# Patient Record
Sex: FEMALE | Race: WHITE | NOT HISPANIC OR LATINO | Employment: OTHER | URBAN - METROPOLITAN AREA
[De-identification: names, ages, dates, MRNs, and addresses within clinical notes are randomized per-mention and may not be internally consistent; named-entity substitution may affect disease eponyms.]

---

## 2019-01-09 ENCOUNTER — OFFICE VISIT (OUTPATIENT)
Dept: GYNECOLOGY | Facility: CLINIC | Age: 69
End: 2019-01-09
Payer: MEDICARE

## 2019-01-09 VITALS — DIASTOLIC BLOOD PRESSURE: 60 MMHG | SYSTOLIC BLOOD PRESSURE: 98 MMHG

## 2019-01-09 DIAGNOSIS — R39.9 URINARY TRACT INFECTION SYMPTOMS: ICD-10-CM

## 2019-01-09 DIAGNOSIS — R63.4 WEIGHT LOSS: ICD-10-CM

## 2019-01-09 DIAGNOSIS — R10.2 PELVIC PAIN IN FEMALE: ICD-10-CM

## 2019-01-09 DIAGNOSIS — R35.0 URINARY FREQUENCY: Primary | ICD-10-CM

## 2019-01-09 PROBLEM — H25.9 AGE-RELATED CATARACT OF BOTH EYES: Status: ACTIVE | Noted: 2018-10-24

## 2019-01-09 PROBLEM — R09.82 POSTNASAL DRIP: Status: ACTIVE | Noted: 2019-01-09

## 2019-01-09 PROBLEM — F33.41 RECURRENT MAJOR DEPRESSIVE DISORDER, IN PARTIAL REMISSION (CMS/HCC): Status: ACTIVE | Noted: 2019-01-09

## 2019-01-09 PROBLEM — M85.80 OSTEOPENIA: Status: ACTIVE | Noted: 2017-07-22

## 2019-01-09 PROBLEM — F41.1 GENERALIZED ANXIETY DISORDER: Status: ACTIVE | Noted: 2019-01-09

## 2019-01-09 PROBLEM — G43.909 MIGRAINE: Status: ACTIVE | Noted: 2019-01-09

## 2019-01-09 PROBLEM — I95.1 ORTHOSTATIC HYPOTENSION: Status: ACTIVE | Noted: 2019-01-09

## 2019-01-09 LAB
BACTERIA, POC: NORMAL
BILIRUBIN, POC: NEGATIVE
BLOOD URINE, POC: NEGATIVE
CLARITY, POC: CLEAR
COLOR, POC: YELLOW
EXPIRATION DATE: NORMAL
GLUCOSE URINE, POC: NEGATIVE
KETONES, POC: NEGATIVE
LEUKOCYTE EST, POC: NORMAL
Lab: NORMAL
NITRITE, POC: NEGATIVE
PH, POC: 5
POCT MANUFACTURER: NORMAL
PROTEIN, POC: NEGATIVE
SPECIFIC GRAVITY, POC: 1
UROBILINOGEN, POC: 0.2

## 2019-01-09 PROCEDURE — 81002 URINALYSIS NONAUTO W/O SCOPE: CPT | Performed by: OBSTETRICS & GYNECOLOGY

## 2019-01-09 PROCEDURE — 87086 URINE CULTURE/COLONY COUNT: CPT | Performed by: OBSTETRICS & GYNECOLOGY

## 2019-01-09 PROCEDURE — 99214 OFFICE O/P EST MOD 30 MIN: CPT | Performed by: OBSTETRICS & GYNECOLOGY

## 2019-01-09 RX ORDER — SUMATRIPTAN SUCCINATE 100 MG/1
100 TABLET ORAL DAILY PRN
COMMUNITY

## 2019-01-09 RX ORDER — ESTRADIOL 0.1 MG/G
CREAM VAGINAL
Qty: 42.5 G | Refills: 3 | Status: ON HOLD | OUTPATIENT
Start: 2019-01-09 | End: 2023-09-09 | Stop reason: ENTERED-IN-ERROR

## 2019-01-09 RX ORDER — SERTRALINE HYDROCHLORIDE 50 MG/1
50 TABLET, FILM COATED ORAL DAILY
COMMUNITY
Start: 2019-01-04 | End: 2020-01-04 | Stop reason: HOSPADM

## 2019-01-09 RX ORDER — CLONAZEPAM 1 MG/1
TABLET ORAL NIGHTLY
Refills: 5 | COMMUNITY
Start: 2019-01-04 | End: 2023-12-11

## 2019-01-09 RX ORDER — ESTRADIOL 0.1 MG/G
CREAM VAGINAL
COMMUNITY
Start: 2017-01-23 | End: 2019-01-09 | Stop reason: SDUPTHER

## 2019-01-09 RX ORDER — CHOLECALCIFEROL (VITAMIN D3) 50 MCG
TABLET ORAL
COMMUNITY
Start: 2014-03-14 | End: 2022-09-09

## 2019-01-09 NOTE — PROGRESS NOTES
Subjective : urinary symptoms    Patient ID: Stephanie Jeffers is a 68 y.o. female.    HPI: New frequency and pelvic pressure for at least a week. No dysuria. No back pain ( has hx of renal calculi). No fever. No self treatment. Sxs unchanged.    Review of Systems:  Const: + fatigue and wgt loss  GI: neg bloating  MS: neg back pain   : see above  Gyn: no bleeding    Past Medical History:   Diagnosis Date   • Hypotension    • Kidney stones    • Migraines        Past Surgical History:   Procedure Laterality Date   • EYE SURGERY      catarac       Objective     Vitals:    01/09/19 1443   BP: 98/60     There is no height or weight on file to calculate BMI.    Physical Exam     Const: wnwd nad, thin ( no change)    Abd: soft, nondistended, no mass, non tender    Pelvic: Small ant mobile uterus, no adnexal mass, fullness or tenderness    Neuro: aaox3    Psych: appropriate mood and affect        Assessment/Plan     Problem List Items Addressed This Visit     Weight loss    Relevant Orders    US PELVIS TRANSABDOMINAL & TRANSVAGINAL    Urinary frequency - Primary    Relevant Orders    US PELVIS TRANSABDOMINAL & TRANSVAGINAL      Other Visit Diagnoses     Urinary tract infection symptoms        Relevant Orders    POCT urinalysis dipstick (Completed)    Pelvic pain in female        Relevant Orders    US PELVIS TRANSABDOMINAL & TRANSVAGINAL        New onset bladder sxs. Check for UTI, doubt, to see urology. Given wgt loss and pressure sxs will also evaluate ovaries.    Return in about 5 months (around 6/9/2019) for annual.    Leila Porter MD

## 2019-01-10 ENCOUNTER — TELEPHONE (OUTPATIENT)
Dept: GYNECOLOGY | Facility: CLINIC | Age: 69
End: 2019-01-10

## 2019-01-10 LAB — BACTERIA UR CULT: NORMAL

## 2019-01-10 NOTE — TELEPHONE ENCOUNTER
Pt would like to discuss the US with you prior to going to urology appt. Pt would like a call back

## 2019-01-15 ENCOUNTER — TRANSCRIBE ORDERS (OUTPATIENT)
Dept: SCHEDULING | Age: 69
End: 2019-01-15

## 2019-01-15 DIAGNOSIS — R10.9 ABDOMINAL PAIN: Primary | ICD-10-CM

## 2019-01-23 ENCOUNTER — HOSPITAL ENCOUNTER (OUTPATIENT)
Dept: RADIOLOGY | Age: 69
Discharge: HOME | End: 2019-01-23
Attending: PHYSICIAN ASSISTANT
Payer: MEDICARE

## 2019-01-23 DIAGNOSIS — R10.9 ABDOMINAL PAIN: ICD-10-CM

## 2019-01-23 PROCEDURE — 74176 CT ABD & PELVIS W/O CONTRAST: CPT

## 2019-06-03 ENCOUNTER — TRANSCRIBE ORDERS (OUTPATIENT)
Dept: SCHEDULING | Age: 69
End: 2019-06-03

## 2019-06-03 DIAGNOSIS — M85.89 OTHER SPECIFIED DISORDERS OF BONE DENSITY AND STRUCTURE, MULTIPLE SITES: Primary | ICD-10-CM

## 2019-06-10 ENCOUNTER — HOSPITAL ENCOUNTER (OUTPATIENT)
Dept: RADIOLOGY | Facility: HOSPITAL | Age: 69
Discharge: HOME | End: 2019-06-10
Attending: INTERNAL MEDICINE
Payer: MEDICARE

## 2019-06-10 ENCOUNTER — TRANSCRIBE ORDERS (OUTPATIENT)
Dept: REGISTRATION | Facility: HOSPITAL | Age: 69
End: 2019-06-10

## 2019-06-10 DIAGNOSIS — M85.89 OTHER SPECIFIED DISORDERS OF BONE DENSITY AND STRUCTURE, MULTIPLE SITES: ICD-10-CM

## 2019-06-10 PROCEDURE — 77080 DXA BONE DENSITY AXIAL: CPT

## 2020-08-26 ENCOUNTER — TRANSCRIBE ORDERS (OUTPATIENT)
Dept: REGISTRATION | Facility: CLINIC | Age: 70
End: 2020-08-26

## 2020-08-26 ENCOUNTER — APPOINTMENT (OUTPATIENT)
Dept: LAB | Facility: CLINIC | Age: 70
End: 2020-08-26
Attending: INTERNAL MEDICINE
Payer: MEDICARE

## 2020-08-26 DIAGNOSIS — R53.81 OTHER MALAISE: Primary | ICD-10-CM

## 2020-08-26 DIAGNOSIS — R53.83 OTHER FATIGUE: ICD-10-CM

## 2020-08-26 DIAGNOSIS — R63.5 ABNORMAL WEIGHT GAIN: ICD-10-CM

## 2020-08-26 DIAGNOSIS — K59.00 CONSTIPATION, UNSPECIFIED: ICD-10-CM

## 2020-08-26 DIAGNOSIS — R53.81 OTHER MALAISE: ICD-10-CM

## 2020-08-26 DIAGNOSIS — L65.9 NONSCARRING HAIR LOSS, UNSPECIFIED: ICD-10-CM

## 2020-08-26 LAB
ALBUMIN SERPL-MCNC: 3.9 G/DL (ref 3.4–5)
ALP SERPL-CCNC: 53 IU/L (ref 35–126)
ALT SERPL-CCNC: 23 IU/L (ref 11–54)
ANION GAP SERPL CALC-SCNC: 11 MEQ/L (ref 3–15)
AST SERPL-CCNC: 29 IU/L (ref 15–41)
BASOPHILS # BLD: 0.01 K/UL (ref 0.01–0.1)
BASOPHILS NFR BLD: 0.1 %
BILIRUB SERPL-MCNC: 0.7 MG/DL (ref 0.3–1.2)
BUN SERPL-MCNC: 17 MG/DL (ref 8–20)
CALCIUM SERPL-MCNC: 9.4 MG/DL (ref 8.9–10.3)
CHLORIDE SERPL-SCNC: 104 MEQ/L (ref 98–109)
CO2 SERPL-SCNC: 23 MEQ/L (ref 22–32)
CREAT SERPL-MCNC: 0.9 MG/DL (ref 0.6–1.1)
DIFFERENTIAL METHOD BLD: ABNORMAL
EOSINOPHIL # BLD: 0.09 K/UL (ref 0.04–0.36)
EOSINOPHIL NFR BLD: 1.3 %
ERYTHROCYTE [DISTWIDTH] IN BLOOD BY AUTOMATED COUNT: 13.8 % (ref 11.7–14.4)
FERRITIN SERPL-MCNC: 31 NG/ML (ref 11–250)
GFR SERPL CREATININE-BSD FRML MDRD: >60 ML/MIN/1.73M*2
GLUCOSE SERPL-MCNC: 83 MG/DL (ref 70–99)
HCT VFR BLDCO AUTO: 40.2 % (ref 35–45)
HGB BLD-MCNC: 13.1 G/DL (ref 11.8–15.7)
IMM GRANULOCYTES # BLD AUTO: 0.02 K/UL (ref 0–0.08)
IMM GRANULOCYTES NFR BLD AUTO: 0.3 %
IRON SATN MFR SERPL: 20 % (ref 15–45)
IRON SERPL-MCNC: 71 UG/DL (ref 35–150)
LYMPHOCYTES # BLD: 2.18 K/UL (ref 1.2–3.5)
LYMPHOCYTES NFR BLD: 32.6 %
MCH RBC QN AUTO: 27.6 PG (ref 28–33.2)
MCHC RBC AUTO-ENTMCNC: 32.6 G/DL (ref 32.2–35.5)
MCV RBC AUTO: 84.6 FL (ref 83–98)
MONOCYTES # BLD: 0.57 K/UL (ref 0.28–0.8)
MONOCYTES NFR BLD: 8.5 %
NEUTROPHILS # BLD: 3.81 K/UL (ref 1.7–7)
NEUTS SEG NFR BLD: 57.2 %
NRBC BLD-RTO: 0 %
PDW BLD AUTO: 10.9 FL (ref 9.4–12.3)
PLATELET # BLD AUTO: 256 K/UL (ref 150–369)
POTASSIUM SERPL-SCNC: 4.1 MEQ/L (ref 3.6–5.1)
PROT SERPL-MCNC: 6.6 G/DL (ref 6–8.2)
RBC # BLD AUTO: 4.75 M/UL (ref 3.93–5.22)
SODIUM SERPL-SCNC: 138 MEQ/L (ref 136–144)
T4 FREE SERPL-MCNC: 1.22 NG/DL (ref 0.58–1.64)
TIBC SERPL-MCNC: 360 UG/DL (ref 270–460)
TSH SERPL DL<=0.05 MIU/L-ACNC: 0.8 MIU/L (ref 0.34–5.6)
UIBC SERPL-MCNC: 289 UG/DL (ref 180–360)
WBC # BLD AUTO: 6.68 K/UL (ref 3.8–10.5)

## 2020-08-26 PROCEDURE — 80053 COMPREHEN METABOLIC PANEL: CPT

## 2020-08-26 PROCEDURE — 83550 IRON BINDING TEST: CPT

## 2020-08-26 PROCEDURE — 85025 COMPLETE CBC W/AUTO DIFF WBC: CPT

## 2020-08-26 PROCEDURE — 84443 ASSAY THYROID STIM HORMONE: CPT

## 2020-08-26 PROCEDURE — 84439 ASSAY OF FREE THYROXINE: CPT

## 2020-08-26 PROCEDURE — 82728 ASSAY OF FERRITIN: CPT

## 2020-08-26 PROCEDURE — 36415 COLL VENOUS BLD VENIPUNCTURE: CPT

## 2020-09-21 ENCOUNTER — APPOINTMENT (OUTPATIENT)
Dept: LAB | Facility: CLINIC | Age: 70
End: 2020-09-21
Attending: INTERNAL MEDICINE
Payer: MEDICARE

## 2020-09-21 ENCOUNTER — TRANSCRIBE ORDERS (OUTPATIENT)
Dept: LAB | Facility: CLINIC | Age: 70
End: 2020-09-21

## 2020-09-21 DIAGNOSIS — E55.9 VITAMIN D DEFICIENCY, UNSPECIFIED: ICD-10-CM

## 2020-09-21 DIAGNOSIS — M81.0 AGE-RELATED OSTEOPOROSIS WITHOUT CURRENT PATHOLOGICAL FRACTURE: Primary | ICD-10-CM

## 2021-04-14 DIAGNOSIS — Z23 ENCOUNTER FOR IMMUNIZATION: ICD-10-CM

## 2021-04-27 ENCOUNTER — APPOINTMENT (OUTPATIENT)
Dept: LAB | Facility: CLINIC | Age: 71
End: 2021-04-27
Attending: INTERNAL MEDICINE
Payer: MEDICARE

## 2021-04-27 ENCOUNTER — TRANSCRIBE ORDERS (OUTPATIENT)
Dept: REGISTRATION | Facility: CLINIC | Age: 71
End: 2021-04-27

## 2021-04-27 DIAGNOSIS — E55.9 VITAMIN D DEFICIENCY, UNSPECIFIED: ICD-10-CM

## 2021-04-27 DIAGNOSIS — M81.0 AGE-RELATED OSTEOPOROSIS WITHOUT CURRENT PATHOLOGICAL FRACTURE: ICD-10-CM

## 2021-04-27 DIAGNOSIS — M81.0 AGE-RELATED OSTEOPOROSIS WITHOUT CURRENT PATHOLOGICAL FRACTURE: Primary | ICD-10-CM

## 2021-04-27 LAB
25(OH)D3 SERPL-MCNC: 33 NG/ML (ref 30–100)
ALBUMIN SERPL-MCNC: 3.9 G/DL (ref 3.4–5)
ALP SERPL-CCNC: 50 IU/L (ref 35–126)
ALT SERPL-CCNC: 21 IU/L (ref 11–54)
ANION GAP SERPL CALC-SCNC: 9 MEQ/L (ref 3–15)
AST SERPL-CCNC: 27 IU/L (ref 15–41)
BILIRUB SERPL-MCNC: 0.8 MG/DL (ref 0.3–1.2)
BUN SERPL-MCNC: 18 MG/DL (ref 8–20)
CALCIUM SERPL-MCNC: 9.7 MG/DL (ref 8.9–10.3)
CHLORIDE SERPL-SCNC: 101 MEQ/L (ref 98–109)
CO2 SERPL-SCNC: 27 MEQ/L (ref 22–32)
CREAT SERPL-MCNC: 0.8 MG/DL (ref 0.6–1.1)
GFR SERPL CREATININE-BSD FRML MDRD: >60 ML/MIN/1.73M*2
GLUCOSE SERPL-MCNC: 88 MG/DL (ref 70–99)
POTASSIUM SERPL-SCNC: 4.5 MEQ/L (ref 3.6–5.1)
PROT SERPL-MCNC: 6.7 G/DL (ref 6–8.2)
SODIUM SERPL-SCNC: 137 MEQ/L (ref 136–144)

## 2021-04-27 PROCEDURE — 80053 COMPREHEN METABOLIC PANEL: CPT

## 2021-04-27 PROCEDURE — 36415 COLL VENOUS BLD VENIPUNCTURE: CPT

## 2021-04-27 PROCEDURE — 82306 VITAMIN D 25 HYDROXY: CPT

## 2021-06-01 ENCOUNTER — TELEPHONE (OUTPATIENT)
Dept: SCHEDULING | Facility: CLINIC | Age: 71
End: 2021-06-01

## 2021-06-01 NOTE — TELEPHONE ENCOUNTER
New Patient Appointment Request    Name of caller: Stephanie Jeffers    Diagnosis: high cholesterol and papulations      Referred by: self    Previous Cardiologist name and phone number: Dr. Bello Emerson   [facility]    Best contact number: [phone]    Additional notes: no appt within timeframe

## 2021-06-07 ENCOUNTER — TELEPHONE (OUTPATIENT)
Dept: ADMISSIONS | Facility: HOSPITAL | Age: 71
End: 2021-06-07
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
## 2021-06-08 ENCOUNTER — TELEMEDICINE (OUTPATIENT)
Dept: PSYCHIATRY | Facility: HOSPITAL | Age: 71
End: 2021-06-08
Payer: MEDICARE

## 2021-06-08 DIAGNOSIS — F33.1 MODERATE EPISODE OF RECURRENT MAJOR DEPRESSIVE DISORDER (CMS/HCC): Primary | ICD-10-CM

## 2021-06-08 PROCEDURE — 90791 PSYCH DIAGNOSTIC EVALUATION: CPT | Mod: 95 | Performed by: SOCIAL WORKER

## 2021-06-08 ASSESSMENT — COGNITIVE AND FUNCTIONAL STATUS - GENERAL
AROUSAL LEVEL: AWAKE
DELUSIONS: NONE OR AGE APPROPRIATE
AFFECT: FULL RANGE;TEARFUL
THOUGHT_CONTENT: APPROPRIATE
EYE_CONTACT: WNL
LIBIDO: NO CHANGE
MOOD: ANXIOUS;DEPRESSED;IRRITABLE
CONCENTRATION: WNL
ATTENTION: WNL
THOUGHT_PROCESS: WNL
PERCEPTUAL FUNCTION: NORMAL
INSIGHT: INTACT
ORIENTATION: FULLY ORIENTED
REMOTE MEMORY: WNL
RECENT MEMORY: WNL
SLEEP_WAKE_CYCLE: NO CHANGE
SPEECH: REGULAR
EST. PREMORBID INTELLIGENCE: ABOVE AVERAGE
APPETITE: NO CHANGE
IMPULSE CONTROL: INTACT
APPEARANCE: WELL GROOMED
PSYCHOMOTOR FUNCTIONING: WNL
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
## 2021-06-10 ENCOUNTER — TELEPHONE (OUTPATIENT)
Dept: PSYCHIATRY | Facility: HOSPITAL | Age: 71
End: 2021-06-10

## 2021-06-25 ENCOUNTER — TELEMEDICINE (OUTPATIENT)
Dept: PSYCHIATRY | Facility: HOSPITAL | Age: 71
End: 2021-06-25
Attending: SOCIAL WORKER
Payer: MEDICARE

## 2021-06-25 DIAGNOSIS — F33.1 MODERATE EPISODE OF RECURRENT MAJOR DEPRESSIVE DISORDER (CMS/HCC): Primary | ICD-10-CM

## 2021-06-25 PROCEDURE — 90853 GROUP PSYCHOTHERAPY: CPT | Mod: 95 | Performed by: SOCIAL WORKER

## 2021-06-25 ASSESSMENT — COGNITIVE AND FUNCTIONAL STATUS - GENERAL
APPEARANCE: WELL GROOMED
THOUGHT_PROCESS: WNL
AFFECT: FULL RANGE
MOOD: ANXIOUS;DEPRESSED;FRUSTRATED
EYE_CONTACT: WNL
JUDGEMENT: GOOD
PSYCHOMOTOR FUNCTIONING: WNL
INSIGHT: INTACT

## 2021-06-25 NOTE — GROUP NOTE
Date:  6/25/2021  Start Time:   9:00 AM  End Time:  10:30 AM    Stephanie Jeffers, YOB: 1950,  was an active group participant.     Request for Consent  Patient provided verbal consent to treat via telemedicine. Clinician introduced the secure telemedicine platform that we are utilizing to provide care during the COVID-19 pandemic. Patient understands the session will be billed to their insurance or patient directly.  Patient was informed only the group patients  and the clinician are permitted on?the video conference, sessions are not recorded by the clinician, and the patient is not permitted to record the session.? Patient was provided clinician's unique meeting ID prior to session, patient was asked to arrive to virtual session on time just as patient would if we were in the office. Clinician confirmed identification of patient by name and birthdate, provider name, location of patient and clinician, and callback number in case disconnected. Patient consents to behavioral health treatment    Patient Response to Request for Consent: Yes  Visit Type performed: Audio and Video    Group Focus:  Goal of group was to establish and build social support, review coping skills, normalize the struggles of being human, and increase self awareness and self compassion.     About the Group: 11 members attended group today. Group code of conduct was reviewed and members were instructed to sign in for their attendance. Clinician built group rapport by reminding everyone even if they do not know each other all members present are there due to common experiences and emotions.  Members were informed about group being offered between both locations of the [facility] and were encouraged to sign up for upcoming groups. Brief introductions including name, and what the member needed from group tonight.  Group engaged in active and supportive discussion. Topics discussed included managing life changes, specifically divorce,  "challenges with connection and isolation, maintaining authentic relationships, and increasing resilience.   Group members were able to offer insightful and supportive feedback and suggestions about how to manage difficult situations. Group members were provided with an article/resource: \"Road to Resilience\", [employer].  Group members were encouraged to prioritize their self care and well being and to reach out to the [facility] with any needs.        Mental Status:  Findings  Mood: Anxious, Depressed, Frustrated  Affect: Full Range  Rapport: Attentive  Eye Contact: WNL  Appearance: Well Groomed  Psychomotor Functioning: WNL  Thought Process: WNL  Positive Involvement: Emotional, Open, Empathetic, Easilty Engaged, Interested, Relevant  Judgment: Good  Insight: Intact    Patients reaction:  Pt was present for first group session. Pt shared with the group she is seeking support due to increased anger and sadness over her daughter cutting off communication and not allowing pt to see her two grandchildren. Pt processed with the group that this has been so painful as she is not sure the reasoning behind her daughter's decision and is unsure how it can be mended at this time. PT was open and supportive of other group members and receptive of feedback. Pt shared ways in which she is working on increasing in connecting with others, but acknowledging her limits as well.     Visit Diagnosis:      ICD-10-CM ICD-9-CM   1. Moderate episode of recurrent major depressive disorder (CMS/HCC)  F33.1 296.32       Plan:  F/U with Biweekly  group with LCSW.   Pt to F/U with treatment goals as outlined in treatment plan.  Pt to F/U with local ED/call 911 should SI/HI arises.   Sammi Conte LCSW    "

## 2021-07-11 NOTE — PROGRESS NOTES
Antonio Price MD  Cardiology    [facility]    [facility]  [address]  [address] [place] [address]    TEL  [phone]  [facility]     07/13/21     Dear Dr. Wang:    It was my pleasure to see Ms. Stephanie Jeffers at the [facility] today.  She was referred for the evaluation of hypercholesterolemia.    As you know, she is a 70 y.o. female with a history of familial hypercholesterolemia.    She was previously followed by Dr. Mccray at [facility] and was diagnosed with familial hypercholesterolemia. LDL was as high as 225 mg/dL in the past. She had LFT abnormalities with Lipitor. Crestor caused myalgias and nightmares. Similar side effects occurred with ezetimibe, Lescol and lovastatin. She was seen by my colleague, Dr. Rainey, in 2017 for chest pain. Coronary vasospasm was considered. A stress echo was obtained, as outlined below.    She was seen more recently by Dr. Emerson at [facility] for management of her FH and dyspnea on exertion.  He was not convinced that she had clinical angina. She was started on twice weekly simvastatin. She was also ordered for a stress echo.  The latter was not obtained due to the pandemic.  She experienced the same myalgias with simvastatin and discontinue therapy.    She believes she had a coronary calcium scan many years ago with some calcium.  Reportedly, she had a CT scan of her abdomen and pelvis which showed mild calcification of her intra-abdominal arteries.  She has never had advanced lipid panel.    Today, she has no specific concerns or complaints.  She has occasional chest discomfort and palpitations when under high stress.  Outside of these circumstances, she has no cardiopulmonary symptoms.  She is able to walk and perform other exercises without any cardiopulmonary symptoms.  No change in exercise tolerance.  She has occasional lightheadedness with position changes.  This is unchanged for  many years.  She tells me that her cholesterol has always been high, despite an excellent lifestyle.  She exercises at least 4 times per week.  She follows a low saturated fat diet.  Her weight has been stable.    She has no known history of clinical coronary artery disease, myocardial infarction, or congestive heart failure.  No preeclampsia or gestational diabetes with her children.  She reports being treated for hepatitis B virus in the past.  Otherwise, no liver disease.  No renal disease. She has never been treated for hypertension and she is not diabetic.     She had a lipid panel in August of 2017. At that time her total cholesterol was 266 mg/dL, triglycerides 125 mg/dL, HDL 84 mg/dL with a calculated LDL of 157 mg/dL. Her liver transaminases were normal.      Cardiovascular History:  1. Familial hypercholesterolemia  2. History of HBV  3. Relative hypotension    Past Medical History: Migraines    Past Surgical History:  Past Surgical History:   Procedure Laterality Date   • EYE SURGERY      catarac       Medications:  Current Outpatient Medications   Medication Sig Dispense Refill   • cholecalciferol, vitamin D3, 2,000 unit tablet Take by mouth.     • clonazePAM (klonoPIN) 1 mg tablet Take by mouth nightly.    5   • escitalopram (LEXAPRO) 10 mg tablet Take 10 mg by mouth daily. Pt will increase to 15 mg as of today per PCP     • estradiol (ESTRACE) 0.01 % (0.1 mg/gram) vaginal cream 1 gm vaginally twice a week 42.5 g 3   • SUMAtriptan (IMITREX) 100 mg tablet Take by mouth as needed.       • zoledronic acid/mannitol-water (RECLAST IV) Infuse into a venous catheter. Once per year       No current facility-administered medications for this visit.       Allergies: Patient has no known allergies.    Social History: She is .  She has 2 children.  Retired .  She is a never smoker.  Approximately 3 glasses of wine per week.  No recreational drugs.    Family History: She has a  sister who has tetralogy of Fallot. Her brother has no documented coronary artery disease. Her mother  at a young age from complications of lung cancer.  She had hypercholesterolemia.  Her father lived to be 92.  He had a stroke late in life and also had hypercholesterolemia.  No family history of premature coronary artery disease, arrhythmias, cardiomyopathies, or sudden cardiac death.    Review of Systems: A complete 14-point review of systems is negative, except as noted in the HPI.    Exam:  Objective   Vitals:    21 1430   BP: 92/64   Pulse: 70   SpO2: 99%     Body mass index is 19.74 kg/m².  Constitutional: Appears comfortable.   Eyes: No icterus.  No corneal arcus.  ENT: Deferred. Patient wearing a mask.   Neck: No jugular venous distention. Supple, normal range of motion.  Vascular: No carotid bruits. Radial pulses intact and equal.  Cardiac: Normal S1 and S2, regular rhythm. No murmurs, rubs, or gallops appreciated.  Lungs: Clear to auscultation bilaterally.  No wheezing.  GI: Soft, nontender, normoactive bowel sounds.  Extremities: Warm. No lower extremity edema.   Skin: Dry. No jaundice.  No xanthelasmas.  Musculoskeletal: No joint swelling or erythema.   Neurologic: Awake, alert, oriented.  Moving all extremities.  Psychiatric: No agitation.    Labs: Personally reviewed and discussed with the patient.  Notable for the following.   Lab Results   Component Value Date     2021    K 4.5 2021    BUN 18 2021    CREATININE 0.8 2021    WBC 6.68 2020    HGB 13.1 2020     2020- , TG 82, ,     Cardiovascular Studies:   1. Stress echo, 2017: No ischemia. Excellent exercise tolerance. Baseline echo- Normal wall motion and excursion. 1+ MR and TR. RVSP 13 mmHg.     ECG from today personally reviewed and discussed with the patient shows sinus rhythm, within normal limits.  Compared with tracing from 2017, there are no  significant changes.    Assessment/Plan   Problem List Items Addressed This Visit        Nervous    Chest discomfort     She has a longstanding history of chest discomfort when under emotional stress.  Her symptoms are unchanged over several years.  She has no symptoms with physical activity.  Her ECG is nonischemic.  She had a normal stress echocardiogram in 2017 and her symptoms have not changed significantly since that time.  I do not suspect that her symptoms are related to cardiac ischemia.  With that said, she knows to contact me should her symptoms progress.         Relevant Orders    ECG 12 LEAD-OFFICE PERFORMED (Completed)       Circulatory    Orthostatic hypotension     She has a longstanding history of hypotension.  She will continue to stay well-hydrated and avoid quick position changes.            Endocrine/Metabolic    Familial hypercholesterolemia - Jaymie Brown was diagnosed with familial hypercholesterolemia several years ago.  I suspect that this diagnosis was based primarily on her very high LDL cholesterol (values over 200 mg/dL).  She is intolerant to multiple statins including both high intensity statins.  Her diet and exercise regimen are quite good.  She has no other significant, modifiable cardiovascular risk factors.  We do not have a recent lipid panel available for review.    I recommended that we proceed with a coronary artery calcium scan and an advance lipid panel to further refine her cardiovascular risk.  Should pharmacotherapy be warranted, which is likely, we will need to proceed with PCSK9 inhibitor therapy given her intolerance to statins and ezetimibe.  This was discussed today.         Relevant Orders    CT HEART CORONARY ARTERY CALCIUM SCORE WITHOUT IV CONTRAST        It was my pleasure to visit with Stephanie Jeffers in clinic today.  I will contact her to discuss the results of her testing.  We will decide on next steps and follow-up interval at that time.  Please do  not hesitate to contact me with any questions.      Sincerely,         ________________  Antonio Price MD    I spent 48 minutes on this date of service performing the following activities: obtaining history, performing examination, entering orders, documenting, preparing for visit, obtaining / reviewing records, providing counseling and education, communicating results and coordinating care.

## 2021-07-13 ENCOUNTER — OFFICE VISIT (OUTPATIENT)
Dept: CARDIOLOGY | Facility: CLINIC | Age: 71
End: 2021-07-13
Payer: MEDICARE

## 2021-07-13 VITALS
HEART RATE: 70 BPM | OXYGEN SATURATION: 99 % | SYSTOLIC BLOOD PRESSURE: 92 MMHG | BODY MASS INDEX: 19.63 KG/M2 | WEIGHT: 115 LBS | DIASTOLIC BLOOD PRESSURE: 64 MMHG | HEIGHT: 64 IN

## 2021-07-13 DIAGNOSIS — R07.89 CHEST DISCOMFORT: ICD-10-CM

## 2021-07-13 DIAGNOSIS — E78.01 FAMILIAL HYPERCHOLESTEROLEMIA: Primary | ICD-10-CM

## 2021-07-13 DIAGNOSIS — I95.1 ORTHOSTATIC HYPOTENSION: ICD-10-CM

## 2021-07-13 PROCEDURE — 93000 ELECTROCARDIOGRAM COMPLETE: CPT | Performed by: INTERNAL MEDICINE

## 2021-07-13 PROCEDURE — 99204 OFFICE O/P NEW MOD 45 MIN: CPT | Performed by: INTERNAL MEDICINE

## 2021-07-13 RX ORDER — ESCITALOPRAM OXALATE 10 MG/1
10 TABLET ORAL DAILY
COMMUNITY
End: 2022-03-08

## 2021-07-13 NOTE — ASSESSMENT & PLAN NOTE
She has a longstanding history of chest discomfort when under emotional stress.  Her symptoms are unchanged over several years.  She has no symptoms with physical activity.  Her ECG is nonischemic.  She had a normal stress echocardiogram in 2017 and her symptoms have not changed significantly since that time.  I do not suspect that her symptoms are related to cardiac ischemia.  With that said, she knows to contact me should her symptoms progress.

## 2021-07-13 NOTE — ASSESSMENT & PLAN NOTE
Stephanie was diagnosed with familial hypercholesterolemia several years ago.  I suspect that this diagnosis was based primarily on her very high LDL cholesterol (values over 200 mg/dL).  She is intolerant to multiple statins including both high intensity statins.  Her diet and exercise regimen are quite good.  She has no other significant, modifiable cardiovascular risk factors.  We do not have a recent lipid panel available for review.    I recommended that we proceed with a coronary artery calcium scan and an advance lipid panel to further refine her cardiovascular risk.  Should pharmacotherapy be warranted, which is likely, we will need to proceed with PCSK9 inhibitor therapy given her intolerance to statins and ezetimibe.  This was discussed today.

## 2021-07-13 NOTE — ASSESSMENT & PLAN NOTE
She has a longstanding history of hypotension.  She will continue to stay well-hydrated and avoid quick position changes.

## 2021-07-16 ENCOUNTER — TELEPHONE (OUTPATIENT)
Dept: CARDIOLOGY | Facility: CLINIC | Age: 71
End: 2021-07-16

## 2021-07-16 ENCOUNTER — TELEPHONE (OUTPATIENT)
Dept: SCHEDULING | Facility: CLINIC | Age: 71
End: 2021-07-16

## 2021-07-16 NOTE — TELEPHONE ENCOUNTER
----- Message from Antonio Price MD sent at 7/13/2021  5:22 PM EDT -----  Can you please contact this patient and set up a [facility] for the next few weeks?  Thank you.

## 2021-07-16 NOTE — TELEPHONE ENCOUNTER
Pt called stating she was advised she would be hearing from Dr Bauer office about setting up advanced lipid panel.    Pt can be reached at [phone].

## 2021-07-19 ENCOUNTER — HOSPITAL ENCOUNTER (OUTPATIENT)
Dept: RADIOLOGY | Facility: HOSPITAL | Age: 71
Discharge: HOME | End: 2021-07-19
Attending: INTERNAL MEDICINE
Payer: MEDICARE

## 2021-07-19 DIAGNOSIS — E78.01 FAMILIAL HYPERCHOLESTEROLEMIA: ICD-10-CM

## 2021-07-19 PROCEDURE — 75571 CT HRT W/O DYE W/CA TEST: CPT

## 2021-07-20 ENCOUNTER — TELEPHONE (OUTPATIENT)
Dept: SCHEDULING | Facility: CLINIC | Age: 71
End: 2021-07-20

## 2021-07-20 NOTE — TELEPHONE ENCOUNTER
Patient is calling to get the results of her CT calcium score completed yesterday.    Pt can be reached at: [phone]

## 2021-07-20 NOTE — TELEPHONE ENCOUNTER
. She is getting [facility] soon. Have her make an appt in a month in person or telemed? To discuss further . Intolerant to statins, Zetia, baseline LDL above 200

## 2021-07-20 NOTE — TELEPHONE ENCOUNTER
Yes.  Can we please set her up for a telemed appointment in 1 month?  I spoke with her and she agrees with this plan.  Thanks all.

## 2021-07-26 NOTE — TELEPHONE ENCOUNTER
Patient is scheduled for telemed 8/27. Patient also inquired about advanced lab work she was suppose to receive in the mail. Due to the delay she is unable to receive medication.

## 2021-07-26 NOTE — TELEPHONE ENCOUNTER
Pt calling to speak to Dr Price in regards to advanced lipid test. She is unsure exactly what it is and what she should be looking out for in the mail. She would like a call back from Dr Price to get this clarified.    Pt can be reached at [phone]

## 2021-07-26 NOTE — TELEPHONE ENCOUNTER
Pt calling back to verify she does need a new [facility].  Pt requests it be mailed out as soon as possible.

## 2021-07-26 NOTE — TELEPHONE ENCOUNTER
Sent out a [facility] packet in the [place] mail last week. We can send out another one.

## 2021-07-28 ENCOUNTER — APPOINTMENT (OUTPATIENT)
Dept: LAB | Facility: CLINIC | Age: 71
End: 2021-07-28
Attending: INTERNAL MEDICINE
Payer: MEDICARE

## 2021-07-28 DIAGNOSIS — E78.5 HYPERLIPIDEMIA, UNSPECIFIED: ICD-10-CM

## 2021-07-28 PROCEDURE — 30099997 SPECIMEN PROCESSING

## 2021-08-17 ENCOUNTER — DOCUMENTATION (OUTPATIENT)
Dept: CARDIOLOGY | Facility: CLINIC | Age: 71
End: 2021-08-17

## 2021-08-18 NOTE — TELEPHONE ENCOUNTER
Pt calling to see if [facility] results were received. Informed her they were. She is wondering if Dr Price has any earlier appt dates that she can be seen. She states it does not have to be telemed, it can be in person.    Pt can be reached at [phone]

## 2021-08-25 ENCOUNTER — TRANSCRIBE ORDERS (OUTPATIENT)
Dept: SCHEDULING | Age: 71
End: 2021-08-25

## 2021-08-25 DIAGNOSIS — M81.0 AGE-RELATED OSTEOPOROSIS WITHOUT CURRENT PATHOLOGICAL FRACTURE: Primary | ICD-10-CM

## 2021-08-26 ENCOUNTER — HOSPITAL ENCOUNTER (OUTPATIENT)
Dept: RADIOLOGY | Facility: HOSPITAL | Age: 71
Discharge: HOME | End: 2021-08-26
Attending: INTERNAL MEDICINE
Payer: MEDICARE

## 2021-08-26 DIAGNOSIS — M81.0 AGE-RELATED OSTEOPOROSIS WITHOUT CURRENT PATHOLOGICAL FRACTURE: ICD-10-CM

## 2021-08-26 PROCEDURE — 77080 DXA BONE DENSITY AXIAL: CPT

## 2021-08-27 ENCOUNTER — TELEMEDICINE (OUTPATIENT)
Dept: CARDIOLOGY | Facility: CLINIC | Age: 71
End: 2021-08-27
Payer: MEDICARE

## 2021-08-27 ENCOUNTER — TELEPHONE (OUTPATIENT)
Dept: SCHEDULING | Facility: CLINIC | Age: 71
End: 2021-08-27

## 2021-08-27 DIAGNOSIS — R07.89 CHEST DISCOMFORT: ICD-10-CM

## 2021-08-27 DIAGNOSIS — E78.41 ELEVATED LIPOPROTEIN(A): ICD-10-CM

## 2021-08-27 DIAGNOSIS — R93.1 ELEVATED CORONARY ARTERY CALCIUM SCORE: ICD-10-CM

## 2021-08-27 DIAGNOSIS — E78.01 FAMILIAL HYPERCHOLESTEROLEMIA: Primary | ICD-10-CM

## 2021-08-27 DIAGNOSIS — I95.1 ORTHOSTATIC HYPOTENSION: ICD-10-CM

## 2021-08-27 PROCEDURE — 99443 PR PHYS/QHP TELEPHONE EVALUATION 21-30 MIN: CPT | Mod: 95 | Performed by: INTERNAL MEDICINE

## 2021-08-27 RX ORDER — ASPIRIN 81 MG/1
81 TABLET ORAL DAILY
Qty: 90 TABLET | Refills: 1
Start: 2021-08-27 | End: 2022-05-31

## 2021-08-27 RX ORDER — EVOLOCUMAB 140 MG/ML
140 INJECTION, SOLUTION SUBCUTANEOUS
Qty: 1 ML | Refills: 3 | Status: SHIPPED | OUTPATIENT
Start: 2021-08-27 | End: 2021-10-14 | Stop reason: SDUPTHER

## 2021-08-27 NOTE — ASSESSMENT & PLAN NOTE
She has a longstanding history of chest discomfort when under emotional stress.  Her symptoms are unchanged over several years.  No recent symptoms.  She knows to contact me should her symptoms progress.

## 2021-08-27 NOTE — PROGRESS NOTES
Antonio Price MD  Cardiology    [facility]    [facility]  [facility] [address]  [address] [place] [address]    TEL  [phone]  [facility]     08/27/21     Request for Consent:  You and I are about to have a telemedicine check-in or visit. This is allowed because you are already my patient, and you have requested it.  This telemedicine visit will be billed to your health insurance or you, if you are self-insured.  You understand you will be responsible for any copayments or coinsurances that apply to your telemedicine visit.  Before starting our telemedicine visit, I am required to get your consent for this virtual check-in or visit by telemedicine. Do you consent?      Patient Response to Request for Consent: Yes    The following have been reviewed and updated as appropriate in this visit:  Past medical history, problem list, medications, allergies, labs, radiographic and cardiovascular studies as available.  A video telemedicine visit was attempted, but due to technical difficulties, the visit was audio only.    Dear Dr. Wang:    It was my pleasure to speak with Ms. Stephanie Jeffers during a telemedicine today.     As you know, she is a 70 y.o. female with a history of familial hypercholesterolemia.    By way of background, she was previously followed by Dr. Mccray at [facility] and was diagnosed with familial hypercholesterolemia. LDL was as high as 225 mg/dL in the past. She had LFT abnormalities with Lipitor. Crestor caused myalgias and nightmares. Similar side effects occurred with ezetimibe, Lescol and lovastatin. She was seen by my colleague, Dr. Rainey, in 2017 for chest pain. Coronary vasospasm was considered. A stress echo was obtained, as outlined below. She was seen more recently by Dr. Emerson at [facility] for management of her FH and dyspnea on exertion.  He was not convinced that she had clinical angina. She was started on twice weekly  simvastatin. She was also ordered for a stress echo.  The latter was not obtained due to the pandemic.  She experienced the same myalgias with simvastatin and discontinue therapy.    Following her initial visit with me, in advance lipid panel and coronary artery calcium scan were ordered.  Much of today's visit was spent discussing the results, which are outlined below.    She continues to feel well overall and has no specific concerns or complaints today.  She denies any cardiopulmonary symptoms.  Her chest discomfort is rare and only occurs in the setting of high stress situations.  She remains quite active otherwise without any cardiopulmonary symptoms.  Her diet remains quite good.    Cardiovascular History:  1.  Familial hypercholesterolemia  2.  Elevated lipoprotein(a)  3.  Elevated coronary calcium score  4.  Orthostatic hypotension    Past Medical History: Migraines, history of HBV    Past Surgical History:  Past Surgical History:   Procedure Laterality Date   • EYE SURGERY      catarac     Medications:  Current Outpatient Medications   Medication Sig Dispense Refill   • aspirin 81 mg enteric coated tablet Take 1 tablet (81 mg total) by mouth daily. 90 tablet 1   • cholecalciferol, vitamin D3, 2,000 unit tablet Take by mouth.     • clonazePAM (klonoPIN) 1 mg tablet Take by mouth nightly.    5   • escitalopram (LEXAPRO) 10 mg tablet Take 10 mg by mouth daily. Pt will increase to 15 mg as of today per PCP     • estradiol (ESTRACE) 0.01 % (0.1 mg/gram) vaginal cream 1 gm vaginally twice a week 42.5 g 3   • evolocumab (REPATHA SURECLICK) 140 mg/mL pen Inject 1 mL (140 mg total) under the skin every 14 (fourteen) days. 1 mL 3   • SUMAtriptan (IMITREX) 100 mg tablet Take by mouth as needed.       • zoledronic acid/mannitol-water (RECLAST IV) Infuse into a venous catheter. Once per year       No current facility-administered medications for this visit.       Allergies: Patient has no known allergies.    Social  History: She is .  She has 2 children.  Retired .  She is a never smoker.  Approximately 3 glasses of wine per week.  No recreational drugs.    Family History: She has a sister who has tetralogy of Fallot. Her brother has no documented coronary artery disease. Her mother  at a young age from complications of lung cancer.  She had hypercholesterolemia.  Her father lived to be 92.  He had a stroke late in life and also had hypercholesterolemia.  No family history of premature coronary artery disease, arrhythmias, cardiomyopathies, or sudden cardiac death.    Review of Systems: A complete 14-point review of systems is negative, except as noted in the HPI.    Exam: Not obtained; telemedicine visit.  Exam from her last in person visit outlined below.  Objective   There were no vitals filed for this visit.  There is no height or weight on file to calculate BMI.  Constitutional: Appears comfortable.   Eyes: No icterus.  No corneal arcus.  ENT: Deferred. Patient wearing a mask.   Neck: No jugular venous distention. Supple, normal range of motion.  Vascular: No carotid bruits. Radial pulses intact and equal.  Cardiac: Normal S1 and S2, regular rhythm. No murmurs, rubs, or gallops appreciated.  Lungs: Clear to auscultation bilaterally.  No wheezing.  GI: Soft, nontender, normoactive bowel sounds.  Extremities: Warm. No lower extremity edema.   Skin: Dry. No jaundice.  No xanthelasmas.  Musculoskeletal: No joint swelling or erythema.   Neurologic: Awake, alert, oriented.  Moving all extremities.  Psychiatric: No agitation.    Labs: Personally reviewed and discussed with the patient.  Notable for the following.   Lab Results   Component Value Date     2021    K 4.5 2021    BUN 18 2021    CREATININE 0.8 2021    WBC 6.68 2020    HGB 13.1 2020     2020- , TG 82, ,     Advanced lipid panel, 2021:  , TG  60, ,   APO B 146  Lipoprotein(a) 204 mg/dL  High-sensitivity CRP 0.8, Lp-PLA2 126  Hemoglobin A1c 5.6%  LDL-P 1829, LDL size 21.3  Omega-3 fatty acid index 4.3    Cardiovascular Studies:   1. Stress echo, 1/2017: No ischemia. Excellent exercise tolerance. Baseline echo- Normal wall motion and excursion. 1+ MR and TR. RVSP 13 mmHg.   2.  CAC, 7/2021: Composite 120 (left main 1, LAD 51, RCA 68).  75-90th percentile.  Mild-moderate atherosclerotic calcification of the aorta.  Otherwise unremarkable noncardiac findings.    ECG not obtained given telemedicine visit.    Assessment/Plan   Problem List Items Addressed This Visit        Nervous    Chest discomfort     She has a longstanding history of chest discomfort when under emotional stress.  Her symptoms are unchanged over several years.  No recent symptoms.  She knows to contact me should her symptoms progress.            Circulatory    Orthostatic hypotension     She has a longstanding history of hypotension.  She will continue to stay well-hydrated and avoid quick position changes.         Elevated coronary artery calcium score     We discussed the prognostic significance of her coronary artery calcium score at length today.  She has had no new anginal symptoms.  She has no history of bleeding issues.  I therefore recommended that she start aspirin 81 mg daily.  She will notify me if she develops any bleeding.  Lipid management as below.         Relevant Medications    evolocumab (REPATHA SURECLICK) 140 mg/mL pen    aspirin 81 mg enteric coated tablet       Endocrine/Metabolic    Familial hypercholesterolemia - Primary     Stephanie was diagnosed with familial hypercholesterolemia several years ago.  I suspect that this diagnosis was based primarily on her very high LDL cholesterol (values over 200 mg/dL).  She is intolerant to multiple statins including both high intensity statins.  Her diet and exercise regimen are quite good.  She has no other  significant, modifiable cardiovascular risk factors.  She was found to have an elevated coronary calcium score.  Furthermore, her advanced lipid panel revealed an LDL of 178 and elevated lipoprotein(a).     We discussed that given her clinical diagnosis of FH with additional risk markers (elevated lipoprotein a), aggressive lipid-lowering is required.  I would like her LDL to be less than 100 mg/dL and ideally less than 70 mg/dL.  She is intolerant to statins and therefore we discussed starting PCSK9 inhibitor therapy.  The mechanism of action and potential side effects were reviewed in detail.  After a discussion, the decision was made to move forward.  Our office will contact her following the insurance approval process.  We will arrange for a nursing visit where she will learn to administer the medication.  We will plan for a repeat lipid panel 8 weeks after starting therapy.             Relevant Medications    evolocumab (REPATHA SURECLICK) 140 mg/mL pen    aspirin 81 mg enteric coated tablet    Other Relevant Orders    Lipid panel    Elevated lipoprotein(a)     We discussed that this is an independent risk factor for cardiovascular disease.  Aggressive lipid lowering is required, as discussed above.  PCSK9 inhibitors have been shown to mildly reduce lipoprotein(a) levels.  I recommended that all first-degree family members not only have screening lipid panels obtained, but also a screening lipoprotein(a).          Relevant Medications    evolocumab (REPATHA SURECLICK) 140 mg/mL pen    aspirin 81 mg enteric coated tablet          It was my pleasure to speak with Stephanie today.  She will follow up with me in 4 months.  Please do not hesitate to contact me with any questions.      Sincerely,         ________________  Antonio Price MD    Time Spent in Medical Discussion During This Encounter:  This visit was a telemedicine visit. The patient was informed of the limitations of this type of visit (no EKG or physical  exam). The patient was also informed that their insurance carrier will be billed for the visit. The patient verbalized their understanding and agreed to proceed.   Total time for this visit was 30 minutes minutes and was comprised of: Chart preparation and review, performing the telemedicine visit, processing orders, and completing documentation.

## 2021-08-27 NOTE — ASSESSMENT & PLAN NOTE
Stephanie was diagnosed with familial hypercholesterolemia several years ago.  I suspect that this diagnosis was based primarily on her very high LDL cholesterol (values over 200 mg/dL).  She is intolerant to multiple statins including both high intensity statins.  Her diet and exercise regimen are quite good.  She has no other significant, modifiable cardiovascular risk factors.  She was found to have an elevated coronary calcium score.  Furthermore, her advanced lipid panel revealed an LDL of 178 and elevated lipoprotein(a).     We discussed that given her clinical diagnosis of FH with additional risk markers (elevated lipoprotein a), aggressive lipid-lowering is required.  I would like her LDL to be less than 100 mg/dL and ideally less than 70 mg/dL.  She is intolerant to statins and therefore we discussed starting PCSK9 inhibitor therapy.  The mechanism of action and potential side effects were reviewed in detail.  After a discussion, the decision was made to move forward.  Our office will contact her following the insurance approval process.  We will arrange for a nursing visit where she will learn to administer the medication.  We will plan for a repeat lipid panel 8 weeks after starting therapy.

## 2021-08-27 NOTE — TELEPHONE ENCOUNTER
Pt called following up from her appt today with Dr Price. She had some concerns regarding treatment they were going to move forward with and wanted to discuss that with Dr Price or a nurse.    Pt can be reached at [phone]

## 2021-08-27 NOTE — ASSESSMENT & PLAN NOTE
We discussed that this is an independent risk factor for cardiovascular disease.  Aggressive lipid lowering is required, as discussed above.  PCSK9 inhibitors have been shown to mildly reduce lipoprotein(a) levels.  I recommended that all first-degree family members not only have screening lipid panels obtained, but also a screening lipoprotein(a).

## 2021-08-27 NOTE — ASSESSMENT & PLAN NOTE
We discussed the prognostic significance of her coronary artery calcium score at length today.  She has had no new anginal symptoms.  She has no history of bleeding issues.  I therefore recommended that she start aspirin 81 mg daily.  She will notify me if she develops any bleeding.  Lipid management as below.

## 2021-09-15 ENCOUNTER — TRANSCRIBE ORDERS (OUTPATIENT)
Dept: LAB | Facility: CLINIC | Age: 71
End: 2021-09-15
Payer: MEDICARE

## 2021-09-15 ENCOUNTER — APPOINTMENT (OUTPATIENT)
Dept: LAB | Facility: CLINIC | Age: 71
End: 2021-09-15
Attending: SURGERY
Payer: MEDICARE

## 2021-09-15 DIAGNOSIS — Z41.1 ENCOUNTER FOR COSMETIC SURGERY: ICD-10-CM

## 2021-09-15 DIAGNOSIS — Z41.1 ENCOUNTER FOR COSMETIC SURGERY: Primary | ICD-10-CM

## 2021-09-15 LAB
ALBUMIN SERPL-MCNC: 3.9 G/DL (ref 3.4–5)
ALP SERPL-CCNC: 44 IU/L (ref 35–126)
ALT SERPL-CCNC: 24 IU/L (ref 11–54)
ANION GAP SERPL CALC-SCNC: 7 MEQ/L (ref 3–15)
APTT PPP: 33 SEC (ref 23–35)
AST SERPL-CCNC: 31 IU/L (ref 15–41)
BILIRUB SERPL-MCNC: 0.6 MG/DL (ref 0.3–1.2)
BUN SERPL-MCNC: 15 MG/DL (ref 8–20)
CALCIUM SERPL-MCNC: 9.7 MG/DL (ref 8.9–10.3)
CHLORIDE SERPL-SCNC: 106 MEQ/L (ref 98–109)
CO2 SERPL-SCNC: 26 MEQ/L (ref 22–32)
CREAT SERPL-MCNC: 0.8 MG/DL (ref 0.6–1.1)
ERYTHROCYTE [DISTWIDTH] IN BLOOD BY AUTOMATED COUNT: 14.4 % (ref 11.7–14.4)
GFR SERPL CREATININE-BSD FRML MDRD: >60 ML/MIN/1.73M*2
GLUCOSE SERPL-MCNC: 85 MG/DL (ref 70–99)
HCT VFR BLDCO AUTO: 41.3 % (ref 35–45)
HGB BLD-MCNC: 13.1 G/DL (ref 11.8–15.7)
INR PPP: 1
MCH RBC QN AUTO: 27.1 PG (ref 28–33.2)
MCHC RBC AUTO-ENTMCNC: 31.7 G/DL (ref 32.2–35.5)
MCV RBC AUTO: 85.3 FL (ref 83–98)
PDW BLD AUTO: 11.1 FL (ref 9.4–12.3)
PLATELET # BLD AUTO: 249 K/UL (ref 150–369)
POTASSIUM SERPL-SCNC: 4.4 MEQ/L (ref 3.6–5.1)
PROT SERPL-MCNC: 6.3 G/DL (ref 6–8.2)
PROTHROMBIN TIME: 12.5 SEC (ref 12.2–14.5)
RBC # BLD AUTO: 4.84 M/UL (ref 3.93–5.22)
SODIUM SERPL-SCNC: 139 MEQ/L (ref 136–144)
WBC # BLD AUTO: 5.91 K/UL (ref 3.8–10.5)

## 2021-09-15 PROCEDURE — 85027 COMPLETE CBC AUTOMATED: CPT | Mod: GZ

## 2021-09-15 PROCEDURE — 85730 THROMBOPLASTIN TIME PARTIAL: CPT

## 2021-09-15 PROCEDURE — 36415 COLL VENOUS BLD VENIPUNCTURE: CPT

## 2021-09-15 PROCEDURE — 80053 COMPREHEN METABOLIC PANEL: CPT

## 2021-09-15 PROCEDURE — 85610 PROTHROMBIN TIME: CPT | Mod: GZ

## 2021-09-17 ENCOUNTER — TELEPHONE (OUTPATIENT)
Dept: CARDIOLOGY | Facility: CLINIC | Age: 71
End: 2021-09-17

## 2021-09-17 NOTE — TELEPHONE ENCOUNTER
Pt state she was not aware she needed a follow up appt. Would like to speak to ARON or Jamia regarding Repatha.

## 2021-09-17 NOTE — TELEPHONE ENCOUNTER
Spoke with her. She said that no one ever called her about the Repatha approval. Jamia, could you please look into this? Prescribed it a few weeks ago. Thanks.

## 2021-09-21 NOTE — TELEPHONE ENCOUNTER
Jamia I lvm  for  to call the NS office reg Laura, I spoke with Lisa (Pharmacists) @ [facility] and she stated the Repatha will costs $342.35 . This is her deductible for her prescription Plan.  We will try for the Repatha Ready, [employer]  and then [facility] [employer] [facility], BB

## 2021-09-21 NOTE — TELEPHONE ENCOUNTER
Pt calling to report when she arrived to  the Repatha Medication at the pharmacy the medication cost is $350.00 for 2 injections / 30 days supply.     Pt is hoping to have some Financial Assistance please if possible.     Pt can be reached @ [phone].     TY

## 2021-10-14 DIAGNOSIS — E78.01 FAMILIAL HYPERCHOLESTEROLEMIA: ICD-10-CM

## 2021-10-14 DIAGNOSIS — E78.41 ELEVATED LIPOPROTEIN(A): ICD-10-CM

## 2021-10-14 RX ORDER — EVOLOCUMAB 140 MG/ML
140 INJECTION, SOLUTION SUBCUTANEOUS
Qty: 2 ML | Refills: 6 | Status: SHIPPED | OUTPATIENT
Start: 2021-10-14 | End: 2021-10-15 | Stop reason: SDUPTHER

## 2021-10-14 NOTE — TELEPHONE ENCOUNTER
Spoke with Mrs. Jeffers, she was approved for the Repatha thru [employer] from 6/22/2021  Until further notice. I spoke with Lisa (pharmacist) @ [facility]. The cost will be $336.15. We will send the Repatha to [facility] and see what the cost will be. Patient was also given the paper work for [place] [employer]. This will be faxed to [employer] when I receive the completed form. STEVAN

## 2021-10-15 DIAGNOSIS — E78.01 FAMILIAL HYPERCHOLESTEROLEMIA: ICD-10-CM

## 2021-10-15 DIAGNOSIS — E78.41 ELEVATED LIPOPROTEIN(A): ICD-10-CM

## 2021-10-15 RX ORDER — EVOLOCUMAB 140 MG/ML
140 INJECTION, SOLUTION SUBCUTANEOUS
Qty: 2 ML | Refills: 6 | Status: SHIPPED | OUTPATIENT
Start: 2021-10-15 | End: 2021-12-10 | Stop reason: SDUPTHER

## 2021-10-15 NOTE — TELEPHONE ENCOUNTER
Jamia I spoke with Mrs. Jeffers reg Repatha, [facility] is charging $336.15  And [facility] is charging $347.74. She would prefer to have the Repatha sent back to [facility] its  closer to her home.I'm mailing her a one month trail card for repatha . She already has the [employer] safety net foundation and will send it back ASAP. STEVAN

## 2021-11-12 NOTE — TELEPHONE ENCOUNTER
Pt called wanting to speak to Jamia for Repatha assistance status. Pt mentioned she will be due he next injection soon. Pt can be reached at [phone]

## 2021-11-15 NOTE — TELEPHONE ENCOUNTER
Pt calling to speak with Cynthia Lopez or Jamia regarding the status of the Repatha assistance.    Pt would like a call back at [phone]

## 2021-11-15 NOTE — TELEPHONE ENCOUNTER
Cynthia pedro, can ginger call her and let her know where we are with the support. She can come in for samples.

## 2021-11-15 NOTE — TELEPHONE ENCOUNTER
I spoke with Mrs. Jeffers, she stated she mailed the [employer]  form one month ago. I haven't received it. So she will stop by on Wednesday 11/17/2021  for samples of Repatha and fill out another form, BB

## 2021-11-22 ENCOUNTER — TELEPHONE (OUTPATIENT)
Dept: CARDIOLOGY | Facility: CLINIC | Age: 71
End: 2021-11-22
Payer: MEDICARE

## 2021-11-22 DIAGNOSIS — E78.01 FAMILIAL HYPERCHOLESTEROLEMIA: ICD-10-CM

## 2021-11-22 DIAGNOSIS — E78.41 ELEVATED LIPOPROTEIN(A): ICD-10-CM

## 2021-11-22 NOTE — TELEPHONE ENCOUNTER
Aury Remy came to the NS office and filled out [employer] safety net foundation form and received 2 samples of Repatha 140 mg Lot # [id number] exp 12/2023.Also she give a one month trial co-pay card  For  Repatha. STEVAN

## 2021-11-23 ENCOUNTER — TRANSCRIBE ORDERS (OUTPATIENT)
Dept: SCHEDULING | Age: 71
End: 2021-11-23
Payer: MEDICARE

## 2021-11-23 DIAGNOSIS — N20.0 CALCULUS OF KIDNEY: Primary | ICD-10-CM

## 2021-11-24 ENCOUNTER — HOSPITAL ENCOUNTER (OUTPATIENT)
Dept: RADIOLOGY | Facility: CLINIC | Age: 71
Discharge: HOME | End: 2021-11-24
Attending: PHYSICIAN ASSISTANT
Payer: MEDICARE

## 2021-11-24 DIAGNOSIS — N20.0 CALCULUS OF KIDNEY: ICD-10-CM

## 2021-11-24 PROCEDURE — 76775 US EXAM ABDO BACK WALL LIM: CPT

## 2021-11-24 PROCEDURE — 74018 RADEX ABDOMEN 1 VIEW: CPT

## 2021-12-09 NOTE — TELEPHONE ENCOUNTER
Pt calling to Follow Up on Repatha. Pt would lilke to Check status of the Foundation applications and to check if More Samples are available.     Pt can be reached @ [phone].     TY

## 2021-12-09 NOTE — TELEPHONE ENCOUNTER
Spoke with Mrs. LawrenceAury, she will stop by the NS office tomorrow (12/10/2021 ) for one sample of repatha. She will also apply to the [employer] foundation, BB

## 2021-12-10 DIAGNOSIS — E78.41 ELEVATED LIPOPROTEIN(A): ICD-10-CM

## 2021-12-10 DIAGNOSIS — E78.01 FAMILIAL HYPERCHOLESTEROLEMIA: ICD-10-CM

## 2021-12-10 RX ORDER — EVOLOCUMAB 140 MG/ML
140 INJECTION, SOLUTION SUBCUTANEOUS
Qty: 1 ML | Refills: 0 | Status: ON HOLD | COMMUNITY
Start: 2021-12-10 | End: 2023-09-09 | Stop reason: ENTERED-IN-ERROR

## 2021-12-10 NOTE — TELEPHONE ENCOUNTER
Mrs. Jeffers came to the NS Office for one sample of Repatha 140 mg. Lot # [id number]   Exp 12/2023, BB

## 2021-12-13 NOTE — TELEPHONE ENCOUNTER
Stephanie called to follow up on any of the foundations to see if she can get assistance for Repatha   or if she isnt approved for any then she woudl need additional samples after this week.   She can be reahed @ [phone]

## 2021-12-13 NOTE — TELEPHONE ENCOUNTER
Spoke with Mrs. Jeffers, she did apply to the PAN  foundation, now she has to wait . Abby, Aury Andersen will come to the [facility] office on Wednesday for the one sample of Repatha. Thank you. BB

## 2021-12-15 RX ORDER — EVOLOCUMAB 140 MG/ML
140 INJECTION, SOLUTION SUBCUTANEOUS
Qty: 1 ML | Refills: 0 | COMMUNITY
Start: 2021-12-15 | End: 2022-01-28 | Stop reason: SDUPTHER

## 2021-12-28 ENCOUNTER — TELEPHONE (OUTPATIENT)
Dept: SCHEDULING | Facility: CLINIC | Age: 71
End: 2021-12-28
Payer: MEDICARE

## 2021-12-28 NOTE — TELEPHONE ENCOUNTER
Pt called requesting lab orders be mailed to home, Also pt would like clarification on when she  should  get the blood test after starting Repatha    Pt also requesting samples of repatha.    Pt can be reached at [phone]

## 2022-01-03 NOTE — TELEPHONE ENCOUNTER
Called and spoke to patient.  Told her I will send her message to ALEC Lindsay concerning samples.      In terms of the labs she is going to [facility] next [place] 1/10/2022 will be having labs done the first day she gets there and afterwards so will not need any lipids at present.  Told her it would be best to wait until those results are obtained to see if any additional labs are needed and I provided her with the fax number [phone].

## 2022-01-24 NOTE — TELEPHONE ENCOUNTER
Pt calling to see if the office has Repatha samples in stock.  Pt states if not at [facility], she can go to [facility] too, if they have any.  Pt still trying to get into an assistance program for the Repatha.  Pt can be reached at [phone].

## 2022-01-25 ENCOUNTER — TELEPHONE (OUTPATIENT)
Dept: CARDIOLOGY | Facility: CLINIC | Age: 72
End: 2022-01-25
Payer: MEDICARE

## 2022-01-25 NOTE — TELEPHONE ENCOUNTER
Mrs. Jeffers came to the NS office for one sample of Repatha lot # [id number]  And a free trial co-pay card, BB

## 2022-01-28 DIAGNOSIS — E78.41 ELEVATED LIPOPROTEIN(A): ICD-10-CM

## 2022-01-28 DIAGNOSIS — E78.01 FAMILIAL HYPERCHOLESTEROLEMIA: ICD-10-CM

## 2022-01-28 RX ORDER — EVOLOCUMAB 140 MG/ML
140 INJECTION, SOLUTION SUBCUTANEOUS
Qty: 6 ML | Refills: 3 | Status: SHIPPED | OUTPATIENT
Start: 2022-01-28 | End: 2022-01-31 | Stop reason: SDUPTHER

## 2022-01-28 NOTE — TELEPHONE ENCOUNTER
Mrs. Jeffers was approved for the [facility] [employer] [facility], BB

## 2022-01-31 DIAGNOSIS — E78.41 ELEVATED LIPOPROTEIN(A): ICD-10-CM

## 2022-01-31 DIAGNOSIS — E78.01 FAMILIAL HYPERCHOLESTEROLEMIA: ICD-10-CM

## 2022-02-01 RX ORDER — EVOLOCUMAB 140 MG/ML
140 INJECTION, SOLUTION SUBCUTANEOUS
Qty: 6 ML | Refills: 3 | Status: SHIPPED | OUTPATIENT
Start: 2022-02-01 | End: 2022-03-08

## 2022-03-08 ENCOUNTER — OFFICE VISIT (OUTPATIENT)
Dept: CARDIOLOGY | Facility: CLINIC | Age: 72
End: 2022-03-08
Payer: MEDICARE

## 2022-03-08 VITALS
WEIGHT: 109 LBS | OXYGEN SATURATION: 98 % | HEIGHT: 64 IN | HEART RATE: 80 BPM | SYSTOLIC BLOOD PRESSURE: 100 MMHG | DIASTOLIC BLOOD PRESSURE: 60 MMHG | BODY MASS INDEX: 18.61 KG/M2

## 2022-03-08 DIAGNOSIS — R93.1 ELEVATED CORONARY ARTERY CALCIUM SCORE: Primary | ICD-10-CM

## 2022-03-08 DIAGNOSIS — I95.1 ORTHOSTATIC HYPOTENSION: ICD-10-CM

## 2022-03-08 DIAGNOSIS — E78.01 FAMILIAL HYPERCHOLESTEROLEMIA: ICD-10-CM

## 2022-03-08 DIAGNOSIS — R07.89 CHEST DISCOMFORT: ICD-10-CM

## 2022-03-08 DIAGNOSIS — E78.41 ELEVATED LIPOPROTEIN(A): ICD-10-CM

## 2022-03-08 PROCEDURE — 93000 ELECTROCARDIOGRAM COMPLETE: CPT | Performed by: INTERNAL MEDICINE

## 2022-03-08 PROCEDURE — 99214 OFFICE O/P EST MOD 30 MIN: CPT | Performed by: INTERNAL MEDICINE

## 2022-03-08 RX ORDER — ELECTROLYTES/DEXTROSE
SOLUTION, ORAL ORAL DAILY
Status: ON HOLD | COMMUNITY
End: 2023-09-09 | Stop reason: ENTERED-IN-ERROR

## 2022-03-08 NOTE — ASSESSMENT & PLAN NOTE
She is tolerating Repatha without side effects.  She has had a substantial improvement in her LDL cholesterol.  I will make no changes to her regimen today and plan to repeat a lipid panel before her follow-up visit.  If we remain above 70 mg/dL, we can discuss ezetimibe at that time.  Her lifestyle efforts are very good and I encouraged her to continue with these.

## 2022-03-08 NOTE — ASSESSMENT & PLAN NOTE
Aggressive lipid lowering is required, as discussed above.  PCSK9 inhibitors have been shown to mildly reduce lipoprotein(a) levels.  I previously recommended that all first-degree family members not only have screening lipid panels obtained, but also a screening lipoprotein(a).

## 2022-03-08 NOTE — ASSESSMENT & PLAN NOTE
She has had no new anginal symptoms.  She will continue aspirin 81 mg daily, notifying me with any bleeding issues.  Lipid management as below.

## 2022-03-08 NOTE — PROGRESS NOTES
Antonio Price MD  Cardiology    [facility]    [facility]  [facility]  [address] [place] [address]    TEL  [phone]  [facility]     03/08/22     Dear Dr. Wang:    It was my pleasure to see Ms. Stephanie Jeffers at the [facility] today for follow-up.    As you know, she is a 71 y.o. female with a history of familial hypercholesterolemia.    By way of background, she was previously followed by Dr. Mccray at [facility] and was diagnosed with familial hypercholesterolemia. LDL was as high as 225 mg/dL in the past. She had LFT abnormalities with Lipitor. Crestor caused myalgias and nightmares. Similar side effects occurred with ezetimibe, Lescol and lovastatin. She was seen by my colleague, Dr. Rainey, in 2017 for chest pain. Coronary vasospasm was considered. A stress echo was obtained, as outlined below. She was seen more recently by Dr. Emerson at [facility] for management of her FH and dyspnea on exertion.  He was not convinced that she had clinical angina. She was started on twice weekly simvastatin. She was also ordered for a stress echo.  The latter was not obtained due to the pandemic.  She experienced the same myalgias with simvastatin and discontinue therapy.    Following her last visit with me, Repatha was initiated.  She has had no side effects on the medication.  She is eating a better diet and is exercising several days per week without cardiopulmonary symptoms.  No bleeding issues.  She is very pleased with her recent lipid panel.  Occasional lightheadedness with standing if she does not remain well-hydrated.  No syncope.    Cardiovascular History:  1. Familial hypercholesterolemia  2. History of HBV  3. Relative hypotension    Past Medical History: Migraines    Past Surgical History:  Past Surgical History:   Procedure Laterality Date   • EYE SURGERY      catarac       Medications:  Current Outpatient Medications    Medication Sig Dispense Refill   • biotin 5 mg capsule Take by mouth daily.     • cholecalciferol, vitamin D3, 2,000 unit tablet Take by mouth.     • clonazePAM (klonoPIN) 1 mg tablet Take by mouth nightly.    5   • estradiol (ESTRACE) 0.01 % (0.1 mg/gram) vaginal cream 1 gm vaginally twice a week 42.5 g 3   • evolocumab (REPATHA SURECLICK) 140 mg/mL pen Inject 1 mL (140 mg total) under the skin every 14 (fourteen) days. 1 mL 0   • SUMAtriptan (IMITREX) 100 mg tablet Take by mouth as needed.       • zoledronic acid/mannitol-water (RECLAST IV) Infuse into a venous catheter. Once per year     • aspirin 81 mg enteric coated tablet Take 1 tablet (81 mg total) by mouth daily. 90 tablet 1     No current facility-administered medications for this visit.       Allergies: Patient has no known allergies.    Social History: She is .  She has 2 children.  Retired .  She is a never smoker.  Approximately 3 glasses of wine per week.  No recreational drugs.    Family History: She has a sister who has tetralogy of Fallot. Her brother has no documented coronary artery disease. Her mother  at a young age from complications of lung cancer.  She had hypercholesterolemia.  Her father lived to be 92.  He had a stroke late in life and also had hypercholesterolemia.  No family history of premature coronary artery disease, arrhythmias, cardiomyopathies, or sudden cardiac death.    Review of Systems: A complete 14-point review of systems is negative, except as noted in the HPI.    Exam:  Objective   Vitals:    22 1108   BP: 100/60   Pulse: 80   SpO2: 98%     Body mass index is 18.71 kg/m².  Constitutional: Appears comfortable.   Eyes: No icterus.  No corneal arcus.  ENT: Deferred. Patient wearing a mask.   Neck: No jugular venous distention. Supple, normal range of motion.  Vascular: No carotid bruits. Radial pulses intact and equal.  Cardiac: Normal S1 and S2, regular rhythm. No murmurs, rubs,  or gallops appreciated.  Lungs: Clear to auscultation bilaterally.  No wheezing.  GI: Soft, nontender, normoactive bowel sounds.  Extremities: Warm. No lower extremity edema.   Skin: Dry. No jaundice.  No xanthelasmas.  Musculoskeletal: No joint swelling or erythema.   Neurologic: Awake, alert, oriented.  Moving all extremities.  Psychiatric: No agitation.    Labs: Personally reviewed and discussed with the patient.  Notable for the following.   Lab Results   Component Value Date     09/15/2021    K 4.4 09/15/2021    BUN 15 09/15/2021    CREATININE 0.8 09/15/2021    WBC 5.91 09/15/2021    HGB 13.1 09/15/2021     09/15/2021   6/2019- , TG 82, ,   1/2022-  , , HDL 75, LDL 87  High-sensitivity CRP 0.9  Sodium 139, potassium 4.3, creatinine 0.9  LFTs within normal limits  TSH within normal limits    Cardiovascular Studies:   1. Stress echo, 1/2017: No ischemia. Excellent exercise tolerance. Baseline echo- Normal wall motion and excursion. 1+ MR and TR. RVSP 13 mmHg.   2.  CAC, 7/2021: Composite 120 (left main 1, LAD 51, RCA 68).  75-90th percentile.  Mild-moderate atherosclerotic calcification of the aorta.  Otherwise unremarkable noncardiac findings.    ECG from today personally reviewed and discussed with the patient shows sinus rhythm, within normal limits.  Compared with tracing from 7/13/2021, there are no significant changes.    Assessment/Plan   Problem List Items Addressed This Visit        Nervous    Chest discomfort     She has a longstanding history of chest discomfort when under emotional stress.  Her symptoms are unchanged over several years.  No recent symptoms.  She knows to contact me should her symptoms progress.         Relevant Orders    ECG 12 LEAD-OFFICE PERFORMED (Completed)       Circulatory    Orthostatic hypotension     She has a longstanding history of hypotension.  She will continue to stay well-hydrated and avoid quick position changes.          Elevated coronary artery calcium score - Primary     She has had no new anginal symptoms.  She will continue aspirin 81 mg daily, notifying me with any bleeding issues.  Lipid management as below.              Endocrine/Metabolic    Familial hypercholesterolemia     She is tolerating Repatha without side effects.  She has had a substantial improvement in her LDL cholesterol.  I will make no changes to her regimen today and plan to repeat a lipid panel before her follow-up visit.  If we remain above 70 mg/dL, we can discuss ezetimibe at that time.  Her lifestyle efforts are very good and I encouraged her to continue with these.           Relevant Orders    Lipid panel    Elevated lipoprotein(a)     Aggressive lipid lowering is required, as discussed above.  PCSK9 inhibitors have been shown to mildly reduce lipoprotein(a) levels.  I previously recommended that all first-degree family members not only have screening lipid panels obtained, but also a screening lipoprotein(a).              It was my pleasure to visit with Stephanie Jeffers in clinic today.  She will follow up with me in 6 months.  Please do not hesitate to contact me with any questions.      Sincerely,         ________________  Antonio Price MD

## 2022-04-06 RX ORDER — EVOLOCUMAB 140 MG/ML
140 INJECTION, SOLUTION SUBCUTANEOUS
Qty: 2 ML | Refills: 0 | COMMUNITY
Start: 2022-04-06 | End: 2022-05-31 | Stop reason: SDUPTHER

## 2022-04-22 ENCOUNTER — TELEPHONE (OUTPATIENT)
Dept: CARDIOLOGY | Facility: HOSPITAL | Age: 72
End: 2022-04-22
Payer: MEDICARE

## 2022-04-22 NOTE — TELEPHONE ENCOUNTER
I spoke with patient to let her know we received her [employer] and faxed it to [employer]. Patient stated she had completed the form months ago but I did re-confirm with her that we just received the application form this afternoon.

## 2022-05-04 ENCOUNTER — APPOINTMENT (OUTPATIENT)
Dept: LAB | Facility: CLINIC | Age: 72
End: 2022-05-04
Attending: INTERNAL MEDICINE
Payer: MEDICARE

## 2022-05-04 ENCOUNTER — TRANSCRIBE ORDERS (OUTPATIENT)
Dept: SCHEDULING | Age: 72
End: 2022-05-04

## 2022-05-04 DIAGNOSIS — Z79.899 OTHER LONG TERM (CURRENT) DRUG THERAPY: ICD-10-CM

## 2022-05-04 DIAGNOSIS — E55.9 VITAMIN D DEFICIENCY, UNSPECIFIED: ICD-10-CM

## 2022-05-04 DIAGNOSIS — M81.0 AGE-RELATED OSTEOPOROSIS WITHOUT CURRENT PATHOLOGICAL FRACTURE: ICD-10-CM

## 2022-05-04 DIAGNOSIS — M81.0 AGE-RELATED OSTEOPOROSIS WITHOUT CURRENT PATHOLOGICAL FRACTURE: Primary | ICD-10-CM

## 2022-05-04 LAB
ALBUMIN SERPL-MCNC: 3.8 G/DL (ref 3.4–5)
ALP SERPL-CCNC: 55 IU/L (ref 35–126)
ALT SERPL-CCNC: 33 IU/L (ref 11–54)
ANION GAP SERPL CALC-SCNC: 10 MEQ/L (ref 3–15)
AST SERPL-CCNC: 45 IU/L (ref 15–41)
BILIRUB SERPL-MCNC: 0.5 MG/DL (ref 0.3–1.2)
BUN SERPL-MCNC: 21 MG/DL (ref 8–20)
CALCIUM SERPL-MCNC: 9.8 MG/DL (ref 8.9–10.3)
CHLORIDE SERPL-SCNC: 101 MEQ/L (ref 98–109)
CO2 SERPL-SCNC: 28 MEQ/L (ref 22–32)
CREAT SERPL-MCNC: 0.8 MG/DL (ref 0.6–1.1)
GFR SERPL CREATININE-BSD FRML MDRD: >60 ML/MIN/1.73M*2
GLUCOSE SERPL-MCNC: 117 MG/DL (ref 70–99)
POTASSIUM SERPL-SCNC: 4.2 MEQ/L (ref 3.6–5.1)
PROT SERPL-MCNC: 6.7 G/DL (ref 6–8.2)
SODIUM SERPL-SCNC: 139 MEQ/L (ref 136–144)

## 2022-05-04 PROCEDURE — 36415 COLL VENOUS BLD VENIPUNCTURE: CPT

## 2022-05-04 PROCEDURE — 82306 VITAMIN D 25 HYDROXY: CPT

## 2022-05-04 PROCEDURE — 80053 COMPREHEN METABOLIC PANEL: CPT

## 2022-05-05 LAB — 25(OH)D3 SERPL-MCNC: 56 NG/ML (ref 30–100)

## 2022-05-31 ENCOUNTER — OFFICE VISIT (OUTPATIENT)
Dept: PRIMARY CARE | Facility: CLINIC | Age: 72
End: 2022-05-31
Payer: MEDICARE

## 2022-05-31 VITALS
WEIGHT: 108 LBS | HEIGHT: 64 IN | TEMPERATURE: 97.5 F | OXYGEN SATURATION: 98 % | HEART RATE: 60 BPM | BODY MASS INDEX: 18.44 KG/M2 | SYSTOLIC BLOOD PRESSURE: 100 MMHG | DIASTOLIC BLOOD PRESSURE: 62 MMHG

## 2022-05-31 DIAGNOSIS — F41.1 GENERALIZED ANXIETY DISORDER: ICD-10-CM

## 2022-05-31 DIAGNOSIS — M54.50 ACUTE LEFT-SIDED LOW BACK PAIN WITHOUT SCIATICA: Primary | ICD-10-CM

## 2022-05-31 DIAGNOSIS — M85.88 OSTEOPENIA OF LUMBAR SPINE: ICD-10-CM

## 2022-05-31 DIAGNOSIS — F33.41 RECURRENT MAJOR DEPRESSIVE DISORDER, IN PARTIAL REMISSION (CMS/HCC): ICD-10-CM

## 2022-05-31 DIAGNOSIS — D49.0 IPMN (INTRADUCTAL PAPILLARY MUCINOUS NEOPLASM): ICD-10-CM

## 2022-05-31 DIAGNOSIS — N39.0 RECURRENT UTI: ICD-10-CM

## 2022-05-31 DIAGNOSIS — R93.1 ELEVATED CORONARY ARTERY CALCIUM SCORE: ICD-10-CM

## 2022-05-31 PROBLEM — R63.4 WEIGHT LOSS: Status: RESOLVED | Noted: 2018-07-20 | Resolved: 2022-05-31

## 2022-05-31 PROBLEM — R07.89 CHEST DISCOMFORT: Status: RESOLVED | Noted: 2021-07-13 | Resolved: 2022-05-31

## 2022-05-31 PROBLEM — Z00.00 GENERAL MEDICAL EXAM: Status: ACTIVE | Noted: 2022-05-31

## 2022-05-31 PROBLEM — R09.82 POSTNASAL DRIP: Status: RESOLVED | Noted: 2019-01-09 | Resolved: 2022-05-31

## 2022-05-31 PROCEDURE — 99205 OFFICE O/P NEW HI 60 MIN: CPT | Performed by: STUDENT IN AN ORGANIZED HEALTH CARE EDUCATION/TRAINING PROGRAM

## 2022-05-31 RX ORDER — METHENAMINE HIPPURATE 1000 MG/1
1 TABLET ORAL 2 TIMES DAILY
COMMUNITY
Start: 2021-11-15 | End: 2022-09-09

## 2022-05-31 RX ORDER — NITROFURANTOIN (MACROCRYSTALS) 100 MG/1
100 CAPSULE ORAL AS NEEDED
Status: ON HOLD | COMMUNITY
Start: 2022-03-11 | End: 2023-09-09 | Stop reason: ALTCHOICE

## 2022-05-31 RX ORDER — VALACYCLOVIR HYDROCHLORIDE 500 MG/1
500 TABLET, FILM COATED ORAL AS NEEDED
Status: ON HOLD | COMMUNITY
End: 2023-09-09 | Stop reason: ALTCHOICE

## 2022-06-01 ENCOUNTER — HOSPITAL ENCOUNTER (OUTPATIENT)
Dept: RADIOLOGY | Facility: HOSPITAL | Age: 72
Discharge: HOME | End: 2022-06-01
Attending: STUDENT IN AN ORGANIZED HEALTH CARE EDUCATION/TRAINING PROGRAM
Payer: MEDICARE

## 2022-06-01 DIAGNOSIS — M54.50 ACUTE LEFT-SIDED LOW BACK PAIN WITHOUT SCIATICA: ICD-10-CM

## 2022-06-09 ENCOUNTER — DOCUMENTATION (OUTPATIENT)
Dept: PRIMARY CARE | Facility: CLINIC | Age: 72
End: 2022-06-09
Payer: MEDICARE

## 2022-06-09 NOTE — PROGRESS NOTES
Jennifer Maldonado MA has completed a chart review for Stephanie Jeffers and have determined that the care gap has been satisfied.    Care Gap Source:  (BREAST BULK)    Care Gap(s) Identified: Breast Cancer Screening    Chart Review Completed: Yes    Patient states she completes yearly mammograms, no report in chart, called Dr. Sharita Renteria [phone] and they will fax report today.

## 2022-07-06 ENCOUNTER — TELEPHONE (OUTPATIENT)
Dept: CARDIOLOGY | Facility: CLINIC | Age: 72
End: 2022-07-06
Payer: MEDICARE

## 2022-07-06 NOTE — TELEPHONE ENCOUNTER
Spoke with her.  All questions answered      ----- Message from Hyacinth White MA sent at 7/6/2022  8:35 AM EDT -----  Regarding: FW: Celexa    ----- Message -----  From: Stephanie Jeffers  Sent: 7/6/2022   8:33 AM EDT  To: , #  Subject: Celexa                                           Hi Dr. dupree-I have recently started celexa at a low dose. I was concerned about that drug possibly causing heart issues. Dr. Ravi told  me to ask you about this. I took it years ago with no problems. Is it ok for me? Also read about issues with antibiotics while taking celexa. Thoughts?   Thanks so much,Stephanie

## 2022-07-15 ENCOUNTER — TELEPHONE (OUTPATIENT)
Dept: SCHEDULING | Facility: CLINIC | Age: 72
End: 2022-07-15
Payer: MEDICARE

## 2022-07-15 RX ORDER — EVOLOCUMAB 140 MG/ML
140 INJECTION, SOLUTION SUBCUTANEOUS
Qty: 140 ML | Refills: 2 | Status: SHIPPED | OUTPATIENT
Start: 2022-07-15 | End: 2022-09-09

## 2022-07-15 RX ORDER — EVOLOCUMAB 140 MG/ML
140 INJECTION, SOLUTION SUBCUTANEOUS
Qty: 140 ML | Refills: 0 | COMMUNITY
Start: 2022-07-15 | End: 2022-09-09

## 2022-07-15 NOTE — TELEPHONE ENCOUNTER
Patient called asking if Repatha samples available for  today. At [facility] office.     Please call her at [phone] to advise. ty

## 2022-09-06 ENCOUNTER — APPOINTMENT (OUTPATIENT)
Dept: LAB | Facility: CLINIC | Age: 72
End: 2022-09-06
Attending: INTERNAL MEDICINE
Payer: MEDICARE

## 2022-09-06 DIAGNOSIS — R23.3 EASY BRUISING: ICD-10-CM

## 2022-09-06 DIAGNOSIS — E78.01 FAMILIAL HYPERCHOLESTEROLEMIA: ICD-10-CM

## 2022-09-06 DIAGNOSIS — Z86.39 PERSONAL HISTORY OF OTHER ENDOCRINE, NUTRITIONAL AND METABOLIC DISEASE: ICD-10-CM

## 2022-09-06 DIAGNOSIS — R23.3 BRUISING, SPONTANEOUS: ICD-10-CM

## 2022-09-06 DIAGNOSIS — R23.3 ABNORMAL BRUISING: ICD-10-CM

## 2022-09-06 DIAGNOSIS — Z13.21 ENCOUNTER FOR VITAMIN DEFICIENCY SCREENING: ICD-10-CM

## 2022-09-06 DIAGNOSIS — I87.8 OTHER SPECIFIED DISORDERS OF VEINS: ICD-10-CM

## 2022-09-06 DIAGNOSIS — R79.89 ABNORMAL COMPLETE BLOOD COUNT: ICD-10-CM

## 2022-09-06 LAB
ALBUMIN SERPL-MCNC: 3.9 G/DL (ref 3.4–5)
ALP SERPL-CCNC: 39 IU/L (ref 35–126)
ALT SERPL-CCNC: 22 IU/L (ref 11–54)
ANION GAP SERPL CALC-SCNC: 4 MEQ/L (ref 3–15)
AST SERPL-CCNC: 33 IU/L (ref 15–41)
BASOPHILS # BLD: 0 K/UL (ref 0.01–0.1)
BASOPHILS NFR BLD: 0 %
BILIRUB SERPL-MCNC: 0.7 MG/DL (ref 0.3–1.2)
BUN SERPL-MCNC: 21 MG/DL (ref 8–20)
CALCIUM SERPL-MCNC: 9.2 MG/DL (ref 8.9–10.3)
CHLORIDE SERPL-SCNC: 108 MEQ/L (ref 98–109)
CHOLEST SERPL-MCNC: 236 MG/DL
CO2 SERPL-SCNC: 27 MEQ/L (ref 22–32)
CREAT SERPL-MCNC: 0.9 MG/DL (ref 0.6–1.1)
DIFFERENTIAL METHOD BLD: ABNORMAL
EOSINOPHIL # BLD: 0.07 K/UL (ref 0.04–0.36)
EOSINOPHIL NFR BLD: 1.1 %
ERYTHROCYTE [DISTWIDTH] IN BLOOD BY AUTOMATED COUNT: 14.7 % (ref 11.7–14.4)
GFR SERPL CREATININE-BSD FRML MDRD: >60 ML/MIN/1.73M*2
GLUCOSE SERPL-MCNC: 91 MG/DL (ref 70–99)
HCT VFR BLDCO AUTO: 42.9 % (ref 35–45)
HDLC SERPL-MCNC: 106 MG/DL
HDLC SERPL: 2.2 {RATIO}
HGB BLD-MCNC: 13.6 G/DL (ref 11.8–15.7)
IMM GRANULOCYTES # BLD AUTO: 0.01 K/UL (ref 0–0.08)
IMM GRANULOCYTES NFR BLD AUTO: 0.2 %
INR PPP: 1
LDLC SERPL CALC-MCNC: 116 MG/DL
LYMPHOCYTES # BLD: 2.53 K/UL (ref 1.2–3.5)
LYMPHOCYTES NFR BLD: 41.5 %
MCH RBC QN AUTO: 27.2 PG (ref 28–33.2)
MCHC RBC AUTO-ENTMCNC: 31.7 G/DL (ref 32.2–35.5)
MCV RBC AUTO: 85.8 FL (ref 83–98)
MONOCYTES # BLD: 0.55 K/UL (ref 0.28–0.8)
MONOCYTES NFR BLD: 9 %
NEUTROPHILS # BLD: 2.93 K/UL (ref 1.7–7)
NEUTS SEG NFR BLD: 48.2 %
NONHDLC SERPL-MCNC: 130 MG/DL
NRBC BLD-RTO: 0 %
PDW BLD AUTO: 10.6 FL (ref 9.4–12.3)
PLATELET # BLD AUTO: 239 K/UL (ref 150–369)
POTASSIUM SERPL-SCNC: 4.5 MEQ/L (ref 3.6–5.1)
PROT SERPL-MCNC: 6.1 G/DL (ref 6–8.2)
PROTHROMBIN TIME: 12.7 SEC (ref 12.2–14.5)
RBC # BLD AUTO: 5 M/UL (ref 3.93–5.22)
SODIUM SERPL-SCNC: 139 MEQ/L (ref 136–144)
TRIGL SERPL-MCNC: 69 MG/DL (ref 30–149)
WBC # BLD AUTO: 6.09 K/UL (ref 3.8–10.5)

## 2022-09-06 PROCEDURE — 85610 PROTHROMBIN TIME: CPT

## 2022-09-06 PROCEDURE — 36415 COLL VENOUS BLD VENIPUNCTURE: CPT

## 2022-09-06 PROCEDURE — 83540 ASSAY OF IRON: CPT

## 2022-09-06 PROCEDURE — 82728 ASSAY OF FERRITIN: CPT

## 2022-09-06 PROCEDURE — 82607 VITAMIN B-12: CPT

## 2022-09-06 PROCEDURE — 85025 COMPLETE CBC W/AUTO DIFF WBC: CPT

## 2022-09-06 PROCEDURE — 80061 LIPID PANEL: CPT

## 2022-09-06 PROCEDURE — 80053 COMPREHEN METABOLIC PANEL: CPT

## 2022-09-07 ENCOUNTER — TELEPHONE (OUTPATIENT)
Dept: PRIMARY CARE | Facility: CLINIC | Age: 72
End: 2022-09-07
Payer: MEDICARE

## 2022-09-07 DIAGNOSIS — R79.89 ABNORMAL COMPLETE BLOOD COUNT: ICD-10-CM

## 2022-09-07 DIAGNOSIS — R23.3 EASY BRUISING: Primary | ICD-10-CM

## 2022-09-07 DIAGNOSIS — Z86.39 PERSONAL HISTORY OF OTHER ENDOCRINE, NUTRITIONAL AND METABOLIC DISEASE: ICD-10-CM

## 2022-09-07 DIAGNOSIS — Z13.21 ENCOUNTER FOR VITAMIN DEFICIENCY SCREENING: ICD-10-CM

## 2022-09-07 DIAGNOSIS — R23.3 BRUISING, SPONTANEOUS: ICD-10-CM

## 2022-09-07 LAB
FERRITIN SERPL-MCNC: 34 NG/ML (ref 11–250)
IRON SATN MFR SERPL: 23 % (ref 15–45)
IRON SERPL-MCNC: 89 UG/DL (ref 35–150)
TIBC SERPL-MCNC: 391 UG/DL (ref 270–460)
UIBC SERPL-MCNC: 302 UG/DL (ref 180–360)
VIT B12 SERPL-MCNC: 528 PG/ML (ref 180–914)

## 2022-09-07 NOTE — TELEPHONE ENCOUNTER
----- Message from Nirmala Ravi MD sent at 9/6/2022  7:20 PM EDT -----  [facility] CSP: can you add on labs listed below and pend order for me to co-sign? Thanks!   KAY Alvarez - does Repatha cause platelet inhibition/bruising?   Normal coagulation panel.  Normal metabolic panel except for slightly elevated BUN - this can be elevated if you are not well hydrated when labs were taken, but is not otherwise concerning.  Blood count shows stable, low MCH and MCHC, these are markers of the amount of hemoglobin in your red blood cells and do not explain easy bruising by themselves. I will have my staff add on a peripheral smear, B12 and iron studies so we can further evaluate these abnormal values and can let you know more when that returns.   Easy bruising can also be caused by changes in your platelet function from medications: Celexa is an SSRI which can cause bruising via inhibition of serotonin-mediated platelet activation, which leads to platelet dysfunction.  Let me know if you would like to consider weaning off of your Celexa to see if this improves things.   I will defer to Dr. Price if Repatha has a similar mechanism or side effect.

## 2022-09-07 NOTE — TELEPHONE ENCOUNTER
----- Message from Nirmala Ravi MD sent at 9/6/2022  7:20 PM EDT -----  [facility] CSP: can you add on labs listed below and pend order for me to co-sign? Thanks!   KAY Alvarez - does Repatha cause platelet inhibition/bruising?   Normal coagulation panel.  Normal metabolic panel except for slightly elevated BUN - this can be elevated if you are not well hydrated when labs were taken, but is not otherwise concerning.  Blood count shows stable, low MCH and MCHC, these are markers of the amount of hemoglobin in your red blood cells and do not explain easy bruising by themselves. I will have my staff add on a peripheral smear, B12 and iron studies so we can further evaluate these abnormal values and can let you know more when that returns.   Easy bruising can also be caused by changes in your platelet function from medications: Celexa is an SSRI which can cause bruising via inhibition of serotonin-mediated platelet activation, which leads to platelet dysfunction.  Let me know if you would like to consider weaning off of your Celexa to see if this improves things.   I will defer to Dr. Price if Repatha has a similar mechanism or side effect.

## 2022-09-08 LAB — PATH REV BLD -IMP: NORMAL

## 2022-09-09 ENCOUNTER — OFFICE VISIT (OUTPATIENT)
Dept: CARDIOLOGY | Facility: CLINIC | Age: 72
End: 2022-09-09
Payer: MEDICARE

## 2022-09-09 VITALS
HEART RATE: 82 BPM | HEIGHT: 64 IN | BODY MASS INDEX: 18.62 KG/M2 | DIASTOLIC BLOOD PRESSURE: 74 MMHG | SYSTOLIC BLOOD PRESSURE: 98 MMHG | WEIGHT: 109.06 LBS | RESPIRATION RATE: 16 BRPM

## 2022-09-09 DIAGNOSIS — E78.01 FAMILIAL HYPERCHOLESTEROLEMIA: Primary | ICD-10-CM

## 2022-09-09 DIAGNOSIS — I95.1 ORTHOSTATIC HYPOTENSION: ICD-10-CM

## 2022-09-09 DIAGNOSIS — R93.1 ELEVATED CORONARY ARTERY CALCIUM SCORE: ICD-10-CM

## 2022-09-09 DIAGNOSIS — E78.41 ELEVATED LIPOPROTEIN(A): ICD-10-CM

## 2022-09-09 PROCEDURE — 99214 OFFICE O/P EST MOD 30 MIN: CPT | Performed by: INTERNAL MEDICINE

## 2022-09-09 PROCEDURE — 93000 ELECTROCARDIOGRAM COMPLETE: CPT | Performed by: INTERNAL MEDICINE

## 2022-09-09 NOTE — ASSESSMENT & PLAN NOTE
She has had no new anginal symptoms.  Lipid management as below.  Aspirin has been stopped due to easy bruising.

## 2022-09-09 NOTE — ASSESSMENT & PLAN NOTE
She is tolerating Repatha without side effects.  She has had a substantial improvement in her LDL cholesterol, though there was an increase in her LDL recently. Possible that this is related to the timing of her lab draw. Discussed that labs should be obtained half way between doses. She continues to work on dietary modifications. We will repeat a lipid panel in 3 months. If her LDL remains above goal we will consider Zetia.

## 2022-09-09 NOTE — PROGRESS NOTES
Antonio Price MD  Cardiology    [facility]    [facility]  [facility]  [address] [place] [address]    TEL  [phone]  [facility]     09/09/22     Dear Dr. Ravi:    It was my pleasure to see Ms. Stephanie Jeffers at the [facility] today for follow-up.    As you know, she is a 71 y.o. female with a history of familial hypercholesterolemia.    Stephanie continues to do well overall. No new cardiopulmonary symptoms. No chest pain or pressure. No dyspnea, palpitations, or edema. No syncope. She has a longstanding history of lightheadedness with fast position changes. This is unchanged. Her diet and exercise regimen remain very good overall. Due to easy brusiing she has been off aspirin. Continues to tolerate Repatha without issue.     Cardiovascular History:  1. Familial hypercholesterolemia, elevated Lp(a)  2. Coronary atherosclerosis   3. Orthostatic hypotension    Past Medical History: Migraines, History of HBV    Past Surgical History:  Past Surgical History:   Procedure Laterality Date    CATARACT EXTRACTION Left 11/19/2018    CATARACT EXTRACTION Right 01/16/2019       Medications:  Current Outpatient Medications   Medication Sig Dispense Refill    biotin 5 mg capsule Take by mouth daily.      clonazePAM (klonoPIN) 1 mg tablet Take by mouth nightly.    5    estradiol (ESTRACE) 0.01 % (0.1 mg/gram) vaginal cream 1 gm vaginally twice a week 42.5 g 3    evolocumab (REPATHA SURECLICK) 140 mg/mL pen Inject 1 mL (140 mg total) under the skin every 14 (fourteen) days. 1 mL 0    nitrofurantoin (MACRODANTIN) 100 mg capsule Take 100 mg by mouth as needed.      SUMAtriptan (IMITREX) 100 mg tablet Take by mouth as needed.        valACYclovir (VALTREX) 500 mg tablet Take 500 mg by mouth as needed.      zoledronic acid/mannitol-water (RECLAST IV) Infuse into a venous catheter. Once per year       No current  facility-administered medications for this visit.       Allergies: Patient has no known allergies.    Social History: She is .  She has 2 children.  Retired .  She is a never smoker.  Approximately 3 glasses of wine per week.  No recreational drugs.    Family History: She has a sister who has tetralogy of Fallot. Her brother has no documented coronary artery disease. Her mother  at a young age from complications of lung cancer.  She had hypercholesterolemia.  Her father lived to be 92.  He had a stroke late in life and also had hypercholesterolemia.  No family history of premature coronary artery disease, arrhythmias, cardiomyopathies, or sudden cardiac death.    Review of Systems: A complete 14-point review of systems is negative, except as noted in the HPI.    Exam:  Objective   Vitals:    22 1100   BP: 98/74   Pulse: 82   Resp: 16     Wt Readings from Last 3 Encounters:   22 49.5 kg (109 lb 1 oz)   22 49.4 kg (109 lb)   22 49 kg (108 lb)     Body mass index is 18.72 kg/m².  Constitutional: Appears comfortable.   Eyes: No icterus.  No corneal arcus.  ENT: Deferred. Patient wearing a mask.   Neck: No jugular venous distention.   Vascular: No carotid bruits. Radial pulses intact and equal.  Cardiac: Normal S1 and S2, regular rhythm. No murmurs, rubs, or gallops appreciated.  Lungs: Clear to auscultation bilaterally.    GI: Soft, normoactive bowel sounds.  Extremities: Warm. No lower extremity edema.   Skin: Dry. No jaundice.   Neurologic: Awake, alert, oriented.    Psychiatric: No agitation.    Labs: Personally reviewed and discussed with the patient.  Notable for the following.   Lab Results   Component Value Date    LDLCALC 116 (H) 2022    CHOL 236 (H) 2022    TRIG 69 2022     2022     2022    K 4.5 2022    BUN 21 (H) 2022    CREATININE 0.9 2022    WBC 6.09 2022    HGB 13.6 2022      09/06/2022 1/2022-  , , HDL 75, LDL 87  High-sensitivity CRP 0.9  Sodium 139, potassium 4.3, creatinine 0.9  LFTs within normal limits  TSH within normal limits    6/2019- , TG 82, ,     Cardiovascular Studies:   1. Stress echo, 1/2017: No ischemia. Excellent exercise tolerance. Baseline echo- Normal wall motion and excursion. 1+ MR and TR. RVSP 13 mmHg.   2.  CAC, 7/2021: Composite 120 (left main 1, LAD 51, RCA 68).  75-90th percentile.  Mild-moderate atherosclerotic calcification of the aorta.  Otherwise unremarkable noncardiac findings.    ECG from today personally reviewed and discussed with the patient shows sinus rhythm, within normal limits.  Compared with tracing from 3/8/2022, there is no significant change.     Assessment/Plan   Problem List Items Addressed This Visit        Circulatory    Orthostatic hypotension     She has a longstanding history of hypotension.  She will continue to stay well-hydrated and avoid quick position changes.           Relevant Orders    ECG 12 LEAD-OFFICE PERFORMED (Completed)    Elevated coronary artery calcium score     She has had no new anginal symptoms.  Lipid management as below.  Aspirin has been stopped due to easy bruising.            Relevant Orders    ECG 12 LEAD-OFFICE PERFORMED (Completed)    Lipid panel       Other    Familial hypercholesterolemia - Primary     She is tolerating Repatha without side effects.  She has had a substantial improvement in her LDL cholesterol, though there was an increase in her LDL recently. Possible that this is related to the timing of her lab draw. Discussed that labs should be obtained half way between doses. She continues to work on dietary modifications. We will repeat a lipid panel in 3 months. If her LDL remains above goal we will consider Zetia.            Relevant Orders    Lipid panel    Elevated lipoprotein(a)     Aggressive lipid lowering is required, as discussed above.  PCSK9  inhibitors have been shown to mildly reduce lipoprotein(a) levels.  I previously recommended that all first-degree family members not only have screening lipid panels obtained, but also a screening lipoprotein(a).                It was my pleasure to visit with Stephanie Jeffers in clinic today.  She will follow up with me in 6 months.  Please do not hesitate to contact me with any questions.      Sincerely,         ________________  Antonio Price MD

## 2022-09-12 RX ORDER — EVOLOCUMAB 140 MG/ML
140 INJECTION, SOLUTION SUBCUTANEOUS
Qty: 4 ML | Refills: 0 | Status: ON HOLD | COMMUNITY
Start: 2022-09-12 | End: 2023-09-09 | Stop reason: ENTERED-IN-ERROR

## 2022-10-16 ENCOUNTER — NURSE TRIAGE (OUTPATIENT)
Dept: PRIMARY CARE | Facility: CLINIC | Age: 72
End: 2022-10-16
Payer: MEDICARE

## 2022-10-16 NOTE — TELEPHONE ENCOUNTER
"Synopsis:  Patient calling w/ worsening COVID-19 symptoms, afebrile but c/o sore throat, bad bodyaches and fatigue. Noted chart note discussion 10/14 w/ PCP w/ rec for patient to call back for Paxlovid if worse. Reviewed labs OK, sent Paxlovid prescription to patient's pharmacy, advised of side effects. Patient to self-monitor at home and continue hydration, Tylenol PRN for fever and Mucinex DM.    Disposition:  Home Care  Care Advice:  Care Advice Given     Given By Given At Modified    Kristian Can CRNP 10/16/2022  5:47 PM No    HOME CARE:   * You should be able to treat this at home.    Kristian Can CRNP 10/16/2022  5:47 PM No    REASSURANCE AND EDUCATION - POSITIVE COVID-19 LAB TEST AND MILD SYMPTOMS:  * You had a recent lab test for COVID-19 and it came back positive.  * A positive result on a PCR or rapid self-test kit is highly accurate for diagnosing COVID-19. It is highly likely that you have COVID-19.  * From what you have told me, your symptoms are mild. That is reassuring.  * Here's some care advice to help you and to help prevent others from getting sick.       Patient/Caregiver understands and will follow care advice?  Yes, plans to follow advice        Orders Placed This Encounter:  Orders Placed This Encounter    nirmatrelvir-ritonavir (PAXLOVID) tablets,dose pack dose package     Sig: Take 3 tablets by mouth 2 (two) times a day for 5 days. Take nirmatrelvir 300 mg (TWO 150mg tablets) PO PLUS ritonavir 100 mg (ONE 100mg tablet) by mouth, with all three tablets taken together twice daily     Dispense:  30 tablet     Refill:  0       Reason for Disposition   [1] COVID-19 diagnosed by positive lab test (e.g., PCR, rapid self-test kit) AND [2] mild symptoms (e.g., cough, fever, others) AND [3] no complications or SOB    Answer Assessment - Initial Assessment Questions  1. COVID-19 DIAGNOSIS: \"Who made your COVID-19 diagnosis?\" \"Was it confirmed by a positive lab test or self-test?\" If not " "diagnosed by a doctor (or NP/PA), ask \"Are there lots of cases (community spread) where you live?\" Note: See public health department website, if unsure.      Self test at home Friday at 3PM   2. COVID-19 EXPOSURE: \"Was there any known exposure to COVID before the symptoms began?\" [facility] Definition of close contact: within 6 feet (2 meters) for a total of 15 minutes or more over a 24-hour period.       Was at a big wedding  3. ONSET: \"When did the COVID-19 symptoms start?\"       Thursday night  4. WORST SYMPTOM: \"What is your worst symptom?\" (e.g., cough, fever, shortness of breath, muscle aches)      Lightheaded, weak  5. COUGH: \"Do you have a cough?\" If Yes, ask: \"How bad is the cough?\"        Slight, dry, intermittent  6. FEVER: \"Do you have a fever?\" If Yes, ask: \"What is your temperature, how was it measured, and when did it start?\"      99.3F, has not been >100.5F  7. RESPIRATORY STATUS: \"Describe your breathing?\" (e.g., shortness of breath, wheezing, unable to speak)       Denies  8. BETTER-SAME-WORSE: \"Are you getting better, staying the same or getting worse compared to yesterday?\"  If getting worse, ask, \"In what way?\"      Worse  9. HIGH RISK DISEASE: \"Do you have any chronic medical problems?\" (e.g., asthma, heart or lung disease, weak immune system, obesity, etc.)      High cholesterol  10. VACCINE: \"Have you had the COVID-19 vaccine?\" If Yes, ask: \"Which one, how many shots, when did you get it?\"        Pfizer x 2  11. BOOSTER: \"Have you received your COVID-19 booster?\" If Yes, ask: \"Which one and when did you get it?\"        Pfizer x 2  12. PREGNANCY: \"Is there any chance you are pregnant?\" \"When was your last menstrual period?\"        N/A  13. OTHER SYMPTOMS: \"Do you have any other symptoms?\"  (e.g., chills, fatigue, headache, loss of smell or taste, muscle pain, sore throat)        Fatigue, bodyaches, sore throat  14. O2 SATURATION MONITOR:  \"Do you use an oxygen saturation monitor (pulse oximeter) at " "home?\" If Yes, ask \"What is your reading (oxygen level) today?\" \"What is your usual oxygen saturation reading?\" (e.g., 95%)        97% on RA    Protocols used: CORONAVIRUS (COVID-19) DIAGNOSED OR SUSPECTED-ADULT-AH      "

## 2022-10-27 NOTE — TELEPHONE ENCOUNTER
Pt is asking if Repatha samples may be available for  at [facility]?    Pt can be reached at [phone].     Ty.

## 2022-11-01 ENCOUNTER — HOSPITAL ENCOUNTER (OUTPATIENT)
Dept: RADIOLOGY | Facility: CLINIC | Age: 72
Discharge: HOME | End: 2022-11-01
Payer: MEDICARE

## 2022-11-01 ENCOUNTER — OFFICE VISIT (OUTPATIENT)
Dept: PRIMARY CARE | Facility: CLINIC | Age: 72
End: 2022-11-01
Payer: MEDICARE

## 2022-11-01 ENCOUNTER — DOCUMENTATION (OUTPATIENT)
Dept: PSYCHIATRY | Facility: HOSPITAL | Age: 72
End: 2022-11-01
Payer: MEDICARE

## 2022-11-01 VITALS
HEART RATE: 84 BPM | OXYGEN SATURATION: 99 % | TEMPERATURE: 97.6 F | RESPIRATION RATE: 16 BRPM | WEIGHT: 109 LBS | SYSTOLIC BLOOD PRESSURE: 112 MMHG | BODY MASS INDEX: 18.71 KG/M2 | DIASTOLIC BLOOD PRESSURE: 74 MMHG

## 2022-11-01 DIAGNOSIS — U09.9 POST-COVID-19 SYNDROME MANIFESTING AS CHRONIC COUGH: ICD-10-CM

## 2022-11-01 DIAGNOSIS — R05.3 POST-COVID-19 SYNDROME MANIFESTING AS CHRONIC COUGH: Primary | ICD-10-CM

## 2022-11-01 DIAGNOSIS — U09.9 POST-COVID-19 SYNDROME MANIFESTING AS CHRONIC COUGH: Primary | ICD-10-CM

## 2022-11-01 DIAGNOSIS — R05.3 POST-COVID-19 SYNDROME MANIFESTING AS CHRONIC COUGH: ICD-10-CM

## 2022-11-01 PROCEDURE — 99214 OFFICE O/P EST MOD 30 MIN: CPT

## 2022-11-01 PROCEDURE — 71046 X-RAY EXAM CHEST 2 VIEWS: CPT

## 2022-11-01 RX ORDER — FLUTICASONE PROPIONATE 50 MCG
2 SPRAY, SUSPENSION (ML) NASAL DAILY
Qty: 16 G | Refills: 1 | Status: ON HOLD | OUTPATIENT
Start: 2022-11-01 | End: 2023-09-09 | Stop reason: ALTCHOICE

## 2022-11-01 ASSESSMENT — ENCOUNTER SYMPTOMS
SINUS PAIN: 0
RHINORRHEA: 1
FATIGUE: 1
SORE THROAT: 0
NAUSEA: 0
DIARRHEA: 0
CHILLS: 0
SHORTNESS OF BREATH: 0
PALPITATIONS: 0
SINUS PRESSURE: 0
FEVER: 0
COUGH: 1

## 2022-11-01 NOTE — PATIENT INSTRUCTIONS
"Symptomatic management:  Aims to relieve symptoms of nasal obstruction and rhinorrhea as well as the systemic signs and symptoms such as fever and fatigue   -Rest and drink plenty of fluids (at least 10-12 glasses a day).    -Warm fluids such as tea and soup can help increase the rate of mucous flow and provide some symptom relief.  -Over the counter NSAIDS (ibuprofen) and acetaminophen (tylenol- do not exceed 4,000 mg of 4 grams/day) can be used for pain and fever as needed  -Saline nasal spray may reduce nasal burning and irritation  -Flonase or Nasacort are intranasal glucocorticoids that decrease mucosal inflammation that allows improved sinus drainage   -Afrin Nasal Spray for congestion (do NOT use for more than 3 days as overuse can cause rebound congestion when you stop the medication)  -Oral decongestants (Sudafed) may be helpful when Eustachian tube dysfunction is a factor (use for 3-5 days)   -Dextromethorphan for cough (can be found in DayQuil, \"DM\" medications, and other cold medications). Avoid this if you have hypertension and opt for Coricidin HBP.   -Antihistamines: Used for symptom relief due to their drying effects; over-drying of the mucosa may lead to further discomfort   -Guaifenesin (Mucinex) may help to thin secretions and may promote ease of mucus drainage and clearance  -Steam inhalation of warm, humidified  air (steam) may provide transient sense of relief of congestion       "

## 2022-11-01 NOTE — PROGRESS NOTES
ESTABLISHED PATIENT OFFICE VISIT    [facility]  [address] [place] [address]  [address]  , [place] [address] [phone]      HISTORY OF PRESENT ILLNESS        HPI:  Stephanie Jeffers is a 71 y.o. female presents today for   Chief Complaint   Patient presents with    Fatigue     Post covid. Taste and smell has not returned. Still coughing     Covid positive on 10/14  Did not take Paxlovid   Cough ongoing since before covid   Patient received 2 Covid boosters  Sx: cough (dry), fatigue, brain fog, congestion, loss of tastse and smell, post nasal drip  Patient states that a little bit of taste and smell is returning   Denies SOB, chest pain, fever    Plan:  -Chest x-ray ordered (no active disease)  -Patient would like to hold on oral steroid for now   -Continue supportive care    PAST MEDICAL AND SURGICAL HISTORY      Past Medical History:   Diagnosis Date    Hypotension     IPMN (intraductal papillary mucinous neoplasm) 5/31/2022    Kidney stones     Lipid disorder     Migraines        Past Surgical History:   Procedure Laterality Date    CATARACT EXTRACTION Left 11/19/2018    CATARACT EXTRACTION Right 01/16/2019     MEDICATIONS        Current Outpatient Medications:     biotin 5 mg capsule, Take by mouth daily., Disp: , Rfl:     clonazePAM (klonoPIN) 1 mg tablet, Take by mouth nightly.  , Disp: , Rfl: 5    estradiol (ESTRACE) 0.01 % (0.1 mg/gram) vaginal cream, 1 gm vaginally twice a week, Disp: 42.5 g, Rfl: 3    evolocumab (REPATHA SURECLICK) 140 mg/mL pen, Inject 1 mL (140 mg total) under the skin every 14 (fourteen) days., Disp: 1 mL, Rfl: 0    fluticasone propionate (FLONASE) 50 mcg/actuation nasal spray, Administer 2 sprays into each nostril daily., Disp: 16 g, Rfl: 1    valACYclovir (VALTREX) 500 mg tablet, Take 500 mg by mouth as needed., Disp: , Rfl:     evolocumab (REPATHA SURECLICK) 140 mg/mL pen, Inject 1 mL (140 mg total) under the skin every 14  (fourteen) days., Disp: 4 mL, Rfl: 0    nitrofurantoin (MACRODANTIN) 100 mg capsule, Take 100 mg by mouth as needed., Disp: , Rfl:     SUMAtriptan (IMITREX) 100 mg tablet, Take by mouth as needed.  , Disp: , Rfl:     zoledronic acid/mannitol-water (RECLAST IV), Infuse into a venous catheter. Once per year, Disp: , Rfl:     ALLERGIES      Patient has no known allergies.    FAMILY HISTORY      Family History   Problem Relation Age of Onset    Hyperlipidemia Biological Mother     Lung cancer Biological Mother     Hyperlipidemia Biological Father     Hypertension Biological Father     Diabetes Biological Father     Tetralogy of Fallot  Biological Sister     Breast cancer Cousin        SOCIAL/ TOBACCO HISTORY      Social History     Tobacco Use    Smoking status: Never    Smokeless tobacco: Never   Substance Use Topics    Alcohol use: Yes     Comment: glass of wine 3 times per week     Drug use: No       REVIEW OF SYSTEMS      Review of Systems   Constitutional: Positive for fatigue. Negative for chills and fever.   HENT: Positive for congestion, postnasal drip and rhinorrhea. Negative for sinus pressure, sinus pain and sore throat.         Loss of taste and smell    Respiratory: Positive for cough (Dry). Negative for shortness of breath.    Cardiovascular: Negative for chest pain and palpitations.   Gastrointestinal: Negative for diarrhea and nausea.        PHYSICAL EXAMINATION     Vitals:    11/01/22 1432   BP: 112/74   Pulse: 84   Resp: 16   Temp: 36.4 °C (97.6 °F)   SpO2: 99%   Weight: 49.4 kg (109 lb)       Wt Readings from Last 3 Encounters:   11/01/22 49.4 kg (109 lb)   09/09/22 49.5 kg (109 lb 1 oz)   06/01/22 49.4 kg (109 lb)        Lab Results   Component Value Date    WBC 6.09 09/06/2022    HGB 13.6 09/06/2022    HCT 42.9 09/06/2022     09/06/2022    CHOL 236 (H) 09/06/2022    TRIG 69 09/06/2022     09/06/2022    ALT 22 09/06/2022    AST 33 09/06/2022     09/06/2022    K 4.5  09/06/2022     09/06/2022    CREATININE 0.9 09/06/2022    BUN 21 (H) 09/06/2022    CO2 27 09/06/2022    TSH 0.80 08/26/2020    INR 1.0 09/06/2022    LDLCALC 116 (H) 09/06/2022       Physical Exam  Vitals reviewed.   Constitutional:       General: She is not in acute distress.     Appearance: Normal appearance. She is not ill-appearing, toxic-appearing or diaphoretic.   HENT:      Head: Normocephalic.      Right Ear: Ear canal normal. A middle ear effusion is present. Tympanic membrane is not erythematous or bulging.      Left Ear: Ear canal normal. A middle ear effusion is present. Tympanic membrane is not erythematous or bulging.      Ears:      Comments: B/L mid ear effusions      Nose: Nose normal.      Mouth/Throat:      Mouth: Mucous membranes are moist.      Pharynx: Oropharynx is clear.   Cardiovascular:      Rate and Rhythm: Normal rate and regular rhythm.      Heart sounds: Normal heart sounds.   Pulmonary:      Effort: Pulmonary effort is normal. No respiratory distress.      Breath sounds: Normal breath sounds. No wheezing or rhonchi.   Musculoskeletal:         General: Normal range of motion.   Skin:     General: Skin is warm and dry.   Neurological:      Mental Status: She is alert.   Psychiatric:         Mood and Affect: Mood normal.         Behavior: Behavior normal.         Thought Content: Thought content normal.         Judgment: Judgment normal.        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 - Primary     -Chest x-ray ordered (no active disease)  -Patient would like to hold on oral steroid for now   -Continue supportive care  -Discussed covid [facility] if symptoms do not improve          Relevant Medications    fluticasone propionate (FLONASE) 50 mcg/actuation nasal spray    Other Relevant Orders    X-RAY CHEST 2 VIEWS (Completed)          I spent 30 minutes on this date of  "service performing the following activities: obtaining history, performing examination, entering orders, documenting, preparing for visit, obtaining / reviewing records and providing counseling and education.    ALEC Velazco  11/1/2022    Patient Instructions   Symptomatic management:  Aims to relieve symptoms of nasal obstruction and rhinorrhea as well as the systemic signs and symptoms such as fever and fatigue   -Rest and drink plenty of fluids (at least 10-12 glasses a day).    -Warm fluids such as tea and soup can help increase the rate of mucous flow and provide some symptom relief.  -Over the counter NSAIDS (ibuprofen) and acetaminophen (tylenol- do not exceed 4,000 mg of 4 grams/day) can be used for pain and fever as needed  -Saline nasal spray may reduce nasal burning and irritation  -Flonase or Nasacort are intranasal glucocorticoids that decrease mucosal inflammation that allows improved sinus drainage   -Afrin Nasal Spray for congestion (do NOT use for more than 3 days as overuse can cause rebound congestion when you stop the medication)  -Oral decongestants (Sudafed) may be helpful when Eustachian tube dysfunction is a factor (use for 3-5 days)   -Dextromethorphan for cough (can be found in DayQuil, \"DM\" medications, and other cold medications). Avoid this if you have hypertension and opt for Coricidin HBP.   -Antihistamines: Used for symptom relief due to their drying effects; over-drying of the mucosa may lead to further discomfort   -Guaifenesin (Mucinex) may help to thin secretions and may promote ease of mucus drainage and clearance  -Steam inhalation of warm, humidified  air (steam) may provide transient sense of relief of congestion                             "

## 2022-11-01 NOTE — ASSESSMENT & PLAN NOTE
-Chest x-ray ordered (no active disease)  -Patient would like to hold on oral steroid for now   -Continue supportive care  -Discussed covid [facility] if symptoms do not improve

## 2022-11-01 NOTE — DISCHARGE SUMMARY
Discharge Summary     Patient Name: Stephanie Jeffers  : 1950  Treatment start date: 21  Treatment end date: 22    Discharge Type: Mental Health  Discharge Reason: Special Circumstances     Reason for admission and treatment: Pt was evaluated and recommended for OP groups to address anxiety and depressive symptoms.    Services offered and response to treatment: Pt attended one OP group and then did not follow up.  Pt was erroneously on active census.  Pt confirmed via Mychart that she will not be following up with [facility] services.    Client status - Condition upon discharge: Not applicable.    Screening Assessments done this visit:        [place]  Depression Screening: Not indicated           Clinical concerns to be addressed in continued care: Not applicable    Aftercare appointments: Not applicable    Special Instructions: None    Medical Follow Up needed?  No     Is patient receiving Medicated Assisted Treatment? No    Mental Health Crisis Support:    [facility] Crisis Number: [phone]   [facility] Crisis Number: [phone]   [facility] Crisis Number: 200-724-2763   [facility] Crisis Number: [phone]      In case of Mental Health Crisis, go to the closest Emergency Department, call , or contact the National Suicide Prevention Hotline at: 1-661.725.2156.  You may also call, text or chat the National Suicide Prevention Hotline at .     Other Support Agencies:  [place] National [place] of Mental [employer]ness: 886.349.1933    [place] Advocacy System: [phone]    Depression & Bipolar [place]: [phone]      Patient offered a copy of plan? No, Describe d/c confirmed via my chart     Patient provided a copy? No, Describe d/c confirmed via my chart       Courtney Dietrich, ABNER @ 2:26 PM

## 2022-11-07 ENCOUNTER — HOSPITAL ENCOUNTER (OUTPATIENT)
Dept: RADIOLOGY | Facility: CLINIC | Age: 72
Discharge: HOME | End: 2022-11-07
Attending: STUDENT IN AN ORGANIZED HEALTH CARE EDUCATION/TRAINING PROGRAM
Payer: MEDICARE

## 2022-11-07 DIAGNOSIS — D49.0 IPMN (INTRADUCTAL PAPILLARY MUCINOUS NEOPLASM): ICD-10-CM

## 2022-11-07 DIAGNOSIS — R93.3 ABNORMAL FINDINGS ON DIAGNOSTIC IMAGING OF OTHER PARTS OF DIGESTIVE TRACT: ICD-10-CM

## 2022-11-07 RX ORDER — GADOBUTROL 604.72 MG/ML
0.1 INJECTION INTRAVENOUS ONCE
Status: COMPLETED | OUTPATIENT
Start: 2022-11-07 | End: 2022-11-07

## 2022-11-07 RX ADMIN — GADOBUTROL 4.9 MMOL: 604.72 INJECTION INTRAVENOUS at 01:30

## 2022-11-30 ENCOUNTER — OFFICE VISIT (OUTPATIENT)
Dept: PRIMARY CARE | Facility: CLINIC | Age: 72
End: 2022-11-30
Payer: MEDICARE

## 2022-11-30 ENCOUNTER — TELEPHONE (OUTPATIENT)
Dept: ADMISSIONS | Facility: HOSPITAL | Age: 72
End: 2022-11-30

## 2022-11-30 VITALS
HEART RATE: 84 BPM | OXYGEN SATURATION: 98 % | WEIGHT: 106 LBS | SYSTOLIC BLOOD PRESSURE: 102 MMHG | RESPIRATION RATE: 16 BRPM | HEIGHT: 64 IN | DIASTOLIC BLOOD PRESSURE: 66 MMHG | BODY MASS INDEX: 18.1 KG/M2 | TEMPERATURE: 97.6 F

## 2022-11-30 DIAGNOSIS — F39 MOOD DISORDER (CMS/HCC): Primary | ICD-10-CM

## 2022-11-30 DIAGNOSIS — R63.6 UNDERWEIGHT: ICD-10-CM

## 2022-11-30 DIAGNOSIS — D49.0 IPMN (INTRADUCTAL PAPILLARY MUCINOUS NEOPLASM): ICD-10-CM

## 2022-11-30 DIAGNOSIS — R93.5 ABNORMAL FINDINGS ON DIAGNOSTIC IMAGING OF OTHER ABDOMINAL REGIONS, INCLUDING RETROPERITONEUM: ICD-10-CM

## 2022-11-30 PROCEDURE — G0439 PPPS, SUBSEQ VISIT: HCPCS | Performed by: STUDENT IN AN ORGANIZED HEALTH CARE EDUCATION/TRAINING PROGRAM

## 2022-11-30 PROCEDURE — 99214 OFFICE O/P EST MOD 30 MIN: CPT | Mod: 25 | Performed by: STUDENT IN AN ORGANIZED HEALTH CARE EDUCATION/TRAINING PROGRAM

## 2022-11-30 RX ORDER — SERTRALINE HYDROCHLORIDE 25 MG/1
25 TABLET, FILM COATED ORAL DAILY
Qty: 90 TABLET | Refills: 0 | Status: SHIPPED | OUTPATIENT
Start: 2022-11-30 | End: 2022-12-23 | Stop reason: ALTCHOICE

## 2022-12-14 ENCOUNTER — APPOINTMENT (OUTPATIENT)
Dept: LAB | Facility: CLINIC | Age: 72
End: 2022-12-14
Attending: STUDENT IN AN ORGANIZED HEALTH CARE EDUCATION/TRAINING PROGRAM
Payer: MEDICARE

## 2022-12-14 DIAGNOSIS — R63.0 NO APPETITE: ICD-10-CM

## 2022-12-14 PROCEDURE — 36415 COLL VENOUS BLD VENIPUNCTURE: CPT

## 2022-12-14 PROCEDURE — 84443 ASSAY THYROID STIM HORMONE: CPT

## 2022-12-15 LAB — TSH SERPL DL<=0.05 MIU/L-ACNC: 0.86 MIU/L (ref 0.34–5.6)

## 2022-12-22 ASSESSMENT — ENCOUNTER SYMPTOMS
EYES NEGATIVE: 1
CARDIOVASCULAR NEGATIVE: 1
NEUROLOGICAL NEGATIVE: 1
MUSCULOSKELETAL NEGATIVE: 1
RESPIRATORY NEGATIVE: 1
GASTROINTESTINAL NEGATIVE: 1
HEMATOLOGIC/LYMPHATIC NEGATIVE: 1
ALLERGIC/IMMUNOLOGIC NEGATIVE: 1
ENDOCRINE NEGATIVE: 1

## 2022-12-22 NOTE — PROGRESS NOTES
ESTABLISHED PATIENT TELEMED VISIT    [facility]   [facility]  NIRMALA RAVI M.D.  [phone]      HPI - ASSESSMENT AND PLAN      Verification of Patient Location:  The patient affirms they are currently located in the following state: [place]     Audio and Video Encounter   Pari, my name is Nirmala Ravi MD.  Before we proceed, can you please verify your identification by telling me your full name and date of birth?  Can you tell me who is in the room with you?    You and I are about to have a telemedicine check-in or visit because you have requested it.  This is a live video-conference.  I am a real person, speaking to you in real time.  There is no one else with me on the video-conference. I am not recording this conversation and I am asking you not to record it.  This telemedicine visit will be billed to your health insurance or you, if you are self-insured.  You understand you will be responsible for any copayments or coinsurances that apply to your telemedicine visit.  Communication platform used for this encounter:  [employer] Video Visit (Epic Video Client)       Before starting our telemedicine visit, I am required to get your consent for this virtual check-in or visit by telemedicine. Do you consent?     Patient Response to Request for Consent: Yes     CC:   Chief Complaint   Patient presents with   • Follow-up     Stephanie Jeffers is a 72 y.o. female with a history of familial hypercholesterolemia, elevated CAC, kidney stone, horseshoe kidney, migraines, osteoporosis now osteopenia, recurrent urinary tract infections who presents for follow up.     She was last seen in the office 11/30/2022  -Restarted on Zoloft, plan to increase to 50mg     Since that visit:  -Insurance did not cover IOP, only PHP, she will re-engage with her therapist     Today she reports she is feeling a bit better than when we spoke last. She restarted Zoloft as planned, but had significant side  effects. She uptitrated and tried two days of Zoloft 50mg and had significant hand tremors.     She was able to see Dr. Rita Hernandez in Psychiatry who changed her to Effexor XR 37.5mg daily. She took her first dose today and is already feeling different. Appetite has been starting to come back. She is going to follow regularly with Dr. Hernandez once a week. Dr. Hernandez will Rx Klonopin for sleep.     Assessment   Problem List Items Addressed This Visit        Nervous    Insomnia     Continue Klonopin 1mg nightly for sleep, Rx by Dr. Hernandez.             Mental Health    Severe episode of recurrent major depressive disorder, without psychotic features (CMS/HCC) - Primary     No longer on Zoloft due to side effects.   Continue Effexor XR 37.5mg daily and regular follow up with Dr. Hernandez.   If for some reason appetite remains low and you are still feeling low mood and energy, let me know as I would like to check a morning cortisol level to rule out hypocortisolism as a cause, although I do not think this is likely.          Relevant Medications    venlafaxine XR (EFFEXOR-XR) 37.5 mg 24 hr capsule          PAST MEDICAL AND SURGICAL HISTORY        Past Medical History:   Diagnosis Date   • Hypotension    • IPMN (intraductal papillary mucinous neoplasm) 5/31/2022   • Kidney stones    • Lipid disorder    • Migraines        Past Surgical History:   Procedure Laterality Date   • CATARACT EXTRACTION Left 11/19/2018   • CATARACT EXTRACTION Right 01/16/2019     MEDICATIONS          Current Outpatient Medications:   •  biotin 5 mg capsule, Take by mouth daily., Disp: , Rfl:   •  clonazePAM (klonoPIN) 1 mg tablet, Take by mouth nightly.  , Disp: , Rfl: 5  •  estradiol (ESTRACE) 0.01 % (0.1 mg/gram) vaginal cream, 1 gm vaginally twice a week, Disp: 42.5 g, Rfl: 3  •  evolocumab (REPATHA SURECLICK) 140 mg/mL pen, Inject 1 mL (140 mg total) under the skin every 14 (fourteen) days., Disp: 1 mL, Rfl: 0  •  evolocumab (REPATHA  SURECLICK) 140 mg/mL pen, Inject 1 mL (140 mg total) under the skin every 14 (fourteen) days., Disp: 4 mL, Rfl: 0  •  fluticasone propionate (FLONASE) 50 mcg/actuation nasal spray, Administer 2 sprays into each nostril daily., Disp: 16 g, Rfl: 1  •  nitrofurantoin (MACRODANTIN) 100 mg capsule, Take 100 mg by mouth as needed., Disp: , Rfl:   •  SUMAtriptan (IMITREX) 100 mg tablet, Take by mouth as needed.  , Disp: , Rfl:   •  valACYclovir (VALTREX) 500 mg tablet, Take 500 mg by mouth as needed., Disp: , Rfl:   •  venlafaxine XR (EFFEXOR-XR) 37.5 mg 24 hr capsule, , Disp: , Rfl:   ALLERGIES        Patient has no known allergies.  FAMILY HISTORY        Family History   Problem Relation Age of Onset   • Hyperlipidemia Biological Mother    • Lung cancer Biological Mother    • Hyperlipidemia Biological Father    • Hypertension Biological Father    • Diabetes Biological Father    • Tetralogy of Fallot  Biological Sister    • Breast cancer Cousin      SOCIAL/ TOBACCO HISTORY        Social History     Tobacco Use   • Smoking status: Never   • Smokeless tobacco: Never   Substance Use Topics   • Alcohol use: Not Currently     Comment: glass of wine 3 times per week    • Drug use: No     REVIEW OF SYSTEMS        Review of Systems   Constitutional: Positive for appetite change and unexpected weight change.   HENT: Negative.    Eyes: Negative.    Respiratory: Negative.    Cardiovascular: Negative.    Gastrointestinal: Negative.    Endocrine: Negative.    Genitourinary: Negative.    Musculoskeletal: Negative.    Skin: Negative.    Allergic/Immunologic: Negative.    Neurological: Negative.    Hematological: Negative.    Psychiatric/Behavioral: Positive for dysphoric mood.      PHYSICAL EXAMINATION   There were no vitals taken for this visit.     Constitutional:       Appearance: Normal appearance. Well-developed.   HENT:      Head: Normocephalic and atraumatic.      Right Ear: External ear normal.      Left Ear: External ear  normal.   Eyes:      General: Lids are normal.      Extraocular Movements: Extraocular movements intact.      Conjunctiva/sclera: Conjunctivae normal.   Pulmonary:      Effort: Pulmonary effort is normal.   Neurological:      General: No focal deficit present.      Mental Status: Alert and oriented to person, place, and time. Mental status is at baseline.   Psychiatric:         Attention and Perception: Attention and perception normal.         Mood and Affect: Mood and affect normal.         Speech: Speech normal.         Behavior: Behavior normal. Behavior is cooperative.         Thought Content: Thought content normal.         Cognition and Memory: Cognition and memory normal.         Judgment: Judgment normal.      TIME   I spent 13 minutes on this date of service performing the following activities: obtaining history, performing examination, entering orders, documenting, preparing for visit, obtaining / reviewing records and providing counseling and education.    Nirmala Ravi MD  12/23/2022

## 2022-12-23 ENCOUNTER — TELEMEDICINE (OUTPATIENT)
Dept: PRIMARY CARE | Facility: CLINIC | Age: 72
End: 2022-12-23
Payer: MEDICARE

## 2022-12-23 DIAGNOSIS — F33.2 SEVERE EPISODE OF RECURRENT MAJOR DEPRESSIVE DISORDER, WITHOUT PSYCHOTIC FEATURES (CMS/HCC): Primary | ICD-10-CM

## 2022-12-23 DIAGNOSIS — F51.04 PSYCHOPHYSIOLOGICAL INSOMNIA: ICD-10-CM

## 2022-12-23 PROBLEM — R05.3 POST-COVID-19 SYNDROME MANIFESTING AS CHRONIC COUGH: Status: RESOLVED | Noted: 2022-11-01 | Resolved: 2022-12-23

## 2022-12-23 PROBLEM — G47.00 INSOMNIA: Status: ACTIVE | Noted: 2022-12-23

## 2022-12-23 PROBLEM — U09.9 POST-COVID-19 SYNDROME MANIFESTING AS CHRONIC COUGH: Status: RESOLVED | Noted: 2022-11-01 | Resolved: 2022-12-23

## 2022-12-23 PROCEDURE — 99214 OFFICE O/P EST MOD 30 MIN: CPT | Mod: 95 | Performed by: STUDENT IN AN ORGANIZED HEALTH CARE EDUCATION/TRAINING PROGRAM

## 2022-12-23 RX ORDER — VENLAFAXINE HYDROCHLORIDE 37.5 MG/1
CAPSULE, EXTENDED RELEASE ORAL
Status: ON HOLD | COMMUNITY
Start: 2022-12-20 | End: 2023-09-09 | Stop reason: ENTERED-IN-ERROR

## 2022-12-23 ASSESSMENT — ENCOUNTER SYMPTOMS
UNEXPECTED WEIGHT CHANGE: 1
DYSPHORIC MOOD: 1
APPETITE CHANGE: 1

## 2022-12-23 NOTE — PATIENT INSTRUCTIONS
Continue Effexor XR 37.5mg daily and regular follow up with Dr. Hernandez.   Get your COVID booster January 15th.   If for some reason appetite remains low and you are still feeling low mood and energy, let me know as I would like to check a morning cortisol level to rule out hypocortisolism as a cause, although I do not think this is likely.

## 2022-12-23 NOTE — ASSESSMENT & PLAN NOTE
Continue Klonopin 1mg nightly for sleep, Rx by Dr. Hernandez.   
No longer on Zoloft due to side effects.   Continue Effexor XR 37.5mg daily and regular follow up with Dr. Hernandez.   If for some reason appetite remains low and you are still feeling low mood and energy, let me know as I would like to check a morning cortisol level to rule out hypocortisolism as a cause, although I do not think this is likely.   
Vaginal Delivery
stable

## 2023-01-26 NOTE — TELEPHONE ENCOUNTER
Initial Intake    Stephanie Jeffers, a 72 y.o. female          Referring Facility: [facility]  Referring Facility Comments: Nirmala Ravi

## 2023-02-17 ENCOUNTER — TELEPHONE (OUTPATIENT)
Dept: SCHEDULING | Facility: CLINIC | Age: 73
End: 2023-02-17

## 2023-02-17 RX ORDER — EVOLOCUMAB 140 MG/ML
140 INJECTION, SOLUTION SUBCUTANEOUS
Qty: 2 ML | Refills: 0 | Status: ON HOLD | COMMUNITY
Start: 2023-02-17 | End: 2023-09-09 | Stop reason: ENTERED-IN-ERROR

## 2023-02-17 NOTE — TELEPHONE ENCOUNTER
Pt is approved for Repatha and applied to [employer] [facility],. Picking up samples at [place]

## 2023-02-17 NOTE — TELEPHONE ENCOUNTER
Medication Sample Request    Stephanie Jeffers    Caller: Stephanie    Relationship: slef    Medication/dosage: Repatha Sureclik 140 mg    Contact number: [phone]    Additional note: Pt is out

## 2023-02-17 NOTE — TELEPHONE ENCOUNTER
Jose D samples  (Newest Message First)  Hyacinth White MA  You 8 minutes ago (1:12 PM)     SG  can you check with Jamia/ Chloe to see if it is okay to set aside samples and if so can you bag them up and notify pt. TY!

## 2023-03-06 ENCOUNTER — APPOINTMENT (OUTPATIENT)
Dept: LAB | Facility: CLINIC | Age: 73
End: 2023-03-06
Attending: INTERNAL MEDICINE
Payer: MEDICARE

## 2023-03-06 DIAGNOSIS — E78.01 FAMILIAL HYPERCHOLESTEROLEMIA: ICD-10-CM

## 2023-03-06 DIAGNOSIS — E78.41 ELEVATED LIPOPROTEIN(A): ICD-10-CM

## 2023-03-06 DIAGNOSIS — R93.1 ELEVATED CORONARY ARTERY CALCIUM SCORE: ICD-10-CM

## 2023-03-06 PROCEDURE — 80061 LIPID PANEL: CPT

## 2023-03-06 PROCEDURE — 36415 COLL VENOUS BLD VENIPUNCTURE: CPT

## 2023-03-06 PROCEDURE — 80053 COMPREHEN METABOLIC PANEL: CPT

## 2023-03-06 PROCEDURE — 83695 ASSAY OF LIPOPROTEIN(A): CPT

## 2023-03-06 PROCEDURE — 86141 C-REACTIVE PROTEIN HS: CPT

## 2023-03-07 LAB
ALBUMIN SERPL-MCNC: 3.9 G/DL (ref 3.4–5)
ALP SERPL-CCNC: 54 IU/L (ref 35–126)
ALT SERPL-CCNC: 21 IU/L (ref 11–54)
ANION GAP SERPL CALC-SCNC: 8 MEQ/L (ref 3–15)
AST SERPL-CCNC: 29 IU/L (ref 15–41)
BILIRUB SERPL-MCNC: 0.6 MG/DL (ref 0.3–1.2)
BUN SERPL-MCNC: 20 MG/DL (ref 8–20)
CALCIUM SERPL-MCNC: 9.5 MG/DL (ref 8.9–10.3)
CHLORIDE SERPL-SCNC: 102 MEQ/L (ref 98–109)
CHOLEST SERPL-MCNC: 222 MG/DL
CO2 SERPL-SCNC: 26 MEQ/L (ref 22–32)
CREAT SERPL-MCNC: 0.9 MG/DL (ref 0.6–1.1)
CRP SERPL HS-MCNC: 2.22 MG/L
GFR SERPL CREATININE-BSD FRML MDRD: >60 ML/MIN/1.73M*2
GLUCOSE SERPL-MCNC: 97 MG/DL (ref 70–99)
HDLC SERPL-MCNC: 99 MG/DL
HDLC SERPL: 2.2 {RATIO}
LDLC SERPL CALC-MCNC: 98 MG/DL
NONHDLC SERPL-MCNC: 123 MG/DL
POTASSIUM SERPL-SCNC: 3.9 MEQ/L (ref 3.6–5.1)
PROT SERPL-MCNC: 6.4 G/DL (ref 6–8.2)
SODIUM SERPL-SCNC: 136 MEQ/L (ref 136–144)
TRIGL SERPL-MCNC: 123 MG/DL (ref 30–149)

## 2023-03-10 ENCOUNTER — OFFICE VISIT (OUTPATIENT)
Dept: CARDIOLOGY | Facility: CLINIC | Age: 73
End: 2023-03-10
Payer: MEDICARE

## 2023-03-10 VITALS
SYSTOLIC BLOOD PRESSURE: 90 MMHG | OXYGEN SATURATION: 98 % | WEIGHT: 108 LBS | HEIGHT: 60 IN | RESPIRATION RATE: 18 BRPM | DIASTOLIC BLOOD PRESSURE: 58 MMHG | BODY MASS INDEX: 21.2 KG/M2

## 2023-03-10 DIAGNOSIS — I95.1 ORTHOSTATIC HYPOTENSION: ICD-10-CM

## 2023-03-10 DIAGNOSIS — E78.41 ELEVATED LIPOPROTEIN(A): ICD-10-CM

## 2023-03-10 DIAGNOSIS — E78.01 FAMILIAL HYPERCHOLESTEROLEMIA: Primary | ICD-10-CM

## 2023-03-10 DIAGNOSIS — R93.1 ELEVATED CORONARY ARTERY CALCIUM SCORE: ICD-10-CM

## 2023-03-10 PROCEDURE — 93000 ELECTROCARDIOGRAM COMPLETE: CPT | Performed by: INTERNAL MEDICINE

## 2023-03-10 PROCEDURE — 99214 OFFICE O/P EST MOD 30 MIN: CPT | Performed by: INTERNAL MEDICINE

## 2023-03-10 RX ORDER — EZETIMIBE 10 MG/1
10 TABLET ORAL NIGHTLY
Qty: 90 TABLET | Refills: 3 | Status: SHIPPED | OUTPATIENT
Start: 2023-03-10 | End: 2023-03-10 | Stop reason: SDUPTHER

## 2023-03-10 RX ORDER — EVOLOCUMAB 140 MG/ML
INJECTION, SOLUTION SUBCUTANEOUS
Qty: 6 ML | Refills: 3 | Status: SHIPPED | OUTPATIENT
Start: 2023-03-10 | End: 2024-03-22

## 2023-03-10 RX ORDER — ESCITALOPRAM OXALATE 5 MG/1
TABLET ORAL
Status: ON HOLD | COMMUNITY
Start: 2023-01-13 | End: 2023-09-09 | Stop reason: ENTERED-IN-ERROR

## 2023-03-10 NOTE — PROGRESS NOTES
Antonio Price MD  Cardiology    [facility]    [facility]  [facility] [address] [facility]  [address] [place] [address]    TEL  [phone]  [facility]     03/10/23     Dear Dr. aRvi:    It was my pleasure to see Ms. Stephanie Jeffers at the [facility] today for follow-up.    As you know, she is a 72 y.o. female with a history of familial hypercholesterolemia.    Stephanie continues to do well overall. No new cardiopulmonary symptoms. No chest pain or pressure. No dyspnea, palpitations, or edema. No syncope. She has a longstanding history of lightheadedness with fast position changes. This is unchanged. Her diet and exercise regimen remain very good overall. Due to easy brusiing she has been off aspirin. Continues to tolerate Repatha without issue.     Cardiovascular History:  1. Familial hypercholesterolemia, elevated Lp(a)  2. Coronary atherosclerosis   3. Orthostatic hypotension    Past Medical History: Migraines, History of HBV    Past Surgical History:  Past Surgical History:   Procedure Laterality Date   • CATARACT EXTRACTION Left 11/19/2018   • CATARACT EXTRACTION Right 01/16/2019       Medications:  Current Outpatient Medications   Medication Sig Dispense Refill   • biotin 5 mg capsule Take by mouth daily.     • clonazePAM (klonoPIN) 1 mg tablet Take by mouth nightly.    5   • estradiol (ESTRACE) 0.01 % (0.1 mg/gram) vaginal cream 1 gm vaginally twice a week 42.5 g 3   • evolocumab (REPATHA SURECLICK) 140 mg/mL pen Inject 1 mL (140 mg total) under the skin every 14 (fourteen) days. 1 mL 0   • evolocumab (REPATHA SURECLICK) 140 mg/mL pen Inject 1 mL (140 mg total) under the skin every 14 (fourteen) days. 4 mL 0   • evolocumab (REPATHA SURECLICK) 140 mg/mL pen Inject 1 mL (140 mg total) under the skin every 14 (fourteen) days. 2 Boxes  Lot #  [id number]  Exp: 9/30/2025 2 mL 0   • SUMAtriptan (IMITREX) 100 mg tablet Take by mouth as needed.        • valACYclovir (VALTREX) 500 mg tablet Take 500 mg by mouth as needed.     • escitalopram (LEXAPRO) 5 mg tablet TAKE 1 AND 1/2 TABLETS DAILY BY MOUTH     • ezetimibe (ZETIA) 10 mg tablet Take 1 tablet (10 mg total) by mouth nightly. 90 tablet 3   • fluticasone propionate (FLONASE) 50 mcg/actuation nasal spray Administer 2 sprays into each nostril daily. (Patient not taking: Reported on 3/10/2023) 16 g 1   • nitrofurantoin (MACRODANTIN) 100 mg capsule Take 100 mg by mouth as needed.     • REPATHA SURECLICK 140 mg/mL pen INJECT 1 ML (140 MG TOTAL) UNDER THE SKIN EVERY 14 DAYS. 6 mL 3   • venlafaxine XR (EFFEXOR-XR) 37.5 mg 24 hr capsule        No current facility-administered medications for this visit.       Allergies: Patient has no known allergies.    Social History: She is .  She has 2 children.  Retired .  She is a never smoker.  Approximately 3 glasses of wine per week.  No recreational drugs.    Family History: She has a sister who has tetralogy of Fallot. Her brother has no documented coronary artery disease. Her mother  at a young age from complications of lung cancer.  She had hypercholesterolemia.  Her father lived to be 92.  He had a stroke late in life and also had hypercholesterolemia.  No family history of premature coronary artery disease, arrhythmias, cardiomyopathies, or sudden cardiac death.    Review of Systems: A complete 14-point review of systems is negative, except as noted in the HPI.    Exam:  Objective   Vitals:    03/10/23 1006   BP: (!) 90/58   Resp: 18   SpO2: 98%     Wt Readings from Last 3 Encounters:   03/10/23 49 kg (108 lb)   22 48.1 kg (106 lb)   22 49.4 kg (109 lb)     Body mass index is 21.09 kg/m².  Constitutional: Appears comfortable.   Eyes: No icterus.  No corneal arcus.  ENT: Deferred. Patient wearing a mask.   Neck: No jugular venous distention.   Vascular: No carotid bruits. Radial pulses intact and equal.  Cardiac:  Normal S1 and S2, regular rhythm. No murmurs, rubs, or gallops appreciated.  Lungs: Clear to auscultation bilaterally.    GI: Soft, normoactive bowel sounds.  Extremities: Warm. No lower extremity edema.   Skin: Dry.  Neurologic: Awake, alert, oriented.    Psychiatric: No agitation.    Labs: Personally reviewed and discussed with the patient.  Notable for the following.   Lab Results   Component Value Date    LDLCALC 98 03/06/2023    CHOL 222 (H) 03/06/2023    TRIG 123 03/06/2023    HDL 99 03/06/2023     03/06/2023    K 3.9 03/06/2023    BUN 20 03/06/2023    CREATININE 0.9 03/06/2023    WBC 6.09 09/06/2022    HGB 13.6 09/06/2022     09/06/2022     Lab Results   Component Value Date    ALT 21 03/06/2023    AST 29 03/06/2023    ALKPHOS 54 03/06/2023    BILITOT 0.6 03/06/2023 1/2022-  , , HDL 75, LDL 87  High-sensitivity CRP 0.9  Sodium 139, potassium 4.3, creatinine 0.9  LFTs within normal limits  TSH within normal limits    6/2019- , TG 82, ,     Cardiovascular Studies:   1. Stress echo, 1/2017: No ischemia. Excellent exercise tolerance. Baseline echo- Normal wall motion and excursion. 1+ MR and TR. RVSP 13 mmHg.   2.  CAC, 7/2021: Composite 120 (left main 1, LAD 51, RCA 68).  75-90th percentile.  Mild-moderate atherosclerotic calcification of the aorta.  Otherwise unremarkable noncardiac findings.    ECG from today personally reviewed and discussed with the patient shows sinus rhythm.  No significant change compared with 9/9/2022.    Assessment/Plan   Problem List Items Addressed This Visit        Circulatory    Orthostatic hypotension     She has a longstanding history of hypotension.  She will continue to stay well-hydrated and avoid quick position changes.         Elevated coronary artery calcium score     She has had no new anginal symptoms.  Lipid management as below.  Aspirin has been stopped due to easy bruising.             Other    Familial  hypercholesterolemia - Primary     She is tolerating Repatha without side effects.  Her LDL remains above goal.  Following a shared decision-making conversation we decided to add ezetimibe 10 mg daily to her regimen.  Potential side effects were reviewed.  She will contact me if she experiences any adverse effects.  Otherwise, we will repeat a lipid panel before her follow-up visit.  She has excellent lifestyle habits.         Relevant Orders    ECG 12 LEAD-OFFICE PERFORMED (Completed)    Lipid panel    Hepatic function panel    Elevated lipoprotein(a)     Aggressive lipid lowering is required, as discussed above.  PCSK9 inhibitors have been shown to mildly reduce lipoprotein(a) levels.  I previously recommended that all first-degree family members not only have screening lipid panels obtained, but also a screening lipoprotein(a).          Relevant Orders    Lipid panel     It was my pleasure to visit with Stephanie Jeffers in clinic today.  She will follow up with me in 6 months.  Please do not hesitate to contact me with any questions.      Sincerely,         ________________  Antonio Price MD

## 2023-03-11 NOTE — ASSESSMENT & PLAN NOTE
She is tolerating Repatha without side effects.  Her LDL remains above goal.  Following a shared decision-making conversation we decided to add ezetimibe 10 mg daily to her regimen.  Potential side effects were reviewed.  She will contact me if she experiences any adverse effects.  Otherwise, we will repeat a lipid panel before her follow-up visit.  She has excellent lifestyle habits.

## 2023-03-12 LAB — LPA SERPL-SCNC: 555 NMOL/L

## 2023-03-28 DIAGNOSIS — E78.41 ELEVATED LIPOPROTEIN(A): ICD-10-CM

## 2023-03-28 DIAGNOSIS — E78.01 FAMILIAL HYPERCHOLESTEROLEMIA: ICD-10-CM

## 2023-03-28 RX ORDER — EZETIMIBE 10 MG/1
10 TABLET ORAL NIGHTLY
Qty: 90 TABLET | Refills: 3 | Status: ON HOLD | OUTPATIENT
Start: 2023-03-28 | End: 2023-09-09 | Stop reason: ENTERED-IN-ERROR

## 2023-04-20 ENCOUNTER — TELEPHONE (OUTPATIENT)
Dept: SCHEDULING | Facility: CLINIC | Age: 73
End: 2023-04-20
Payer: MEDICARE

## 2023-04-20 NOTE — TELEPHONE ENCOUNTER
Pt called to follow up on previous message.     Pt is asking if she will have OV this afternoon?    Pt can be reached at [phone].     Ty.

## 2023-04-20 NOTE — TELEPHONE ENCOUNTER
Dr Price patient. PMH:Ca score 120, orthostatic hypotension, elevated lipoprotein, and familial hypercholesterolemia.    Patient call with c/o constant chest tightness since yesterday. The prior two days she had severe dizziness. She says now she has the tightness which does not worsen with activity. She feels a little SOB and light headed. No nausea, diaphoresis, jaw or arm pain.    She would like to be seen, she feels she needs an EKG.    Patients number is [phone]    Instructed to call 911 if tightness increases, if becomes diaphoretic, nauseated, or develops jaw/arm discomfort.

## 2023-04-24 ENCOUNTER — TELEPHONE (OUTPATIENT)
Dept: SCHEDULING | Facility: CLINIC | Age: 73
End: 2023-04-24
Payer: MEDICARE

## 2023-04-24 ENCOUNTER — OFFICE VISIT (OUTPATIENT)
Dept: CARDIOLOGY | Facility: CLINIC | Age: 73
End: 2023-04-24
Payer: MEDICARE

## 2023-04-24 VITALS
DIASTOLIC BLOOD PRESSURE: 62 MMHG | HEART RATE: 75 BPM | SYSTOLIC BLOOD PRESSURE: 106 MMHG | RESPIRATION RATE: 16 BRPM | BODY MASS INDEX: 21.46 KG/M2 | OXYGEN SATURATION: 99 % | HEIGHT: 60 IN | WEIGHT: 109.3 LBS

## 2023-04-24 DIAGNOSIS — R93.1 ELEVATED CORONARY ARTERY CALCIUM SCORE: ICD-10-CM

## 2023-04-24 DIAGNOSIS — I95.1 ORTHOSTATIC HYPOTENSION: ICD-10-CM

## 2023-04-24 DIAGNOSIS — R42 DIZZINESS: ICD-10-CM

## 2023-04-24 DIAGNOSIS — R07.89 OTHER CHEST PAIN: Primary | ICD-10-CM

## 2023-04-24 PROCEDURE — 93000 ELECTROCARDIOGRAM COMPLETE: CPT | Mod: XU

## 2023-04-24 PROCEDURE — 99214 OFFICE O/P EST MOD 30 MIN: CPT

## 2023-04-24 RX ORDER — ASPIRIN 81 MG/1
81 TABLET ORAL DAILY
Status: ON HOLD | COMMUNITY
End: 2023-09-09 | Stop reason: ENTERED-IN-ERROR

## 2023-04-24 ASSESSMENT — ENCOUNTER SYMPTOMS
PALPITATIONS: 0
SYNCOPE: 0
DYSPNEA ON EXERTION: 0
DIZZINESS: 1
FALLS: 0
DIAPHORESIS: 0
VERTIGO: 0
HEADACHES: 0
BLURRED VISION: 0
NEAR-SYNCOPE: 0
PND: 0
SHORTNESS OF BREATH: 0
DOUBLE VISION: 0
NAUSEA: 0
VOMITING: 0
CLAUDICATION: 0
BRUISES/BLEEDS EASILY: 1
ORTHOPNEA: 0
SLEEP DISTURBANCES DUE TO BREATHING: 0
IRREGULAR HEARTBEAT: 0

## 2023-04-24 NOTE — TELEPHONE ENCOUNTER
Pt calling requesting for another diagnosis for the stress test because medicare would not accept the 1st one    P:[phone]    Ty

## 2023-04-24 NOTE — PROGRESS NOTES
" ALEC Kimbrough  Cardiology    [facility]ART [facility]    [facility]  [facility] [address]  [address] [place] [address]    TEL  [phone]  [facility]     04/24/23     Bhanu Jeffers is a 72 y.o. female who is seen in the office today for cardiology follow-up.  She has a past medical history of familial hypercholesterolemia, coronary atherosclerosis, and orthostatic hypotension.  She was last seen by Dr. Price on March 10, 2023 at which time she was doing well.    She called the office on April 20, 2023 to report chest tightness and intermittent dizziness.  Her symptoms did not worsen with activity and she denied radiation of the pain, nausea, or diaphoresis.  She was recommended to pursue emergency evaluation, however, did not feel her symptoms warranted the emergency department and did not seek evaluation.      She was offered an urgent follow-up appointment today for evaluation of persistent symptoms and feels okay today but concerned about her symptoms.  She describes her chest discomfort as \"pressure\" in the left chest which can radiate to her left arm.  It is now intermittent, random, worse with palpation, and resolves spontaneously.  She has not tried any over-the-counter remedies.  She denies exacerbation of symptoms with exertion.  She also has random episodes of dizziness lasting 15-20 minutes.  She believes sitting down may improve her episodes.  She also feels her dizziness improved after stopping Zetia.  She has not had any dizziness today.  Unfortunately, she does not hydrate \"as well as she should.\"  She denies any falls or syncopal episodes related to her dizziness.  Her episodes of dizziness do not coincide with her episodes of chest discomfort.     She also has sensation of not being able to take a deep breath.  She denies pain with breathing or association of shortness of breath with her chest pain.  When she " "takes 1 deep breath, she feels better.  She does note feeling stressed and that this has been a \"tough year.\"   She denies palpitations, dyspnea on exertion, orthopnea, PND, lower extremity edema, or syncope.      No Known Allergies    Current Outpatient Medications   Medication Sig Dispense Refill   • aspirin 81 mg enteric coated tablet Take 81 mg by mouth daily.     • biotin 5 mg capsule Take by mouth daily.     • clonazePAM (klonoPIN) 1 mg tablet Take by mouth nightly.    5   • escitalopram (LEXAPRO) 5 mg tablet TAKE 1 AND 1/2 TABLETS DAILY BY MOUTH     • estradiol (ESTRACE) 0.01 % (0.1 mg/gram) vaginal cream 1 gm vaginally twice a week 42.5 g 3   • evolocumab (REPATHA SURECLICK) 140 mg/mL pen Inject 1 mL (140 mg total) under the skin every 14 (fourteen) days. 1 mL 0   • evolocumab (REPATHA SURECLICK) 140 mg/mL pen Inject 1 mL (140 mg total) under the skin every 14 (fourteen) days. 4 mL 0   • evolocumab (REPATHA SURECLICK) 140 mg/mL pen Inject 1 mL (140 mg total) under the skin every 14 (fourteen) days. 2 Boxes  Lot #  [id number]  Exp: 9/30/2025 2 mL 0   • fluticasone propionate (FLONASE) 50 mcg/actuation nasal spray Administer 2 sprays into each nostril daily. 16 g 1   • nitrofurantoin (MACRODANTIN) 100 mg capsule Take 100 mg by mouth as needed.     • REPATHA SURECLICK 140 mg/mL pen INJECT 1 ML (140 MG TOTAL) UNDER THE SKIN EVERY 14 DAYS. 6 mL 3   • SUMAtriptan (IMITREX) 100 mg tablet Take by mouth as needed.       • valACYclovir (VALTREX) 500 mg tablet Take 500 mg by mouth as needed.     • venlafaxine XR (EFFEXOR-XR) 37.5 mg 24 hr capsule      • ezetimibe (ZETIA) 10 mg tablet Take 1 tablet (10 mg total) by mouth nightly. (Patient not taking: Reported on 4/24/2023) 90 tablet 3     No current facility-administered medications for this visit.         Objective     Review of Systems   Constitutional: Negative for diaphoresis.   Eyes: Negative for blurred vision and double vision.   Cardiovascular: Positive for chest " pain. Negative for claudication, cyanosis, dyspnea on exertion, irregular heartbeat, leg swelling, near-syncope, orthopnea, palpitations, paroxysmal nocturnal dyspnea and syncope.   Respiratory: Negative for shortness of breath and sleep disturbances due to breathing.    Hematologic/Lymphatic: Bruises/bleeds easily.   Musculoskeletal: Negative for falls.   Gastrointestinal: Negative for nausea and vomiting.   Neurological: Positive for dizziness. Negative for headaches and vertigo.   Allergic/Immunologic: Positive for environmental allergies.   All other systems reviewed and are negative.      Vitals:    04/24/23 1412   BP: 106/62   Pulse: 75   Resp: 16   SpO2: 99%     Wt Readings from Last 5 Encounters:   04/24/23 49.6 kg (109 lb 4.8 oz)   03/10/23 49 kg (108 lb)   11/30/22 48.1 kg (106 lb)   11/01/22 49.4 kg (109 lb)   09/09/22 49.5 kg (109 lb 1 oz)       Physical Exam  Vitals reviewed.   Constitutional:       General: She is not in acute distress.  HENT:      Head: Normocephalic and atraumatic.      Right Ear: External ear normal.      Left Ear: External ear normal.      Nose:      Comments: mask     Mouth/Throat:      Comments: mask  Eyes:      Conjunctiva/sclera: Conjunctivae normal.   Cardiovascular:      Rate and Rhythm: Normal rate and regular rhythm.      Pulses: Normal pulses.      Heart sounds: Normal heart sounds.   Pulmonary:      Effort: Pulmonary effort is normal.      Breath sounds: Normal breath sounds.   Abdominal:      Palpations: Abdomen is soft.   Musculoskeletal:         General: Normal range of motion.      Cervical back: Normal range of motion and neck supple.      Right lower leg: No edema.      Left lower leg: No edema.   Skin:     General: Skin is warm and dry.      Capillary Refill: Capillary refill takes less than 2 seconds.   Neurological:      General: No focal deficit present.      Mental Status: She is alert.   Psychiatric:         Mood and Affect: Mood normal.         Behavior:  Behavior normal.         Thought Content: Thought content normal.         Judgment: Judgment normal.         Cardiographics  1. Stress echo, 1/2017: No ischemia. Excellent exercise tolerance. Baseline echo- Normal wall motion and excursion. 1+ MR and TR. RVSP 13 mmHg.   2.  CAC, 7/2021: Composite 120 (left main 1, LAD 51, RCA 68).  75-90th percentile.  Mild-moderate atherosclerotic calcification of the aorta.  Otherwise unremarkable noncardiac findings.      ECG:  From today- normal sinus rhythm, rate 75.      Lab Review   Lab Results   Component Value Date    WBC 6.09 09/06/2022    HGB 13.6 09/06/2022    HCT 42.9 09/06/2022     09/06/2022    CHOL 222 (H) 03/06/2023    TRIG 123 03/06/2023    HDL 99 03/06/2023     03/06/2023    K 3.9 03/06/2023     03/06/2023    CREATININE 0.9 03/06/2023    BUN 20 03/06/2023    CO2 26 03/06/2023    TSH 0.86 12/14/2022    INR 1.0 09/06/2022       Assessment/Plan   Problem List Items Addressed This Visit        Circulatory    Orthostatic hypotension     Longstanding history of hypotension.  /62 sitting, 100/58 standing  -- Encouraged adequate hydration.  She knows to avoid quick position changes.          Elevated coronary artery calcium score     She has a history of chest discomfort with emotional stress but current symptoms have been persistent for several days.  Discomfort occurs randomly for 1-2 hours with some radiation to upper left arm.  It is not associated with exertion and she denies associated nausea, dizziness, shortness of breath, or diaphoresis.  Her chest is tender to palpation and the pain on palpation is more severe than the above discomfort.  ECG performed in office today unchanged from previous studies.  -- Recommend evaluation with stress echocardiogram in light of known elevated calcium score and chest discomfort with increased frequency and radiation to left arm.  -- Discussed she should seek emergency evaluation if her symptoms worsen or  become associated with nausea, diaphoresis, shortness of breath, or other concerning symptoms.  -- Continue Repatha and aspirin 81 mg daily.         Relevant Medications    aspirin 81 mg enteric coated tablet    Other Relevant Orders    ECG 12 lead (Completed)    Echocardiogram stress test - exercise (stress echo)       Mental Health    Dizziness     Few days of increased dizziness for 15-20 minute episodes  Denies recent bleeding, falls, or direct correlation with episodes of chest discomfort.  She does have coughing/sneezing which could be attributed to seasonal allergies but will evaluate cardiac causes.  Dizziness reportedly improved with discontinuation of Zetia.  ECG without ischemia or ectopy  -- 72 hour Zio Patch placed today  -- Encouraged adequate hydration  -- BMP/CBC ordered but she prefers to hold off on obtaining lab work for now.  -- Continue to hold Zetia while further evaluating dizziness.         Relevant Orders    Basic metabolic panel    CBC and differential    Zio patch - extended holter 2-7 days       Other    Other chest pain - Primary     Longstanding history of chest discomfort with emotional stress.  -- See above plan under Calcium Score          Relevant Orders    ECG 12 lead (Completed)    Basic metabolic panel    CBC and differential        Thank you for allowing me to participate in the care of this patient.  Please do not hesitate to reach out with any questions or concerns.    Disclaimer: This note was generated using speech recognition software.  A reasonable attempt has been made at proofreading.  Please disregard any obvious grammatical or typographical errors.    ALEC Ruvalcaba  04/24/23

## 2023-04-24 NOTE — TELEPHONE ENCOUNTER
Pt calling to discuss symptoms, see pt message/ encounter    States she was supposed to be receiving a call today and would like it sooner rather than later    Pt can be reached at [phone]

## 2023-04-24 NOTE — ASSESSMENT & PLAN NOTE
Longstanding history of hypotension.  /62 sitting, 100/58 standing  -- Encouraged adequate hydration.  She knows to avoid quick position changes.

## 2023-04-24 NOTE — ASSESSMENT & PLAN NOTE
She has a history of chest discomfort with emotional stress but current symptoms have been persistent for several days.  Discomfort occurs randomly for 1-2 hours with some radiation to upper left arm.  It is not associated with exertion and she denies associated nausea, dizziness, shortness of breath, or diaphoresis.  Her chest is tender to palpation and the pain on palpation is more severe than the above discomfort.  ECG performed in office today unchanged from previous studies.  -- Recommend evaluation with stress echocardiogram in light of known elevated calcium score and chest discomfort with increased frequency and radiation to left arm.  -- Discussed she should seek emergency evaluation if her symptoms worsen or become associated with nausea, diaphoresis, shortness of breath, or other concerning symptoms.  -- Continue Repatha and aspirin 81 mg daily.

## 2023-04-24 NOTE — ASSESSMENT & PLAN NOTE
Longstanding history of chest discomfort with emotional stress.  -- See above plan under Calcium Score

## 2023-04-24 NOTE — ASSESSMENT & PLAN NOTE
She has a history of chest discomfort with emotional stress but symptoms have been persistent for several days.  Discomfort occurs randomly for 1-2 hours with some radiation to upper left arm.  It is not associated with exertion and she denies associated nausea, dizziness, shortness of breath, and

## 2023-04-24 NOTE — ASSESSMENT & PLAN NOTE
Few days of increased dizziness for 15-20 minute episodes  Denies recent bleeding, falls, or direct correlation with episodes of chest discomfort.  She does have coughing/sneezing which could be attributed to seasonal allergies but will evaluate cardiac causes.  Dizziness reportedly improved with discontinuation of Zetia, no episodes today.  ECG without ischemia or ectopy  -- 72 hour Zio Patch placed today  -- Encouraged adequate hydration  -- BMP/CBC ordered but she prefers to hold off on obtaining lab work for now.  -- Continue to hold Zetia while further evaluating dizziness.

## 2023-04-25 NOTE — TELEPHONE ENCOUNTER
I'm not sure.  I received the below message requesting alternate diagnosis for stress test yesterday and R93.1 would be the alternate.      If you don't mind looking into it and making sure everything went through then letting the patient know if things are scheduled appropriately, I would appreciate it!

## 2023-04-25 NOTE — TELEPHONE ENCOUNTER
Edie--Could you please help me with this? Appears the code R93.1 is the dx code provided and authorized and also what Marian said she would change code to if needed. Just confused if I need to change anything? Just need another set of eyes on this! Thank you!

## 2023-04-25 NOTE — TELEPHONE ENCOUNTER
Pt called to follow up on previous messages.     Pt states that stress echo was scheduled, however, diagnosis code would still have to be changed in order for Medicare to cover.    Pt can be reached at [phone].    Ty.

## 2023-04-25 NOTE — TELEPHONE ENCOUNTER
Marian--looks like Echo was already scheduled and pre-certed. Did you need me to look into it still? TY.

## 2023-05-03 ENCOUNTER — HOSPITAL ENCOUNTER (OUTPATIENT)
Dept: CARDIOLOGY | Facility: HOSPITAL | Age: 73
Discharge: HOME | End: 2023-05-03
Payer: MEDICARE

## 2023-05-03 DIAGNOSIS — R93.1 ELEVATED CORONARY ARTERY CALCIUM SCORE: ICD-10-CM

## 2023-05-03 LAB
AORTIC ROOT ANNULUS: 2.8 CM
ASCENDING AORTA: 3.2 CM
E WAVE DECELERATION TIME: 201 MS
E/A RATIO: 0.9
E/E' RATIO: 12.3
E/LAT E' RATIO: 13.8
FRACTIONAL SHORTENING: 39.9 %
INTERVENTRICULAR SEPTUM: 0.68 CM
LA ESV (BP): 22 CM3
LA/AORTA RATIO: 1.04
LAAS-AP2: 11.5 CM2
LAAS-AP4: 8.94 CM2
LAD 2D: 2.9 CM
LALD A4C: 3.61 CM
LALD A4C: 4.18 CM
LAV-S: 25.1 CM3
LEFT ATRIUM VOLUME: 16.6 CM3
LEFT INTERNAL DIMENSION IN SYSTOLE: 2.44 CM
LEFT VENTRICULAR INTERNAL DIMENSION IN DIASTOLE: 4.06 CM
LEFT VENTRICULAR POSTERIOR WALL IN END DIASTOLE: 0.7 CM
LVOT 2D: 1.8 CM
LVOT A: 2.54 CM2
MV E'TISSUE VEL-LAT: 0.06 M/S
MV E'TISSUE VEL-MED: 0.07 M/S
MV PEAK A VEL: 1.01 M/S
MV PEAK E VEL: 0.87 M/S
POSTERIOR WALL: 0.7 CM
SEPTAL TISSUE DOPPLER FREE WALL LATE DIA VELOCITY (APICAL 4 CHAMBER VIEW): 0.1 M/S
STRESS BASELINE BP: NORMAL MMHG
STRESS BASELINE HR: 74 BPM
STRESS O2 SAT REST: 99 %
STRESS PERCENT HR: 105 %
STRESS POST ESTIMATED WORKLOAD: 10.8 METS
STRESS POST EXERCISE DUR MIN: 9 MIN
STRESS POST EXERCISE DUR SEC: 40 SEC
STRESS POST O2 SAT PEAK: 99 %
STRESS POST PEAK BP: NORMAL MMHG
STRESS POST PEAK HR: 153 BPM
STRESS TARGET HR: 124 BPM
TAPSE: 1.76 CM
TR MAX PG: 12.11 MMHG
TRICUSPID VALVE PEAK REGURGITATION VELOCITY: 1.74 M/S

## 2023-05-03 PROCEDURE — 93018 CV STRESS TEST I&R ONLY: CPT | Performed by: INTERNAL MEDICINE

## 2023-05-03 PROCEDURE — 93325 DOPPLER ECHO COLOR FLOW MAPG: CPT

## 2023-05-03 PROCEDURE — 93320 DOPPLER ECHO COMPLETE: CPT | Mod: 26 | Performed by: INTERNAL MEDICINE

## 2023-05-03 PROCEDURE — 93325 DOPPLER ECHO COLOR FLOW MAPG: CPT | Mod: 26 | Performed by: INTERNAL MEDICINE

## 2023-05-03 PROCEDURE — 93350 STRESS TTE ONLY: CPT | Mod: 26 | Performed by: INTERNAL MEDICINE

## 2023-05-16 ENCOUNTER — TELEPHONE (OUTPATIENT)
Dept: CARDIOLOGY | Facility: CLINIC | Age: 73
End: 2023-05-16
Payer: MEDICARE

## 2023-05-16 NOTE — TELEPHONE ENCOUNTER
Spoke with her regarding Zio Patch result.  Advised no significant abnormalities or arrhythmias which would have contributed to her symptoms.  She is feeling well overall and feels her symptoms were due to stress.  She will follow-up as scheduled in September.      Chloe- she is asking about Repatha samples and states she usually picks them up at [place].  Are you able to assist? Thanks!    EK- FYI only.

## 2023-05-16 NOTE — TELEPHONE ENCOUNTER
Received Zio patch results.  Predominantly sinus rhythm with rare atrial and ventricular ectopy.  Her triggers correlated with sinus rhythm.  There is no significant abnormality on the monitor and nothing that would typically warrant treatment.  Please check in with her to see how she is feeling and convey these results.  Also wanted to check in on the stress test.  This will be scheduled?  Thanks!

## 2023-05-18 DIAGNOSIS — R42 DIZZINESS: ICD-10-CM

## 2023-06-22 DIAGNOSIS — E78.01 FAMILIAL HYPERCHOLESTEROLEMIA: Primary | ICD-10-CM

## 2023-08-10 ENCOUNTER — APPOINTMENT (OUTPATIENT)
Dept: LAB | Facility: CLINIC | Age: 73
End: 2023-08-10
Attending: INTERNAL MEDICINE
Payer: MEDICARE

## 2023-08-10 ENCOUNTER — TRANSCRIBE ORDERS (OUTPATIENT)
Dept: REGISTRATION | Facility: CLINIC | Age: 73
End: 2023-08-10

## 2023-08-10 DIAGNOSIS — E55.9 VITAMIN D DEFICIENCY, UNSPECIFIED: ICD-10-CM

## 2023-08-10 DIAGNOSIS — R53.81 OTHER MALAISE: ICD-10-CM

## 2023-08-10 DIAGNOSIS — R63.4 ABNORMAL WEIGHT LOSS: ICD-10-CM

## 2023-08-10 DIAGNOSIS — R68.2 DRY MOUTH, UNSPECIFIED: ICD-10-CM

## 2023-08-10 DIAGNOSIS — R53.83 OTHER FATIGUE: ICD-10-CM

## 2023-08-10 DIAGNOSIS — M85.80 OTHER SPECIFIED DISORDERS OF BONE DENSITY AND STRUCTURE, UNSPECIFIED SITE: ICD-10-CM

## 2023-08-10 DIAGNOSIS — L65.9 NONSCARRING HAIR LOSS, UNSPECIFIED: ICD-10-CM

## 2023-08-10 DIAGNOSIS — R68.2 DRY MOUTH, UNSPECIFIED: Primary | ICD-10-CM

## 2023-08-10 DIAGNOSIS — D51.9 VITAMIN B12 DEFICIENCY ANEMIA, UNSPECIFIED: ICD-10-CM

## 2023-08-10 LAB
25(OH)D3 SERPL-MCNC: 42 NG/ML (ref 30–100)
ALBUMIN SERPL-MCNC: 4.1 G/DL (ref 3.5–5.7)
ALP SERPL-CCNC: 42 IU/L (ref 34–125)
ALT SERPL-CCNC: 15 IU/L (ref 7–52)
ANION GAP SERPL CALC-SCNC: 4 MEQ/L (ref 3–15)
AST SERPL-CCNC: 24 IU/L (ref 13–39)
BASOPHILS # BLD: 0 K/UL (ref 0.01–0.1)
BASOPHILS NFR BLD: 0 %
BILIRUB SERPL-MCNC: 0.6 MG/DL (ref 0.3–1.2)
BUN SERPL-MCNC: 19 MG/DL (ref 7–25)
CALCIUM SERPL-MCNC: 9.4 MG/DL (ref 8.6–10.3)
CHLORIDE SERPL-SCNC: 101 MEQ/L (ref 98–107)
CO2 SERPL-SCNC: 30 MEQ/L (ref 21–31)
CREAT SERPL-MCNC: 0.8 MG/DL (ref 0.6–1.2)
DIFFERENTIAL METHOD BLD: ABNORMAL
EOSINOPHIL # BLD: 0.03 K/UL (ref 0.04–0.36)
EOSINOPHIL NFR BLD: 0.6 %
ERYTHROCYTE [DISTWIDTH] IN BLOOD BY AUTOMATED COUNT: 13.8 % (ref 11.7–14.4)
FERRITIN SERPL-MCNC: 27 NG/ML (ref 11–250)
GFR SERPL CREATININE-BSD FRML MDRD: >60 ML/MIN/1.73M*2
GLUCOSE SERPL-MCNC: 104 MG/DL (ref 70–99)
HCT VFR BLDCO AUTO: 40.4 % (ref 35–45)
HGB BLD-MCNC: 12.8 G/DL (ref 11.8–15.7)
IMM GRANULOCYTES # BLD AUTO: 0.01 K/UL (ref 0–0.08)
IMM GRANULOCYTES NFR BLD AUTO: 0.2 %
LYMPHOCYTES # BLD: 1.45 K/UL (ref 1.2–3.5)
LYMPHOCYTES NFR BLD: 28.7 %
MCH RBC QN AUTO: 26.7 PG (ref 28–33.2)
MCHC RBC AUTO-ENTMCNC: 31.7 G/DL (ref 32.2–35.5)
MCV RBC AUTO: 84.3 FL (ref 83–98)
MONOCYTES # BLD: 0.47 K/UL (ref 0.28–0.8)
MONOCYTES NFR BLD: 9.3 %
NEUTROPHILS # BLD: 3.09 K/UL (ref 1.7–7)
NEUTS SEG NFR BLD: 61.2 %
NRBC BLD-RTO: 0 %
PDW BLD AUTO: 11.3 FL (ref 9.4–12.3)
PLATELET # BLD AUTO: 220 K/UL (ref 150–369)
POTASSIUM SERPL-SCNC: 4.2 MEQ/L (ref 3.5–5.1)
PROT SERPL-MCNC: 6.7 G/DL (ref 6–8.2)
RBC # BLD AUTO: 4.79 M/UL (ref 3.93–5.22)
SODIUM SERPL-SCNC: 135 MEQ/L (ref 136–145)
TSH SERPL DL<=0.05 MIU/L-ACNC: 1.07 MIU/L (ref 0.34–5.6)
VIT B12 SERPL-MCNC: 363 PG/ML (ref 180–914)
WBC # BLD AUTO: 5.05 K/UL (ref 3.8–10.5)

## 2023-08-10 PROCEDURE — 82306 VITAMIN D 25 HYDROXY: CPT

## 2023-08-10 PROCEDURE — 86235 NUCLEAR ANTIGEN ANTIBODY: CPT

## 2023-08-10 PROCEDURE — 36415 COLL VENOUS BLD VENIPUNCTURE: CPT

## 2023-08-10 PROCEDURE — 84443 ASSAY THYROID STIM HORMONE: CPT

## 2023-08-10 PROCEDURE — 82607 VITAMIN B-12: CPT

## 2023-08-10 PROCEDURE — 80053 COMPREHEN METABOLIC PANEL: CPT

## 2023-08-10 PROCEDURE — 82728 ASSAY OF FERRITIN: CPT

## 2023-08-10 PROCEDURE — 85025 COMPLETE CBC W/AUTO DIFF WBC: CPT

## 2023-08-10 PROCEDURE — 86038 ANTINUCLEAR ANTIBODIES: CPT

## 2023-08-10 PROCEDURE — 83921 ORGANIC ACID SINGLE QUANT: CPT

## 2023-08-14 LAB
ANA SER QL IA: NEGATIVE
ENA SS-A IGG SER-ACNC: 44 AU/ML
ENA SS-B IGG SER-ACNC: 5 AU/ML

## 2023-08-15 LAB — METHYLMALONATE SERPL-SCNC: 112 NMOL/L (ref 87–318)

## 2023-09-05 ENCOUNTER — HOSPITAL ENCOUNTER (INPATIENT)
Facility: HOSPITAL | Age: 73
LOS: 6 days | Discharge: HOME | DRG: 885 | End: 2023-09-11
Attending: PSYCHIATRY & NEUROLOGY | Admitting: PSYCHIATRY & NEUROLOGY
Payer: MEDICARE

## 2023-09-05 ENCOUNTER — HOSPITAL ENCOUNTER (EMERGENCY)
Facility: HOSPITAL | Age: 73
DRG: 885 | End: 2023-09-05
Attending: STUDENT IN AN ORGANIZED HEALTH CARE EDUCATION/TRAINING PROGRAM
Payer: MEDICARE

## 2023-09-05 VITALS
BODY MASS INDEX: 20.31 KG/M2 | RESPIRATION RATE: 18 BRPM | SYSTOLIC BLOOD PRESSURE: 120 MMHG | WEIGHT: 104 LBS | TEMPERATURE: 97.7 F | OXYGEN SATURATION: 98 % | HEART RATE: 67 BPM | DIASTOLIC BLOOD PRESSURE: 56 MMHG

## 2023-09-05 DIAGNOSIS — R07.89 OTHER CHEST PAIN: Primary | ICD-10-CM

## 2023-09-05 DIAGNOSIS — R45.851 SUICIDAL IDEATION: Primary | ICD-10-CM

## 2023-09-05 LAB
ALBUMIN SERPL-MCNC: 4.3 G/DL (ref 3.5–5.7)
ALP SERPL-CCNC: 42 IU/L (ref 34–125)
ALT SERPL-CCNC: 11 IU/L (ref 7–52)
AMPHET UR QL SCN: NOT DETECTED
ANION GAP SERPL CALC-SCNC: 5 MEQ/L (ref 3–15)
APAP SERPL-MCNC: 0.1 UG/ML (ref 10–30)
AST SERPL-CCNC: 18 IU/L (ref 13–39)
BARBITURATES UR QL SCN: NOT DETECTED
BASOPHILS # BLD: 0.01 K/UL (ref 0.01–0.1)
BASOPHILS NFR BLD: 0.1 %
BENZODIAZ UR QL SCN: NOT DETECTED
BILIRUB SERPL-MCNC: 0.4 MG/DL (ref 0.3–1.2)
BILIRUB UR QL STRIP.AUTO: NEGATIVE MG/DL
BUN SERPL-MCNC: 25 MG/DL (ref 7–25)
CALCIUM SERPL-MCNC: 10 MG/DL (ref 8.6–10.3)
CANNABINOIDS UR QL SCN: NOT DETECTED
CHLORIDE SERPL-SCNC: 104 MEQ/L (ref 98–107)
CHOLEST SERPL-MCNC: 209 MG/DL
CLARITY UR REFRACT.AUTO: CLEAR
CO2 SERPL-SCNC: 30 MEQ/L (ref 21–31)
COCAINE UR QL SCN: NOT DETECTED
COLOR UR AUTO: YELLOW
CREAT SERPL-MCNC: 0.8 MG/DL (ref 0.6–1.2)
DIFFERENTIAL METHOD BLD: ABNORMAL
EOSINOPHIL # BLD: 0.01 K/UL (ref 0.04–0.36)
EOSINOPHIL NFR BLD: 0.1 %
ERYTHROCYTE [DISTWIDTH] IN BLOOD BY AUTOMATED COUNT: 14 % (ref 11.7–14.4)
ETHANOL SERPL-MCNC: <10 MG/DL
FENTANYL URINE SCR: NOT DETECTED
GFR SERPL CREATININE-BSD FRML MDRD: >60 ML/MIN/1.73M*2
GLUCOSE SERPL-MCNC: 96 MG/DL (ref 70–99)
GLUCOSE UR STRIP.AUTO-MCNC: ABNORMAL MG/DL
HCT VFR BLDCO AUTO: 43.2 % (ref 35–45)
HDLC SERPL-MCNC: 98 MG/DL
HDLC SERPL: 2.1 {RATIO}
HGB BLD-MCNC: 13.4 G/DL (ref 11.8–15.7)
HGB UR QL STRIP.AUTO: NEGATIVE
IMM GRANULOCYTES # BLD AUTO: 0.02 K/UL (ref 0–0.08)
IMM GRANULOCYTES NFR BLD AUTO: 0.3 %
KETONES UR STRIP.AUTO-MCNC: NEGATIVE MG/DL
LDLC SERPL CALC-MCNC: 76 MG/DL
LEUKOCYTE ESTERASE UR QL STRIP.AUTO: NEGATIVE
LIPASE SERPL-CCNC: 38 U/L (ref 11–82)
LYMPHOCYTES # BLD: 1.55 K/UL (ref 1.2–3.5)
LYMPHOCYTES NFR BLD: 19.6 %
MCH RBC QN AUTO: 27 PG (ref 28–33.2)
MCHC RBC AUTO-ENTMCNC: 31 G/DL (ref 32.2–35.5)
MCV RBC AUTO: 86.9 FL (ref 83–98)
MONOCYTES # BLD: 0.65 K/UL (ref 0.28–0.8)
MONOCYTES NFR BLD: 8.2 %
NEUTROPHILS # BLD: 5.65 K/UL (ref 1.7–7)
NEUTS SEG NFR BLD: 71.7 %
NITRITE UR QL STRIP.AUTO: NEGATIVE
NONHDLC SERPL-MCNC: 111 MG/DL
NRBC BLD-RTO: 0 %
OPIATES UR QL SCN: NOT DETECTED
PCP UR QL SCN: NOT DETECTED
PDW BLD AUTO: 10.3 FL (ref 9.4–12.3)
PH UR STRIP.AUTO: 6 [PH]
PLATELET # BLD AUTO: 228 K/UL (ref 150–369)
POTASSIUM SERPL-SCNC: 4 MEQ/L (ref 3.5–5.1)
PROT SERPL-MCNC: 7.4 G/DL (ref 6–8.2)
PROT UR QL STRIP.AUTO: NEGATIVE
RBC # BLD AUTO: 4.97 M/UL (ref 3.93–5.22)
SALICYLATES SERPL-MCNC: <1.5 MG/DL
SARS-COV-2 RNA RESP QL NAA+PROBE: NEGATIVE
SODIUM SERPL-SCNC: 139 MEQ/L (ref 136–145)
SP GR UR REFRACT.AUTO: 1.02
TRIGL SERPL-MCNC: 177 MG/DL
TROPONIN I SERPL HS-MCNC: 3.1 PG/ML
UROBILINOGEN UR STRIP-ACNC: 0.2 EU/DL
WBC # BLD AUTO: 7.89 K/UL (ref 3.8–10.5)

## 2023-09-05 PROCEDURE — 80053 COMPREHEN METABOLIC PANEL: CPT | Performed by: PHYSICIAN ASSISTANT

## 2023-09-05 PROCEDURE — 83036 HEMOGLOBIN GLYCOSYLATED A1C: CPT | Performed by: PSYCHIATRY & NEUROLOGY

## 2023-09-05 PROCEDURE — 99285 EMERGENCY DEPT VISIT HI MDM: CPT | Mod: 25

## 2023-09-05 PROCEDURE — 80307 DRUG TEST PRSMV CHEM ANLYZR: CPT | Performed by: PHYSICIAN ASSISTANT

## 2023-09-05 PROCEDURE — 93005 ELECTROCARDIOGRAM TRACING: CPT | Performed by: PHYSICIAN ASSISTANT

## 2023-09-05 PROCEDURE — 36415 COLL VENOUS BLD VENIPUNCTURE: CPT | Performed by: PHYSICIAN ASSISTANT

## 2023-09-05 PROCEDURE — G0480 DRUG TEST DEF 1-7 CLASSES: HCPCS | Mod: 59 | Performed by: PHYSICIAN ASSISTANT

## 2023-09-05 PROCEDURE — 81003 URINALYSIS AUTO W/O SCOPE: CPT | Mod: 59 | Performed by: PHYSICIAN ASSISTANT

## 2023-09-05 PROCEDURE — 85025 COMPLETE CBC W/AUTO DIFF WBC: CPT | Performed by: PHYSICIAN ASSISTANT

## 2023-09-05 PROCEDURE — 63700000 HC SELF-ADMINISTRABLE DRUG: Performed by: PSYCHIATRY & NEUROLOGY

## 2023-09-05 PROCEDURE — 93005 ELECTROCARDIOGRAM TRACING: CPT | Performed by: PSYCHIATRY & NEUROLOGY

## 2023-09-05 PROCEDURE — 80061 LIPID PANEL: CPT | Performed by: PSYCHIATRY & NEUROLOGY

## 2023-09-05 PROCEDURE — 12400000 HC ROOM AND CARE SEMIPRIVATE PSYCH

## 2023-09-05 PROCEDURE — 87635 SARS-COV-2 COVID-19 AMP PRB: CPT | Performed by: PHYSICIAN ASSISTANT

## 2023-09-05 PROCEDURE — 84484 ASSAY OF TROPONIN QUANT: CPT | Performed by: PHYSICIAN ASSISTANT

## 2023-09-05 PROCEDURE — 63700000 HC SELF-ADMINISTRABLE DRUG: Performed by: PHYSICIAN ASSISTANT

## 2023-09-05 PROCEDURE — 83690 ASSAY OF LIPASE: CPT | Performed by: PHYSICIAN ASSISTANT

## 2023-09-05 RX ORDER — DOCUSATE SODIUM 100 MG/1
100 CAPSULE, LIQUID FILLED ORAL 2 TIMES DAILY
Status: DISCONTINUED | OUTPATIENT
Start: 2023-09-05 | End: 2023-09-11 | Stop reason: HOSPADM

## 2023-09-05 RX ORDER — ACETAMINOPHEN 325 MG/1
650 TABLET ORAL EVERY 4 HOURS PRN
Status: DISCONTINUED | OUTPATIENT
Start: 2023-09-05 | End: 2023-09-11 | Stop reason: HOSPADM

## 2023-09-05 RX ORDER — HYDROXYZINE HYDROCHLORIDE 25 MG/1
50 TABLET, FILM COATED ORAL EVERY 6 HOURS PRN
Status: DISCONTINUED | OUTPATIENT
Start: 2023-09-05 | End: 2023-09-11 | Stop reason: HOSPADM

## 2023-09-05 RX ORDER — CLONAZEPAM 1 MG/1
1 TABLET ORAL NIGHTLY
Status: DISCONTINUED | OUTPATIENT
Start: 2023-09-05 | End: 2023-09-06

## 2023-09-05 RX ORDER — TRAZODONE HYDROCHLORIDE 50 MG/1
50 TABLET ORAL NIGHTLY PRN
Status: DISCONTINUED | OUTPATIENT
Start: 2023-09-05 | End: 2023-09-11 | Stop reason: HOSPADM

## 2023-09-05 RX ORDER — ONDANSETRON 4 MG/1
4 TABLET, ORALLY DISINTEGRATING ORAL EVERY 8 HOURS PRN
Status: DISCONTINUED | OUTPATIENT
Start: 2023-09-05 | End: 2023-09-11 | Stop reason: HOSPADM

## 2023-09-05 RX ORDER — HALOPERIDOL 5 MG/1
5 TABLET ORAL EVERY 6 HOURS PRN
Status: DISCONTINUED | OUTPATIENT
Start: 2023-09-05 | End: 2023-09-06

## 2023-09-05 RX ORDER — HALOPERIDOL 5 MG/ML
5 INJECTION INTRAMUSCULAR EVERY 6 HOURS PRN
Status: DISCONTINUED | OUTPATIENT
Start: 2023-09-05 | End: 2023-09-06

## 2023-09-05 RX ORDER — DIPHENHYDRAMINE HCL 50 MG/ML
50 VIAL (ML) INJECTION EVERY 6 HOURS PRN
Status: DISCONTINUED | OUTPATIENT
Start: 2023-09-05 | End: 2023-09-11 | Stop reason: HOSPADM

## 2023-09-05 RX ORDER — ACETAMINOPHEN 325 MG/1
975 TABLET ORAL ONCE
Status: COMPLETED | OUTPATIENT
Start: 2023-09-05 | End: 2023-09-05

## 2023-09-05 RX ORDER — ALUMINUM HYDROXIDE, MAGNESIUM HYDROXIDE, AND SIMETHICONE 1200; 120; 1200 MG/30ML; MG/30ML; MG/30ML
30 SUSPENSION ORAL EVERY 4 HOURS PRN
Status: DISCONTINUED | OUTPATIENT
Start: 2023-09-05 | End: 2023-09-11 | Stop reason: HOSPADM

## 2023-09-05 RX ADMIN — ACETAMINOPHEN 975 MG: 325 TABLET ORAL at 17:32

## 2023-09-05 RX ADMIN — CLONAZEPAM 1 MG: 1 TABLET ORAL at 21:46

## 2023-09-05 ASSESSMENT — ENCOUNTER SYMPTOMS
CHEST TIGHTNESS: 0
SHORTNESS OF BREATH: 0
ABDOMINAL PAIN: 0
FEVER: 0

## 2023-09-05 ASSESSMENT — COGNITIVE AND FUNCTIONAL STATUS - GENERAL
PERCEPTUAL FUNCTION: NORMAL
ATTENTION: WNL
AFFECT: FLAT;TEARFUL
ORIENTATION: FULLY ORIENTED
MOOD: DEPRESSED;HOPELESS;HELPLESS;ANXIOUS
IMPULSE CONTROL: INTACT
APPETITE: DECREASED
JUDGEMENT: IMPAIRED, MINIMALLY
APPEARANCE: WELL GROOMED
LIBIDO: NON-CONTRIBUTORY
THOUGHT_CONTENT: APPROPRIATE
SLEEP_WAKE_CYCLE: NO CHANGE
EYE_CONTACT: WNL
SPEECH: REGULAR
REMOTE MEMORY: WNL
RECENT MEMORY: WNL
AROUSAL LEVEL: ALERT
INSIGHT: AWARE OF IMPACT ON FUNCTIONING
CONCENTRATION: WNL
DELUSIONS: NONE OR AGE APPROPRIATE
THOUGHT_PROCESS: WORRY;TANGENTIAL
PSYCHOMOTOR FUNCTIONING: DECREASED
DO YOU HAVE SERIOUS DIFFICULTY WALKING OR CLIMBING STAIRS: NO

## 2023-09-05 NOTE — BEHAVIORAL HEALTH CRISIS PROGRESS NOTE
Behavioral Health Crisis Note:     9/5/23 @ 12:57 - [facility] met with patient bedside and conducted evaluation. Additional information can be found in behavioral health assessment. Patient agreeable to IP recommendation. 201 process and procedure explained, bill of rights read and provided, [facility] crisis numbers and suicide resources left with patient. All necessary paperwork signed by patient and attending and placed on patient chart. [facility] bed on hold. Admission orders requested from Dr. Perez.     17:27 - Patient accepted to [facility] psych by Dr. Perez. RN aware to call report.           SUE Frazier

## 2023-09-05 NOTE — ED ATTESTATION NOTE
I have personally seen and examined Stephanie Jeffers.  I was involved in the care, management and medical decision making for this patient.  I reviewed and agree with physician assistant (PACarmeloC) assessment and plan of care; we discussed the case and the treatment plan. Any exceptions are documented below.    My focused history, examination, assessment and plan of care of Stephanie Jeffers is as follows:  Brief History:  72F with long h/o depression now with worsening symptoms with SI. States her PCP and therapist believe would benefit from inpatient treatment. States decreased appetite with 4 lbs weight loss. No somatic complaint.          has a past medical history of Hypotension, IPMN (intraductal papillary mucinous neoplasm) (5/31/2022), Kidney stones, Lipid disorder, and Migraines.   has a past surgical history that includes Cataract extraction (Left, 11/19/2018) and Cataract extraction (Right, 01/16/2019).  Focused Physical Exam:  Patient Vitals for the past 72 hrs:   BP Temp Pulse Resp SpO2 Weight   09/05/23 1131 (!) 106/51 36.5 °C (97.7 °F) 96 16 97 % 47.2 kg (104 lb)     Physical Exam  Vitals and nursing note reviewed. Exam conducted with a chaperone present (sister).   Constitutional:       Appearance: Normal appearance.   HENT:      Head: Normocephalic and atraumatic.      Right Ear: External ear normal.      Left Ear: External ear normal.      Nose: Nose normal.      Mouth/Throat:      Mouth: Mucous membranes are dry.   Eyes:      Extraocular Movements: Extraocular movements intact.      Conjunctiva/sclera: Conjunctivae normal.   Pulmonary:      Effort: Pulmonary effort is normal. No respiratory distress.   Musculoskeletal:         General: Normal range of motion.      Cervical back: Normal range of motion. No rigidity.   Skin:     General: Skin is dry.      Coloration: Skin is not pale.   Neurological:      General: No focal deficit present.      Mental Status: She is alert. Mental status is at  baseline.   Psychiatric:      Comments: Admits to depression with SI.        Assessment / Plan / MDM:  Medical Decision Making  Ddx: depression, anxiety, electrolyte abnormality  Plan: crisis labs, SW eval    Amount and/or Complexity of Data Reviewed  Labs: ordered.  ECG/medicine tests: ordered.             Leobardo Mosley,   09/05/23 1309
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
## 2023-09-05 NOTE — ED PROVIDER NOTES
Emergency Medicine Note  HPI   HISTORY OF PRESENT ILLNESS     Patient admits to being depressed with thoughts of suicide.  Patient states she has different ways she has thought about killing herself will not disclose specific plan.  Patient states she came here for further evaluation treatment.  She denies any self injury denies any overdose she denies any history of previous suicide attempt.  Patient states she had multiple stressors over the last year.  She is  she has had multiple  stressors with daughter and sister just had a fourth open heart surgery.  Patient states she has been in contact with her doctor did blood panel in August including thyroid that was unremarkable.  Given worsening depressive symptoms she came to the ER for further evaluation treatment.  Patient denies chest pain cough shortness of breath.  Admits to dry mouth with decreased appetite and anorexia.  She denies any fever chills.  Denies abdominal pain    Denies any drugs alcohol abuse.  Does take Klonopin nightly.  Has not stopped or increase the dose takes as prescribed.            Patient History   PAST HISTORY     Reviewed from Nursing Triage:       Past Medical History:   Diagnosis Date    Hypotension     IPMN (intraductal papillary mucinous neoplasm) 5/31/2022    Kidney stones     Lipid disorder     Migraines        Past Surgical History:   Procedure Laterality Date    CATARACT EXTRACTION Left 11/19/2018    CATARACT EXTRACTION Right 01/16/2019       Family History   Problem Relation Age of Onset    Hyperlipidemia Biological Mother     Lung cancer Biological Mother     Hyperlipidemia Biological Father     Hypertension Biological Father     Diabetes Biological Father     Tetralogy of Fallot  Biological Sister     Breast cancer Cousin        Social History     Tobacco Use    Smoking status: Never    Smokeless tobacco: Never   Substance Use Topics    Alcohol use: Not Currently     Comment: glass of wine 3 times  per week     Drug use: No         Review of Systems   REVIEW OF SYSTEMS     Review of Systems   Constitutional: Negative for fever.   Respiratory: Negative for chest tightness and shortness of breath.    Cardiovascular: Negative for chest pain.   Gastrointestinal: Negative for abdominal pain.         VITALS     ED Vitals    Date/Time Temp Pulse Resp BP SpO2 [facility]   09/05/23 1131 36.5 °C (97.7 °F) 96 16 106/51 97 % SAHIL        Pulse Ox %: 97 % (09/05/23 1131)  Pulse Ox Interpretation: Normal (09/05/23 1131)           Physical Exam   PHYSICAL EXAM     Physical Exam  Vitals and nursing note reviewed.   Constitutional:       Appearance: Normal appearance.   HENT:      Head: Normocephalic and atraumatic.   Cardiovascular:      Rate and Rhythm: Normal rate and regular rhythm.   Pulmonary:      Effort: Pulmonary effort is normal.      Breath sounds: Normal breath sounds.   Abdominal:      General: Bowel sounds are normal.      Palpations: Abdomen is soft.   Skin:     General: Skin is warm.   Neurological:      General: No focal deficit present.      Mental Status: She is alert and oriented to person, place, and time.   Psychiatric:         Attention and Perception: Attention normal.         Mood and Affect: Mood normal.         Speech: Speech normal.         Behavior: Behavior is cooperative.         Thought Content: Thought content includes suicidal ideation. Thought content includes suicidal plan.           PROCEDURES     Procedures     DATA     Results     Procedure Component Value Units Date/Time    UA with reflex culture [[id number]]  (Abnormal) Collected: 09/05/23 1206    Specimen: Urine, Clean Catch Updated: 09/05/23 1616    Narrative:      The following orders were created for panel order UA with reflex culture.  Procedure                               Abnormality         Status                     ---------                               -----------         ------                     UA Reflex to Culture  (Ma...[[id number]]  Abnormal            Final result                 Please view results for these tests on the individual orders.    UA Reflex to Culture (Macroscopic) [[id number]]  (Abnormal) Collected: 09/05/23 1206    Specimen: Urine, Clean Catch Updated: 09/05/23 1616     Color, Urine Yellow     Clarity, Urine Clear     Specific Gravity, Urine 1.021     pH, Urine 6.0     Leukocyte Esterase Negative     Comment: Results can be falsely negative due to high specific gravity, some antibiotics, glucose >3 g/dl, or WBC other than neutrophils.        Nitrite, Urine Negative     Protein, Urine Negative     Glucose, Urine Trace mg/dL      Ketones, Urine Negative mg/dL      Urobilinogen, Urine 0.2 EU/dL      Bilirubin, Urine Negative mg/dL      Blood, Urine Negative     Comment: The sensitivity of the occult blood test is equivalent to approximately 4 intact RBC/HPF.       Urine drug screen (UDS) [[id number]] Collected: 09/05/23 1206    Specimen: Urine, Clean Catch Updated: 09/05/23 1608    SARS-CoV-2 (COVID-19), PCR Nasopharynx [[id number]]  (Normal) Collected: 09/05/23 1159    Specimen: Nasopharyngeal Swab from Nasopharynx Updated: 09/05/23 1248    Narrative:      The following orders were created for panel order SARS-CoV-2 (COVID-19), PCR Nasopharynx.  Procedure                               Abnormality         Status                     ---------                               -----------         ------                     SARS-CoV-2 (COVID-19), P...[[id number]]  Normal              Final result                 Please view results for these tests on the individual orders.    SARS-CoV-2 (COVID-19), PCR Nasopharynx [[id number]]  (Normal) Collected: 09/05/23 1159    Specimen: Nasopharyngeal Swab from Nasopharynx Updated: 09/05/23 1248     SARS-CoV-2 (COVID-19) Negative    Narrative:      Testing performed using real-time PCR for detection of COVID-19. EUA approved validation studies performed on site.     HS Troponin (with 2  hour reflex) [[id number]]  (Normal) Collected: 09/05/23 1207    Specimen: Blood, Venous Updated: 09/05/23 1244     High Sens Troponin I 3.1 pg/mL     ER toxicology screen, serum [[id number]]  (Abnormal) Collected: 09/05/23 1207    Specimen: Blood, Venous Updated: 09/05/23 1239     Salicylate <1.5 mg/dL      Acetaminophen 0.1 ug/mL      Ethanol <10 mg/dL     Comprehensive metabolic panel [[id number]]  (Normal) Collected: 09/05/23 1207    Specimen: Blood, Venous Updated: 09/05/23 1237     Sodium 139 mEQ/L      Potassium 4.0 mEQ/L      Comment: Results obtained on plasma. Plasma Potassium values may be up to 0.4 mEQ/L less than serum values. The differences may be greater for patients with high platelet or white cell counts.        Chloride 104 mEQ/L      CO2 30 mEQ/L      BUN 25 mg/dL      Creatinine 0.8 mg/dL      Glucose 96 mg/dL      Calcium 10.0 mg/dL      AST (SGOT) 18 IU/L      ALT (SGPT) 11 IU/L      Alkaline Phosphatase 42 IU/L      Total Protein 7.4 g/dL      Comment: Test performed on plasma which typically contains approximately 0.4 g/dL more protein than serum.        Albumin 4.3 g/dL      Bilirubin, Total 0.4 mg/dL      eGFR >60.0 mL/min/1.73m*2      Anion Gap 5 mEQ/L     Lipase [[id number]]  (Normal) Collected: 09/05/23 1207    Specimen: Blood, Venous Updated: 09/05/23 1237     Lipase 38 U/L     CBC and differential [[id number]]  (Abnormal) Collected: 09/05/23 1207    Specimen: Blood, Venous Updated: 09/05/23 1222     WBC 7.89 K/uL      RBC 4.97 M/uL      Hemoglobin 13.4 g/dL      Hematocrit 43.2 %      MCV 86.9 fL      MCH 27.0 pg      MCHC 31.0 g/dL      RDW 14.0 %      Platelets 228 K/uL      MPV 10.3 fL      Differential Type Auto     nRBC 0.0 %      Immature Granulocytes 0.3 %      Neutrophils 71.7 %      Lymphocytes 19.6 %      Monocytes 8.2 %      Eosinophils 0.1 %      Basophils 0.1 %      Immature Granulocytes, Absolute 0.02 K/uL      Neutrophils, Absolute 5.65 K/uL      Lymphocytes, Absolute 1.55  K/uL      Monocytes, Absolute 0.65 K/uL      Eosinophils, Absolute 0.01 K/uL      Basophils, Absolute 0.01 K/uL           Imaging Results    None         ECG 12 lead   Independent Interpretation by ED Provider   ECG 11:57 - nsr 87 bpm, nl axis, good rwp, no st/t wave changes, qt/qtc 354/425 ms.             Scoring tools                                  ED Course & MDM   MDM / ED COURSE / CLINICAL IMPRESSION / DISPO     Medical Decision Making  Suicidal ideation: undiagnosed new problem with uncertain prognosis  Amount and/or Complexity of Data Reviewed  Labs: ordered. Decision-making details documented in ED Course.  ECG/medicine tests: ordered and independent interpretation performed.      Risk  Decision regarding hospitalization.          ED Course as of 09/05/23 1629   Tue Sep 05, 2023   1157 ECG 11:57 - nsr 87 bpm, nl axis, good rwp, no st/t wave changes, qt/qtc 354/425 ms.  [NS]   1235 Pt seen and evaluated. Medically cleared for SW eval.  [NS]   1302 Huddle with Delmy READ LSW. Pt to be admitted to  psych unit.  [NS]   1331 High Sens Troponin I: 3.1 [JR]   1331 AST (SGOT): 18 [JR]   1331 ALT (SGPT): 11 [JR]      ED Course User Index  [JR] Quinton Herman PA C  [NS] Leobardo Mosley, DO     Clinical Impression      Suicidal ideation     _________________     ED Disposition   Transfer to Behavioral Health                   Quinton Herman PA C  09/05/23 2865

## 2023-09-06 PROBLEM — R63.0 POOR APPETITE: Status: ACTIVE | Noted: 2023-09-06

## 2023-09-06 LAB
AMPHET UR QL SCN: NOT DETECTED
ATRIAL RATE: 74
ATRIAL RATE: 87
BARBITURATES UR QL SCN: NOT DETECTED
BENZODIAZ UR QL SCN: NOT DETECTED
BILIRUB UR QL STRIP.AUTO: NEGATIVE MG/DL
CANNABINOIDS UR QL SCN: NOT DETECTED
CLARITY UR REFRACT.AUTO: CLEAR
COCAINE UR QL SCN: NOT DETECTED
COLOR UR AUTO: COLORLESS
EST. AVERAGE GLUCOSE BLD GHB EST-MCNC: 111 MG/DL
FENTANYL URINE SCR: NOT DETECTED
GLUCOSE UR STRIP.AUTO-MCNC: NEGATIVE MG/DL
HBA1C MFR BLD: 5.5 %
HGB UR QL STRIP.AUTO: NEGATIVE
KETONES UR STRIP.AUTO-MCNC: NEGATIVE MG/DL
LEUKOCYTE ESTERASE UR QL STRIP.AUTO: NEGATIVE
NITRITE UR QL STRIP.AUTO: NEGATIVE
OPIATES UR QL SCN: NOT DETECTED
P AXIS: 53
P AXIS: 67
PCP UR QL SCN: NOT DETECTED
PH UR STRIP.AUTO: 7 [PH]
PR INTERVAL: 150
PR INTERVAL: 170
PROT UR QL STRIP.AUTO: NEGATIVE
QRS DURATION: 80
QRS DURATION: 86
QT INTERVAL: 354
QT INTERVAL: 386
QTC CALCULATION(BAZETT): 425
QTC CALCULATION(BAZETT): 428
R AXIS: 64
R AXIS: 67
SP GR UR REFRACT.AUTO: 1.01
T WAVE AXIS: 46
T WAVE AXIS: 53
UROBILINOGEN UR STRIP-ACNC: 0.2 EU/DL
VENTRICULAR RATE: 74
VENTRICULAR RATE: 87

## 2023-09-06 PROCEDURE — 90792 PSYCH DIAG EVAL W/MED SRVCS: CPT | Performed by: STUDENT IN AN ORGANIZED HEALTH CARE EDUCATION/TRAINING PROGRAM

## 2023-09-06 PROCEDURE — 63700000 HC SELF-ADMINISTRABLE DRUG: Performed by: PSYCHIATRY & NEUROLOGY

## 2023-09-06 PROCEDURE — 12400000 HC ROOM AND CARE SEMIPRIVATE PSYCH

## 2023-09-06 PROCEDURE — 200200 PR NO CHARGE

## 2023-09-06 PROCEDURE — 81003 URINALYSIS AUTO W/O SCOPE: CPT | Performed by: PSYCHIATRY & NEUROLOGY

## 2023-09-06 PROCEDURE — 80307 DRUG TEST PRSMV CHEM ANLYZR: CPT | Performed by: PSYCHIATRY & NEUROLOGY

## 2023-09-06 PROCEDURE — 63700000 HC SELF-ADMINISTRABLE DRUG: Performed by: STUDENT IN AN ORGANIZED HEALTH CARE EDUCATION/TRAINING PROGRAM

## 2023-09-06 RX ORDER — HALOPERIDOL 2 MG/1
2 TABLET ORAL EVERY 6 HOURS PRN
Status: DISCONTINUED | OUTPATIENT
Start: 2023-09-06 | End: 2023-09-11 | Stop reason: HOSPADM

## 2023-09-06 RX ORDER — HALOPERIDOL 5 MG/ML
5 INJECTION INTRAMUSCULAR EVERY 6 HOURS PRN
Status: DISCONTINUED | OUTPATIENT
Start: 2023-09-06 | End: 2023-09-11 | Stop reason: HOSPADM

## 2023-09-06 RX ORDER — SUMATRIPTAN SUCCINATE 100 MG/1
100 TABLET ORAL EVERY 2 HOUR PRN
Status: DISCONTINUED | OUTPATIENT
Start: 2023-09-06 | End: 2023-09-11 | Stop reason: HOSPADM

## 2023-09-06 RX ORDER — MIRTAZAPINE 15 MG/1
7.5 TABLET, FILM COATED ORAL NIGHTLY
Status: DISCONTINUED | OUTPATIENT
Start: 2023-09-06 | End: 2023-09-07

## 2023-09-06 RX ORDER — CLONAZEPAM 1 MG/1
1 TABLET ORAL NIGHTLY
Status: DISCONTINUED | OUTPATIENT
Start: 2023-09-06 | End: 2023-09-11 | Stop reason: HOSPADM

## 2023-09-06 RX ADMIN — SUMATRIPTAN SUCCINATE 100 MG: 100 TABLET, FILM COATED ORAL at 06:03

## 2023-09-06 RX ADMIN — ACETAMINOPHEN 650 MG: 325 TABLET ORAL at 01:37

## 2023-09-06 RX ADMIN — MIRTAZAPINE 7.5 MG: 15 TABLET, FILM COATED ORAL at 22:16

## 2023-09-06 RX ADMIN — DOCUSATE SODIUM 100 MG: 100 CAPSULE, LIQUID FILLED ORAL at 18:18

## 2023-09-06 RX ADMIN — CLONAZEPAM 1 MG: 1 TABLET ORAL at 22:16

## 2023-09-06 NOTE — CONSULTS
[facility] Medicine Service     Psychiatric Unit Medical Evaluation         Requesting:  Inpatient Psychiatric Unit Team    Reason for Consultation:  Initial Medical Evaluation     HISTORY OF PRESENT ILLNESS        This is a 72 y.o. female admitted to the psychiatric unit for psychiatric evaluation. The [facility] medicine service department is consulted for routine medical evaluation. History obtained by chart review and patient interview.      Patients past medical history is significant for migraines, low Vitamin B12, elevated calcium score, HLD, orthostatic hypotension, anxiety and depression.     Upon assessment, patient is alert and in no distress. She is seen OOB ambulating in room without difficulty. Patient currently denies any current or recent symptoms of chest pain, shortness of breath, dizziness, lightheadedness. She does report positional dizziness and lightheadedness which she reports is chronic. She denies any hx of syncope. No reports of orthopnea or PND. No reports of peripheral edema.  Patient also denies abdominal pain, constipation, vomiting, diarrhea, bloody stools, or dysuria symptoms. She does report poor appetite and weight loss of 4 lbs over the last month. She complains of a dry mouth for about a month. No reports of dysphagia.     Medication dispense history reviewed. She reports she no longer takes Aspirin and states she stopped taking a few months ago after discussing with her cardiologist. She reports her last Repatha injection was on Friday 9/1.     The patient denies any other medical concerns currently.     PAST MEDICAL AND SURGICAL HISTORY        Past Medical History:   Diagnosis Date    Hypotension     IPMN (intraductal papillary mucinous neoplasm) 5/31/2022    Kidney stones     Lipid disorder     Migraines        Past Surgical History:   Procedure Laterality Date    CATARACT EXTRACTION Left 11/19/2018    CATARACT EXTRACTION Right 01/16/2019       PCP: Libby Wang,  MD    MEDICATIONS        Home Medications:  Medications Prior to Admission   Medication Sig Dispense Refill Last Dose    clonazePAM (klonoPIN) 1 mg tablet Take by mouth nightly.    5 9/4/2023    REPATHA SURECLICK 140 mg/mL pen INJECT 1 ML (140 MG TOTAL) UNDER THE SKIN EVERY 14 DAYS. 6 mL 3 Past Week    SUMAtriptan (IMITREX) 100 mg tablet Take by mouth as needed.     Past Month    venlafaxine XR (EFFEXOR-XR) 37.5 mg 24 hr capsule    Past Month    aspirin 81 mg enteric coated tablet Take 81 mg by mouth daily.   Unknown    biotin 5 mg capsule Take by mouth daily.       escitalopram (LEXAPRO) 5 mg tablet TAKE 1 AND 1/2 TABLETS DAILY BY MOUTH   More than a month    estradiol (ESTRACE) 0.01 % (0.1 mg/gram) vaginal cream 1 gm vaginally twice a week 42.5 g 3 Unknown    evolocumab (REPATHA SURECLICK) 140 mg/mL pen Inject 1 mL (140 mg total) under the skin every 14 (fourteen) days. 1 mL 0 Unknown    evolocumab (REPATHA SURECLICK) 140 mg/mL pen Inject 1 mL (140 mg total) under the skin every 14 (fourteen) days. 4 mL 0 Unknown    evolocumab (REPATHA SURECLICK) 140 mg/mL pen Inject 1 mL (140 mg total) under the skin every 14 (fourteen) days. 2 Boxes  Lot #  [id number]  Exp: 9/30/2025 2 mL 0 Unknown    ezetimibe (ZETIA) 10 mg tablet Take 1 tablet (10 mg total) by mouth nightly. (Patient not taking: Reported on 4/24/2023) 90 tablet 3 Unknown    fluticasone propionate (FLONASE) 50 mcg/actuation nasal spray Administer 2 sprays into each nostril daily. 16 g 1 More than a month    nitrofurantoin (MACRODANTIN) 100 mg capsule Take 100 mg by mouth as needed.   More than a month    valACYclovir (VALTREX) 500 mg tablet Take 500 mg by mouth as needed.   Unknown       Current inpatient medications were personally reviewed.    ALLERGIES        Patient has no known allergies.    FAMILY HISTORY        Family History   Problem Relation Age of Onset    Hyperlipidemia Biological Mother     Lung cancer Biological Mother      "Hyperlipidemia Biological Father     Hypertension Biological Father     Diabetes Biological Father     Tetralogy of Fallot  Biological Sister     Breast cancer Cousin        SOCIAL HISTORY        Social History     Tobacco Use    Smoking status: Never    Smokeless tobacco: Never   Substance Use Topics    Alcohol use: Not Currently     Comment: glass of wine 3 times per week     Drug use: No        REVIEW OF SYSTEMS        All other systems reviewed and negative except as noted in HPI    PHYSICAL EXAMINATION        Visit Vitals  /75 (BP Location: Left upper arm, Patient Position: Sitting)   Pulse 66   Temp 36.3 °C (97.3 °F) (Oral)   Resp 16   Ht 1.626 m (5' 4.02\")   Wt 48.4 kg (106 lb 12.8 oz) Comment: 9/5   SpO2 98%   BMI 18.32 kg/m²     Body mass index is 18.32 kg/m².      Physical Exam  Vitals reviewed.   Constitutional:       General: She is not in acute distress.     Appearance: She is not diaphoretic.   Cardiovascular:      Rate and Rhythm: Normal rate and regular rhythm.      Pulses: Normal pulses.      Heart sounds: Normal heart sounds, S1 normal and S2 normal.   Pulmonary:      Effort: Pulmonary effort is normal.      Breath sounds: No wheezing.   Skin:     General: Skin is warm.   Neurological:      General: No focal deficit present.      Mental Status: She is alert and oriented to person, place, and time.         LABS / EKG        Labs  Results from last 7 days   Lab Units 09/05/23  1207   WBC K/uL 7.89   HEMOGLOBIN g/dL 13.4   HEMATOCRIT % 43.2   PLATELETS K/uL 228       Results from last 7 days   Lab Units 09/05/23  1207   SODIUM mEQ/L 139   POTASSIUM mEQ/L 4.0   CHLORIDE mEQ/L 104   CO2 mEQ/L 30   BUN mg/dL 25   CREATININE mg/dL 0.8   CALCIUM mg/dL 10.0   ALBUMIN g/dL 4.3   BILIRUBIN TOTAL mg/dL 0.4   ALK PHOS IU/L 42   ALT IU/L 11   AST IU/L 18   GLUCOSE mg/dL 96       Lab Results   Component Value Date    TSH 1.07 08/10/2023        Urine Drug Screen Results       09/06/23     0924    PCP " Scrn Ur Not Detected    Benzodiazepine Ur Qual Not Detected    Amphetamine+Methamphetamine Scrn Ur Not Detected    Cannabinoid Screen Ur Not Detected    Opiate Scrn Ur Not Detected    Barbiturate Screen Ur Not Detected         Comment for PCP Scrn Ur at 0924 on 09/06/23: Assay Detects: phencyclidine in urine. Lowest detectable concentration is 25 ng/mL of phencyclidine.    Comment for Benzodiazepine Ur Qual at 0924 on 09/06/23: Assay Detects: benzodiazepines and metabolites at varying concentrations. Lowest detectable concentration is 200 ng/mL of oxazepam.    Comment for Amphetamine+Methamphetamine Scrn Ur at 0924 on 09/06/23: Assay Detects: d-methamphetamine, d-amphetamine, methlyenedioxyamphetamine (MDA), and methlyenendioxymethamphetamine (MDMA) in urine. Lowest detectable concentration is 1000 ng/mL of d-methamphetamine.  Assay is less sensitive to MDA and MDMA (lowest detectable concentration, 2500 ng/mL) and could produce a false negative result. If MDMA overdose is suspected and the result is negative, a more specific test should be requested.    Comment for Cannabinoid Screen Ur at 0924 on 09/06/23: Assay Detects: cannabinoid metabolites in urine. Lowest detectable concentration is 50 ng/mL    Comment for Opiate Scrn Ur at 0924 on 09/06/23: Assay Detects: codeine, dihydrocodeine, hydrocodone, hydromorphone, levorphanol, morphine, morphine-3-glucuronide, norcodeine, oxycodone in urine. Lowest detectable concentration is 300 ng/mL of morphine.    Comment for Barbiturate Screen Ur at 0924 on 09/06/23: Assay Detects: alphenal, amobarbital, aprobarbital, barbital, butabarbital, butalbital, butethal, diallybarbital, pentobarbital, secobarbital,talbutal, and thiopental. Lowest detectable concentration is 200 ng/mL of secobarbital.           SARS-CoV-2 (COVID-19) (no units)   Date/Time Value   09/05/2023 1159 Negative          EKG:  I have independently reviewed the ECG. No significant findings.        ASSESSMENT  AND RECOMMENDATIONS           * Mood disorder (CMS/[facility])  Assessment & Plan  Management per psychiatry     Poor appetite  Assessment & Plan  Patient reports poor appetite over the last month.   She reports 4 lb weight loss as well; also with sx of dry mouth.   She is being followed by her PCP for management as outpatient.   No sx of bloody stool or hematuria.   No reports of night sweats, fevers/chills.   Per chart review with PCP: previously had 8 mm pancreatic intraductal mucinous neoplasm without suspicious features; one year will be November- advised to have contrast enhanced MRI with MRCP.         Plan-  - Continue to monitor intake/output closely.   - Recommend nutrition consult   - Follow up with PCP at discharge for hospital follow up and age appropriate cancer screenings.   - Follow up with outpatient GI.       Elevated coronary artery calcium score  Assessment & Plan  Noted on chart review.   Follows with outpatient cardiologist Dr. Antonio Price.   Patient reports has follow up appt this week.   Prescribed Repatha injections; no longer takes Zetia.       Plan-  - Continue outpatient follow up.     Orthostatic hypotension  Assessment & Plan  Hx of orthostatic hypotension.  Follows with outpatient cardiologist Dr. Antonio Price at [facility].   She denies any hx of syncope.   She reports not prescribed medication for hypotension.       Plan-  - Continue to monitor VS  - Could trial ESVIN stockings if OK with psychiatry   - Follow up with cardiology as outpatient.     Migraine  Assessment & Plan  Hx migraines without aura.   She reports baseline sx headache on top of head and occasionally localized to left eye.   She reports noise disturbance. No light sensitivity.   Home regimen: Imitrex.       Plan-  - Continue home regimen   - Recommend following up with an outpatient neurologist to establish care for ongoing management and monitoring.        Please communicate acute concerns with the covering hospitalist.    The  patient should follow up with Primary Care upon discharge from the [facility].        ALEC Greene  9/6/2023

## 2023-09-06 NOTE — ASSESSMENT & PLAN NOTE
- Continue home regimen   - Recommend following up with an outpatient neurologist to establish care for ongoing management and monitoring.

## 2023-09-06 NOTE — ASSESSMENT & PLAN NOTE
Hx of orthostatic hypotension.  Follows with outpatient cardiologist Dr. Antonio Price at [facility].   She denies any hx of syncope.   She reports not prescribed medication for hypotension.       Plan-  - Continue to monitor VS  - Follow up with cardiology as outpatient.

## 2023-09-06 NOTE — PLAN OF CARE
Problem: Adult Behavioral Health Plan of Care  Goal: Plan of Care Review  Flowsheets (Taken 9/6/2023 3277)  Outcome Evaluation: Patient evaluated per MST, low BMI.  Patient with history of decreased appetite/intakes and reported weight loss.  Per Chart review, note documented weights 106-109 lb in past year without any significant changes appreciated.  Chronically low weight.  Current BMI 18.33 kg/m2, borderline underweight.  Fair appetite/intakes at this time per RN documentation.  Unable to verify recent subjective weight loss.  Regular diet ordered.  Admitted with Mood disorder.  Chart, history reviewed.  Patient does not meet criteria for Malnutrition at this time, limited criteria.    Goal: Consume at least 75% of estimated PO needs.  Recommendations:  1. Regular diet.  2. Provide Magic Cup BID with meals, for added calories/protein.  3. Encourage PO, balanced meals.  4. Monitor diet/PO, weekly weights.

## 2023-09-06 NOTE — PLAN OF CARE
"  Problem: Depressive Signs/Symptoms  Goal: Optimized Energy Level (Depressive Signs/Symptoms)  Outcome: Progressing  Flowsheets (Taken 9/6/2023 1217)  Goal Outcome: progressing  Individual Goal: Stephanie will be dressed and groomed and ready for morning activites.  Wagner Goal: grooms self without prompting     Problem: Adult Behavioral Health Plan of Care  Goal: Plan of Care Review  Outcome: Progressing  Flowsheets (Taken 9/6/2023 1217)  Consent Given to Review Plan with: Stephanie  Progress: improving  Patient Agreement with Plan of Care: agrees  Outcome Evaluation: Stephanie is awake alert and O x 3, they are casually dressed and well groomed.  Pt has been visible on the unit socializing peers and staff.  They are able to make their needs known.  Pt reports that they are sleeping well.  They deny pain and discomfort at this time.  Pt's appetite and nutrition are fair.    Pt makes good eye contact and their speech is unremarkable.  Pt describes their mood as \"depressed,  \"  affect is depressed.  They rate their depression  7/10 and  anxiety  5/10.  Pt is compliant with medication and group therapy sessions.  Pt denies having thoughts of wanting to hurt self and others.  Pt denies experiencing auditory and visual hallucinations at this time.  Will continue to observe and offer emotional support.  Will  update status with changes.     Plan of Care Reviewed With: patient  Intervention(s): Observe pt q 15 minutes for safety.  Pt was offered one on one emotional support and medication education.  Pt was offered toiletries and clean linens.  Pt is being encouraged to attend group therapy sessions and to participate in unit activities.     "

## 2023-09-06 NOTE — PROGRESS NOTES
09/06/23 1015   Activity/Group Checklist   Group Title Support and Self-expression   Attendance Did not attend

## 2023-09-06 NOTE — PLAN OF CARE
"  Problem: Adult Behavioral Health Plan of Care  Goal: Plan of Care Review  Outcome: Progressing  Flowsheets (Taken 9/5/2023 2122)  Progress: no change  Patient Agreement with Plan of Care: agrees  Outcome Evaluation: Stephanie is a 71 yo female admitted via 201 for first inpt psych admission for depression and SI. Pt admits to increasing hopelessness, loneliness, anhedonia for past few weeks with no relief. Pt reports increased anxiety about physical changes within her body I.e. intense dry mouth (unknown cause), total appetite loss. Pt has history of being on SSRI's but says that started to have side effects. Pt reports no sleep disturbance. Stephanie reports many life stressors in the last year that have culminated to her having this \"break down\" and increasing suicidal thoughts without a plan. Pt contracts for safety, denies SI while here in the hospital. Pt lives in assisted living facility, says she has community and can identify positive coping. Pt received regularly scheduled HS medications, stayed in her room for the evening. Continuing to monitor.   Plan of Care Reviewed With: patient  Intervention(s): Admission assessment, RN assessment, suicide assessment, empathetic listening, feeling validated.  Goal: Develops/Participates in Therapeutic Mac[place] to Support Successful Transition  Outcome: Progressing     "

## 2023-09-06 NOTE — PROGRESS NOTES
09/06/23 1516   Activity/Group Checklist   Group Title Interactive therapy   Attendance Did not attend

## 2023-09-06 NOTE — PROGRESS NOTES
"   09/06/23 0930   Activity/Group Checklist   Group Title Community meeting   Attendance Attended   Attendance Duration (min) 16-30   Follows Direction Followed directions   Interactions Interacted reciprocally   Affect/Mood Range Normal range   Affect/Mood Display Alert;Calm   Goals Achieved Able to listen to others;Able to engage in interactions  (Stephanie's goal for the day is \"to meet my doctor and .\")       "

## 2023-09-06 NOTE — PROGRESS NOTES
09/06/23 1437   Activity/Group Checklist   Group Title Support and Self-expression   Attendance Attended   Attendance Duration (min) 46-60   Follows Direction Followed directions   Interactions Initiates interaction   Affect/Mood Range Normal range   Affect/Mood Display Alert   Goals Achieved Displayed empathy;Able to listen to others;Discussed discharge plan

## 2023-09-06 NOTE — ASSESSMENT & PLAN NOTE
Stephanie Jeffers is a 72 y.o. female with long hx of depression and anxiety, no hx of psych hospitalizations or suicide attempts, lives alone, now presenting with worsening depression, loneliness.     C/w 201 stay  C/w Cymbalta 20 mg po daily. Discussed that it is a good idea to wean off of Klonopin given risk of falls, memory issues, cognitive impairment, addiction/dependence but she opted not to taper now since she has been taking it 25 years and feels she is in crisis.  VSS, EKG reviewed  G/m/s therapy as tolerated

## 2023-09-06 NOTE — H&P
"Psychiatry H and P    Cc: \"I feel very depressed and empty.\"     Stephanie Jeffers is a 72 y.o. female with long hx of depression and anxiety, no hx of psych hospitalizations or suicide attempts, lives alone, now presenting with worsening depression, loneliness.     Today she states that she has been feeling especially low for the past month. \"Everything I have been dealing with came to a head.\" Ended romantic relationship with her boyfriend one year ago which was hard though she was the one who ended it and \"he was a narcissist\" (as is her daughter, both her ex husbands, and her father). Another stressor is losing her relationship with her daughter. \"We used to be very close.\" There was an incident when her daughter's boyfriend called patient to talk about daughter because \"he was worried she was going to break his heart.\" States she told boyfriend that her daughter \"is a heartbreaker, and selfish, and a narcissist\" though she admits she never said these things to her daughter. Boyfriend told daughter and daughter has kept her distance since, and for two years \"she estranged herself from me.\" Says multiple times \"she took my grandchildren from me!\" Admits that she is angry with daughter \"off and on,\" at same time admits that it must have been a shock for her daughter to hear she had said those things to her boyfriend.     She describes that she sees other members of her family on social media with \"families and lives, and I'm outside it all.\" Sleeps ok on Klonopin 1 mg po qHS, she says she has not missed a dose of this in 25 years and is afraid to see what would happen if she missed it. Low appetite and has lost 4 lbs this month. Has had thoughts of dying, though no plan or intent, and she says this is new though she has been depressed before. No access to guns. She has felt \"hopeless, helpless, I know I need to be on medication.\" Has been on Wellbutrin (felt restless), Celexa, Zoloft for a long time, Effexor, TCA " "in past for migraines. Recently tried Lexapro and had urinary retention, stopped after 3 weeks of 5 mg, and on Zoloft developed tremors.    Of note, she is very worried about her health though she only has osteoporosis and HLD. Sensitive to meds. Has been experiencing dry mouth which is new, has undergone \"a battery of tests by my PCP,\" they have not found any evidence of medical reason including autoimmune.     Spoke with sister Ewa with her permission. \"She is very hard on herself.\" She is the \"best mother, the best sister.\" She does not understand why Mariaelena, the daughter, has so fully estranged herself. Has not seen her this depressed before.             Psychiatric History:  Current Psychiatrist: no  Past psychiatric Hospitalization: no  Medication Trials:  yes - Wellbutrin \"I felt like jumping out of my skin.\"    ECT trials: no    Suicide Risk Assessment Components:     Previous Suicide Attempts: No     Access to Firearms: No     Chronic Suicide Risk Factors: Psychiatric illness (mood, anxiety, psychotic, personality, SUDS, other) and Abuse or trauma history     Proximal Suicide Risk Factors: Recent major stressor (relationship loss, death of loved one, financial/job/school loss, legal trouble) and Negative existential attitudes (hopeless, purposeless, trapped, burdensome)     Protective Factors: Access to effective mental health care    Substance Use History:  Substance use: denies      Family History: mother, daughters, aunt with depression.  Family History   Problem Relation Age of Onset    Hyperlipidemia Biological Mother     Lung cancer Biological Mother     Hyperlipidemia Biological Father     Hypertension Biological Father     Diabetes Biological Father     Tetralogy of Fallot  Biological Sister     Breast cancer Cousin        Social History:   Social History     Socioeconomic History    Marital status:      Spouse name: None    Number of children: None    Years of education: None    " Highest education level: None   Tobacco Use    Smoking status: Never    Smokeless tobacco: Never   Substance and Sexual Activity    Alcohol use: Not Currently     Comment: glass of wine 3 times per week     Drug use: No    Sexual activity: Never     Partners: Male   Social History Narrative    Two daughters - one has two children, younger daughter is an actress     She is a retired      Lives at [facility]     Social Determinants of Health     Stress: No Stress Concern Present (9/5/2023)    [employer] - Occupational Stress Questionnaire     Feeling of Stress : Only a little       Medical History:   Past Medical History:   Diagnosis Date    Hypotension     IPMN (intraductal papillary mucinous neoplasm) 5/31/2022    Kidney stones     Lipid disorder     Migraines        Surgical History:   Past Surgical History:   Procedure Laterality Date    CATARACT EXTRACTION Left 11/19/2018    CATARACT EXTRACTION Right 01/16/2019       Allergies: No Known Allergies    Current Medications:  SCHEDULED:   clonazePAM  1 mg oral Nightly    docusate sodium  100 mg oral BID    mirtazapine  7.5 mg oral Nightly     PRN    acetaminophen    alum-mag hydroxide-simeth    diphenhydrAMINE    haloperidoL **OR** haloperidol lactate    hydrOXYzine    ondansetron ODT    SUMAtriptan    trazodone      Review of Systems  Musculoskeletal:  Grossly intact    Objective     Vital Signs for the last 24 hours:  Temp:  [36.3 °C (97.3 °F)-36.7 °C (98 °F)] 36.3 °C (97.3 °F)  Heart Rate:  [66-67] 66  Resp:  [16-18] 16  BP: (120-130)/(56-75) 121/75    Labs  Results from last 7 days   Lab Units 09/05/23  1207   HEMOGLOBIN g/dL 13.4   WBC K/uL 7.89   PLATELETS K/uL 228   POTASSIUM mEQ/L 4.0   SODIUM mEQ/L 139   CREATININE mg/dL 0.8     Results from last 7 days   Lab Units 09/05/23  1207   ALT IU/L 11   AST IU/L 18   ALK PHOS IU/L 42     Ethanol   Date Value Ref Range Status   09/05/2023 <10  <10 mg/dL Final     Lab Results   Component Value Date    BNZOURSC Not Detected 09/06/2023    COCURSC Not Detected 09/06/2023    AMPHURSC Not Detected 09/06/2023    CANURSC Not Detected 09/06/2023    OPIURSC Not Detected 09/06/2023    BARBURSC Not Detected 09/06/2023    FENTANYL Not Detected 09/06/2023     Lab Results   Component Value Date    TSH 1.07 08/10/2023    FREET4 1.22 08/26/2020    JEHEGCRK79 363 08/10/2023     Lab Results   Component Value Date    VENTRICRATE 74 09/06/2023    QTCCALCULAT 428 09/06/2023     Lab Results   Component Value Date    HDL 98 09/05/2023    LDLCALC 76 09/05/2023    TRIG 177 (H) 09/05/2023    CHOL 209 (H) 09/05/2023    HGBA1C 5.5 09/05/2023         MENTAL STATUS EXAM  Appearance: well groomed, thin  Gait and Motor: no abnormal movements  Speech: normal rate/rhythm/volume  Mood: depressed  Affect: dysphoric  Associations: coherent  Thought Process: goal-directed  Thought Content: no auditory or visual hallucinations.  Suicidality/Homicidality: thoughts of being dead/ no desire or plan to die  Judgement/Insight: acknowledges illness  Cognition grossly intact, alert        Assessment/Plan  * Mood disorder (CMS/[facility])  Assessment & Plan  Stephanie Jeffers is a 72 y.o. female with long hx of depression and anxiety, no hx of psych hospitalizations or suicide attempts, lives alone, now presenting with worsening depression, loneliness.     C/w 201 stay  Discussed trying Remeron 7.5 mg po qHS, monitor closely for dry mouth, if she cannot tolerate can try Cymbalta which may help with back pain. PRN as ordered. Discussed that it is a good idea to wean off of Klonopin given risk of falls, memory issues, cognitive impairment, addiction/dependence but we will not rush this since she has been taking it 25 years.  VSS, EKG reviewed  G/m/s therapy as tolerated          At least 75 min spent assessing Ms. Jeffers, reviewing record, discussing with staff, coordinating care.

## 2023-09-06 NOTE — CONSULTS
Psychiatry Consult    Chart reviewed. Discussed with ULCIO Wiggins. Orders placed for Klonipin 1 mg PO QHS.     Penny Salinas MD

## 2023-09-06 NOTE — ASSESSMENT & PLAN NOTE
Noted on chart review.   Follows with outpatient cardiologist Dr. Antonio Price.   Patient reports has follow up appt this week.   Prescribed Repatha injections; no longer takes Zetia.       Plan-  - Continue outpatient follow up.

## 2023-09-06 NOTE — ASSESSMENT & PLAN NOTE
Patient reports poor appetite over the last month.   She reports 4 lb weight loss as well; also with sx of dry mouth.   She is being followed by her PCP for management as outpatient.   No sx of bloody stool or hematuria.   No reports of night sweats, fevers/chills.   Per chart review with PCP: previously had 8 mm pancreatic intraductal mucinous neoplasm without suspicious features; one year will be November- advised to have contrast enhanced MRI with MRCP.         Plan-  - Continue to monitor intake/output closely.   - Recommend nutrition consult   - Follow up with PCP at discharge for hospital follow up and age appropriate cancer screenings.   - Follow up with outpatient GI.

## 2023-09-07 PROCEDURE — 99233 SBSQ HOSP IP/OBS HIGH 50: CPT | Performed by: STUDENT IN AN ORGANIZED HEALTH CARE EDUCATION/TRAINING PROGRAM

## 2023-09-07 PROCEDURE — 63700000 HC SELF-ADMINISTRABLE DRUG: Performed by: PSYCHIATRY & NEUROLOGY

## 2023-09-07 PROCEDURE — 12400000 HC ROOM AND CARE SEMIPRIVATE PSYCH

## 2023-09-07 PROCEDURE — 63700000 HC SELF-ADMINISTRABLE DRUG: Performed by: STUDENT IN AN ORGANIZED HEALTH CARE EDUCATION/TRAINING PROGRAM

## 2023-09-07 RX ORDER — DULOXETIN HYDROCHLORIDE 20 MG/1
20 CAPSULE, DELAYED RELEASE ORAL DAILY
Status: DISCONTINUED | OUTPATIENT
Start: 2023-09-07 | End: 2023-09-11 | Stop reason: HOSPADM

## 2023-09-07 RX ADMIN — ACETAMINOPHEN 650 MG: 325 TABLET ORAL at 10:50

## 2023-09-07 RX ADMIN — DULOXETINE HYDROCHLORIDE 20 MG: 20 CAPSULE, DELAYED RELEASE PELLETS ORAL at 12:42

## 2023-09-07 RX ADMIN — SUMATRIPTAN SUCCINATE 100 MG: 100 TABLET, FILM COATED ORAL at 16:29

## 2023-09-07 RX ADMIN — DOCUSATE SODIUM 100 MG: 100 CAPSULE, LIQUID FILLED ORAL at 09:16

## 2023-09-07 RX ADMIN — CLONAZEPAM 1 MG: 1 TABLET ORAL at 22:07

## 2023-09-07 NOTE — PROGRESS NOTES
09/07/23 1500   Activity/Group Checklist   Group Title Interactive therapy   Attendance Other (Comment)     Stephanie was observed resting and was unable to attend group at this time.

## 2023-09-07 NOTE — PROGRESS NOTES
09/07/23 1315   Activity/Group Checklist   Group Title Wellness tools  (Coping Skills: Emotion-Focused vs. Problem-Focused Coping and Coping Jeopardy.)   Attendance Attended   Attendance Duration (min) 46-60   Follows Direction Followed directions   Interactions Initiates interaction   Affect/Mood Range Normal range   Affect/Mood Display Bright;Calm   Goals Achieved Able to engage in interactions;Able to listen to others;Identified feelings;Discussed coping/wellness tools  (Stephanie was pleasant and actively engaged in group. She shared knowledge and tips for use of deep breathing and meditation. Stephanie worked collaboratively with peers throughout coping jeopardy game)

## 2023-09-07 NOTE — PROGRESS NOTES
"Psychiatry Progress Note    Chief Complaint/Reason for follow-up: Mood disorder, unspecified     Interval History: out in the milieu, socializing with peers, hyper verbal at times, reports feeling less depressed. Denied SI/AVH. Compliant with meds. Attending groups.    Patient reports feeling \"terrible\" today. She attributes this to side effects of the Remeron that was started last night. \"I woke up druggy\". Spoke about her long history of taking Klonopin at night. Says she is nervous about the withdrawal symptoms she may have if we were to taper this. Said she would try cymbalta over the remeron. Agreed to starting at very low dose.     Spoke about her relationship with her daughter. Says her daughter is her source of \"pain and sorrow\" and although they are in touch it is very superficial and not what their relationship used to be. She says that her sister is very supportive of her- \"probably my biggest supporter\".     Open to extensive outpatient therapy in [facility].    Ended interview by telling provider \"you are my only hope... you need to get my medication regimen right\".     Review of Systems:   As highlighted above.    Vital Signs for the last 24 hours:  Temp:  [36.6 °C (97.8 °F)-36.6 °C (97.9 °F)] 36.6 °C (97.9 °F)  Heart Rate:  [74-84] 84  Resp:  [16-20] 16  BP: ()/(55-62) 93/62    Labs:  I have reviewed the patient's labs.  Current labs are within normal limits.    Mental Status Exam:  Appearance: well groomed, thin  Gait and Motor: no abnormal movements  Speech: normal rate/rhythm/volume, talkative   Mood: \"terrible\"  Affect: full and appropriate   Associations: coherent  Thought Process: goal-directed  Thought Content: no auditory or visual hallucinations.  Suicidality/Homicidality: thoughts of being dead/ no desire or plan to die  Judgement/Insight: acknowledges illness  Cognition grossly intact, alert      Assessment/Plan  Mood disorder (CMS/[facility])  Stephanie Jeffers is a 72 y.o. female with long hx of " depression and anxiety, no hx of psych hospitalizations or suicide attempts, lives alone, now presenting with worsening depression, loneliness.     C/w 201 stay  Discussed trying Remeron 7.5 mg po qHS, monitor closely for dry mouth, if she cannot tolerate can try Cymbalta which may help with back pain. PRN as ordered. Discussed that it is a good idea to wean off of Klonopin given risk of falls, memory issues, cognitive impairment, addiction/dependence but we will not rush this since she has been taking it 25 years.  VSS, EKG reviewed  G/m/s therapy as tolerated        Management per psychiatry     Orthostatic hypotension  Hx of orthostatic hypotension.  Follows with outpatient cardiologist Dr. Antonio Price at [facility].   She denies any hx of syncope.   She reports not prescribed medication for hypotension.       Plan-  - Continue to monitor VS  - Could trial ESVIN stockings if OK with psychiatry   - Follow up with cardiology as outpatient.     Migraine  Hx migraines without aura.   She reports baseline sx headache on top of head and occasionally localized to left eye.   She reports noise disturbance. No light sensitivity.   Home regimen: Imitrex.       Plan-  - Continue home regimen   - Recommend following up with an outpatient neurologist to establish care for ongoing management and monitoring.     Poor appetite  Patient reports poor appetite over the last month.   She reports 4 lb weight loss as well; also with sx of dry mouth.   She is being followed by her PCP for management as outpatient.   No sx of bloody stool or hematuria.   No reports of night sweats, fevers/chills.   Per chart review with PCP: previously had 8 mm pancreatic intraductal mucinous neoplasm without suspicious features; one year will be November- advised to have contrast enhanced MRI with MRCP.         Plan-  - Continue to monitor intake/output closely.   - Recommend nutrition consult   - Follow up with PCP at discharge for hospital follow up and  age appropriate cancer screenings.   - Follow up with outpatient GI.       Elevated coronary artery calcium score  Noted on chart review.   Follows with outpatient cardiologist Dr. Antonio Price.   Patient reports has follow up appt this week.   Prescribed Repatha injections; no longer takes Zetia.       Plan-  - Continue outpatient follow up.

## 2023-09-07 NOTE — PROGRESS NOTES
09/07/23 1030   Activity/Group Checklist   Group Title Distress tolerance  (Grounding Techniques: Pts will identify grounding techniques to help control uncomfortable symptoms by tuning attention away from thoughts/memories and practicing refocusing on the present.)   Attendance Did not attend

## 2023-09-07 NOTE — PROGRESS NOTES
"SW met with pt to complete psychosocial assessment. Pt is a 72 year-old retired,  female, admitted on a 201 for SI with plan to OD in the context of multiple stressors. This is pt's first inpatient psychiatric admission. She has no history of suicide attempts or SIB. She reports that she was under the care of an outpatient psychiatrist to treat her depression for most of her adult life; pt states that she stopped her antidepressant medication 2 years ago (\"because my boyfriend at the time said I didn't need to be on medication\"). She is currently receiving individual counseling with Derrick Hills and medication management by her PCP. Pt states she attempted to restart Lexapro and Zoloft (she took both medications in the past) but reported side effects of urinary retention and tremors, respectively. Pt states she started TMS at [facility] but only had two treatments prior to this admission. Pt endorses a strong family history of depression on the maternal side of her family (mother and sister), and states her older daughter was diagnosed with Bipolar Disorder and her younger daughter with depression. Her older daughter is prescribed Wellbutrin and Abilify, and her younger daughter is prescribed Prozac.    Pt reports history of trauma/neglect. She states that her mother was severely depressed and was hospitalized psychiatrically multiple times during pt's childhood. Pt was aware of her father's infidelity. Pt watched her younger sister struggle to survive for the first 14 years of her life and undergo 3 heart surgeries. Pt denies history of physical, sexual or emotional abuse.    Pt reports that the last two and a half years have been extremely challenging. She and her boyfriend broke up a year ago. Her sister who has suffered with a heart condition since childhood recently underwent surgery (it was successful). Pt's older daughter stopped communicating with pt a year and a half ago, and pt was unable to see her " "grandchildren ages 11 and 13. Pt states she and her older daughter were extremely close, and pt does not understand why their relationship changed. Within the past 6 months, pt's daughter has taken some steps to mend their relationship but wants to keep her distance (\"I'm not ready to have you in my life like the way it was before\"). Pt states this daughter is now close with her biological father, his new wife, and her fiance's parents. They are in pictures together on social media, and pt feels like \"I am on the outside. I have nothing and they have everything.\" [facility] and it was an upsetting experience for her to feel so disconnected. Pt has much anticipatory anxiety about attending her older daughter's upcoming wedding in November.    Pt reports within the past month she has lost her appetite, has decreased energy, struggles to get out of bed and care for herself. She also endorses guilt because she feels her problems are less significant than those of others around her. Pt states she has been trying to go to the gym, take barre and yoga classes and learn meditation as coping skills. Pt expresses her motivation to \"listen to the professionals and do whatever it takes to get better.\" Pt is interested in the [facility] as an aftercare plan. Pt gives permission for SW to contact her sister Ewa regarding her treatment. SW will follow for safe discharge planning.       09/06/23 2200   General Information,    Admission Status voluntary admission   Arrived From emergency department   Referral Source emergency department   Preferred Language English   Highest Level of Education Master's Degree  (Master's Degree in Secondary Education)   Contact Information   Permission Granted to Share Info With family/designee   Contact Information Comments   (Ewa Dawn (Sister)   [phone] (Home Phone))   Advance Directives (for Healthcare)   Does patient have advance directive? No "   Patient does not have Advance Directive Patient/Family declines further information   Does patient have current OOH DNR form? No   Patient does not have current OOH DNR form Patient/Family declines further information   Does patient have current POLST? No   Patient does not have current POLST Patient/Family declines further information   Does patient have mental health advance directive? No   Patient does not have Mental Health Advance Directive Patient/Family declines further information   Hospice   Hospice Follow-up no   Living Environment   People in Home alone   Current Living Arrangements home  (Pt resides in an over 55 community)   Primary Care Provided by self   Provides Primary Care For no one   Family Caregiver if Needed sibling(s)   Family Caregiver Names   (Ewa)   Quality of Family Relationships helpful;involved;supportive;stressful  (Pt's sister Ewa and daughter Nenita are supportive. 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)      Current or Previous  Service none   Financial/Legal   Source of Income social security;other (see comments)  (investments)   Legal   Criminal Activity/Legal Involvement none   Values/Beliefs   Spiritual, Cultural Beliefs, Faith Practices, Values that Affect Care no   Mental Status Summary   Recent Changes in Mental Status/Cognitive Functioning mood;other (see comments)   Mental Status Comments   (Poor sleep and appetite, social isolation, poor self care)   Personal Safety   Feels Safe at Home or Work/School yes   Feels Threatened by Someone no   Does Anyone Try to Keep You From Having Contact with Others or Doing Things Outside Your Home? no   Physical Signs of Abuse Present no   Violence Risk   History of Violence 0-->No   Aggressive/Violent Observed  Behaviors 0-->No observed behaviors   Total Score 0   Do You Have Any Dangerous Items in Your Possession no   Homicidal ideation No   Current Self-injurious Behavior   Current Self-injurious Behavior denies   Past Self-injurious Behavior   Past Self-injurious Behavior denies   Behavioral Health Condition Management   Behavioral Health Symptoms/Conditions anxiety;depression   Behavioral Management Strategies activity;complementary therapy(ies);counseling;exercise;medication therapy;support system   Behavioral Health Self-Management Outcome 4 (good)   Mental Health Treatment   Previous Mental Health Treatment counseling;psychiatrist   Current Mental Health Treatment counseling  (Therapist Derrick Hills, private practice in [place], [place])   Substance Use   Substance Use Status never used   Previous Substance Use Treatment none   Alcohol Use   Q1: How often do you have a drink containing alcohol? 2-4 pr month   Q2: How many drinks containing alcohol do you have on a typical day when you are drinking? 1 or 2   Q3: How often do you have six or more drinks on one occasion? Never   Coping/Stress, BH   Major Change/Loss/Stressor child(edwina);family problems;family/significant other illness;mental health condition;separation/divorce;social isolation   Sources of Support mental health providers;sibling(s);adult child(edwina)  (Ewa (sister), Nenita (daughter))   Techniques to [place] with Loss/Stress/Change counseling;diversional activities;exercise;medication;meditation   Reaction to Health Status accepting;adjusting;anxious;depressed;overwhelmed   Understanding of Condition and Treatment adequate understanding of medical condition;adequate understanding of treatment   Developmental Stage (Zhaneon's)   Developmental Stage Stage 8 (65 years-death/Late Adulthood) Integrity vs. Despair   Discharge Needs Assessment,    Readmission Within the Last 30 Days no previous admission in last 30 days   Concerns to be Addressed care  coordination/care conferences;coping/stress;discharge planning;medication;mental health   Patient/Family Anticipates Transition to home   Patient/Family Anticipated Services at Transition mental health services   Transportation Concerns none   Transportation Anticipated car, drives self   Outpatient/Agency/Support Group Needs outpatient counseling;outpatient psychiatric care (specify)   Anticipated Discharge Disposition home or self-care   Current Discharge Risk psychiatric illness   Discharge Planning   Anticipated Discharge Disposition home with outpatient services   Type of Outpatient Services psychiatric care;counseling   Plan   Patient/Family in Agreement with Plan yes

## 2023-09-07 NOTE — PLAN OF CARE
Problem: Adult Behavioral Health Plan of Care  Goal: Plan of Care Review  Flowsheets (Taken 9/7/2023 0201)  Progress: improving  Patient Agreement with Plan of Care: agrees  Outcome Evaluation: Stephanie was out in the milieu talking and laughing with peers. Hyperverbal and reported feeling less depressed since admission and is enjoying being around other peers. Became tearful talking about her stressors at home. Denied SI/AVH or having any pain. Attended evening group and took a shower before going to bed. Compliant with HS meds.   Plan of Care Reviewed With: patient  Intervention(s): Nursing assessment, medication management, Q15 min safety checks

## 2023-09-07 NOTE — PLAN OF CARE
"Problem: Depressive Signs/Symptoms  Goal: Optimized Energy Level (Depressive Signs/Symptoms)  Outcome: Progressing  Flowsheets (Taken 9/7/2023 1219)  Goal Outcome: progressing  Individual Goal: Stephanie will attend and participate in group  [place] Goal: dresses/ready for morning activity       Plan of Care Review  Plan of Care Reviewed With: patient  Patient Agreement with Plan of Care: agrees  Progress: improving  Intervention(s): Nursing assessment, medication administration, safety rounds, suicide check-in  Outcome Evaluation: Stephanie was calm and cooperative throughout shift. Visible on the unit and interacting with peers. Medication compliant. Reports feeling anxious, hopeless, and \"very down\" that she is not going to improve. She also reports feeling anxious about medication changes and how this will impact her mood. RN offered support and encouragement. She was tearful while talking with this RN. Requested and received PRN tylenol for a headache. Medication not effective. PRN imitrex requested and given for headache. Medication was effective. Denies SI/HI/AVH. Refused evening dose of colace. RN will continue to monitor, offer support, and encourage Stephanie to comply with treatment. Q15 minute checks maintained for safety.  "

## 2023-09-07 NOTE — PROGRESS NOTES
09/07/23 0981   Activity/Group Checklist   Group Title Community meeting  (Meditations: Pts will listen to a guided meditation about the physical health and wellbeing of ourselves and loved ones, understanding the importance of taking care of our minds.)   Attendance Attended   Attendance Duration (min) 16-30   Follows Direction Followed directions   Interactions Interacted reciprocally   Affect/Mood Range Normal range   Affect/Mood Display Alert;Calm   Goals Achieved Able to engage in interactions;Able to listen to others     Stephanie obtained a leadership role by reading the unit rules. She contributed a personal goal, focusing on medication management and meeting the tx team. Afterwards, Pt engaged in the holistic service as a means of grounding and relaxation.

## 2023-09-07 NOTE — PROGRESS NOTES
"Spoke with patient's outpatient therapist Derrick Kapadia, [facility] [phone]:    -He sees her about every 2 weeks, last visit was about 2 weeks ago. He is happy she is in the hospital now and being proactive about her care.   -Reports the patient's depression is related to abandonment fears and issues with detachment. She went through a similar episode when she was .  -She does very well with therapy and reassurance. She is very sensitive to medications, but Derrick thinks she'd benefit more from behavioral changes and ongoing therapy. He does not feel she should be on Klonopin for sleep.   -He is worried about her weight loss and malnourishment. Says she does not eat when she is in these states.   -She has attended \"on-site\" psychological services in the past, which he says are like short term inpatient programs. She has benefited greatly from them. He agrees with plan to discharge to IOP program.   -He is open to continuing therapy with her.      Kala Iverson MD  Psychiatry, PGY-2  "

## 2023-09-08 ENCOUNTER — TELEPHONE (OUTPATIENT)
Dept: SCHEDULING | Facility: CLINIC | Age: 73
End: 2023-09-08
Payer: MEDICARE

## 2023-09-08 PROCEDURE — 63700000 HC SELF-ADMINISTRABLE DRUG: Performed by: STUDENT IN AN ORGANIZED HEALTH CARE EDUCATION/TRAINING PROGRAM

## 2023-09-08 PROCEDURE — 63700000 HC SELF-ADMINISTRABLE DRUG: Performed by: PSYCHIATRY & NEUROLOGY

## 2023-09-08 PROCEDURE — 12400000 HC ROOM AND CARE SEMIPRIVATE PSYCH

## 2023-09-08 PROCEDURE — 99233 SBSQ HOSP IP/OBS HIGH 50: CPT | Performed by: STUDENT IN AN ORGANIZED HEALTH CARE EDUCATION/TRAINING PROGRAM

## 2023-09-08 RX ADMIN — DULOXETINE HYDROCHLORIDE 20 MG: 20 CAPSULE, DELAYED RELEASE PELLETS ORAL at 08:27

## 2023-09-08 RX ADMIN — CLONAZEPAM 1 MG: 1 TABLET ORAL at 22:11

## 2023-09-08 RX ADMIN — DOCUSATE SODIUM 100 MG: 100 CAPSULE, LIQUID FILLED ORAL at 08:27

## 2023-09-08 RX ADMIN — DOCUSATE SODIUM 100 MG: 100 CAPSULE, LIQUID FILLED ORAL at 16:18

## 2023-09-08 NOTE — PROGRESS NOTES
09/08/23 1315   Activity/Group Checklist   Group Title Wellness tools  (Emotion Regulation: Emotion Game and Discussion about emotion action tendency, need indicated, and opposite action)   Attendance Attended   Attendance Duration (min) 46-60   Follows Direction Followed directions   Interactions Initiates interaction   Affect/Mood Range Normal range   Affect/Mood Display Calm;Alert   Goals Achieved Able to listen to others;Identified feelings;Able to engage in interactions;Able to reflect/comment on own behavior;Verbalized increased hopefulness  (Stephanie was pleasant, insightful, + actively engaged in group discussion about emotions. She reported feeling hopeful. Stephanie connected w peers and reflected on changes in her behavior when she was in a depressed state.)

## 2023-09-08 NOTE — PLAN OF CARE
Problem: Adult Behavioral Health Plan of Care  Goal: Plan of Care Review  Flowsheets (Taken 9/7/2023 5252)  Progress: improving  Patient Agreement with Plan of Care: agrees  Outcome Evaluation: Stephanie was out in the milieu socializing with peers all evening. Bright affect. Reported not having a very good day. States the Remeron she started on last  night may have caused her to have a migraine. Denies having a headache after taking Imitrex. Endorsing some anxiety but denies SI/SH/AVH. Compliant with  med.    Plan of Care Reviewed With: patient  Intervention(s): Nursing assessment, medication management, Q15 min safety checks

## 2023-09-08 NOTE — PROGRESS NOTES
09/07/23 1432   Treatment Plan   Treatment Plan Type Initial Treatment Plan   Plan Date 09/07/23   Treatment Plan Details Stephanie Jeffers is a 72 y.o. female with long hx of depression and anxiety, no hx of psych hospitalizations or suicide attempts, lives alone, now presenting with worsening depression, loneliness.   Long Range Goal No SI, improved mood and functioning   Individualization   Patient Personal Strengths coping skills;expressive of needs;expressive of emotions;family/social support;independent living skills;insight into illness/situation;intellectual cognitive skills;interests/hobbies;motivated for treatment;positive educational history;positive vocational history;stable living environment;socioeconomic stability   Patient Vulnerabilities adverse childhood experience(s);family/relationship conflict;history of unsuccessful treatment   Discharge   Plan Stephanie will discharge with aftercare in place including medication management and psychotherapy to stabilize symptoms of anxiety and depression.   Treatment Plan Status   Treatment Plan Status Complete     SW met with pt to review Initial Treatment Plan. Pt in agreement with goals and signed the document. Paper copy placed in pt's chart. Pt reported negative side effects to Remeron and that she was switched to Cymbalta. Pt inquired about [facility], and SW informed pt she is scheduled for intake on 9/14/23. SW provided written material about [facility] to pt.

## 2023-09-08 NOTE — PROGRESS NOTES
09/08/23 1030   Activity/Group Checklist   Group Title Other (Comment)  (Music Therapy)   Attendance Did not attend

## 2023-09-08 NOTE — PROGRESS NOTES
09/08/23 1445   Activity/Group Checklist   Group Title Interactive therapy   Attendance Did not attend

## 2023-09-08 NOTE — PROGRESS NOTES
"PSYCHIATRIC PROGRESS NOTE    Chief Complaint/Reason for follow-up: depression NOS    Interval History: Per staff, in milieu socializing. Bright.     Today says yesterday she had migraine, and so did not feel emotionally good. Today feels better both physically and emotionally. Is still grieving loss of her relationship with daughter \"but I guess I will talk about that in therapy in the day program.\" She tolerated Cymbalta yesterday and today without complaint. We discuss that if she continues to tolerate over weekend, she will discharge Monday. Denies SI/HI.     Vital Signs for the last 24 hours:       Scheduled Meds:   clonazePAM  1 mg oral Nightly    docusate sodium  100 mg oral BID    DULoxetine  20 mg oral Daily       Labs:  Results from last 7 days   Lab Units 09/05/23  1207   HEMOGLOBIN g/dL 13.4   WBC K/uL 7.89   PLATELETS K/uL 228   POTASSIUM mEQ/L 4.0   SODIUM mEQ/L 139   CREATININE mg/dL 0.8       MENTAL STATUS EXAM  Appearance: well groomed  Gait and Motor: no abnormal movements  Speech: normal rate/rhythm/volume  Mood: 'okay'  Affect: mildly dysphoric, reactive  Associations: coherent  Thought Process: goal-directed  Thought Content: no auditory or visual hallucinations.  Suicidality/Homicidality: denies  Judgement/Insight: acknowledges illness  Cognition grossly intact, alert    Assessment/Plan  * Mood disorder (CMS/[facility])  Assessment & Plan  Stephanie Jeffers is a 72 y.o. female with long hx of depression and anxiety, no hx of psych hospitalizations or suicide attempts, lives alone, now presenting with worsening depression, loneliness.     C/w 201 stay  C/w Cymbalta 20 mg po daily. Discussed that it is a good idea to wean off of Klonopin given risk of falls, memory issues, cognitive impairment, addiction/dependence but she opted not to taper now since she has been taking it 25 years and feels she is in crisis.  VSS, EKG reviewed  G/m/s therapy as tolerated    50 total minutes were spent including " direct face-to-face counseling/coordination of care, review of the medical record, and documentation. I discussed the treatment plan and the patient's progress with nursing, therapy, and social work staff as appropriate.

## 2023-09-08 NOTE — PLAN OF CARE
Problem: Depressive Signs/Symptoms  Goal: Optimized Energy Level (Depressive Signs/Symptoms)  Outcome: Progressing  Flowsheets (Taken 9/8/2023 1435)  Goal Outcome: progressing  Individual Goal: Loulou will continue to socialize with peers  [place] Goal: grooms self without prompting   Plan of Care Review  Plan of Care Reviewed With: patient  Patient Agreement with Plan of Care: agrees  Progress: improving  Intervention(s): Nursing assessment, medication management, encourage participatin in treatment and Q 15 minute rounds for safety  Outcome Evaluation: Stephanie is alert and oriented. Visible in the milieu, and able to state needs. Requested print out of labs and given. No other issues or concerns at this time and treatment plan remains ongoing.

## 2023-09-08 NOTE — PLAN OF CARE
" RORO spoke with pt's sister Ewa for collateral information. Ewa stated she thinks pt sounds better. Ewa reported that pt has two medical appointments/tests on 9/12, and she's worried that pt will have to wait until December to be seen if she has to reschedule. SW provided update on pt's progress, aftercare plan and goal of d/c on Monday 9/11 if pt continues to improve. Ewa said \"tell my sister I love her.\" RORO stated she will update Ewa on 9/11 to confirm discharge. SW will continue to follow.  "

## 2023-09-09 PROCEDURE — 63700000 HC SELF-ADMINISTRABLE DRUG: Performed by: STUDENT IN AN ORGANIZED HEALTH CARE EDUCATION/TRAINING PROGRAM

## 2023-09-09 PROCEDURE — 90833 PSYTX W PT W E/M 30 MIN: CPT | Performed by: STUDENT IN AN ORGANIZED HEALTH CARE EDUCATION/TRAINING PROGRAM

## 2023-09-09 PROCEDURE — 12400000 HC ROOM AND CARE SEMIPRIVATE PSYCH

## 2023-09-09 PROCEDURE — 99233 SBSQ HOSP IP/OBS HIGH 50: CPT | Performed by: STUDENT IN AN ORGANIZED HEALTH CARE EDUCATION/TRAINING PROGRAM

## 2023-09-09 PROCEDURE — 63700000 HC SELF-ADMINISTRABLE DRUG: Performed by: PSYCHIATRY & NEUROLOGY

## 2023-09-09 RX ADMIN — CLONAZEPAM 1 MG: 1 TABLET ORAL at 22:28

## 2023-09-09 RX ADMIN — DULOXETINE HYDROCHLORIDE 20 MG: 20 CAPSULE, DELAYED RELEASE PELLETS ORAL at 09:21

## 2023-09-09 RX ADMIN — DOCUSATE SODIUM 100 MG: 100 CAPSULE, LIQUID FILLED ORAL at 09:19

## 2023-09-09 NOTE — PLAN OF CARE
Plan of Care Review  Plan of Care Reviewed With: patient  Patient Agreement with Plan of Care: agrees  Progress: no change  Intervention(s): nursing assessment, suicide check in  Outcome Evaluation: Stephanie is visible, social, and pleasant on approach. Denies SI/HI/AH/VH. Compliant with HS meds. Denies having any complaints or concerns.

## 2023-09-09 NOTE — PROGRESS NOTES
09/09/23 0930   Activity/Group Checklist   Group Title Community meeting   Attendance Attended   Attendance Duration (min) 16-30   Follows Direction Followed directions   Affect/Mood Range Normal range   Affect/Mood Display Calm;Alert   Goals Achieved Able to listen to others;Able to engage in interactions     Pt set a goal not to get in her head and worry. Pt has a goal to focus on positives today.

## 2023-09-09 NOTE — PROGRESS NOTES
09/09/23 1100   Activity/Group Checklist   Group Title Distress tolerance  (managing difficult emotions)   Attendance Attended   Attendance Duration (min) 46-60   Follows Direction Followed directions   Interactions Interacted reciprocally   Affect/Mood Range Normal range   Affect/Mood Display Calm;Alert   Goals Achieved Identified feelings;Discussed discharge plan;Discussed coping/wellness tools;Able to engage in interactions;Able to listen to others;Identified resources and support systems;Able to offer feedback or support to another     Pt participated in the group discussion on managing extreme emotions. Knowing one's own skills breakdown point. Discussed and practiced crisis survival skills.    Pt expressed anxiety about discharging back to the environment, that pt states led to current hospitalization. Pt shared coping skills she uses with peers.

## 2023-09-09 NOTE — PROGRESS NOTES
"PSYCHIATRIC PROGRESS NOTE    Chief Complaint/Reason for follow-up: depression, suicidal thoughts    Interval History:     Chart reviewed; significant for: VS WNL; admission labs and EKG reviewed; cooperative with medications, no PRNs; overnight- visible, social, pleasant, denies SI    E&M and supportive psychotherapy- on exam, pt met individually in the afternoon, was social with peers on approach. Asked me earlier if I could meet with a peer of hers stating she was in more distress. Pt reports she is \"Feeling much better.\" Bright affect. Tells me the story of her presentation and difficulties over the last several years. Provided support. She emphasizes how the estrangement from her daughter and grandchildren has been distressing. Has brought along a card from her eldest daughter, would like me to read it. Daughter has written supportive message. Pt hopes this leads to reconciliation. \"Not going to my hopes up too high.\" Looks forward to attending family wedding. We discussed the importance she derives from connection to others. She comments on how her daughter was also hospitalized here and the mirroring, and how she believes her hospitalization has demonstrated to her daughter how serious her crisis has been. Pt reports that she is eating again, not stuck in bed, denies suicidal thoughts. More hopeful and future-oriented. Tolerating the duloxetine, will continue current dose. States she would like to be discharged as early as possible on Monday morning, suggested to her an 11:00 AM time.    Vital Signs for the last 24 hours:       Scheduled Meds:   clonazePAM  1 mg oral Nightly    docusate sodium  100 mg oral BID    DULoxetine  20 mg oral Daily       Labs:  Selected Electrolytes  Results from last 7 days   Lab Units 09/05/23  1207   POTASSIUM mEQ/L 4.0   SODIUM mEQ/L 139   CREATININE mg/dL 0.8       Recent EKG (QTc)  Lab Results   Component Value Date    VENTRICRATE 74 09/06/2023    QTCCALCULAT 428 09/06/2023 " "      No results found for: VPA, LITHIUM    MENTAL STATUS EXAM  Appearance: well groomed  Behavior: sociable, asks me personal questions, decreased boundaries  Gait and Motor: no abnormal movements and normal  Speech: normal rate/rhythm/volume, spontaneous  Mood: \"so much better\"  Affect: bright, euthymic  Associations: coherent  Thought Process: goal-directed  Thought Content: no psychosis; + all-or-nothing thinking, externalizing  Suicidality/Homicidality: denies  Judgement/Insight: partial  Orientation: grossly intact, not formally assessed  Memory: grossly intact, not formally assessed  Attention: alert  Knowledge: normal  Language: normal     Assessment/Plan    Stephanie Jeffers is a 72 y.o.  woman, retired, living in assisted living facility, with PMH migraine, Vit B12 deficiency, HLD, orthostasis, and PPH anxiety, depression admitted voluntarily 9/5/23 from the [facility] ED where she was brought in 9/5/23 by sister for suicidal thoughts, hopelessness, loneliness in the context of multiple stressors- breakup, estrangement from daughter/grandchildren, sister's health.     On chronic clonazepam 1 mg nightly. Thus far after not tolerating mirtazapine 7.5 mg nightly x1 night shifted to duloxetine 20 mg daily.    #Unspecified depressive disorder vs. Adjustment disorder with mixed anxiety and depressed mood  #Cluster B personality traits  -09/09/23 status: improving- reports she has been bolstered by card from daughter, hopeful for reconciliation and rebuilding relationship, tolerating duloxetine, denies SI, exhibits ample future-orientation  -individual, group, milieu therapy  -continue home clonazepam 1 mg nightly; agree with prudent primary team recommendation to attempt gradual taper/discontinuation  -continue new trial of duloxetine 20 mg daily  -continue to monitor for safety of discharge  -[facility] PHP intake arranged    #Medical conditions managed by the hospital medicine service include: poor appetite, " elevated coronary artery calcium score, orthostatic hypotension, migraine    Disposition: per primary team; discharge is anticipate Monday     50 total minutes were spent including direct face-to-face counseling/coordination of care, review of the medical record, and documentation. I discussed the treatment plan and the patient's progress with nursing, therapy, and social work staff as appropriate.    Plus an additional 16 minutes of psychotherapy (modality documented above)

## 2023-09-09 NOTE — PROGRESS NOTES
09/09/23 1400   Activity/Group Checklist   Group Title Interactive therapy  ([place])   Attendance Attended   Attendance Duration (min) 46-60   Follows Direction Followed directions   Interactions Interacted reciprocally   Affect/Mood Range Normal range   Affect/Mood Display Alert;Calm   Goals Achieved Able to listen to others;Able to engage in interactions;Discussed discharge plan;Discussed coping/wellness tools     The group discussed mindful eating and the five senses.

## 2023-09-09 NOTE — PLAN OF CARE
Plan of Care Review  Plan of Care Reviewed With: patient  Patient Agreement with Plan of Care: agrees  Consent Given to Review Plan with: pt  Progress: improving  Intervention(s): q15 minute safety rounds, med managemet and administration, provided emotional support, encouraged group attendance and participation.  Outcome Evaluation: Stephanie reported 3/10 anxiety, 5/10 depression, denied suicidal or homicidal ideations, denied hallucinations. Stephanie reported thinking about events leading into her admission and the future and she realized she hasnt been in the moment in awhile.     Stephanie is visible in the community and social with peers.

## 2023-09-10 PROCEDURE — 63700000 HC SELF-ADMINISTRABLE DRUG: Performed by: STUDENT IN AN ORGANIZED HEALTH CARE EDUCATION/TRAINING PROGRAM

## 2023-09-10 PROCEDURE — 12400000 HC ROOM AND CARE SEMIPRIVATE PSYCH

## 2023-09-10 PROCEDURE — 99232 SBSQ HOSP IP/OBS MODERATE 35: CPT | Performed by: STUDENT IN AN ORGANIZED HEALTH CARE EDUCATION/TRAINING PROGRAM

## 2023-09-10 RX ADMIN — CLONAZEPAM 1 MG: 1 TABLET ORAL at 22:29

## 2023-09-10 RX ADMIN — DULOXETINE HYDROCHLORIDE 20 MG: 20 CAPSULE, DELAYED RELEASE PELLETS ORAL at 09:15

## 2023-09-10 NOTE — PLAN OF CARE
Problem: Adult Behavioral Health Plan of Care  Goal: Plan of Care Review  Outcome: Progressing  Flowsheets (Taken 9/10/2023 0040)  Progress: improving  Patient Agreement with Plan of Care: agrees  Outcome Evaluation: Stephanie was visible in the lounge watching tv with her peers. She is social with peers and staff, very bright with interaction. She reports having a good day, denies SI/HI/AVH. She was compliant with her medication. Will continue to monitor for safety and needs.  Plan of Care Reviewed With: patient  Intervention(s): Stephanie encouraged to let her needs be known to staff.   Plan of Care Review  Plan of Care Reviewed With: patient  Patient Agreement with Plan of Care: agrees  Progress: improving  Intervention(s): Stephanie encouraged to let her needs be known to staff.  Outcome Evaluation: Stephanie was visible in the lounge watching tv with her peers.

## 2023-09-10 NOTE — PROGRESS NOTES
09/10/23 1100   Activity/Group Checklist   Group Title Support and Self-expression  (Self care house)   Attendance Attended   Attendance Duration (min) 46-60   Follows Direction Followed directions   Interactions Initiates interaction   Affect/Mood Range Normal range   Affect/Mood Display Alert   Goals Achieved Identified feelings;Identified triggers;Discussed coping/wellness tools;Able to listen to others;Able to engage in interactions     Stephanie attended group on our self-care house. In this group Stephanie answered questions pertaining to causes of stress, coping skills, areas of Stephanie's life she would like to improve, self-care, community resources and things she keeps from others. During the group we spoke about loneliness, social media, dating, family traditions. Stephanie was engaged with discussion with peers.

## 2023-09-10 NOTE — PROGRESS NOTES
09/10/23 1415   Activity/Group Checklist   Group Title Interactive therapy  (aging and dating)   Attendance Attended   Attendance Duration (min) 31-45   Follows Direction Followed directions   Interactions Initiates interaction   Affect/Mood Range Normal range   Affect/Mood Display Calm   Goals Achieved Identified feelings;Identified triggers;Discussed coping/wellness tools;Able to listen to others;Able to engage in interactions     Stephanie and group spoke about the importance of social connections. Stephanie spoke specifically about platonic vs. romantic relationships, dating websites/styles, aging and dating, legacy of children and grandchildren. Stephanie spoke about feeling forgotten in the dating world as she gotten older and marketing for older individuals. Stephanie spoke about dating for money vs. love, various relationship types and the benefits/consequences of both.

## 2023-09-10 NOTE — PROGRESS NOTES
09/10/23 0916   Activity/Group Checklist   Group Title Community meeting   Attendance Attended   Attendance Duration (min) 31-45   Follows Direction Followed directions   Interactions Initiates interaction   Affect/Mood Range Normal range   Affect/Mood Display Calm   Goals Achieved Identified feelings;Discussed coping/wellness tools;Able to listen to others;Able to engage in interactions     Stephanie attended community meeting. Stephanie stated that today she feels better than yesterday. Stephanie stated that her goal for today is to prepare for discharge tomorrow. Stephanie answered the question of the day, If you were granted two wishes what would you wish for? Stephanie stated happiness and health.

## 2023-09-10 NOTE — PLAN OF CARE
Problem: Adult Behavioral Health Plan of Care  Goal: Plan of Care Review  Outcome: Progressing  Flowsheets (Taken 9/10/2023 5896)  Progress: improving  Patient Agreement with Plan of Care: agrees  Outcome Evaluation: Stephanie is visible on the unit. Compliant with meds and meals. Denies si/hi. Pt stated she doesn't feel dread. Affect a little brighter. Appetite improving. Interacts with peers. + groups. Will continue to monitor on q 15 minute checks.   Plan of Care Reviewed With: patient  Intervention(s): Medication management, Therapeutic communications, Encourage group participation and interaction with peers.

## 2023-09-10 NOTE — PROGRESS NOTES
PSYCHIATRIC PROGRESS NOTE    Chief Complaint/Reason for follow-up: depression, suicidal thoughts    Interval History:     Chart reviewed; significant for: /51, VS otherwise WNL; cooperative with medications except colace, no PRNs; overnight- visible, social, very bright, reports having a good day, denies SI    E&M- on exam, pt met in the afternoon, she was out among peers socializing on approach. Amenable to meeting in the multipurpose room. She reports she continues to feel improved. No longer feels dread when waking. She reports feeling more hopeful, looking forward to the future. She notes that it is typical at home for her to have awakening around 7 AM, that she will often go back to sleep for another hour or two. Here in the hospital she has noticed awakening around 4 or 5 AM, having more awakenings towards the end of the sleep cycle. She asks about how the duloxetine works, wonders if this could be related. We discussed that this may be possible but other factors may also be contributing such as environmental in the hospital. She is otherwise tolerating the duloxetine. She reports eating adequately. She talks about her goal to eventually taper off the clonazepam, is aware of the adverse cognitive SE profile. She asks about possible approaches to doing so and we discussed some options- she plans to tackle this after leaving the hospital. She remains eager for discharge, would like to leave at 11:00 AM tomorrow. No SI. Looking forward to watching the Eagles this afternoon. She returns to peers in the community as we conclude.    Vital Signs for the last 24 hours:  Temp:  [36.4 °C (97.6 °F)] 36.4 °C (97.6 °F)  Heart Rate:  [72] 72  Resp:  [18] 18  BP: (100)/(51) 100/51    Scheduled Meds:   clonazePAM  1 mg oral Nightly    docusate sodium  100 mg oral BID    DULoxetine  20 mg oral Daily       Labs:  Selected Electrolytes  Results from last 7 days   Lab Units 09/05/23  1207   POTASSIUM mEQ/L 4.0   SODIUM  "mEQ/L 139   CREATININE mg/dL 0.8       Recent EKG (QTc)  Lab Results   Component Value Date    VENTRICRATE 74 09/06/2023    QTCCALCULAT 428 09/06/2023       No results found for: VPA, LITHIUM    MENTAL STATUS EXAM  Appearance: well groomed  Behavior: sociable  Gait and Motor: no abnormal movements and normal  Speech: normal rate/rhythm/volume, spontaneous  Mood: \"better\"  Affect: bright, euthymic  Associations: coherent  Thought Process: goal-directed  Thought Content: no psychosis  Suicidality/Homicidality: denies  Judgement/Insight: partial, improved  Orientation: grossly intact, not formally assessed  Memory: grossly intact, not formally assessed  Attention: alert  Knowledge: normal  Language: normal     Assessment/Plan    Stephanie Jeffers is a 72 y.o.  woman, retired, living in assisted living facility, with PMH migraine, Vit B12 deficiency, HLD, orthostasis, and PPH anxiety, depression admitted voluntarily 9/5/23 from the [facility] ED where she was brought in 9/5/23 by sister for suicidal thoughts, hopelessness, loneliness in the context of multiple stressors- breakup, estrangement from daughter/grandchildren, sister's health. On chronic clonazepam 1 mg nightly. Thus far after not tolerating mirtazapine 7.5 mg nightly x1 night shifted to duloxetine 20 mg daily.    #Unspecified depressive disorder vs. Adjustment disorder with mixed anxiety and depressed mood  #Cluster B personality traits  -09/10/23 status: improving- reports continued improvement in mood and outlook, remains bolstered by card from daughter, hopeful, some early morning awakening- unclear if medication-related, otherwise tolerating duloxetine, denies SI, exhibits ample future-orientation  -individual, group, milieu therapy  -continue home clonazepam 1 mg nightly; agree with prudent primary team recommendation to attempt gradual taper/discontinuation, pt plans to do so as outpatient  -continue new trial of duloxetine 20 mg daily  -continue to " monitor for safety of discharge  -[facility] PHP intake arranged    #Medical conditions managed by the hospital medicine service include: poor appetite, elevated coronary artery calcium score, orthostatic hypotension, migraine    Disposition: discharge is anticipated tomorrow 9/11/23     35 total minutes were spent including direct face-to-face counseling/coordination of care, review of the medical record, and documentation. I discussed the treatment plan and the patient's progress with nursing, therapy, and social work staff as appropriate.

## 2023-09-10 NOTE — PROGRESS NOTES
09/10/23 8809   Activity/Group Checklist   Group Title Exercise/Yoga   Attendance Did not attend

## 2023-09-11 VITALS
HEIGHT: 64 IN | SYSTOLIC BLOOD PRESSURE: 118 MMHG | DIASTOLIC BLOOD PRESSURE: 76 MMHG | OXYGEN SATURATION: 99 % | TEMPERATURE: 98.1 F | HEART RATE: 120 BPM | BODY MASS INDEX: 18.23 KG/M2 | RESPIRATION RATE: 17 BRPM | WEIGHT: 106.8 LBS

## 2023-09-11 PROCEDURE — 63700000 HC SELF-ADMINISTRABLE DRUG: Performed by: STUDENT IN AN ORGANIZED HEALTH CARE EDUCATION/TRAINING PROGRAM

## 2023-09-11 PROCEDURE — 200200 PR NO CHARGE: Performed by: HOSPITALIST

## 2023-09-11 PROCEDURE — 63700000 HC SELF-ADMINISTRABLE DRUG: Performed by: PSYCHIATRY & NEUROLOGY

## 2023-09-11 PROCEDURE — 99239 HOSP IP/OBS DSCHRG MGMT >30: CPT | Performed by: STUDENT IN AN ORGANIZED HEALTH CARE EDUCATION/TRAINING PROGRAM

## 2023-09-11 RX ORDER — DULOXETIN HYDROCHLORIDE 20 MG/1
20 CAPSULE, DELAYED RELEASE ORAL DAILY
Qty: 30 CAPSULE | Refills: 0 | Status: SHIPPED | OUTPATIENT
Start: 2023-09-12 | End: 2023-09-28 | Stop reason: SDUPTHER

## 2023-09-11 RX ADMIN — DOCUSATE SODIUM 100 MG: 100 CAPSULE, LIQUID FILLED ORAL at 09:06

## 2023-09-11 RX ADMIN — DULOXETINE HYDROCHLORIDE 20 MG: 20 CAPSULE, DELAYED RELEASE PELLETS ORAL at 09:06

## 2023-09-11 NOTE — PROGRESS NOTES
"Psychiatry Progress Note    Chief Complaint/Reason for follow-up: Mood disorder, unspecified     Interval History: out in the milieu, socializing with peers. Reports feeling better. Denied SI/AVH. Compliant with meds. Attending groups.    Patient reports feeling much better. She is very happy because her daughter hand delivered her a note over the weekend that said how much she cared for her. She is relieved that their relationship is improving.   She is disappointed that she waited this long to get back on medications and expressed disappointment that her therapist didn't recommend medications.   She is looking forward to going home, being in her bed again. She says her sister will pick her up and be with her when she gets home. Her immediate plans include doing laundry and going to the store. She has a hair appointment tomorrow and plans to go to the intensive outpatient program the day after.   Appetite is improved.   Denies SI or thoughts of self harm.     Vital Signs for the last 24 hours:  Temp:  [36.7 °C (98.1 °F)] 36.7 °C (98.1 °F)  Heart Rate:  [120] 120  Resp:  [17] 17  BP: (118)/(76) 118/76    Scheduled Meds:   clonazePAM  1 mg oral Nightly    docusate sodium  100 mg oral BID    DULoxetine  20 mg oral Daily       Labs:  Results from last 7 days   Lab Units 09/05/23  1207   HEMOGLOBIN g/dL 13.4   WBC K/uL 7.89   PLATELETS K/uL 228   POTASSIUM mEQ/L 4.0   SODIUM mEQ/L 139   CREATININE mg/dL 0.8     Mental Status Exam:  Appearance: well groomed, thin  Gait and Motor: no abnormal movements  Speech: normal rate/rhythm/volume, talkative   Mood: \"great\"  Affect: full and appropriate   Associations: coherent  Thought Process: goal-directed  Thought Content: no auditory or visual hallucinations. No paranoia or delusions elicited.   Suicidality/Homicidality: thoughts of being dead/ no desire or plan to die  Judgement/Insight: acknowledges illness, help seeking  Cognition grossly intact, alert "       Assessment/Plan  * Mood disorder (CMS/[facility])  Assessment & Plan  Stephanie Jeffers is a 72 y.o. female with long hx of depression and anxiety, no hx of psych hospitalizations or suicide attempts, lives alone, now presenting with worsening depression, loneliness.     C/w 201 stay  C/w Cymbalta 20 mg po daily. Discussed that it is a good idea to wean off of Klonopin given risk of falls, memory issues, cognitive impairment, addiction/dependence but she opted not to taper now since she has been taking it 25 years and feels she is in crisis.  VSS, EKG reviewed  G/m/s therapy as tolerated        Kala Iverson MD  Psychiatry, PGY-2

## 2023-09-11 NOTE — DISCHARGE SUMMARY
Inpatient Discharge Summary    BRIEF OVERVIEW  Admitting Provider:  H&P Notes 8/12/2023 to 9/11/2023         Date of Service Author Author Type Status Note Type File Time    09/06/23 1247 Chelsea Mejia DO Physician Signed H&P 09/06/23 1313          Attending Provider: Chelsea Mejia DO Attending phys phone: [phone]  Primary Care Physician at Discharge: Libby Wang [phone]    Admission Date: 9/5/2023     Discharge Date: 9/11/2023    Primary Discharge Diagnosis  Mood disorder (CMS/HCC)    Secondary Discharge Diagnosis  Active Hospital Problems    Diagnosis Date Noted    Poor appetite 09/06/2023    Mood disorder (CMS/HCC) 11/30/2022    Elevated coronary artery calcium score 08/27/2021    Orthostatic hypotension 01/09/2019    Migraine 01/09/2019      Resolved Hospital Problems   No resolved problems to display.       DETAILS OF HOSPITAL STAY    Operative Procedures Performed      Consults:   Consult Notes 8/12/2023 to 9/11/2023         Date of Service Author Author Type Status Note Type File Time    09/06/23 1200 Sammi Carlin CRNP Nurse Practitioner Signed Consults 09/06/23 1531    09/05/23 2106 Penny Salinas MD Physician Signed Consults 09/05/23 2107          Consult Orders During Admission:  IP CONSULT TO HOSPITALIST     Procedures: {procedures:83590}  Pertinent Test Results: {diagnostics:52373}    Imaging  No results found.    ***    Presenting Problem/History of Present Illness  Mood disorder (CMS/HCC) [F39]     ***    Exam on Day of Discharge  Patient seen and examined on day of discharge.  ***     Hospital Course  ***     Discharge Orders     Medication List      START taking these medications    DULoxetine 20 mg capsule  Commonly known as: CYMBALTA  Start taking on: September 12, 2023  Take 1 capsule (20 mg total) by mouth daily.  Dose: 20 mg        CONTINUE taking these medications    clonazePAM 1 mg tablet  Commonly known as: klonoPIN  Take by mouth  nightly.     REPATHA SURECLICK 140 mg/mL pen  INJECT 1 ML (140 MG TOTAL) UNDER THE SKIN EVERY 14 DAYS.  Generic drug: evolocumab     SUMAtriptan 100 mg tablet  Commonly known as: IMITREX  Take by mouth as needed.              {Discharge non-med orders (select AFTER ordering discharge orders):07918}    Outpatient Follow-Ups            Tomorrow Antonio Price MD [facility] General Cardiology at [facility]    In 1 week MRI1 [facility] [address] - Radiology        Referrals:  No orders of the defined types were placed in this encounter.      Active Issues Requiring Follow-up  Issue: ***  What is Needed: ***      Test Results Pending at Discharge  Unresulted Labs (From admission, onward)    None          ***    Discharge Disposition  Disposition: Home   Destination:        Code Status at Discharge: Full Code  Physician Order for Life-Sustaining Treatment Document Status      No documents found

## 2023-09-11 NOTE — PLAN OF CARE
Problem: Adult Behavioral Health Plan of Care  Goal: Plan of Care Review  Outcome: Progressing  Flowsheets (Taken 9/10/2023 2116)  Progress: improving  Patient Agreement with Plan of Care: agrees  Outcome Evaluation: Stephanie is visible on the unit in the lounge very social with peers. She is bright reporting she is feeling much better. She denies SI/HI/AVH and is very much looking forward to discharge tomorrow. Will continue to monitor for safety and needs.  Plan of Care Reviewed With: patient  Intervention(s): Stephanie was encouraged to let her needs be known to staff.   Plan of Care Review  Plan of Care Reviewed With: patient  Patient Agreement with Plan of Care: agrees  Progress: improving  Intervention(s): Stephanie was encouraged to let her needs be known to staff.  Outcome Evaluation: Stephanie is visible on the unit in the lounge very social with peers.

## 2023-09-11 NOTE — PLAN OF CARE
Plan of Care Review  Plan of Care Reviewed With: patient  Patient Agreement with Plan of Care: agrees  Consent Given to Review Plan with: pt  Progress: improving  Intervention(s): q15 minute safety rounds, med managemet and administration, provided emotional support, encouraged group attendance and participation.  Outcome Evaluation: Stephanie reported mild anxiety related to being discharged today. She denied depression, suicidal or homicidal ideations, denied hallucinations.     Stephanie is bright upon approach and is able to make needs know. She is anticipating being discharged this morning and her sister will pick her up.    1115 Discharge instructions reviewed with patient and patient verbalized understanding of instructions.     Pt belongings inventoried and packed.     Pt escorted with belongings to ED parking lot by this RN for  by sister.

## 2023-09-11 NOTE — PROGRESS NOTES
09/11/23 0805   Recommendations/Plan   Recommended level of care Outpatient Psych PHP/IOP   Patient refused treatment recommendation No   Suicide Resource Information Provided yes     Pt was discharged from the hospital today. She was picked up by her sister. Pt is scheduled for her intake at [facility] for PHP on 9/14 @ 8:30 AM. SW and pt completed pt's Safety Plan. Pt shared the card she received from her daughter Mariaelena which makes pt more hopeful about their relationship. Pt reports her outlook is brighter and that she recognizes she needs to create peace and margarita in her life regardless of the status of her relationship with Mariaelena. Pt has met her goals for this hospitalization.

## 2023-09-11 NOTE — DISCHARGE SUMMARY
"Inpatient Discharge Summary    BRIEF OVERVIEW  Admitting Provider:  H&P Notes 8/12/2023 to 9/11/2023         Date of Service Author Author Type Status Note Type File Time    09/06/23 1247 Chelsea Mejia DO Physician Signed H&P 09/06/23 1313          Attending Provider: Chelsea Mejia DO Attending phys phone: [phone]  Primary Care Physician at Discharge: Libby Wang [phone]    Admission Date: 9/5/2023     Discharge Date: 9/11/2023    Primary Discharge Diagnosis  Mood disorder (CMS/HCC)  Major depressive disorder, recurrent, resolved.     DETAILS OF HOSPITAL STAY  Procedures: none       Presenting Problem/History of Present Illness  Mood disorder (CMS/HCC) [F39]     Cc: \"I feel very depressed and empty.\"      Stephanie Jeffers is a 72 y.o. female with long hx of depression and anxiety, no hx of psych hospitalizations or suicide attempts, lives alone, now presenting with worsening depression, loneliness.      Today she states that she has been feeling especially low for the past month. \"Everything I have been dealing with came to a head.\" Ended romantic relationship with her boyfriend one year ago which was hard though she was the one who ended it and \"he was a narcissist\" (as is her daughter, both her ex husbands, and her father). Another stressor is losing her relationship with her daughter. \"We used to be very close.\" There was an incident when her daughter's boyfriend called patient to talk about daughter because \"he was worried she was going to break his heart.\" States she told boyfriend that her daughter \"is a heartbreaker, and selfish, and a narcissist\" though she admits she never said these things to her daughter. Boyfriend told daughter and daughter has kept her distance since, and for two years \"she estranged herself from me.\" Says multiple times \"she took my grandchildren from me!\" Admits that she is angry with daughter \"off and on,\" at same time admits that it must have been a " "shock for her daughter to hear she had said those things to her boyfriend.      She describes that she sees other members of her family on social media with \"families and lives, and I'm outside it all.\" Sleeps ok on Klonopin 1 mg po qHS, she says she has not missed a dose of this in 25 years and is afraid to see what would happen if she missed it. Low appetite and has lost 4 lbs this month. Has had thoughts of dying, though no plan or intent, and she says this is new though she has been depressed before. No access to guns. She has felt \"hopeless, helpless, I know I need to be on medication.\" Has been on Wellbutrin (felt restless), Celexa, Zoloft for a long time, Effexor, TCA in past for migraines. Recently tried Lexapro and had urinary retention, stopped after 3 weeks of 5 mg, and on Zoloft developed tremors.     Of note, she is very worried about her health though she only has osteoporosis and HLD. Sensitive to meds. Has been experiencing dry mouth which is new, has undergone \"a battery of tests by my PCP,\" they have not found any evidence of medical reason including autoimmune.      Spoke with sister Ewa with her permission. \"She is very hard on herself.\" She is the \"best mother, the best sister.\" She does not understand why Mariaelena, the daughter, has so fully estranged herself. Has not seen her this depressed before.        Exam on Day of Discharge  Patient seen and examined on day of discharge.  Appearance: well groomed, thin  Gait and Motor: no abnormal movements  Speech: normal rate/rhythm/volume, talkative   Mood: \"great\"  Affect: full and appropriate   Associations: coherent  Thought Process: goal-directed  Thought Content: no auditory or visual hallucinations. No paranoia or delusions elicited.   Suicidality/Homicidality: denies thoughts of being dead/ no desire or plan to die  Judgement/Insight: acknowledges illness, help seeking  Cognition grossly intact, alert     Hospital Course  72 y.o. female with " long hx of depression and anxiety, no hx of psych hospitalizations or suicide attempts, lives alone, now presenting with worsening depression, loneliness. On arrival to the unit, patient endorsed feeling increasingly depressed in the setting of recent falling out with her daughter. The team spoke with her sister and her outpatient therapist for more collateral information. Her home klonopin 1mg nightly was continued while she was here. The patient tried Remeron for depression and insomnia, but endorsed feeling lethargic so she was transitioned to Cymbalta 20mg daily. She tolerated this well. Team spoke with patient about effects of benzodiazepines, including risks of dependence, respiratory depression, memory issues, cognitive impairment, and falls in older age. Patient did not feel ready to taper her klonopin yet, but will consider this in the future. While on the unit, there were no behavioral issues. Patient participated in groups, socialized with peers, and was cooperative with medications and care.   On the day of discharge, patient reported significant improvement in her depressive symptoms. She was future oriented and goal directed, with bright affect. She was completing appropriate ADLs, eating, and taking medications. She denied any suicidal thoughts or plans. The patient's presentation was most consistent with acute depressive episode in the setting of various psychosocial stressors. Her sister picked her up and she was discharged to her home with outpatient follow up at [facility], intake on 9/14/23. She also plans to continue following with her previous therapist Derrick.     Discharge Orders     Medication List      START taking these medications    DULoxetine 20 mg capsule  Commonly known as: CYMBALTA  Start taking on: September 12, 2023  Take 1 capsule (20 mg total) by mouth daily.  Dose: 20 mg        CONTINUE taking these medications    clonazePAM 1 mg tablet  Commonly known as: klonoPIN  Take by mouth  nightly.     REPATHA SURECLICK 140 mg/mL pen  INJECT 1 ML (140 MG TOTAL) UNDER THE SKIN EVERY 14 DAYS.  Generic drug: evolocumab     SUMAtriptan 100 mg tablet  Commonly known as: IMITREX  Take by mouth as needed.                    Outpatient Follow-Ups            Tomorrow Antonio Price MD [facility] General Cardiology at [facility]    In 1 week MRI1 [facility] [address] Bld[address] - Radiology        Referrals:  No orders of the defined types were placed in this encounter.      Test Results Pending at Discharge  Unresulted Labs (From admission, onward)    None        Discharge Disposition  Disposition: Home          Code Status at Discharge: Full Code  Physician Order for Life-Sustaining Treatment Document Status      No documents found              Case and plan discussed with Dr. Mejia.    Kala Iverson MD  Psychiatry, PGY-2

## 2023-09-11 NOTE — TELEPHONE ENCOUNTER
Called PTs sister Ewa, and let her know visit was switched to telemed. TY.  
Dr. Price--please see below. Ok to switch to tele-med? TY.  
Pt's sister Ewa called states pt is in [facility], Ewa states may be d/c Monday. Ewa requests if pt can r/s to a tele health appt on 9/12.     Ewa can be reached at [phone]  
bowel sounds normal/nontender/no distention/soft/no masses palpable

## 2023-09-11 NOTE — PROGRESS NOTES
09/11/23 0911   Activity/Group Checklist   Group Title Community meeting   Attendance Other (Comment)  (Stephanie sat in outskirts of group. She engaged with prompting, reporting that she felt good about upcoming discharge)

## 2023-09-11 NOTE — PROGRESS NOTES
Hospital Medicine Service -  Daily Progress Note       SUBJECTIVE   Interval History: Patient was seen and examined this morning. She is excited to go home. She is feeling better. She thinks the cymbalta is waking her up during the night. She is taking the Klonopin that helps. No other issues.      OBJECTIVE      Vital signs in last 24 hours:  Temp:  [36.7 °C (98.1 °F)] 36.7 °C (98.1 °F)  Heart Rate:  [120] 120  Resp:  [17] 17  BP: (118)/(76) 118/76  No intake or output data in the 24 hours ending 09/11/23 0918    PHYSICAL EXAMINATION      Physical Exam  Constitutional:       Appearance: Normal appearance.   Eyes:      Extraocular Movements: Extraocular movements intact.      Pupils: Pupils are equal, round, and reactive to light.   Cardiovascular:      Rate and Rhythm: Normal rate and regular rhythm.   Pulmonary:      Effort: Pulmonary effort is normal.      Breath sounds: Normal breath sounds.   Abdominal:      General: Abdomen is flat. Bowel sounds are normal.      Palpations: Abdomen is soft.   Musculoskeletal:         General: Normal range of motion.   Skin:     General: Skin is warm and dry.   Neurological:      General: No focal deficit present.      Mental Status: She is alert and oriented to person, place, and time.            LINES, CATHETERS, DRAINS, AIRWAYS, AND WOUNDS   Lines, Drains, and Airways:  Wounds (agree with documentation and present on admission):         Comments:      LABS / IMAGING / TELE      Labs  No new labs.      Imaging  Not applicable    ECG/Telemetry  Patient is not on telemetry.    ASSESSMENT AND PLAN      Migraine  Assessment & Plan  - Continue home regimen   - Recommend following up with an outpatient neurologist to establish care for ongoing management and monitoring.     * Mood disorder (CMS/[facility])  Assessment & Plan  Management per psychiatry     Poor appetite  Assessment & Plan  Patient reports poor appetite over the last month.   She reports 4 lb weight loss as well; also  with sx of dry mouth.   She is being followed by her PCP for management as outpatient.   No sx of bloody stool or hematuria.   No reports of night sweats, fevers/chills.   Per chart review with PCP: previously had 8 mm pancreatic intraductal mucinous neoplasm without suspicious features; one year will be November- advised to have contrast enhanced MRI with MRCP.         Plan-  - Continue to monitor intake/output closely.   - Recommend nutrition consult   - Follow up with PCP at discharge for hospital follow up and age appropriate cancer screenings.   - Follow up with outpatient GI.       Elevated coronary artery calcium score  Assessment & Plan  Noted on chart review.   Follows with outpatient cardiologist Dr. Antonio Price.   Patient reports has follow up appt this week.   Prescribed Repatha injections; no longer takes Zetia.       Plan-  - Continue outpatient follow up.     Orthostatic hypotension  Assessment & Plan  Hx of orthostatic hypotension.  Follows with outpatient cardiologist Dr. Antonio Price at [facility].   She denies any hx of syncope.   She reports not prescribed medication for hypotension.       Plan-  - Continue to monitor VS  - Follow up with cardiology as outpatient.        VTE Assessment: Padua    VTE Prophylaxis:  Current anticoagulants:    None      Code Status: Full Code      Estimated Discharge Date: 9/12/2023     Disposition Planning: Home today as per psychiatry     Edith Moses, DO  9/11/2023

## 2023-09-12 ENCOUNTER — TELEPHONE (OUTPATIENT)
Dept: CARDIOLOGY | Facility: CLINIC | Age: 73
End: 2023-09-12

## 2023-09-12 ENCOUNTER — TELEMEDICINE (OUTPATIENT)
Dept: CARDIOLOGY | Facility: CLINIC | Age: 73
End: 2023-09-12
Payer: MEDICARE

## 2023-09-12 DIAGNOSIS — E78.41 ELEVATED LIPOPROTEIN(A): ICD-10-CM

## 2023-09-12 DIAGNOSIS — E78.01 FAMILIAL HYPERCHOLESTEROLEMIA: Primary | ICD-10-CM

## 2023-09-12 DIAGNOSIS — I95.1 ORTHOSTATIC HYPOTENSION: ICD-10-CM

## 2023-09-12 DIAGNOSIS — R93.1 ELEVATED CORONARY ARTERY CALCIUM SCORE: ICD-10-CM

## 2023-09-12 PROBLEM — R07.89 OTHER CHEST PAIN: Status: RESOLVED | Noted: 2021-07-13 | Resolved: 2023-09-12

## 2023-09-12 PROCEDURE — 99214 OFFICE O/P EST MOD 30 MIN: CPT | Mod: 95 | Performed by: INTERNAL MEDICINE

## 2023-09-12 NOTE — ASSESSMENT & PLAN NOTE
Her recent stress echocardiogram showed no evidence of ischemia.  It is likely that her chest discomfort was related to anxiety, which is fortunately under better control.  She knows to contact me for any new cardiopulmonary symptoms.  Aspirin was previously stopped due to easy bruising.  Lipid management as below.

## 2023-09-12 NOTE — ASSESSMENT & PLAN NOTE
Longstanding history of hypotension. Encouraged adequate hydration.  She knows to avoid quick position changes.

## 2023-09-12 NOTE — TELEPHONE ENCOUNTER
Please mail lab slips from today to the patient.  She plans to call in to make a 4-month follow-up visit.  Thanks.

## 2023-09-12 NOTE — PROGRESS NOTES
Antonio Price MD  Cardiology    [facility]    [facility]  [address]  [address] [place] [address]    TEL  [phone]  [email or web address]     09/12/23     Verification of Patient Location:  The patient affirms they are currently located in the following state: [place]    Request for Consent:  Audio and Video Encounter   Pari, my name is Antonio Price MD.  Before we proceed, can you please verify your identification by telling me your full name and date of birth?  Can you tell me who is in the room with you?    You and I are about to have a telemedicine check-in or visit because you have requested it.  This is a live video-conference.  I am a real person, speaking to you in real time.  There is no one else with me on the video-conference.  However, when we use ([employer], Twice, etc) it is important for you to know that the video-conference may not be secure or private.  I am not recording this conversation and I am asking you not to record it.  This telemedicine visit will be billed to your health insurance or you, if you are self-insured.  You understand you will be responsible for any copayments or coinsurances that apply to your telemedicine visit.  Communication platform used for this encounter:  [employer] Video Visit (Epic Video Client)    Before starting our telemedicine visit, I am required to get your consent for this virtual check-in or visit by telemedicine. Do you consent?    Patient Response to Request for Consent:  Yes      Dear Dr. Wang:    It was my pleasure to see Ms. Stephanie Jeffers at the [facility] [place] today for follow-up.    At her last visit Zetia was added to her regimen.  This resulted in dizziness and the medication was also immediately discontinued.  She had intermittent chest pain and dizziness thereafter.  A work-up was largely unremarkable.  She was recently admitted to [facility] with  significant depression.  Fortunately, with alterations to her medications she feels much better and is now in an intensive outpatient program.  She lost a lot of weight as the result of her depression.  She is working to get back to her prior habits.  She believes that her lipid panel obtained on hospital admission is not reflective of her baseline lifestyle habits.  She remains on Repatha.  Her previously described chest pain has entirely resolved and she believes this was related to anxiety/depression.  Similarly, her dizziness has resolved.  She has no new cardiopulmonary symptoms.  Specifically, no chest pain or pressure. No dyspnea, palpitations, or edema. No syncope.     Cardiovascular History:  1. Familial hypercholesterolemia, elevated Lp(a)  2. Coronary atherosclerosis   3. Orthostatic hypotension    Past Medical History: Migraines, History of HBV    Past Surgical History:  Past Surgical History:   Procedure Laterality Date    CATARACT EXTRACTION Left 2018    CATARACT EXTRACTION Right 2019       Medications:  Current Outpatient Medications   Medication Sig Dispense Refill    clonazePAM (klonoPIN) 1 mg tablet Take by mouth nightly.    5    DULoxetine (CYMBALTA) 20 mg capsule Take 1 capsule (20 mg total) by mouth daily. 30 capsule 0    REPATHA SURECLICK 140 mg/mL pen INJECT 1 ML (140 MG TOTAL) UNDER THE SKIN EVERY 14 DAYS. 6 mL 3    SUMAtriptan (IMITREX) 100 mg tablet Take by mouth as needed.         No current facility-administered medications for this visit.       Allergies: Patient has no known allergies.    Social History: She is .  She has 2 children.  Retired .  She is a never smoker.  Approximately 3 glasses of wine per week.  No recreational drugs.    Family History: She has a sister who has tetralogy of Fallot. Her brother has no documented coronary artery disease. Her mother  at a young age from complications of lung cancer.  She had  hypercholesterolemia.  Her father lived to be 92.  He had a stroke late in life and also had hypercholesterolemia.  No family history of premature coronary artery disease, arrhythmias, cardiomyopathies, or sudden cardiac death.    Review of Systems: A complete 14-point review of systems is negative, except as noted in the HPI.    Exam: N/A given telemedicine visit.  Below is her exam from her last in person visit.  Objective   There were no vitals filed for this visit.  Wt Readings from Last 3 Encounters:   09/06/23 48.4 kg (106 lb 12.8 oz)   09/05/23 47.2 kg (104 lb)   04/24/23 49.6 kg (109 lb 4.8 oz)     There is no height or weight on file to calculate BMI.  Constitutional: Appears comfortable.   Eyes: No icterus.  No corneal arcus.  ENT: Deferred. Patient wearing a mask.   Neck: No jugular venous distention.   Vascular: No carotid bruits. Radial pulses intact and equal.  Cardiac: Normal S1 and S2, regular rhythm. No murmurs, rubs, or gallops appreciated.  Lungs: Clear to auscultation bilaterally.    GI: Soft, normoactive bowel sounds.  Extremities: Warm. No lower extremity edema.   Skin: Dry.  Neurologic: Awake, alert, oriented.    Psychiatric: No agitation.    Labs: Personally reviewed and discussed with the patient.  Notable for the following.   Lab Results   Component Value Date    LDLCALC 76 09/05/2023    CHOL 209 (H) 09/05/2023    TRIG 177 (H) 09/05/2023    HDL 98 09/05/2023    HGBA1C 5.5 09/05/2023     09/05/2023    K 4.0 09/05/2023    BUN 25 09/05/2023    CREATININE 0.8 09/05/2023    WBC 7.89 09/05/2023    HGB 13.4 09/05/2023     09/05/2023     Lab Results   Component Value Date    ALT 11 09/05/2023    AST 18 09/05/2023    ALKPHOS 42 09/05/2023    BILITOT 0.4 09/05/2023 1/2022-  , , HDL 75, LDL 87  High-sensitivity CRP 0.9  Sodium 139, potassium 4.3, creatinine 0.9  LFTs within normal limits  TSH within normal limits    6/2019- , TG 82, , LDL  163    Cardiovascular Studies:   1. Stress echo, 1/2017: No ischemia. Excellent exercise tolerance. Baseline echo- Normal wall motion and excursion. 1+ MR and TR. RVSP 13 mmHg.   2.  CAC, 7/2021: Composite 120 (left main 1, LAD 51, RCA 68).  75-90th percentile.  Mild-moderate atherosclerotic calcification of the aorta.  Otherwise unremarkable noncardiac findings.  3.  Zio, 4/2023: Sinus (73, ). Isolated PAC and PVC. Symptoms corresponded with sinus rhythm.   4.  Stres echo, 5/2023: No ischemia. Excellent exercise capacity. Baseline echo- Normal LV size, thickness, LVEF 60%. No WMAs. Normal RV size/function. Mild MR.     Assessment/Plan   Problem List Items Addressed This Visit        Circulatory    Orthostatic hypotension     Longstanding history of hypotension. Encouraged adequate hydration.  She knows to avoid quick position changes.          Elevated coronary artery calcium score     Her recent stress echocardiogram showed no evidence of ischemia.  It is likely that her chest discomfort was related to anxiety, which is fortunately under better control.  She knows to contact me for any new cardiopulmonary symptoms.  Aspirin was previously stopped due to easy bruising.  Lipid management as below.           Relevant Orders    Lipid panel    Hepatic function panel       Other    Familial hypercholesterolemia - Primary     She is tolerating Repatha without side effects.  It is unclear whether the symptoms she experienced with ezetimibe were related to the medication.  She questions whether could have been related to her anxiety/depression.  We will repeat a lipid panel in a few months to reestablish her baseline levels on Repatha.  If her LDL remains above goal at that time we will likely try ezetimibe again.         Relevant Orders    Lipid panel    Hepatic function panel    Elevated lipoprotein(a)     Aggressive lipid lowering is required, as discussed above.  PCSK9 inhibitors have been shown to mildly reduce  lipoprotein(a) levels.  I previously recommended that all first-degree family members not only have screening lipid panels obtained, but also a screening lipoprotein(a).          Relevant Orders    Lipid panel    Hepatic function panel     It was my pleasure to visit with Stephanie Jeffers in clinic today.  She will follow up with me in 4 months.  Please do not hesitate to contact me with any questions.      Sincerely,         ________________  Antonio Price MD    I spent 30 minutes on this date of service performing the following activities: obtaining history, entering orders, documenting, preparing for visit, obtaining / reviewing records and providing counseling and education.

## 2023-09-12 NOTE — ASSESSMENT & PLAN NOTE
She is tolerating Repatha without side effects.  It is unclear whether the symptoms she experienced with ezetimibe were related to the medication.  She questions whether could have been related to her anxiety/depression.  We will repeat a lipid panel in a few months to reestablish her baseline levels on Repatha.  If her LDL remains above goal at that time we will likely try ezetimibe again.
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
## 2023-09-14 ENCOUNTER — PHP (OUTPATIENT)
Dept: PSYCHIATRY | Facility: HOSPITAL | Age: 73
End: 2023-09-14
Attending: PSYCHIATRY & NEUROLOGY
Payer: MEDICARE

## 2023-09-14 ENCOUNTER — PHP (OUTPATIENT)
Dept: PSYCHIATRY | Facility: HOSPITAL | Age: 73
End: 2023-09-14
Attending: SOCIAL WORKER
Payer: MEDICARE

## 2023-09-14 DIAGNOSIS — F33.2 SEVERE EPISODE OF RECURRENT MAJOR DEPRESSIVE DISORDER, WITHOUT PSYCHOTIC FEATURES (CMS/HCC): Primary | ICD-10-CM

## 2023-09-14 PROCEDURE — 90791 PSYCH DIAGNOSTIC EVALUATION: CPT | Performed by: SOCIAL WORKER

## 2023-09-14 PROCEDURE — 90792 PSYCH DIAG EVAL W/MED SRVCS: CPT | Performed by: PSYCHIATRY & NEUROLOGY

## 2023-09-14 ASSESSMENT — COGNITIVE AND FUNCTIONAL STATUS - GENERAL
PERCEPTUAL FUNCTION: NORMAL
THOUGHT_CONTENT: APPROPRIATE
APPEARANCE: WELL GROOMED
AFFECT: FULL RANGE
CONCENTRATION: WNL
SLEEP_WAKE_CYCLE: NO CHANGE
PSYCHOMOTOR FUNCTIONING: WNL
ATTENTION: WNL
AROUSAL LEVEL: ALERT
RECENT MEMORY: WNL
INSIGHT: AWARE OF IMPACT ON FUNCTIONING
LIBIDO: NON-CONTRIBUTORY
EST. PREMORBID INTELLIGENCE: AVERAGE
EYE_CONTACT: WNL
APPETITE: DECREASED
REMOTE MEMORY: WNL
IMPULSE CONTROL: INTACT
THOUGHT_PROCESS: WNL
DELUSIONS: NONE OR AGE APPROPRIATE
MOOD: DEPRESSED;ANXIOUS;MOTIVATED;HOPEFUL
ORIENTATION: FULLY ORIENTED
SPEECH: REGULAR
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
## 2023-09-14 NOTE — Clinical Note
She will need an OP psychiatrist on DC. Can you send her referrals pls? She will start php tomorrow.
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
## 2023-09-15 ENCOUNTER — PHP (OUTPATIENT)
Dept: PSYCHIATRY | Facility: HOSPITAL | Age: 73
End: 2023-09-15
Attending: PSYCHIATRY & NEUROLOGY
Payer: MEDICARE

## 2023-09-15 ENCOUNTER — NURSE ONLY (OUTPATIENT)
Dept: PSYCHIATRY | Facility: HOSPITAL | Age: 73
End: 2023-09-15
Payer: MEDICARE

## 2023-09-15 ENCOUNTER — TELEPHONE (OUTPATIENT)
Dept: PSYCHIATRY | Facility: HOSPITAL | Age: 73
End: 2023-09-15
Payer: MEDICARE

## 2023-09-15 ENCOUNTER — DOCUMENTATION (OUTPATIENT)
Dept: PSYCHIATRY | Facility: HOSPITAL | Age: 73
End: 2023-09-15
Payer: MEDICARE

## 2023-09-15 VITALS
BODY MASS INDEX: 18.27 KG/M2 | SYSTOLIC BLOOD PRESSURE: 94 MMHG | OXYGEN SATURATION: 99 % | HEIGHT: 64 IN | WEIGHT: 107 LBS | DIASTOLIC BLOOD PRESSURE: 61 MMHG | RESPIRATION RATE: 16 BRPM | HEART RATE: 85 BPM

## 2023-09-15 DIAGNOSIS — F33.2 SEVERE EPISODE OF RECURRENT MAJOR DEPRESSIVE DISORDER, WITHOUT PSYCHOTIC FEATURES (CMS/HCC): Primary | ICD-10-CM

## 2023-09-15 PROCEDURE — G0410 GRP PSYCH PARTIAL HOSP 45-50: HCPCS

## 2023-09-15 ASSESSMENT — ENCOUNTER SYMPTOMS
DYSPHORIC MOOD: 1
EYES NEGATIVE: 1
HEMATOLOGIC/LYMPHATIC NEGATIVE: 1
MUSCULOSKELETAL NEGATIVE: 1
NERVOUS/ANXIOUS: 1
UNEXPECTED WEIGHT CHANGE: 1
NEUROLOGICAL NEGATIVE: 1
ENDOCRINE NEGATIVE: 1
CARDIOVASCULAR NEGATIVE: 1
RESPIRATORY NEGATIVE: 1
ALLERGIC/IMMUNOLOGIC NEGATIVE: 1
GASTROINTESTINAL NEGATIVE: 1

## 2023-09-15 ASSESSMENT — PAIN SCALES - GENERAL: PAINLEVEL: 0-NO PAIN

## 2023-09-15 NOTE — PROGRESS NOTES
[facility] Referrals:  Hi Stephanie;    My name is Shahla Banegas and I am a  at the [facility]  Dr. Saleem ask that I send you referrals for psychiatry services. While in [facility], Dr. Saleem will be your psychiatrist and you will be given a 30 day prescription at discharge.  To continue with medication management after discharge from [facility], you will need to have an outpatient psychiatrist and to avoid gaps in care [facility] recommends you start reaching out to providers while in [facility] to get intake appointment.  Please schedule an appointment for after 9/28/23.      Below is information on psychiatry practices in the area, that possibly work with the insurance we have on file for you.  It is busy time in mental health care, so you may hear they are not accepting new patients or offering wait list.  I advise to leave a message, wait few days to hear back and if no reply call again.  You may want to get on a few wait lists and use online contact portals or emails to get quicker replies.     Psychiatry referrals (Please verify insurance coverage to avoid high costs):    [facility] Phone [phone] & [email or web address]   Location [place], only virtual appointments and specializes in adults & seniors.  Insurances: Medicare, Missingamesan (Blue Shield Personal Choice, [facility]), Aetna & United Behavior Health. Treats Mood, Anxiety, Dementia & Alzheimer's Disease, ADHD, Personality & Bereavement.  Offers Psychiatry, counseling and nursing home services. Is accepting pts and booking end of Oct/Nov 2023    MICHAEL Palmer  Phone [phone] & website: [email or web address]   Located in [place], Insurances: Medicare, Blue Cross / Blue Shield (All but Medicaid products), [place] Blue Cross, Personal Choice, Highmark Blue Shield, Aetna, Cigna, United, Cigna Xmyboxs, Cocodrilo Dog, Employee Assistance Programs, Compsych,  Capital BCBS, [place] BCBS, [facility], Aetna Signature , Quest, Humana, Kids Partners (CHIP), Care First BCBS, Total Care Network, Medicare & [place] Medicare  Offers: Psychiatry & counseling for Ind, youth, couples & family  Specialize/Experience: Substance Abuse, Mood, Anxiety, ADD, Sleep and Thought DO.     [facility] phone [phone] & website: [email or web address]   Locations: [place], [place] & [place] plus virtual counseling.  Accepts most commercial insurances: Aetna, Allied Health (ATAP), [place], Blue Cross Blue Shield, Cigna, Comp Psych, Health Advocate - EAP, Highmark,  Humana, Magellan, Medicare, OnTrak/Catasys, Optum/UBH, [facility], Quest, Total Care Network, [facility] All Savers. Has acquire [facility], [facility] Professional Services & Behavioral Health Choices ([facility]).  Offers: Counseling Ind, Couples, Family & youth. Psychiatry & Assessments.    [employer] Phone [phone] & website: [email or web address]  Located in [facility], offers psychiatry services, testing, counseling. Modalities: CBT, BDT, & psychodynamic. Insurances: Capital Blue Cross, Cigna, [facility], Highmark Blue Shield, Humana, Medicare, [facility] Treats: Anxiety, Trauma/PTSD, Mood, OCD, Neurodevelopment, Thought DO & Sleep.      [employer] Phone [phone], website: [email or web address]  & email: [email or web address]    Located in [place]. Offers psychiatry, counseling- individual, couples, family, and group sessions, & TMS; virtual and in person sessions. Treats anxiety, mood, addiction, ADD/H, ASD, ED, Trauma, Psychosis & OCD Insurances: Aetna, Amerihealth, [place], BC/BS, Evernorth, Cigna, ComPsych, Humana, IBC, [place], Magellan, Medicare, Quest, UHC & Optum

## 2023-09-15 NOTE — GROUP NOTE
Date:  9/15/2023  Start Time:  10:40 AM  End Time:  11:40 AM  Stephanie Jeffers, YOB: 1950   8 members attended group today.    Group Focus:  Goal of group was to establish and build social support, review coping skills, normalize the struggles of being human, and increase self awareness and self compassion.    About the Group:  Clinician started group with a prompting quote/song to facilitate group discussion.  Group engaged in active and supportive discussion. Topics discussed included Self Compassion, DBT skills, emotion regulation, shame versus guilt, weekend planning and cope ahead.  Group members were able to offer insightful and supportive feedback and suggestions about how to manage difficult situations.  Group ended with a meditation/song.  Polly Bustamante MD was onsite when services were rendered.        Patient  was an active group participant.  Assessment/observation of group participation/presentation: Stephanie joined in discussions about her experience with her health anxiety and offered empathic support to peers with shared experience as well as feedback on navigating relationships.   Treatment plan areas addressed: Depression, Relationship issues/Communication skills, Grief and Loss and Self-compassion  Visit Diagnosis:      ICD-10-CM ICD-9-CM   1. Severe episode of recurrent major depressive disorder, without psychotic features (CMS/HCC)  F33.2 296.33       Plan: Continue with PHP   Pt to F/U with treatment goals as outlined in treatment plan.  Pt to F/U with local ED/call 911 should SI/HI arises.    Ruby Stacy, [facility]

## 2023-09-15 NOTE — GROUP NOTE
Date: 9/15/2023  Start Time:   9:30 AM  End Time: 10:30 AM    Stephanie Jeffers, YOB: 1950,  was an active group participant.    Community Check In Group  8 attended group today.     Group Focus: Goal of group was to welcome new and returning PHP members to the [facility], check in regarding group member needs, and assess safety.    About the Group: Clinician reviewed [facility] Group Code of Conduct and answered any questions that arose.  Clinician welcomed new members to the group and facilitated brief introductions of group members to one another.  Introduced topic/theme for the treatment day:Self Compassion.  Encouraged group members to request support throughout the day as needed.  Clinician reviewed group members Morning Reflection Sheets and did a verbal check in with each group member regarding safety (SI/HI/self harm), safety planning, medication compliance, if they needed to consult with anyone today and individual treatment needs/goals for the day.  Session ended with a brief meditation/calming activity.   Polly Bustamante MD was on site when services rendered.      Morning Reflection:   Depression:  1/5  Anxiety:  3/5  Anger:  0/5  Thoughts of taking one's own life today?:  0/5 - None  Self Injury:  0/5 - None  Engaged in Self Injury since yesterday: No    Thoughts of hurting other people today?:  0/5 - None  Compulsive Behaviors?:  0-None  Compulsive Behaviors?:  No  Are you able to follow your safety plan?: Yes      I can/will:  Call someone, Use grounding with my 5 senses, Journal, Use a coping skill and Listen to music  Substance Use: No      I am satisfied with my daily hygiene: Yes    Nourish:  Yes    Number of meals eaten yesterday:  3    Describe:  Normal    Used sleep aid?: Yes      Describe sleep:  Restless    Social Interaction:  Minimal    Minimal with:  Family and Friends  Physical Activity:  No  Mindful Activity:  No  Medication:  Yes    Medication:  Prescribed  Thought Content:  Clear  and Racing Thoughts  Which coping skills did you use yesterday? How did they help or not help?  What barriers did you face?:  Ran errands- straightened up apt  Called friends and family  Pt identified goal for the treatment day:  Stop excessive anjali worry  Pt treatment plan areas addressed:  Anxiety and Chronic Pain/Medical Issues  Pt requested consult with:  Therapist      Visit Diagnosis:      ICD-10-CM ICD-9-CM   1. Severe episode of recurrent major depressive disorder, without psychotic features (CMS/HCC)  F33.2 296.33       Assessment/Observations:  Stephanie was engaged during group. She presented as euthymic. She did have goal for the day and appeared to be attentive to her other peers. She highlighted difficulty with anxiety around medical concerns.   Plan:  Continue with PHP   Pt to F/U with treatment goals as outlined in treatment plan.  Pt to F/U with local ED/call 911 should SI/HI arises.    Cheryle Read LPC

## 2023-09-15 NOTE — PROGRESS NOTES
"RN was alerted that Stephanie reported need to leave emergently due to UTI she has just developed just now.  Stephanie verbalized that she needed to leave to go home and immediately start medication which she has at home.  RN spoke with Stephanie who states that she just began to experience UTI sx's this afternoon.  When RN attempted to get details she responded \"I know what this is , I have chronic UTI, I don't need labs, I haven't had one in a long time, I have macrobid\"  RN urged Stephanie to contact her PCP after PHP, and discussed this not being an emergent medical need that would require leaving PHP.  Stephanie explained that she understood however she would like to begin taking her medications as soon as possible.  RN attempted to discuss rationale for alerting PCP, Stephanie interjected stated she knew what was best and she knew how to treat and would stay for the remainder of PHP, ended the conversation and then returned to group.     "

## 2023-09-15 NOTE — PROGRESS NOTES
"INITIAL PSYCHIATRY NURSING ASSESSMENT    Stephanie is a 73 y/o referred to [facility] by [facility] for depression.   SI-denies  HI-denies  SIB-denies  A/V Miranda-denies  SA- denies  ISABELLA-remote history cocaine use 35 years sober   ADL's-denies difficulty managing    PHP notification letter yes    Covid Vacc status reports being vaccinated  Recent exposure denies        History Reviewed: Yes, no changes.    Outpatient Encounter Medications as of 9/15/2023   Medication Sig    clonazePAM (klonoPIN) 1 mg tablet Take by mouth nightly.      DULoxetine (CYMBALTA) 20 mg capsule Take 1 capsule (20 mg total) by mouth daily.    SUMAtriptan (IMITREX) 100 mg tablet Take by mouth as needed.      zoledronic acid/mannitol-water (RECLAST IV) Infuse into a venous catheter.    REPATHA SURECLICK 140 mg/mL pen INJECT 1 ML (140 MG TOTAL) UNDER THE SKIN EVERY 14 DAYS.       Review of Systems   Constitutional: Positive for unexpected weight change.   HENT: Negative.    Eyes: Negative.    Respiratory: Negative.    Cardiovascular: Negative.    Gastrointestinal: Negative.    Endocrine: Negative.    Genitourinary: Negative.    Musculoskeletal: Negative.    Skin: Negative.    Allergic/Immunologic: Negative.    Neurological: Negative.    Hematological: Negative.    Psychiatric/Behavioral: Positive for dysphoric mood. The patient is nervous/anxious.        Vitals:    09/15/23 0912   BP: 94/61   BP Location: Left upper arm   Patient Position: Sitting   Pulse: 85   Resp: 16   SpO2: 99%   Weight: 48.5 kg (107 lb)   Height: 1.626 m (5' 4\")   PainSc: 0-No pain       Physical Exam    Labs Reviewed  I have reviewed the patient's labs.  Current labs are within normal limits.    Does the patient worry about falling?  no  Has the patient fallen in the last 3 months?  no    Screenings done this visit:                    Metabolic Monitoring Log Completed/Updated:  Not Indicated    Assessment/Plan: Follow-up with PHP as scheduled     Time spent with patient:  12 " minutes  Rachael Do RN @ 9:14 AM

## 2023-09-15 NOTE — TELEPHONE ENCOUNTER
"Called pt OPT Derrick Kapadia [phone]  to coordinate care.  Asked for a call back to discuss pt and obtain a fax or e-mail address to send THIERRY.  LPC left VM with  phone and LPC e-mail.     Called pt Omari to obtain collateral and schedule a family support session. \"Authough I do know my sister the best, I think she should have the session with Mariaelena- the one she has all the problems with.\"  LPC explained the intention of the support session and it needing to be with someone providing emotional support to the pt.  Ewa understood and offered how she and Stephanie \"We are a package deal, if someone won't talk to my sister, they don't get to talk to me either.  Mariaelena didn't like that I told her she should keep her children from their Bubbie.\" after LPC asked what the  to the estrangement was.      Ewa asked that we speak next week as she was not feeling herself.  \"I lost my Mother 31 years ago today, I am not myself.  I won't serve Stephanie today if we talk.  Its a s*it day.\"  LPC provided empathic support and validation.  LPC will reach back out next week and encouraged Ewa to call with any needs for her sister.  She was amenable to scheduling with LPC closer to the end of PHP for a session.  \"She is doing so much better now.  She isn't confusing her medications which makes me so happy.\"    LPC will follow up next week.    "

## 2023-09-15 NOTE — GROUP NOTE
Date: 9/15/2023  Start Time:  1:20 PM  End Time:   2:20 PM  Stephanie Jeffers, YOB: 1950,  was an active group participant.    Wrap Up Group  8 attended group today.   Group Focus: Goal of group was to wrap up and process the treatment day.  Assist  members in planning for the evening ahead and to assess and plan surrounding any  safety needs.      About the Group:  Clinician reviewed group members Afternoon  Reflection Sheets and did a verbal check in with each group member regarding safety  (SI/HI/self harm) and any necessary safety planning .  Session ended with a brief  meditation/calming activity.  Polly Bustamante MD was onsite when  services rendered.        Afternoon Reflection:   Depression:  0/5  Anxiety:  0/5  Anger:  0/5  Thoughts of taking one's own life today?:  0/5 - None  Self Injury:  0/5 - None  Engaged in self-injury?  No  Thoughts of hurting other people today?:  0/5 - None  Compulsive Behaviors?:  0-None  Compulsive Behaviors?: No  Are you able to follow our safety plan?: Yes      I can/will:  Call someone, Journal, Use a coping skill and Listen to music  The most significant moment of the day or insight for me was...:  Receiving the heart with all the uche things people said about me in group  Three things I am grateful for today are:  My sisters  Daughters  Improving health  Did you meet your goal for today? If not, what might you do tomorrow or this week to achieve it?:  Yes kind of  Mindfulness mediatation   My evening self-care plan is:  Take a bath and possible see friends for the evening.       Pt treatment plan areas addressed: Coping Skills and Self-compassion    Visit Diagnosis:      ICD-10-CM ICD-9-CM   1. Severe episode of recurrent major depressive disorder, without psychotic features (CMS/HCC)  F33.2 296.33       Assessment/Observations:  Stephanie presented with a euthymic affect reporting no symptoms today. She was happy about the feedback she received and  grateful for her improving health.   Plan: Continue with PHP   Pt to F/U with treatment goals as outlined in treatment plan.  Pt to F/U with local ED/call 911 should SI/HI arises.    Cheryle Read LPC

## 2023-09-15 NOTE — GROUP NOTE
Date:  9/15/2023  Start Time:  12:10 PM  End Time:   1:10 PM  Stephanie Jeffers, YOB: 1950  Psychoeducational/Skills Based Group  8 members attended group today    Group Focus: Goal of group was to introduce skills and psychoeducation related to topic of PHP day.   Group members were provided instruction and opportunities to practice presented skills.  Clinician answered questions as they arose and shared how presented skills can be utilized on a daily basis to increase emotional wellness.      About the Group: Self Compassion/Self Love 8 members attended group today.    Topic of Curriculum was Self Compassion/Self Love. Facilitator showed Jamaicamajo Trujillojoanne video describing Self-Compassion to help group members understand the use of self compassion as a coping skill. Facilitator led discussion as a group directed toward negative thoughts about self that can be transformed into self-compassionate statements.  For example, asking members to share negative thoughts that often go through their mind, Would you say that to someone you love? What might you say instead? Facilitator introduced a few examples of Self-compassionate phrases: I am only human. I am doing the best I can. This is really hard right now and others have felt this way too.  Group members then discussed their experiences and potential utilization of self compassion to help them in the healing process. Facilitator closed group session with a 2 minute breathing meditation to help group members practice grounding techniques.     Polly Bustamante MD was onsite when services were rendered.          Patient  was an active group participant.  Assessment/observation of group participation/presentation: Stephanie engaged in group exercises and contributed to the discussion, sharing about her experiences with aging and double standards for women's appearance.  Treatment plan areas addressed: Depression, Phase of life problems, Coping Skills  and Self-compassion  Visit Diagnosis:      ICD-10-CM ICD-9-CM   1. Severe episode of recurrent major depressive disorder, without psychotic features (CMS/HCC)  F33.2 296.33       Plan: Continue with PHP   Pt to F/U with treatment goals as outlined in treatment plan.  Pt to F/U with local ED/call 911 should SI/HI arises.    Susanna Alva LCSW

## 2023-09-15 NOTE — TELEPHONE ENCOUNTER
"Returned OPT Derrick Kapadia's call and LVM updating that THIERRY was sent via encrypted service [employer] to his provided e-mail and that I am available my phone and e-mail to coordinate care for pt.     [email or web address]     \"Anibal Meza, Thanks for calling back.  Here is the THIERRY for Stephanie and I am eager to speak with you to coordinate her care. I will reach back out to you now.  If it is easier for you to reach out to me, here is some of my availability next week: Monday 8:30-10:30am, Tuesday 8:30am-11:30am or after 2pm until 3:30. Have a wonderful weekend. Best, Ruby Stacy   \"  "

## 2023-09-18 ENCOUNTER — PHP (OUTPATIENT)
Dept: PSYCHIATRY | Facility: HOSPITAL | Age: 73
End: 2023-09-18
Attending: PSYCHIATRY & NEUROLOGY
Payer: MEDICARE

## 2023-09-18 DIAGNOSIS — F33.2 SEVERE EPISODE OF RECURRENT MAJOR DEPRESSIVE DISORDER, WITHOUT PSYCHOTIC FEATURES (CMS/HCC): Primary | ICD-10-CM

## 2023-09-18 PROCEDURE — G0410 GRP PSYCH PARTIAL HOSP 45-50: HCPCS

## 2023-09-18 NOTE — GROUP NOTE
"Date:  9/18/2023  Start Time:  10:40 AM  End Time:  11:40 AM  Stephanie Jeffers, YOB: 1950   7 members attended group today.    Group Focus:  Goal of group was to establish and build social support, review coping skills, normalize the struggles of being human, and increase self awareness and self compassion.    About the Group:  Clinician started group with a prompting quote/song to facilitate group discussion.  Group engaged in active and supportive discussion. Topics discussed included treatment trajectory, sleep hygiene, fears of abandonment, coping ahead and safety planning.  Group members were able to offer insightful and supportive feedback and suggestions about how to manage difficult situations.  Group ended with a meditation/song.  Paresh Saleem DO was onsite when services were rendered.        Patient  was quiet but attentive.  Assessment/observation of group participation/presentation: Stephanie presented as frustrated at times in group with variable nonverbals including sighing at times. She did appear to be listening to the discussion and commented on her experiences with being judged by peers in her living community for her MH and stating she does not care what \"those bitches\" think.  Treatment plan areas addressed: Depression, Anxiety, Relationship issues/Communication skills, Mood dysregulation, Coping Skills, Daily functioning and Activities of daily living  Visit Diagnosis:      ICD-10-CM ICD-9-CM   1. Severe episode of recurrent major depressive disorder, without psychotic features (CMS/HCC)  F33.2 296.33       Plan: Continue with PHP   Pt to F/U with treatment goals as outlined in treatment plan.  Pt to F/U with local ED/call 911 should SI/HI arises.    Susanna Alva LCSW      "

## 2023-09-18 NOTE — GROUP NOTE
Date: 9/18/2023  Start Time:   9:30 AM  End Time: 10:30 AM    Stephanie Jeffers, YOB: 1950,  was an active group participant.    Community Check In Group  8 attended group today.     Group Focus: Goal of group was to welcome new and returning PHP members to the [facility], check in regarding group member needs, and assess safety.    About the Group: Clinician reviewed [facility] Group Code of Conduct and answered any questions that arose.  Clinician welcomed new members to the group and facilitated brief introductions of group members to one another.  Introduced topic/theme for the treatment day:Mindfulness.  Encouraged group members to request support throughout the day as needed.  Clinician reviewed group members Morning Reflection Sheets and did a verbal check in with each group member regarding safety (SI/HI/self harm), safety planning, medication compliance, if they needed to consult with anyone today and individual treatment needs/goals for the day.  Session ended with a brief meditation/calming activity.   Paresh Saleem DO was on site when services rendered.      Morning Reflection:   Depression:  1/5  Anxiety:  1/5  Anger:  0/5  Thoughts of taking one's own life today?:  0/5 - None  Self Injury:  0/5 - None  Engaged in Self Injury since yesterday: No    Thoughts of hurting other people today?:  0/5 - None  Compulsive Behaviors?:  0-None  Compulsive Behaviors?:  No  Are you able to follow your safety plan?: Yes      I can/will:  Call someone, Use a coping skill and Listen to music  Substance Use: No    Self Care: Yes      I am satisfied with my daily hygiene: Yes      Number of meals eaten yesterday:  3    Describe:  Normal    Used sleep aid?: Yes      Describe sleep:  Restful    Number of hours:  8  Social Interaction:  Yes    Social Interaction:  Moderate  Physical Activity:  Yes  Mindful Activity:  No  Medication:  Yes    Medication:  Prescribed  Thought Content:  Clear  Which coping skills did you use  "yesterday? How did they help or not help?  What barriers did you face?:  Asked to go for dinner - almost refused- then went and was pleasant  Pt identified goal for the treatment day:  Get the most out of this program  Exercise!  Meditate!  Pt treatment plan areas addressed:  Depression, Coping skills and Emotion Regulation  Pt requested consult with:  None      Visit Diagnosis:      ICD-10-CM ICD-9-CM   1. Severe episode of recurrent major depressive disorder, without psychotic features (CMS/HCC)  F33.2 296.33       Assessment/Observations:  Stephanie was engaged during group. She presented as euthymic and shared feeling \"hopeful\" from her weekend and reduction of symptoms.   Plan:  Continue with PHP   Pt to F/U with treatment goals as outlined in treatment plan.  Pt to F/U with local ED/call 911 should SI/HI arises.    Cheryle Read LPC          "

## 2023-09-18 NOTE — GROUP NOTE
Date: 9/18/2023  Start Time:  1:20 PM  End Time:   2:20 PM  Stephanie Jeffers, YOB: 1950,  was an active group participant.    Wrap Up Group  8 attended group today.   Group Focus: Goal of group was to wrap up and process the treatment day.  Assist  members in planning for the evening ahead and to assess and plan surrounding any  safety needs.      About the Group:  Clinician reviewed group members Afternoon  Reflection Sheets and did a verbal check in with each group member regarding safety  (SI/HI/self harm) and any necessary safety planning .  Session ended with a brief  meditation/calming activity.  Paresh Saleem DO was onsite when  services rendered.        Afternoon Reflection:   Depression:  0/5  Anxiety:  0/5  Anger:  0/5  Thoughts of taking one's own life today?:  0/5 - None  Self Injury:  0/5 - None  Engaged in self-injury?  No  Thoughts of hurting other people today?:  0/5 - None  Compulsive Behaviors?:  0-None  Compulsive Behaviors?: No  Are you able to follow our safety plan?: Yes    The most significant moment of the day or insight for me was...:  None  Three things I am grateful for today are:  Friends  Family  Improving health  Did you meet your goal for today? If not, what might you do tomorrow or this week to achieve it?:  Yes- feeling positive  My evening self-care plan is:  Cook dinner      Pt treatment plan areas addressed: Coping Skills and Mindfulness    Visit Diagnosis:      ICD-10-CM ICD-9-CM   1. Severe episode of recurrent major depressive disorder, without psychotic features (CMS/HCC)  F33.2 296.33       Assessment/Observations:  Stephanie was engaged in check out group. She reports no symptoms and no significant moment. She was quick and concise in her check out. On her check out sheet she did [place] no for safety planning although verbalized yes.    Plan: Continue with PHP   Pt to F/U with treatment goals as outlined in treatment plan.  Pt to F/U with local ED/call 911  should SI/HI arises.    Cheryle Read, LPC

## 2023-09-18 NOTE — GROUP NOTE
Date:  9/18/2023  Start Time:  12:10 PM  End Time:   1:10 PM  Stephanie Jeffers, YOB: 1950  Psychoeducational/Skills Based Group  8 members attended group today    Group Focus: Goal of group was to introduce skills and psychoeducation related to topic of PHP day.   Group members were provided instruction and opportunities to practice presented skills.  Clinician answered questions as they arose and shared how presented skills can be utilized on a daily basis to increase emotional wellness.      About the Group: Mindfulness members attended group today.    Topic of curriculum was Mindfulness. Facilitator introduced the concept of mindfulness using Dialectical Behavioral Therapy by showing group members a Arti Mark video. Facilitator educated group members on the use of DBT mindfulness by sharing  Skills Overview worksheets and engaging in group level demonstrations of skills. Concepts described on the worksheets included: 1) observe, 2) describe, 3) participate, 4) non-judgmental stance, 5) one-mindfully, 6) effectively, 7) wise mind (including a video description). The goal of sharing the skills was to provide multiple techniques for practicing mindfulness in our lives. Facilitator encouraged group members to complete a worksheet on Practicing a Non-Judgmental Stance to relate their own experience to the concept of nonjudgement. Facilitator guided group discussion to check for understanding of presented skills. Group was engaged in the practice of [place] Kindness Meditation using a Angelina Gaspar video and accompanying worksheet.  Group ended with Facilitator engaging members on the use of movement as an access point to practice mindfulness skills.  Group participants engaged in a low intensity activity of Mindful Movements.  Group discussion was led on the experience of mindfulness skills of wise mind, nonjudgement and effective participation.      Paresh Saleem DO was onsite when services  were rendered.          Patient  was an active group participant.  Assessment/observation of group participation/presentation: Stephanie responded well to the curriculum once beginners mind was adderessed.  Initially, she presented as aggravated but mood regulated and she had great participation for the remainder of group.   Treatment plan areas addressed: Depression, Anxiety, Relationship issues/Communication skills and Mindfulness  Visit Diagnosis:      ICD-10-CM ICD-9-CM   1. Severe episode of recurrent major depressive disorder, without psychotic features (CMS/HCC)  F33.2 296.33       Plan: Continue with PHP   Pt to F/U with treatment goals as outlined in treatment plan.  Pt to F/U with local ED/call 911 should SI/HI arises.    Ruby Stacy, LPC

## 2023-09-19 ENCOUNTER — PHP (OUTPATIENT)
Dept: PSYCHIATRY | Facility: HOSPITAL | Age: 73
End: 2023-09-19
Attending: PSYCHIATRY & NEUROLOGY
Payer: MEDICARE

## 2023-09-19 DIAGNOSIS — F33.2 SEVERE EPISODE OF RECURRENT MAJOR DEPRESSIVE DISORDER, WITHOUT PSYCHOTIC FEATURES (CMS/HCC): Primary | ICD-10-CM

## 2023-09-19 PROCEDURE — G0410 GRP PSYCH PARTIAL HOSP 45-50: HCPCS | Performed by: COUNSELOR

## 2023-09-19 NOTE — PROGRESS NOTES
Pts OP MH clinician Hector Kapadia called LPC and offered collateral on the themes worked on in OP care.  LPC updated LCSW on pt presentation in care and intentions for aftercare.  LCSW agreed on recommendation for pt to have a psychiatrist prescribe versus her PCP. [employer] encrypted email worked for LCSW to receive PHI and LPC to send DC summary to LCSW next week.

## 2023-09-19 NOTE — GROUP NOTE
Date:  9/19/2023  Start Time:  10:40 AM  End Time:  11:40 AM  Stephanie Jeffers, YOB: 1950   6 members attended group today.    Group Focus:  Goal of group was to establish and build social support, review coping skills, normalize the struggles of being human, and increase self awareness and self compassion.    About the Group:  Clinician started group with a prompting quote/song to facilitate group discussion.  Group engaged in active and supportive discussion. Topics discussed included identity, relationships, affirmations.  Group members were able to offer insightful and supportive feedback and suggestions about how to manage difficult situations.  Group ended with a meditation/song.  Polly Bustamante MD was onsite when services were rendered.        Patient  was quiet but attentive.  Assessment/observation of group participation/presentation: Stephanie presented as attentive to the discussion, was generally quiet but offered ways in which she related, particularly around overidentifying with her romantic relationships.  Treatment plan areas addressed: Depression, Anxiety and Relationship issues/Communication skills  Visit Diagnosis:      ICD-10-CM ICD-9-CM   1. Severe episode of recurrent major depressive disorder, without psychotic features (CMS/HCC)  F33.2 296.33       Plan: Continue with PHP   Pt to F/U with treatment goals as outlined in treatment plan.  Pt to F/U with local ED/call 911 should SI/HI arises.    Susanna Alva LCSW

## 2023-09-19 NOTE — GROUP NOTE
Date: 9/19/2023  Start Time:  1:20 PM  End Time:   2:20 PM  Stephanie Jeffers, YOB: 1950,  was an active group participant.    Wrap Up Group  7 attended group today.   Group Focus: Goal of group was to wrap up and process the treatment day.  Assist  members in planning for the evening ahead and to assess and plan surrounding any  safety needs.      About the Group:  Clinician reviewed group members Afternoon  Reflection Sheets and did a verbal check in with each group member regarding safety  (SI/HI/self harm) and any necessary safety planning .  Session ended with a brief  meditation/calming activity.  Polly Bustamante MD was onsite when  services rendered.        Afternoon Reflection:   Depression:  0/5  Anxiety:  0/5  Anger:  0/5  Thoughts of taking one's own life today?:  0/5 - None  Self Injury:  0/5 - None  Engaged in self-injury?  No  Thoughts of hurting other people today?:  0/5 - None  Compulsive Behaviors?:  0-None  Compulsive Behaviors?: No  Are you able to follow our safety plan?: Yes      I can/will:  Call someone, Use a coping skill and Journal  The most significant moment of the day or insight for me was...:  DBT lesson  Three things I am grateful for today are:  Friends  Mood  optimism  Did you meet your goal for today? If not, what might you do tomorrow or this week to achieve it?:  Yes  My evening self-care plan is:  Cook dinner/ exercise/ meditate      Pt treatment plan areas addressed: Coping Skills and Emotion Regulation    Visit Diagnosis:      ICD-10-CM ICD-9-CM   1. Severe episode of recurrent major depressive disorder, without psychotic features (CMS/HCC)  F33.2 296.33       Assessment/Observations:  Stephanie was engaged during group. She presented with a euthymic mood reporting no symptoms. She wants to start getting back to old routines around working out.   Plan: Continue with PHP   Pt to F/U with treatment goals as outlined in treatment plan.  Pt to F/U with local ED/call  911 should SI/HI arises.    Cheryle Read, LPC

## 2023-09-19 NOTE — GROUP NOTE
Date:  9/19/2023  Start Time:  12:10 PM  End Time:   1:10 PM  Stephanie Jeffers, YOB: 1950  Psychoeducational/Skills Based Group  6 members attended group today    Group Focus: Goal of group was to introduce skills and psychoeducation related to topic of PHP day.   Group members were provided instruction and opportunities to practice presented skills.  Clinician answered questions as they arose and shared how presented skills can be utilized on a daily basis to increase emotional wellness.      About the Group: Emotion Regulation 6 members attended group today.    Topic of curriculum was Emotion Regulation. Facilitator began group discussion by educating group members on Dialectical Behavioral Therapys Bio-Social Model. Facilitator worked with group members to identify their biological vulnerability to intense emotion and the role of environmental invalidation on prolonging distress.  Facilitator engaged group in identifying problematic ways emotionally vulnerable people seek to avoid intense emotions. Facilitator introduced pro-active skills used to reduce emotional vulnerability as well as responsive skills to manage dysregulation in the moment.   Group participants were provided worksheets and handouts on Dialectical Behavioral Therapy skills.  A.B.C and  P.L.E.A.S.E. skills were discussed and group participants were encouraged to complete a worksheet identifying an area of the P.L.E.A.S.E. skill to work on for the evening and report back on the following treatment day.  The next set of responsive coping skills, used when a surge of emotion comes, negative event happens, etc, included 1) naming the emotion, 2) opposite action, 3) check the facts, 4) feeling not acting, and 5) ride the wave. Located within [facility] provided group members with worksheets to reinforce the skills. Videos from [facility] ([facility]) were used in addition to in group skill demonstrations using group members examples to  practice emotion regulation while emotionally neutral in group.  Facilitator continued the topic discussion and checked for understanding of presented skills. Facilitator ended the group with an exercise identifying physiological cues to emotions using the Feelings and Sensation Wheel.    Polly Bustamante MD was onsite when services were rendered.          Patient  was an active group participant.  Assessment/observation of group participation/presentation: Stephanie was engaged, following the curriculum and relating to persistent invalidation and fears of loneliness.   Treatment plan areas addressed: Depression, Relationship issues/Communication skills, Co-dependency and Emotion Regulation  Visit Diagnosis:      ICD-10-CM ICD-9-CM   1. Severe episode of recurrent major depressive disorder, without psychotic features (CMS/HCC)  F33.2 296.33       Plan: Continue with PHP   Pt to F/U with treatment goals as outlined in treatment plan.  Pt to F/U with local ED/call 911 should SI/HI arises.    Ruby Stacy, LPC

## 2023-09-20 ENCOUNTER — PHP (OUTPATIENT)
Dept: PSYCHIATRY | Facility: HOSPITAL | Age: 73
End: 2023-09-20
Attending: PSYCHIATRY & NEUROLOGY
Payer: MEDICARE

## 2023-09-20 DIAGNOSIS — F33.2 SEVERE EPISODE OF RECURRENT MAJOR DEPRESSIVE DISORDER, WITHOUT PSYCHOTIC FEATURES (CMS/HCC): Primary | ICD-10-CM

## 2023-09-20 PROCEDURE — G0410 GRP PSYCH PARTIAL HOSP 45-50: HCPCS

## 2023-09-20 NOTE — GROUP NOTE
Date:  9/20/2023  Start Time:  12:10 PM  End Time:   1:10 PM  Stephanie Jeffers, YOB: 1950  Psychoeducational/Skills Based Group  9 members attended group today    Group Focus: Goal of group was to introduce skills and psychoeducation related to topic of PHP day.   Group members were provided instruction and opportunities to practice presented skills.  Clinician answered questions as they arose and shared how presented skills can be utilized on a daily basis to increase emotional wellness.      About the Group: Trauma (day 1) Topic of curriculum was Trauma Skills. Facilitator began group by briefly reviewing Rachel Sonocines video explanation on traumas impact on the brain.  LPC provided psychoeducation about the bodys response to trauma using a Fight or Flight handout. Engaged group members in sharing their physiological response to stress. Facilitator introduced techniques used to regulate the nervous system when trauma responses are triggered. The first technique is called Grounding and was described using a worksheet from [employer] and completion of a video led art therapy exercise from [facility]. Proceeded art exercise as a group to ascertain group members understanding on skill application outside of treatment.  Facilitator covered Progressive Muscle Relaxation using a script provided by [employer]. Revisited previously reviewed concept of the body brain connection with physiological responses to stress that can be ameliorated through body centered nervous system regulation.  Group members were then instructed by Facilitator on the Emotional Freedom Technique (EFT), illustrated using a YouTube video and paired with a worksheet on tapping points. Facilitator led group members through an open discussion on their experience with the techniques and usefulness in managing triggers. Facilitator ended group with a Container Visualization exercise.    Polly Bustamante MD was  "onsite when services were rendered.          Patient  was an active group participant.  Assessment/observation of group participation/presentation: Stephanie engaged in skills and shared about her tendency to be a people pleaser in the context of the prabha response, but also that she \"tells it like it is.\"  Treatment plan areas addressed: Depression, Anxiety, Relationship issues/Communication skills, Trauma and Coping Skills  Visit Diagnosis:      ICD-10-CM ICD-9-CM   1. Severe episode of recurrent major depressive disorder, without psychotic features (CMS/[facility])  F33.2 296.33       Plan: Continue with PHP   Pt to F/U with treatment goals as outlined in treatment plan.  Pt to F/U with local ED/call 911 should SI/HI arises.    Susanna Alva LCSW      "

## 2023-09-20 NOTE — GROUP NOTE
Date:  9/20/2023  Start Time:  10:40 AM  End Time:  11:40 AM  Stephanie Jeffers, YOB: 1950  9 members attended group today.    Group Focus:  Goal of group was to establish and build social support, review coping skills, normalize the struggles of being human, and increase self awareness and self compassion.    About the Group:  Clinician started group with a prompting quote/song to facilitate group discussion.  Group engaged in active and supportive discussion. Topics discussed included medical anxiety, cognitive distortions, thought record.  Group members were able to offer insightful and supportive feedback and suggestions about how to manage difficult situations.  Group ended with a meditation/song.  Polly Bustamante MD was onsite when services were rendered.        Patient  was an active group participant.  Assessment/observation of group participation/presentation: Stephanie was engaged throughout group. She was insightful and vulnerable with the group about her history. She was also open to feedback and was observed taking notes.   Treatment plan areas addressed: Chronic Pain/Medical Issues, Coping Skills and CBT skills  Visit Diagnosis:      ICD-10-CM ICD-9-CM   1. Severe episode of recurrent major depressive disorder, without psychotic features (CMS/HCC)  F33.2 296.33       Plan: Continue with PHP   Pt to F/U with treatment goals as outlined in treatment plan.  Pt to F/U with local ED/call 911 should SI/HI arises.    Cheryle Read LPC

## 2023-09-20 NOTE — GROUP NOTE
Date: 9/20/2023  Start Time:  1:20 PM  End Time:   2:20 PM  Stephanie Jeffers, YOB: 1950,  was an active group participant.    Wrap Up Group    Group Focus: Goal of group was to wrap up and process the treatment day.  Assist  members in planning for the evening ahead and to assess and plan surrounding any  safety needs.      About the Group:  Clinician reviewed group members Afternoon  Reflection Sheets and did a verbal check in with each group member regarding safety  (SI/HI/self harm) and any necessary safety planning .  Session ended with a brief  meditation/calming activity.  Polly Bustamante MD was onsite when  services rendered.        Afternoon Reflection:   Depression:  0/5  Anxiety:  0/5  Anger:  0/5  Thoughts of taking one's own life today?:  0/5 - None  Self Injury:  0/5 - None  Engaged in self-injury?  No  Thoughts of hurting other people today?:  0/5 - None  Compulsive Behaviors?:  0-None  Compulsive Behaviors?: No  Are you able to follow our safety plan?: Yes      I can/will:  Call someone and Use a coping skill  The most significant moment of the day or insight for me was...:  Talking to group  Three things I am grateful for today are:  Friends, program, family  Did you meet your goal for today? If not, what might you do tomorrow or this week to achieve it?:  Yes  My evening self-care plan is:  Attend community event and socilize      Pt treatment plan areas addressed: Depression, Anxiety, Relationship issues/Communication skills and Trauma    Visit Diagnosis:      ICD-10-CM ICD-9-CM   1. Severe episode of recurrent major depressive disorder, without psychotic features (CMS/HCC)  F33.2 296.33       Assessment/Observations:  Stephanie shared her hopefulness to a peer who was graduating as well as her resolve to be social and not isolate this evening.   Plan: Continue with PHP   Pt to F/U with treatment goals as outlined in treatment plan.  Pt to F/U with local ED/call 911 should SI/HI  arises.    Ruby Stacy, LPC

## 2023-09-20 NOTE — GROUP NOTE
Date: 9/20/2023  Start Time:   9:30 AM  End Time: 10:30 AM    Stephanie Jeffers, YOB: 1950,  was an active group participant.    Community Check In Group  9 attended group today.     Group Focus: Goal of group was to welcome new and returning PHP members to the [facility], check in regarding group member needs, and assess safety.    About the Group: Clinician reviewed [facility] Group Code of Conduct and answered any questions that arose.  Clinician welcomed new members to the group and facilitated brief introductions of group members to one another.  Introduced topic/theme for the treatment day:Trauma.  Encouraged group members to request support throughout the day as needed.  Clinician reviewed group members Morning Reflection Sheets and did a verbal check in with each group member regarding safety (SI/HI/self harm), safety planning, medication compliance, if they needed to consult with anyone today and individual treatment needs/goals for the day.  Session ended with a brief meditation/calming activity.   Polly Bustamante MD was on site when services rendered.      Morning Reflection:   Depression:  0/5  Anxiety:  0/5  Anger:  0/5  Thoughts of taking one's own life today?:  0/5 - None  Self Injury:  0/5 - None  Engaged in Self Injury since yesterday: No    Thoughts of hurting other people today?:  0/5 - None  Compulsive Behaviors?:  0-None  Compulsive Behaviors?:  No  Are you able to follow your safety plan?: Yes      I can/will:  Call someone  Substance Use: No    Self Care: Yes      I am satisfied with my daily hygiene: Yes    Nourish:  Yes    Number of meals eaten yesterday:  3    Describe:  Normal  Sleep:  Yes    Used sleep aid?: Yes      Number of hours:  8  Social Interaction:  Yes    Social Interaction:  Minimal  Physical Activity:  No  Mindful Activity:  Yes    Describe Practice:  Meditate  Medication:  Yes    Medication:  Prescribed  Thought Content:  Racing Thoughts  Which coping skills did you use  yesterday? How did they help or not help?  What barriers did you face?:  Called friends  Pt identified goal for the treatment day:  Stop ruminating about physical health  Pt treatment plan areas addressed:  Anxiety, Self care and Coping skills      Visit Diagnosis:      ICD-10-CM ICD-9-CM   1. Severe episode of recurrent major depressive disorder, without psychotic features (CMS/HCC)  F33.2 296.33       Assessment/Observations:  Stephanie was engaged during group. She reports no symptoms today although noted some anxiety around a pending MRI on Monday. She highlighted wanting feedback around her anxiety around medical.   Plan:  Continue with PHP   Pt to F/U with treatment goals as outlined in treatment plan.  Pt to F/U with local ED/call 911 should SI/HI arises.    Cheryle Read LPC

## 2023-09-21 ENCOUNTER — PHP (OUTPATIENT)
Dept: PSYCHIATRY | Facility: HOSPITAL | Age: 73
End: 2023-09-21
Attending: COUNSELOR
Payer: MEDICARE

## 2023-09-21 ENCOUNTER — PHP (OUTPATIENT)
Dept: PSYCHIATRY | Facility: HOSPITAL | Age: 73
End: 2023-09-21
Attending: PSYCHIATRY & NEUROLOGY
Payer: MEDICARE

## 2023-09-21 DIAGNOSIS — F33.2 SEVERE EPISODE OF RECURRENT MAJOR DEPRESSIVE DISORDER, WITHOUT PSYCHOTIC FEATURES (CMS/HCC): Primary | ICD-10-CM

## 2023-09-21 PROCEDURE — G0410 GRP PSYCH PARTIAL HOSP 45-50: HCPCS

## 2023-09-21 PROCEDURE — 90837 PSYTX W PT 60 MINUTES: CPT | Performed by: COUNSELOR

## 2023-09-21 ASSESSMENT — COGNITIVE AND FUNCTIONAL STATUS - GENERAL
AROUSAL LEVEL: ALERT
PSYCHOMOTOR FUNCTIONING: WNL
THOUGHT_PROCESS: WNL
APPETITE: INCREASED
AFFECT: FULL RANGE;TENSE
LIBIDO: NON-CONTRIBUTORY
SPEECH: REGULAR;PRESSURED
REMOTE MEMORY: WNL
DELUSIONS: NONE OR AGE APPROPRIATE
RECENT MEMORY: WNL
CONCENTRATION: WNL
APPEARANCE: WELL GROOMED
ATTENTION: WNL
EST. PREMORBID INTELLIGENCE: AVERAGE
EYE_CONTACT: WNL
THOUGHT_CONTENT: APPROPRIATE
ORIENTATION: FULLY ORIENTED
IMPULSE CONTROL: INTACT
PERCEPTUAL FUNCTION: NORMAL
INSIGHT: AWARE OF IMPACT ON FUNCTIONING;IMPAIRED, MODERATELY
SLEEP_WAKE_CYCLE: NO CHANGE

## 2023-09-21 NOTE — GROUP NOTE
Date: 9/21/2023  Start Time:   9:30 AM  End Time: 10:30 AM    Stephanie Jeffers, YOB: 1950,  was an active group participant.    Community Check In Group  10 attended group today.     Group Focus: Goal of group was to welcome new and returning PHP members to the [facility], check in regarding group member needs, and assess safety.    About the Group: Clinician reviewed [facility] Group Code of Conduct and answered any questions that arose.  Dicussed recent drift of pt from group expectations and encouraged pt to engage in therapeutic process with intention. Clinician welcomed new members to the group and facilitated brief introductions of group members to one another.  Introduced topic/theme for the treatment day:CBT.  Encouraged group members to request support throughout the day as needed.  Clinician reviewed group members Morning Reflection Sheets and did a verbal check in with each group member regarding safety (SI/HI/self harm), safety planning, medication compliance, if they needed to consult with anyone today and individual treatment needs/goals for the day.  Session ended with a brief meditation/calming activity.   Polly Bustamante MD was on site when services rendered.      Morning Reflection:   Depression:  1/5  Anxiety:  0/5  Anger:  0/5  Thoughts of taking one's own life today?:  0/5 - None  Self Injury:  0/5 - None  Engaged in Self Injury since yesterday: No    Thoughts of hurting other people today?:  0/5 - None  Compulsive Behaviors?:  0-None  Compulsive Behaviors?:  No  Are you able to follow your safety plan?: Yes      I can/will:  Call someone and Use a coping skill  Substance Use: No    Self Care: Yes      I am satisfied with my daily hygiene: Yes    Nourish:  Yes    Describe:  Normal  Sleep:  Yes    Used sleep aid?: Yes      Describe sleep:  Restful  Social Interaction:  Yes    Social Interaction:  Moderate    Moderate with:  Friends  Physical Activity:  No  Mindful Activity:   No  Medication:  Yes    Medication:  Prescribed  Thought Content:  Clear  Which coping skills did you use yesterday? How did they help or not help?  What barriers did you face?:  Talked in group  Pt reported significant or positive event/step:  Made plans to attend event with friends  Pt identified goal for the treatment day:  Dicuss normal ups and downs in daily life  Pt treatment plan areas addressed:  Depression, Relationship issues/Communication Skills, Impulse control, Mindfulness and CBT skills  Pt requested consult with:  None      Visit Diagnosis:      ICD-10-CM ICD-9-CM   1. Severe episode of recurrent major depressive disorder, without psychotic features (CMS/[facility])  F33.2 296.33       Assessment/Observations:  Stephanie checked in first, sharing worries about having a low mood yesterday and catastrophic as if she was going to fall back into a depressive episode.  She was open ad vulnerable.   Plan:  Continue with PHP   Pt to F/U with treatment goals as outlined in treatment plan.  Pt to F/U with local ED/call 911 should SI/HI arises.    Ruby Stacy, LPC

## 2023-09-21 NOTE — GROUP NOTE
Date:  9/21/2023  Start Time:  12:10 PM  End Time:   1:10 PM  Stephanie Jeffers, YOB: 1950  Psychoeducational/Skills Based Group  9 members attended group today    Group Focus: Goal of group was to introduce skills and psychoeducation related to topic of PHP day.   Group members were provided instruction and opportunities to practice presented skills.  Clinician answered questions as they arose and shared how presented skills can be utilized on a daily basis to increase emotional wellness.      About the Group: CBT Topic of curriculum was CBT (Cognitive Behavioral Therapy). Facilitator began group by playing the video What is CBT? by [facility].  Facilitator provided psychoeducation about the Cognitive Dyess and how out thoughts influence our feelings and behaviors. Engaged group member sin exercises practicing the Cognitive Model followed by working towards balanced thinking. Facilitator discussed Automatic Negative Thoughts and how they are influenced by core beliefs and cognitive distortions.  Facilitated continued discussion on how core beliefs impact our current thoughts, feelings and behaviors. Worksheets were provided to group members detailing these concepts. Group members and facilitator actively engaged in skill acquisition through demonstration of Thought Records using examples provided by members and peer based restructuring.   Facilitated an open discussion on CBT concepts presented and assessed for group member understanding. Group ended with covering the concept of Behavioral Activation and encouraging participants to plan/schedule easily attainable, pleasant events for the remainder of the week using the [employer] worksheets provided.    Polly Bustamante MD was onsite when services were rendered.          Patient  was an active group participant.  Assessment/observation of group participation/presentation: ***  Treatment plan areas addressed: Coping  Skills, Mindfulness and CBT skills  Visit Diagnosis:      ICD-10-CM ICD-9-CM   1. Severe episode of recurrent major depressive disorder, without psychotic features (CMS/HCC)  F33.2 296.33       Plan: Continue with PHP   Pt to F/U with treatment goals as outlined in treatment plan.  Pt to F/U with local ED/call 911 should SI/HI arises.    Cheryle Read, MATTHEW
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
## 2023-09-21 NOTE — GROUP NOTE
Date: 9/21/2023  Start Time:  1:20 PM  End Time:   2:20 PM  Stephanie Jeffers, YOB: 1950,  was an active group participant.    Wrap Up Group    Group Focus: Goal of group was to wrap up and process the treatment day.  Assist  members in planning for the evening ahead and to assess and plan surrounding any  safety needs.      About the Group:  Clinician reviewed group members Afternoon  Reflection Sheets and did a verbal check in with each group member regarding safety  (SI/HI/self harm) and any necessary safety planning .  Session ended with a brief  meditation/calming activity.  Polly Bustamante MD was onsite when  services rendered.        Afternoon Reflection:   Depression:  1/5  Anxiety:  3/5  Anger:  0/5  Thoughts of taking one's own life today?:  0/5 - None  Self Injury:  0/5 - None  Engaged in self-injury?  No  Thoughts of hurting other people today?:  0/5 - None  Compulsive Behaviors?:  0-None  Compulsive Behaviors?: No  Are you able to follow our safety plan?: Yes      I can/will:  Call someone and Use a coping skill  The most significant moment of the day or insight for me was...:  Session w Ruby  Three things I am grateful for today are:  Family, friends, group  Did you meet your goal for today? If not, what might you do tomorrow or this week to achieve it?:  Yes  My evening self-care plan is:  Nothing special      Pt treatment plan areas addressed: Depression, Anxiety, Mood dysregulation, Coping Skills and CBT skills    Visit Diagnosis:      ICD-10-CM ICD-9-CM   1. Severe episode of recurrent major depressive disorder, without psychotic features (CMS/HCC)  F33.2 296.33       Assessment/Observations:  Stephanie shared with smiling affect that she is anxious about a new finger fungus she today noticed after manicure she got yesterday. She ID that she was able to tolerate this distress and stay for tx day rather than feel sense of urgency to leave. She indicated plan to go to the salon and  argue about what with the fungus- LCSW encouraged pt to utilize interpersonal effectiveness skills and pt expressed willingness to politely express herself. She reports regular evening self-care includes dinner and reading.  Plan: Continue with PHP   Pt to F/U with treatment goals as outlined in treatment plan.  Pt to F/U with local ED/call 911 should SI/HI arises.    Susanna Alva, ABNER

## 2023-09-21 NOTE — GROUP NOTE
Date:  9/21/2023  Start Time:  10:40 AM  End Time:  11:40 AM  Stephanie Jeffers, YOB: 1950  9 members attended group today.    Group Focus:  Goal of group was to establish and build social support, review coping skills, normalize the struggles of being human, and increase self awareness and self compassion.    About the Group:  Clinician started group with a prompting quote/song to facilitate group discussion.  Group engaged in active and supportive discussion. Topics discussed included radical acceptance, external pressures, recovery, highlighting progress.  Group members were able to offer insightful and supportive feedback and suggestions about how to manage difficult situations.  Group ended with a meditation/song.  Polly Bustamante MD was onsite when services were rendered.        Patient  was an active group participant.  Assessment/observation of group participation/presentation: Stephanie was active throughout group. She often provided empathetic feedback to her peers and related. She highlighted some of the thought she has when she is in her emotional mind.   Treatment plan areas addressed: Coping Skills, Mindfulness and CBT skills  Visit Diagnosis:      ICD-10-CM ICD-9-CM   1. Severe episode of recurrent major depressive disorder, without psychotic features (CMS/HCC)  F33.2 296.33       Plan: Continue with PHP   Pt to F/U with treatment goals as outlined in treatment plan.  Pt to F/U with local ED/call 911 should SI/HI arises.    Cheryle Read LPC

## 2023-09-22 ENCOUNTER — PHP (OUTPATIENT)
Dept: PSYCHIATRY | Facility: HOSPITAL | Age: 73
End: 2023-09-22
Attending: PSYCHIATRY & NEUROLOGY
Payer: MEDICARE

## 2023-09-22 DIAGNOSIS — F33.2 SEVERE EPISODE OF RECURRENT MAJOR DEPRESSIVE DISORDER, WITHOUT PSYCHOTIC FEATURES (CMS/HCC): Primary | ICD-10-CM

## 2023-09-22 PROCEDURE — 99214 OFFICE O/P EST MOD 30 MIN: CPT | Performed by: PSYCHIATRY & NEUROLOGY

## 2023-09-22 PROCEDURE — G0410 GRP PSYCH PARTIAL HOSP 45-50: HCPCS

## 2023-09-22 PROCEDURE — G0463 HOSPITAL OUTPT CLINIC VISIT: HCPCS | Performed by: PSYCHIATRY & NEUROLOGY

## 2023-09-22 NOTE — GROUP NOTE
Date: 9/22/2023  Start Time:   9:30 AM  End Time: 10:30 AM    Stephanie Jeffers, YOB: 1950,  was an active group participant.    Community Check In Group  8 attended group today.     Group Focus: Goal of group was to welcome new and returning PHP members to the [facility], check in regarding group member needs, and assess safety.    About the Group: Clinician reviewed [facility] Group Code of Conduct and answered any questions that arose.  Clinician welcomed new members to the group and facilitated brief introductions of group members to one another.  Introduced topic/theme for the treatment day:Resiliency Building.  Encouraged group members to request support throughout the day as needed.  Clinician reviewed group members Morning Reflection Sheets and did a verbal check in with each group member regarding safety (SI/HI/self harm), safety planning, medication compliance, if they needed to consult with anyone today and individual treatment needs/goals for the day.  Session ended with a brief meditation/calming activity.   Paresh Saleem DO was on site when services rendered.      Morning Reflection:   Depression:  0/5  Anxiety:  2/5  Anger:  0/5  Thoughts of taking one's own life today?:  0/5 - None  Self Injury:  0/5 - None  Engaged in Self Injury since yesterday: No    Thoughts of hurting other people today?:  0/5 - None  Compulsive Behaviors?:  0-None  Compulsive Behaviors?:  No  Are you able to follow your safety plan?: Yes      I can/will:  Call someone and Use a coping skill  Substance Use: No    Self Care: No      I am satisfied with my daily hygiene: Yes      Number of meals eaten yesterday:  3    Describe:  Normal  Sleep:  Yes    Used sleep aid?: Yes      Describe sleep:  Restless  Social Interaction:  Yes    Social Interaction:  Minimal  Physical Activity:  Yes    Describe Practice: walked  Mindful Activity:  No  Medication:  Yes    Medication:  Prescribed  Thought Content:  Clear  Which coping skills  did you use yesterday? How did they help or not help?  What barriers did you face?:  Called friends  Pt identified goal for the treatment day:  Tamp down my worry in general and caretaking  Pt treatment plan areas addressed:  Coping skills, Self care and Interpersonal effectiveness  Pt requested consult with:  None      Visit Diagnosis:      ICD-10-CM ICD-9-CM   1. Severe episode of recurrent major depressive disorder, without psychotic features (CMS/HCC)  F33.2 296.33       Assessment/Observations:  Stephanie was engaged throughout group.  She shared feeling mildly anxious today which appeared to be in part due to trying to find providers.   Plan:  Continue with PHP   Pt to F/U with treatment goals as outlined in treatment plan.  Pt to F/U with local ED/call 911 should SI/HI arises.    Cheryle Read LPC

## 2023-09-22 NOTE — GROUP NOTE
"Date: 9/22/2023  Start Time:  1:20 PM  End Time:   2:20 PM  Stephanie Jeffers, YOB: 1950,  was a passive group participant.    Wrap Up Group  6 attended today  Group Focus: Goal of group was to wrap up and process the treatment day.  Assist  members in planning for the evening ahead and to assess and plan surrounding any  safety needs.      About the Group:  Clinician reviewed group members Afternoon  Reflection Sheets and did a verbal check in with each group member regarding safety  (SI/HI/self harm) and any necessary safety planning .  Session ended with a brief  meditation/calming activity.  Paresh Saleem DO was onsite when  services rendered.        Afternoon Reflection:   Depression:  0/5  Anxiety:  2/5  Anger:  0/5  Thoughts of taking one's own life today?:  0/5 - None  Self Injury:  0/5 - None  Engaged in self-injury?  No  Thoughts of hurting other people today?:  0/5 - None  Compulsive Behaviors?:  0-None  Compulsive Behaviors?: No  Are you able to follow our safety plan?: Yes      I can/will:  Call someone and Use a coping skill  The most significant moment of the day or insight for me was...:  None  Three things I am grateful for today are:  Family, friends, group  Did you meet your goal for today? If not, what might you do tomorrow or this week to achieve it?:  Yes  My evening self-care plan is:  Dinner w friends      Pt treatment plan areas addressed: Depression, Anxiety, Relationship issues/Communication skills, Co-dependency, Impulse Control, Mood dysregulation and Building strengths/resilience    Visit Diagnosis:      ICD-10-CM ICD-9-CM   1. Severe episode of recurrent major depressive disorder, without psychotic features (CMS/HCC)  F33.2 296.33       Assessment/Observations:  Stephanie arrived 10 minutes late to group, stating \"I don't know how I lose track of time.\" She was unable to ID a significant moment of the day and LCSW offered feedback around her goals around interpersonal " "boundaries and how topic of the day could relate. When LCSW collected the sheet, pt had not completed her name and appeared to have filled in the sheet carelessly, checking off \"no\" to safety plan. LCSW confirmed safety with pt and she corrected this.  Plan: Continue with PHP   Pt to F/U with treatment goals as outlined in treatment plan.  Pt to F/U with local ED/call 911 should SI/HI arises.    Susanna Alva, ABNER        "

## 2023-09-22 NOTE — GROUP NOTE
Date:  9/22/2023  Start Time:  12:10 PM  End Time:   1:10 PM  Stephanie Jeffers, YOB: 1950  Psychoeducational/Skills Based Group  7 members attended group today    Group Focus: Goal of group was to introduce skills and psychoeducation related to topic of PHP day.   Group members were provided instruction and opportunities to practice presented skills.  Clinician answered questions as they arose and shared how presented skills can be utilized on a daily basis to increase emotional wellness.      About the Group: Resiliency Building Topic of curriculum was Resiliency Building. Facilitator began group by discussing the video A Lesson On Resilience with group members.  Group members were then engaged group work on how protective factors can help foster resiliency, using a worksheet from [employer]. Group members were encouraged to identify protective factors that they would like to develop/strengthen to help them overcome obstacles. Group members were further educated on the power of Affirmation using the article and examples provided from [employer] on 25 positive Daily Affirmations to Recite for Your Mental Health. Group members used index cards to create mindful triggers to recite affirmations to boost resilience daily and shared their cards in group discussion.  Therapist led the group through a time of reflection on prioritizing values and time management, with the goal of help group members identify their values to build up their resilience. Group members were asked the following questions: 1) in what areas of your life do you want to invest your time/energy/resources? 2) What areas of your life do you want to prioritize to assist in healthy decision-making? 3) How can you cultivate more time for play/creativity/rest? 4) How do or can we manage the barriers that interfere with prioritizing our values? 5) What happens when our actions are not aligned with our values/priorities? How does  that make use feel? How can we start living more aligned with our values? Therapist then encouraged group members to set goals related to developing/strengthening their values, encouraging them to set small, achievable goals to build optimism and confidence, reflecting on the concepts of Behavior Activation covered the prior day. Facilitated discussion on Strengths using handouts from [employer] on [employer] on Build A Meaningful Life by Boosting 6 Strengths. Therapist closed group by asking each group member to complete the provided Gratitude Exercise and facilitated group engagement by having members share examples with one another. Group members were encouraged to identify skills to practice such as goal, setting, affirmation and gratitude in their self-care plans for the evening at check out.     Paresh Saleem DO was onsite when services were rendered.          Patient  was an active group participant.  Assessment/observation of group participation/presentation: Stephanie engaged in exercises and contributed to the discussion around resiliency factors.   Treatment plan areas addressed: Depression, Anxiety and Building strengths/resilience  Visit Diagnosis:      ICD-10-CM ICD-9-CM   1. Severe episode of recurrent major depressive disorder, without psychotic features (CMS/HCC)  F33.2 296.33       Plan: Continue with PHP   Pt to F/U with treatment goals as outlined in treatment plan.  Pt to F/U with local ED/call 911 should SI/HI arises.    Susanna Alva LCSW
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
## 2023-09-22 NOTE — GROUP NOTE
Date:  9/22/2023  Start Time:  10:40 AM  End Time:  11:40 AM  Stephanie Jeffers, YOB: 1950  7 members attended group today.    Group Focus:  Goal of group was to establish and build social support, review coping skills, normalize the struggles of being human, and increase self awareness and self compassion.    About the Group:  Clinician started group with a prompting quote/song to facilitate group discussion.  Group engaged in active and supportive discussion. Topics discussed included grief, boundaries, self compassion, self care, mindfulness, interpersonal effectiveness.  Group members were able to offer insightful and supportive feedback and suggestions about how to manage difficult situations.  Group ended with a meditation/song.  Paresh Saleem DO was onsite when services were rendered.        Patient  was an active group participant.  Assessment/observation of group participation/presentation: Stephanie was engaged throughout group. She related and provided empathetic support to her peers. She shared about the grief of the change in relationship with her daughter resulting in less time with her grandchildren and how difficult it was for her including what helped her.   Treatment plan areas addressed: Depression, Anxiety, Relationship issues/Communication skills, Grief and Loss, Self Care, Coping Skills, Mindfulness and Interpersonal Effectiveness  Visit Diagnosis:      ICD-10-CM ICD-9-CM   1. Severe episode of recurrent major depressive disorder, without psychotic features (CMS/HCC)  F33.2 296.33       Plan: Continue with PHP   Pt to F/U with treatment goals as outlined in treatment plan.  Pt to F/U with local ED/call 911 should SI/HI arises.    Cheryle Read LPC

## 2023-09-25 ENCOUNTER — HOSPITAL ENCOUNTER (OUTPATIENT)
Dept: RADIOLOGY | Facility: CLINIC | Age: 73
Discharge: HOME | End: 2023-09-25
Attending: STUDENT IN AN ORGANIZED HEALTH CARE EDUCATION/TRAINING PROGRAM
Payer: MEDICARE

## 2023-09-25 ENCOUNTER — PHP (OUTPATIENT)
Dept: PSYCHIATRY | Facility: HOSPITAL | Age: 73
End: 2023-09-25
Attending: PSYCHIATRY & NEUROLOGY
Payer: MEDICARE

## 2023-09-25 DIAGNOSIS — R93.5 ABNORMAL FINDINGS ON DIAGNOSTIC IMAGING OF OTHER ABDOMINAL REGIONS, INCLUDING RETROPERITONEUM: ICD-10-CM

## 2023-09-25 DIAGNOSIS — F33.2 SEVERE EPISODE OF RECURRENT MAJOR DEPRESSIVE DISORDER, WITHOUT PSYCHOTIC FEATURES (CMS/HCC): Primary | ICD-10-CM

## 2023-09-25 DIAGNOSIS — D49.0 IPMN (INTRADUCTAL PAPILLARY MUCINOUS NEOPLASM): ICD-10-CM

## 2023-09-25 PROCEDURE — G0410 GRP PSYCH PARTIAL HOSP 45-50: HCPCS

## 2023-09-25 RX ORDER — GADOBUTROL 604.72 MG/ML
0.1 INJECTION INTRAVENOUS ONCE
Status: COMPLETED | OUTPATIENT
Start: 2023-09-25 | End: 2023-09-25

## 2023-09-25 RX ADMIN — GADOBUTROL 5 ML: 604.72 INJECTION INTRAVENOUS at 16:10

## 2023-09-25 NOTE — GROUP NOTE
Date: 9/25/2023  Start Time:   9:30 AM  End Time: 10:30 AM    Stephanie Jeffers, YOB: 1950,  was an active group participant.    Community Check In Group  7 attended group today.     Group Focus: Goal of group was to welcome new and returning PHP members to the [facility], check in regarding group member needs, and assess safety.    About the Group: Clinician reviewed [facility] Group Code of Conduct and answered any questions that arose.  Clinician welcomed new members to the group and facilitated brief introductions of group members to one another.  Introduced topic/theme for the treatment day:Self Care.  Encouraged group members to request support throughout the day as needed.  Clinician reviewed group members Morning Reflection Sheets and did a verbal check in with each group member regarding safety (SI/HI/self harm), safety planning, medication compliance, if they needed to consult with anyone today and individual treatment needs/goals for the day.  Session ended with a brief meditation/calming activity.   Paresh Saleem DO was on site when services rendered.      Morning Reflection:   Depression:  0/5  Anxiety:  2/5  Anger:  0/5  Thoughts of taking one's own life today?:  0/5 - None  Self Injury:  0/5 - None  Engaged in Self Injury since yesterday: No    Thoughts of hurting other people today?:  0/5 - None  Compulsive Behaviors?:  0-None  Compulsive Behaviors?:  No  Are you able to follow your safety plan?: Yes      I can/will:  Call someone and Use a coping skill  Substance Use: No    Self Care: No      I am satisfied with my daily hygiene: Yes    Nourish:  Yes    Describe:  Normal  Sleep:  Yes    Describe sleep:  Restful    Social Interaction:  Minimal  Physical Activity:  No  Mindful Activity:  Yes    Describe Practice:  Meditation  Medication:  Yes    Medication:  Prescribed  Thought Content:  Clear  Which coping skills did you use yesterday? How did they help or not help?  What barriers did you face?:   Called friends, they are supportive it helps  Pt reported significant or positive event/step:  None really  Pt identified goal for the treatment day:  Be calm and not worry about impending MRI results  Pt treatment plan areas addressed:  Depression, Anxiety, Mood dysregulation, Mindfulness, Coping skills, Self care and Chronic Pain/Medical Issues  Pt requested consult with:  None      Visit Diagnosis:      ICD-10-CM ICD-9-CM   1. Severe episode of recurrent major depressive disorder, without psychotic features (CMS/HCC)  F33.2 296.33       Assessment/Observations:  Stephanie reported the weather this weekend impacted her social engagements with some plans cancelled. She reported her mood was down and not depressed, which she notes as improvement attributed to medication and working on herself. She denied needing support from group today.  Plan:  Continue with PHP   Pt to F/U with treatment goals as outlined in treatment plan.  Pt to F/U with local ED/call 911 should SI/HI arises.    Susanna Alva LCSW

## 2023-09-25 NOTE — GROUP NOTE
Date:  9/25/2023  Start Time:  10:40 AM  End Time:  11:40 AM  Stephanie Jeffers, YOB: 1950  6 members attended group today.    Group Focus:  Goal of group was to establish and build social support, review coping skills, normalize the struggles of being human, and increase self awareness and self compassion.    About the Group:  Clinician started group with a prompting quote/song to facilitate group discussion.  Group engaged in active and supportive discussion. Topics discussed included managing triggers, family roles and expectations.  Group members were able to offer insightful and supportive feedback and suggestions about how to manage difficult situations.  Group ended with a meditation/song.  Paresh Saleem DO was onsite when services were rendered.        Patient  was an active group participant.  Assessment/observation of group participation/presentation: Stephanie was attentive and offering support and encouragement to peers, sharing ways in which she or her daughters have had similar experiences.  Treatment plan areas addressed: Depression, Anxiety, Relationship issues/Communication skills, Parenting Stress and Coping Skills  Visit Diagnosis:      ICD-10-CM ICD-9-CM   1. Severe episode of recurrent major depressive disorder, without psychotic features (CMS/HCC)  F33.2 296.33       Plan: Continue with PHP   Pt to F/U with treatment goals as outlined in treatment plan.  Pt to F/U with local ED/call 911 should SI/HI arises.    Susanna Alva LCSW

## 2023-09-25 NOTE — GROUP NOTE
Date:  9/25/2023  Start Time:  12:10 PM  End Time:   1:10 PM  Stephanie Jeffers, YOB: 1950  Psychoeducational/Skills Based Group   7 members attended group today    Group Focus: Goal of group was to introduce skills and psychoeducation related to topic of PHP day.   Group members were provided instruction and opportunities to practice presented skills.  Clinician answered questions as they arose and shared how presented skills can be utilized on a daily basis to increase emotional wellness.      About the Group: Self Care Topic of curriculum was Self-Care.  Facilitator introduced the idea of setting goals for self-care and the importance of taking time for self-amidst peoples busy lives. Facilitator led discussion on [place] of Control to help group members practice recognizing what they can and cannot control.  Facilitator utilized worksheet on [place] of Control to help group members increase identification of what is within their control. Facilitator led a group discussion encouraging group members to identify ideas for self-care using worksheet The Self Care Wheel. Group members highlighted strategies for self-care that they have used past, present and will use in the future. Group discussed ways to enhance sleep routines using Sleep Hygiene worksheet from [employer].  Facilitator educated group members on Gut Feelings: How Food Affects Your Mood from the [employer].  Reviewed the Healthy Plate infographic as well as High Fiber Foods, encouraging patient engagement in what is working well about their nutrition and areas of improvement o boost self-care and mood. Revisited Affirmations specific to self-care.  Facilitator closed group with group members creating Self -Care Intentions for the week to behaviorally activate using the Self-Care Checklist.     Paresh Saleem DO was onsite when services were rendered.          Patient  was an active group  participant.  Assessment/observation of group participation/presentation: Stephanie was engaged throughout the group. She often provided empathetic feedback as well as examples of how she engages in self care. She also highlighted the skillfulness around [place] of control  Treatment plan areas addressed: Self Care, Mindfulness and Self-compassion  Visit Diagnosis:      ICD-10-CM ICD-9-CM   1. Severe episode of recurrent major depressive disorder, without psychotic features (CMS/HCC)  F33.2 296.33       Plan: Continue with PHP   Pt to F/U with treatment goals as outlined in treatment plan.  Pt to F/U with local ED/call 911 should SI/HI arises.    Cheryle Read, MATTHEW

## 2023-09-25 NOTE — GROUP NOTE
"Date: 9/25/2023  Start Time:  1:20 PM  End Time:   2:20 PM  Stephanie Jeffers, YOB: 1950,  was an active group participant.    Wrap Up Group    Group Focus: Goal of group was to wrap up and process the treatment day.  Assist  members in planning for the evening ahead and to assess and plan surrounding any  safety needs.      About the Group:  Clinician reviewed group members Afternoon  Reflection Sheets and did a verbal check in with each group member regarding safety  (SI/HI/self harm) and any necessary safety planning .  Session ended with a brief  meditation/calming activity.  Paresh Saleem DO was onsite when  services rendered.        Afternoon Reflection:   Depression:  0/5  Anxiety:  2/5  Anger:  0/5  Thoughts of taking one's own life today?:  0/5 - None  Self Injury:  0/5 - None  Engaged in self-injury?  No  Thoughts of hurting other people today?:  0/5 - None  Compulsive Behaviors?:  0-None  Compulsive Behaviors?: No  Are you able to follow our safety plan?: Yes      I can/will:  Call someone and Use a coping skill  The most significant moment of the day or insight for me was...:  Reinforcing things \"in my control\" and not  Three things I am grateful for today are:  Family, friends, group  Did you meet your goal for today? If not, what might you do tomorrow or this week to achieve it?:  Kind of- still kind of anxious  My evening self-care plan is:  Take bath. Read my book. Call friends      Pt treatment plan areas addressed: Self Care, Mindfulness and Self-compassion    Visit Diagnosis:      ICD-10-CM ICD-9-CM   1. Severe episode of recurrent major depressive disorder, without psychotic features (CMS/[facility])  F33.2 296.33       Assessment/Observations:  Stephanie was engaged during group. She reports being nervous about her upcoming MRI noting \" very much in my body today.\" She felt it was good to reinforce [place] of control. She presented as euthymic.   Plan: Continue with PHP   Pt to F/U with " treatment goals as outlined in treatment plan.  Pt to F/U with local ED/call 911 should SI/HI arises.    Cheryle Read, MATTHEW

## 2023-09-26 ENCOUNTER — PHP (OUTPATIENT)
Dept: PSYCHIATRY | Facility: HOSPITAL | Age: 73
End: 2023-09-26
Attending: PSYCHIATRY & NEUROLOGY
Payer: MEDICARE

## 2023-09-26 ENCOUNTER — TELEPHONE (OUTPATIENT)
Dept: PSYCHIATRY | Facility: HOSPITAL | Age: 73
End: 2023-09-26
Payer: MEDICARE

## 2023-09-26 DIAGNOSIS — F33.2 SEVERE EPISODE OF RECURRENT MAJOR DEPRESSIVE DISORDER, WITHOUT PSYCHOTIC FEATURES (CMS/HCC): Primary | ICD-10-CM

## 2023-09-26 PROCEDURE — G0410 GRP PSYCH PARTIAL HOSP 45-50: HCPCS

## 2023-09-26 NOTE — TELEPHONE ENCOUNTER
Called pt sister Ewa to obtain collateral and establish a follow up time for a support session on DC date, this Thursday.      LVM for Ewa to call [facility] to coordinate care.

## 2023-09-26 NOTE — GROUP NOTE
Date:  9/26/2023  Start Time:  12:10 PM  End Time:   1:10 PM  Stephanie Jeffers, YOB: 1950  Psychoeducational/Skills Based Group  7 members attended group today    Group Focus: Goal of group was to introduce skills and psychoeducation related to topic of PHP day.   Group members were provided instruction and opportunities to practice presented skills.  Clinician answered questions as they arose and shared how presented skills can be utilized on a daily basis to increase emotional wellness.      About the Group: DBT-Distress Tolerance Topic of Curriculum was Distress Tolerance. Facilitator educated group members about Distraction Techniques (creative but healthy ways of distracting from pain) and Radical Acceptance (You stop fighting reality) from The Dialectical Behavior Therapy Skills Workbook by Arti Mark, PhD. Facilitator discussed goal of providing immediate ways of coping with stress. Facilitator introduced techniques for stress tolerance in the moment, such as the STOP technique and ACCEPTS worksheet all taken from The Dialectical Behavior Therapy Skills Workbook by Arti Mark, PhD.  Facilitator showed Distress Tolerance Video on ACCEPTS from DBT-RU with Dr. Aziza Suazo via You Tube. Facilitator closed group session with Soothing with the Five Senses to help group members practice Distress Tolerance techniques in the moment.     Polly Bustamante MD was onsite when services were rendered.          Patient  was an active group participant.  Assessment/observation of group participation/presentation: Stephanie was engaged, relating to not only radical acceptance but the need for distress tolerance skills.   Treatment plan areas addressed: Depression, Relationship issues/Communication skills and Distress Tolerance  Visit Diagnosis:      ICD-10-CM ICD-9-CM   1. Severe episode of recurrent major depressive disorder, without psychotic features (CMS/HCC)  F33.2 296.33       Plan: Continue  with PHP   Pt to F/U with treatment goals as outlined in treatment plan.  Pt to F/U with local ED/call 911 should SI/HI arises.    Ruby Stacy, LPC

## 2023-09-26 NOTE — GROUP NOTE
Date: 9/26/2023  Start Time:  1:20 PM  End Time:   2:20 PM  Stephanie Jeffers, YOB: 1950,  was a passive group participant.    Wrap Up Group    Group Focus: Goal of group was to wrap up and process the treatment day.  Assist  members in planning for the evening ahead and to assess and plan surrounding any  safety needs.      About the Group:  Clinician reviewed group members Afternoon  Reflection Sheets and did a verbal check in with each group member regarding safety  (SI/HI/self harm) and any necessary safety planning .  Session ended with a brief  meditation/calming activity.  Polly Bustamante MD was onsite when  services rendered.        Afternoon Reflection:   Depression:  0/5  Anxiety:  0/5  Anger:  0/5  Thoughts of taking one's own life today?:  0/5 - None  Self Injury:  0/5 - None  Engaged in self-injury?  No  Thoughts of hurting other people today?:  0/5 - None  Compulsive Behaviors?:  0-None  Compulsive Behaviors?: No  Are you able to follow our safety plan?: Yes      I can/will:  Call someone and Use a coping skill  The most significant moment of the day or insight for me was...:  Discussing bday party at my daughter's house  Three things I am grateful for today are:  Seeing my grandchildren later today  Did you meet your goal for today? If not, what might you do tomorrow or this week to achieve it?:  Yes, very present  My evening self-care plan is:  Go to my daughter's house with open, positive mind, enjoy their company, focus on present margarita      Pt treatment plan areas addressed: Depression, Anxiety, Relationship issues/Communication skills, Mood dysregulation, Coping Skills and Distress Tolerance    Visit Diagnosis:      ICD-10-CM ICD-9-CM   1. Severe episode of recurrent major depressive disorder, without psychotic features (CMS/HCC)  F33.2 296.33       Assessment/Observations:  Stephanie presented as casual in group, sitting with her leg slung over the arm of the chair. She reported it  "was \"interesting\" to hear feedback form peers on situation with daughter and grandson's birthday celebration tonight. She denied any other needs or support.  Plan: Continue with PHP   Pt to F/U with treatment goals as outlined in treatment plan.  Pt to F/U with local ED/call 911 should SI/HI arises.    Susanna Alva LCSW        "

## 2023-09-26 NOTE — GROUP NOTE
"Date:  9/26/2023  Start Time:  10:40 AM  End Time:  11:40 AM  Stephanie Jeffers, YOB: 1950  7 members attended group today.    Group Focus:  Goal of group was to establish and build social support, review coping skills, normalize the struggles of being human, and increase self awareness and self compassion.    About the Group:  Clinician started group with a prompting quote/song to facilitate group discussion.  Group engaged in active and supportive discussion. Topics discussed included [place] of control, loss, \"closure\", relationships.  Group members were able to offer insightful and supportive feedback and suggestions about how to manage difficult situations.  Group ended with a meditation/song.  Polly Bustamante MD was onsite when services were rendered.        Patient  was an active group participant.  Assessment/observation of group participation/presentation: Stephanie was engaged throughout group. She shared about her anxiety about an event tonight and the circumstances around it. She highlighted her [place] of control. She also provided empathetic feedback to her peers.   Treatment plan areas addressed: Anxiety and Interpersonal Effectiveness  Visit Diagnosis:      ICD-10-CM ICD-9-CM   1. Severe episode of recurrent major depressive disorder, without psychotic features (CMS/HCC)  F33.2 296.33       Plan: Continue with PHP   Pt to F/U with treatment goals as outlined in treatment plan.  Pt to F/U with local ED/call 911 should SI/HI arises.    Cheryle Read LPC      "

## 2023-09-26 NOTE — GROUP NOTE
Date: 9/26/2023  Start Time:   9:30 AM  End Time: 10:30 AM    Stephanie Jeffers, YOB: 1950,  was an active group participant.    Community Check In Group  7 attended group today.     Group Focus: Goal of group was to welcome new and returning PHP members to the [facility], check in regarding group member needs, and assess safety.    About the Group: Clinician reviewed [facility] Group Code of Conduct and answered any questions that arose.  Clinician welcomed new members to the group and facilitated brief introductions of group members to one another.  Introduced topic/theme for the treatment day:Distress Tolerance.  Encouraged group members to request support throughout the day as needed.  Clinician reviewed group members Morning Reflection Sheets and did a verbal check in with each group member regarding safety (SI/HI/self harm), safety planning, medication compliance, if they needed to consult with anyone today and individual treatment needs/goals for the day.  Session ended with a brief meditation/calming activity.   Polly Bustamante MD was on site when services rendered.      Morning Reflection:   Depression:  0/5  Anxiety:  0/5  Anger:  0/5  Thoughts of taking one's own life today?:  0/5 - None  Self Injury:  0/5 - None  Engaged in Self Injury since yesterday: No    Thoughts of hurting other people today?:  0/5 - None  Compulsive Behaviors?:  0-None  Compulsive Behaviors?:  No  Are you able to follow your safety plan?: Yes      I can/will:  Call someone and Use a coping skill  Substance Use: No    Self Care: Yes      I completed my self care activity last night::  Prayed    I am satisfied with my daily hygiene: Yes    Nourish:  Yes    Number of meals eaten yesterday:  2    Describe:  Minimal    Used sleep aid?: Yes      Describe sleep:  Restless    Social Interaction:  Minimal  Physical Activity:  No  Mindful Activity:  Yes    Describe Practice:  Prayer  Medication:  Yes    Medication:  Prescribed  Thought  Content:  Racing Thoughts  Which coping skills did you use yesterday? How did they help or not help?  What barriers did you face?:  Deep breathing, box breathing  Pt reported significant or positive event/step:  Getting into gratitude  Pt identified goal for the treatment day:  Be present and learn  Pt treatment plan areas addressed:  Mindfulness and Self care  Pt requested consult with:  None      Visit Diagnosis:      ICD-10-CM ICD-9-CM   1. Severe episode of recurrent major depressive disorder, without psychotic features (CMS/HCC)  F33.2 296.33       Assessment/Observations:  Stephanie was engaged during group. Stephanie presented as euthymic and was excited to tell everyone about her positive result from her MRI.   Plan:  Continue with PHP   Pt to F/U with treatment goals as outlined in treatment plan.  Pt to F/U with local ED/call 911 should SI/HI arises.    Cheryle Read, MATTHEW

## 2023-09-26 NOTE — TELEPHONE ENCOUNTER
"Spoke to Normal who offered in regards to her sisters progress that she thinks things are going well- vast improvement since hospitalization.      \"Made nice lady friends, she sounds good.  She has times when she is sad and learning that its life.\"     Agreed to a phone session this Thursday at 12:20 with Stephanie to finalize DC plans.   "

## 2023-09-27 ENCOUNTER — TELEPHONE (OUTPATIENT)
Dept: PSYCHIATRY | Facility: HOSPITAL | Age: 73
End: 2023-09-27
Payer: MEDICARE

## 2023-09-27 NOTE — TELEPHONE ENCOUNTER
"Stephanie called to report she would not be attending PHP today secondary to eye infection.  Stephanie states on the phone she has had eye infections in past, reports \"this is not conjunctivitis, I've had this before, I get these eye infections\".  She reports eye being swollen shut, has call out to ophthalmologist and if not seen today she will go to eye center for treatment.  Explained to Stephanie that missing this day will not impact D/C which is scheduled for 9/28/2023.   "

## 2023-09-28 ENCOUNTER — PHP (OUTPATIENT)
Dept: PSYCHIATRY | Facility: HOSPITAL | Age: 73
End: 2023-09-28
Attending: PSYCHIATRY & NEUROLOGY
Payer: MEDICARE

## 2023-09-28 ENCOUNTER — PHP (OUTPATIENT)
Dept: PSYCHIATRY | Facility: HOSPITAL | Age: 73
End: 2023-09-28
Attending: COUNSELOR
Payer: MEDICARE

## 2023-09-28 DIAGNOSIS — F33.2 SEVERE EPISODE OF RECURRENT MAJOR DEPRESSIVE DISORDER, WITHOUT PSYCHOTIC FEATURES (CMS/HCC): Primary | ICD-10-CM

## 2023-09-28 DIAGNOSIS — F41.1 GENERALIZED ANXIETY DISORDER: Primary | ICD-10-CM

## 2023-09-28 DIAGNOSIS — F33.2 SEVERE EPISODE OF RECURRENT MAJOR DEPRESSIVE DISORDER, WITHOUT PSYCHOTIC FEATURES (CMS/HCC): ICD-10-CM

## 2023-09-28 PROCEDURE — 99214 OFFICE O/P EST MOD 30 MIN: CPT | Performed by: PSYCHIATRY & NEUROLOGY

## 2023-09-28 PROCEDURE — G0410 GRP PSYCH PARTIAL HOSP 45-50: HCPCS

## 2023-09-28 PROCEDURE — G0463 HOSPITAL OUTPT CLINIC VISIT: HCPCS | Performed by: PSYCHIATRY & NEUROLOGY

## 2023-09-28 PROCEDURE — 90837 PSYTX W PT 60 MINUTES: CPT | Performed by: COUNSELOR

## 2023-09-28 RX ORDER — CLONAZEPAM 1 MG/1
1 TABLET ORAL 2 TIMES DAILY
Qty: 60 TABLET | Refills: 0 | Status: SHIPPED | OUTPATIENT
Start: 2023-10-13 | End: 2023-12-11 | Stop reason: SDUPTHER

## 2023-09-28 RX ORDER — DULOXETIN HYDROCHLORIDE 20 MG/1
20 CAPSULE, DELAYED RELEASE ORAL DAILY
Qty: 90 CAPSULE | Refills: 0 | Status: SHIPPED | OUTPATIENT
Start: 2023-10-08 | End: 2023-11-16

## 2023-09-28 ASSESSMENT — COGNITIVE AND FUNCTIONAL STATUS - GENERAL
PSYCHOMOTOR FUNCTIONING: WNL
THOUGHT_PROCESS: WNL
IMPULSE CONTROL: INTACT
APPEARANCE: WELL GROOMED
INSIGHT: AWARE OF IMPACT ON FUNCTIONING;IMPAIRED, MINIMALLY
THOUGHT_CONTENT: APPROPRIATE
AROUSAL LEVEL: ALERT
AFFECT: FULL RANGE;TENSE
EST. PREMORBID INTELLIGENCE: NO CHANGE
CONCENTRATION: WNL
LIBIDO: NON-CONTRIBUTORY
PERCEPTUAL FUNCTION: NORMAL
ORIENTATION: FULLY ORIENTED
REMOTE MEMORY: WNL
RECENT MEMORY: WNL
APPETITE: INCREASED
ATTENTION: WNL
DELUSIONS: NONE OR AGE APPROPRIATE
SPEECH: REGULAR;PRESSURED
SLEEP_WAKE_CYCLE: INCREASED
EYE_CONTACT: WNL

## 2023-09-28 NOTE — GROUP NOTE
Date: 9/28/2023  Start Time:  1:20 PM  End Time:   2:20 PM  Stephanie Jeffers, YOB: 1950,  was an active group participant.    Wrap Up Group    Group Focus: Goal of group was to wrap up and process the treatment day.  Assist  members in planning for the evening ahead and to assess and plan surrounding any  safety needs.      About the Group:  Clinician reviewed group members Afternoon  Reflection Sheets and did a verbal check in with each group member regarding safety  (SI/HI/self harm) and any necessary safety planning .  Session ended with a brief  meditation/calming activity.  Paresh Saleem DO was onsite when  services rendered.        Afternoon Reflection:   Depression:  0/5  Anxiety:  0/5  Anger:  0/5  Thoughts of taking one's own life today?:  0/5 - None  Self Injury:  0/5 - None  Engaged in self-injury?  No  Thoughts of hurting other people today?:  0/5 - None  Compulsive Behaviors?:  0-None  Compulsive Behaviors?: No  Are you able to follow our safety plan?: Yes      I can/will:  Call someone, Use a coping skill and Journal  The most significant moment of the day or insight for me was...:  Being here and realizing it's my last day  Three things I am grateful for today are:  This program  Friends  family  Did you meet your goal for today? If not, what might you do tomorrow or this week to achieve it?:  Yes  My evening self-care plan is:  Bath   Relax  recovery      Pt treatment plan areas addressed: Coping Skills, Mindfulness and Interpersonal Effectiveness    Visit Diagnosis:      ICD-10-CM ICD-9-CM   1. Severe episode of recurrent major depressive disorder, without psychotic features (CMS/[facility])  F33.2 296.33       Assessment/Observations:  Stephanie was engaged during group. She was hopeful about her progress since her hospital and shared this hope for others in the group. She reported no symptoms and presented as euthymic.   Plan: Discharge from [facility]. Patient to f/u with OP providers  Pt to F/U  with treatment goals as outlined in treatment plan.  Pt to F/U with local ED/call 911 should SI/HI arises.    Cheryle Read, MATTHEW

## 2023-09-28 NOTE — GROUP NOTE
Date: 9/28/2023  Start Time:   9:30 AM  End Time: 10:30 AM    Stephanie Jeffers, YOB: 1950,  was an active group participant.    Community Check In Group  8 attended group today.     Group Focus: Goal of group was to welcome new and returning PHP members to the [facility], check in regarding group member needs, and assess safety.    About the Group: Clinician reviewed [facility] Group Code of Conduct and answered any questions that arose.  Clinician welcomed new members to the group and facilitated brief introductions of group members to one another.  Introduced topic/theme for the treatment day:Interpersonal Effectiveness.  Encouraged group members to request support throughout the day as needed.  Clinician reviewed group members Morning Reflection Sheets and did a verbal check in with each group member regarding safety (SI/HI/self harm), safety planning, medication compliance, if they needed to consult with anyone today and individual treatment needs/goals for the day.  Session ended with a brief meditation/calming activity.   Paresh Saleem DO was on site when services rendered.      Morning Reflection:   Depression:  0/5  Anxiety:  3/5  Anger:  4/5  Thoughts of taking one's own life today?:  0/5 - None  Self Injury:  0/5 - None  Engaged in Self Injury since yesterday: No    Thoughts of hurting other people today?:  0/5 - None  Compulsive Behaviors?:  0-None  Compulsive Behaviors?:  No  Are you able to follow your safety plan?: Yes      I can/will:  Call someone and Use a coping skill  Substance Use: No    Self Care: Yes      I completed my self care activity last night::  Took care of self    I am satisfied with my daily hygiene: Yes    Nourish:  Yes    Number of meals eaten yesterday:  2    Describe:  Minimal  Sleep:  Yes    Used sleep aid?: Yes      Describe sleep:  Broken and Restless  Social Interaction:  Yes    Social Interaction:  Minimal  Physical Activity:  No  Mindful Activity:  No  Medication:   Yes    Medication:  Prescribed  Thought Content:  Racing Thoughts and Irritable  Which coping skills did you use yesterday? How did they help or not help?  What barriers did you face?:  Deep breathing- self care  Pt reported significant or positive event/step:  Proud of myself for handling very stressful day and all sorts of commplications  Pt identified goal for the treatment day:  Get the most out of my last day  Pt treatment plan areas addressed:  Chronic Pain/Medical Issues, Self care and Anger Management  Pt requested consult with:  None      Visit Diagnosis:      ICD-10-CM ICD-9-CM   1. Severe episode of recurrent major depressive disorder, without psychotic features (CMS/HCC)  F33.2 296.33       Assessment/Observations:  Stephanie was engaged, needing some redirection for side conversation bit overall hopeful on her final day of PHP.   Plan:  DC from PHP to OP services.   Pt to F/U with treatment goals as outlined in treatment plan.  Pt to F/U with local ED/call 911 should SI/HI arises.    Ruby Stacy, LPC
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
## 2023-09-28 NOTE — GROUP NOTE
Date:  9/28/2023  Start Time:  10:40 AM  End Time:  11:40 AM  Stephanie Jeffers, YOB: 1950   8 members attended group today.    Group Focus:  Goal of group was to establish and build social support, review coping skills, normalize the struggles of being human, and increase self awareness and self compassion.    About the Group:  Clinician started group with a prompting quote/song to facilitate group discussion.  Group engaged in active and supportive discussion. Topics discussed included interpersonal challenges, boundaries, co parenting, divorce, family systems and behavior change.  Group members were able to offer insightful and supportive feedback and suggestions about how to manage difficult situations.  Group ended with a meditation/song.  Paresh Saleem DO was onsite when services were rendered.        Patient  was an active group participant.  Assessment/observation of group participation/presentation: Stephanie was fully engaged and vulnerable, relating to peers and the discussions on boundaries and disappointment and grief.   Treatment plan areas addressed: Depression and Interpersonal Effectiveness  Visit Diagnosis:      ICD-10-CM ICD-9-CM   1. Severe episode of recurrent major depressive disorder, without psychotic features (CMS/HCC)  F33.2 296.33       Plan: DC from PHP to OP providers  Pt to F/U with treatment goals as outlined in treatment plan.  Pt to F/U with local ED/call 911 should SI/HI arises.    Ruby Stacy, MATTHEW
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
## 2023-09-28 NOTE — PROGRESS NOTES
[facility] Psychotherapy Note    Stephanie Jeffers is a 72 y.o. female who presents for Discharge From Treatment.     Treatment Plan areas addressed during this visit:  Discharge, Aftercare, Depression, Anxiety    Mental Status Exam:  Arousal Level: Alert  Appearance: Well Groomed  Speech: Regular, Pressured  Psychomotor Functioning: WNL  Eye Contact: WNL  Est. Premorbid Intelligence: No change  Orientation: Fully oriented  Attention: WNL  Concentration: WNL  Recent Memory: WNL  Remote Memory: WNL  Thought Content: Appropriate  Thought Process: WNL  Insight: Aware of impact on functioning, Impaired, minimally  Perceptual Function: Normal  Delusions: None or age appropriate  Sleeping: Increased  Appetite: Increased  Libido: Non-Contributory  Affect: Full Range, Tense  Mood: Euthymic (normal), Hopeful, Motivated, Anxious, Irritable  GAD7 Total Score: : 3  PHQ-9: Brief Depression Severity Measure Score: 2       [place] Suicide Severity Rating Scale:  Not indicated          Assessment:   Met with Stephanie to process absence yesterday and experience of health anxiety.  Dicussed skills used to ameliorate her distress and solve the problem, acknowledging the hard work done in PHP.  See DC note.  Called sister Audrey in session and reviewed aftercare.  Normal agrees on the tremendous progress Stephanie has shown and hopefulness for aftercare.  All agreed to plan, pt signed and was given a hard copy of AVS.  In addition, dicussed OP group options and is interested in [facility] and will consider OP eval to join on Oct 11.      Plan:    Patient to F/U with med checks for medication management as directed by prescriber.  Patient to discharge from  [facility] today.   Patient to F/U with treatment goals as outlined in treatment plan.  Patient to F/U with local ED or call 911 should SI/HI arise.  Visit Diagnosis:    ICD-10-CM ICD-9-CM   1. Severe episode of recurrent major depressive disorder, without psychotic features (CMS/HCC)  F33.2 296.33        Time  Start Time: 1210  End Time: 1310  Total Time: 60  Ruby Stacy LPC @ 1:32 PM
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
## 2023-10-04 ENCOUNTER — TELEPHONE (OUTPATIENT)
Dept: PSYCHIATRY | Facility: HOSPITAL | Age: 73
End: 2023-10-04
Payer: MEDICARE

## 2023-10-04 NOTE — TELEPHONE ENCOUNTER
Return call for medline message requesting to speak to Dr Saleem.  Stephanie reports that she is experiencing headaches daily for 1 week and believes this is s/e of cymbalta.  She states the headache began while inpatient.  She reports no changes in mood, no changes to appetite, no changes to sleep, endorses drinking water and reports also being constipated.  Informed Stephanie that information will be forwarded to Dr Saleem, Stephanie expressed wanting to speak directly to provider.  RN explained that information would be relayed to Dr Saleem and recommendations would be communicated back usually by RN dependent on patient needs.  Also explained that if f/u recommendations pose concerns with communication we can try to schedule visit to address concerns.        Addendum to RN note.  Paresh Saleem DO    Called pt at number on file.  At optho currently. Called back to see doc so convo was brief 6 mins.  HA every other day upon awakening x 7d. Taking imatrex with good relief. No aura. No nausea, emesis. Sensitivity to sound and not light.   Some irritability. No depression.   HA onset after AIP.  ? if we should retrial lexapro at 2.5 mg vs effexor. Need more time to discuss.   augmentin with 1% risk of HA. Final day of augmentin today 2/2 chalazion. Eye infection improved and doing well.   Encourage good oral hydration and needs a sooner apt to discuss possible med change if needed. For now cont cymbalt at 20 mg (15% risk of HA). Unable to dec dose due to capsule formulation.  FD to call pt to see if she can FU with writer in 1 week on wed oct 11.

## 2023-10-11 ENCOUNTER — TELEMEDICINE (OUTPATIENT)
Dept: PSYCHIATRY | Facility: HOSPITAL | Age: 73
End: 2023-10-11
Attending: PSYCHIATRY & NEUROLOGY
Payer: MEDICARE

## 2023-10-11 DIAGNOSIS — F33.2 SEVERE EPISODE OF RECURRENT MAJOR DEPRESSIVE DISORDER, WITHOUT PSYCHOTIC FEATURES (CMS/HCC): Primary | ICD-10-CM

## 2023-10-11 DIAGNOSIS — F41.1 GENERALIZED ANXIETY DISORDER: ICD-10-CM

## 2023-10-11 PROCEDURE — 99215 OFFICE O/P EST HI 40 MIN: CPT | Mod: 95 | Performed by: PSYCHIATRY & NEUROLOGY

## 2023-10-11 RX ORDER — VENLAFAXINE HYDROCHLORIDE 37.5 MG/1
37.5 CAPSULE, EXTENDED RELEASE ORAL DAILY
Qty: 30 CAPSULE | Refills: 2 | Status: SHIPPED | OUTPATIENT
Start: 2023-10-11 | End: 2023-11-16
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
## 2023-10-30 ENCOUNTER — DOCUMENTATION (OUTPATIENT)
Dept: PSYCHIATRY | Facility: HOSPITAL | Age: 73
End: 2023-10-30
Payer: MEDICARE

## 2023-10-30 NOTE — PROGRESS NOTES
Worsening depression and inc irritability on effexor 37.5 mg. Dc today per pt request. initiate lexapro 2.5 mg PO daily. rx sent to pharmacy. Has scheduled fu with writer.

## 2023-11-13 ENCOUNTER — HOSPITAL ENCOUNTER (OUTPATIENT)
Dept: RADIOLOGY | Facility: CLINIC | Age: 73
Discharge: HOME | End: 2023-11-13
Attending: INTERNAL MEDICINE
Payer: MEDICARE

## 2023-11-13 ENCOUNTER — TRANSCRIBE ORDERS (OUTPATIENT)
Dept: RADIOLOGY | Facility: CLINIC | Age: 73
End: 2023-11-13

## 2023-11-13 DIAGNOSIS — M81.0 AGE-RELATED OSTEOPOROSIS WITHOUT CURRENT PATHOLOGICAL FRACTURE: ICD-10-CM

## 2023-11-13 DIAGNOSIS — M81.0 AGE-RELATED OSTEOPOROSIS WITHOUT CURRENT PATHOLOGICAL FRACTURE: Primary | ICD-10-CM

## 2023-11-13 PROCEDURE — 77080 DXA BONE DENSITY AXIAL: CPT

## 2023-11-16 ENCOUNTER — TELEMEDICINE (OUTPATIENT)
Dept: PSYCHIATRY | Facility: HOSPITAL | Age: 73
End: 2023-11-16
Attending: PSYCHIATRY & NEUROLOGY
Payer: MEDICARE

## 2023-11-16 DIAGNOSIS — F41.1 GENERALIZED ANXIETY DISORDER: ICD-10-CM

## 2023-11-16 DIAGNOSIS — F33.2 SEVERE EPISODE OF RECURRENT MAJOR DEPRESSIVE DISORDER, WITHOUT PSYCHOTIC FEATURES (CMS/HCC): Primary | ICD-10-CM

## 2023-11-16 PROCEDURE — 99214 OFFICE O/P EST MOD 30 MIN: CPT | Mod: 95 | Performed by: PSYCHIATRY & NEUROLOGY
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
## 2023-12-07 ENCOUNTER — OFFICE VISIT (OUTPATIENT)
Dept: RHEUMATOLOGY | Facility: CLINIC | Age: 73
End: 2023-12-07
Payer: MEDICARE

## 2023-12-07 VITALS — HEART RATE: 90 BPM | OXYGEN SATURATION: 98 % | DIASTOLIC BLOOD PRESSURE: 62 MMHG | SYSTOLIC BLOOD PRESSURE: 90 MMHG
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
## 2023-12-07 DIAGNOSIS — M81.0 AGE-RELATED OSTEOPOROSIS WITHOUT CURRENT PATHOLOGICAL FRACTURE: ICD-10-CM

## 2023-12-07 DIAGNOSIS — Z71.2 VISIT FOR REVIEW OF DEXA SCAN: ICD-10-CM

## 2023-12-07 PROCEDURE — 99205 OFFICE O/P NEW HI 60 MIN: CPT | Performed by: STUDENT IN AN ORGANIZED HEALTH CARE EDUCATION/TRAINING PROGRAM

## 2023-12-07 RX ORDER — ESCITALOPRAM OXALATE 5 MG/1
5 TABLET ORAL DAILY
COMMUNITY
End: 2023-12-20

## 2023-12-07 NOTE — PATIENT INSTRUCTIONS
Local Anesthesia Pre Procedure Assessment    Informed Consent:    Consent Obtained: Yes     Procedure Assessment:    Preop Diagnosis/Indications for Procedure: Pain  Planned Procedure: b/l L5-S1 TFESI  Planned Anesthetic: Local    Medical History/Comorbid Conditions:    Significant Medical/Surgical History: Yes (comment) (See H&P Note)  Normal Mental Status: Yes    Examination Pertinent to Procedure Being Performed:    Evaluation of Operative Site: Clean, no deformity, redness/warmth/swelling, no masses    Other Findings:    Reviewed Current Medications and Allergies: Yes    Pertinent Lab/Diagnostic Tests:    Pertinent Lab / Diagnostic Tests: Other (comment) (See Imaging section)    Tamera Hodge MD  Interventional Pain Management  [facility]  04/14/21   You were seen today for your history of osteoporosis  Given that your T-scores have largely stabilized, we have decided to pursue a drug holiday with careful watching.  Please be careful with slippery surfaces when walking on them - We don't want you to suffer a hip fracture in the context of osteoporosis/osteopenia  I would suggest a repeat bone density in 12-18 months.  In the meantime, optimize weight training/resistance training as it has been shown to improve T-scores. A referral to physical therapy has been made - Please ask the  for [facility]'s number.    Let's do a follow-up after your next bone density.

## 2023-12-07 NOTE — PROGRESS NOTES
"RHEUMATOLOGY NEW PATIENT VISIT  12/07/23      HPI  Referred by: PCP Dr Wang  Reason for consult: Osteoporosis  Date of visit: 12/07/23  Chief Complaints: Review DEXA    Ms. Jeffers is a 73 y.o. female who presents for evaluation of osteoporosis. Her PMH includes OA/DJD, anxiety, HLD  -Prev followed by  Ortho/Dr Lebron. Was dx with osteoporosis about 4-5 years ago. Was tx Reclast IV x 4 treatments with her last tx in May 2023. Wants to know if she can continue  -DEXA 11/2023: L spine T-2.0, L femoral neck T-1.9, R femoral neck T-2.2 (with 4.8% worsening BMD in total hip)  -DEXA 2021: L-spine T-2.1, L femoral neck T-2.2, R femoral neck T-1.9  -DEXA 2019: L-spine T-2.5, L femoral neck T-2.2, R femoral neck T-2.1  -No hx of falls or fragility fx but has \"lots of disc issues.\" Not working with PT  but pending discussion for [facility] for consideration of ESIs. Mentioned that she did PT in the remote past and felt TENs therapy helped a lot  -Remains on VD 4000IU daily. No Ca2+ supplementation due to higher risk of Ca heart score  -FHx: No FHx of osteoporosis  -SocHx: T-Denies. E-Denies. D-Denies. 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    PMHx  Past Medical History:   Diagnosis Date   • Hypotension    • IPMN (intraductal papillary mucinous neoplasm) 5/31/2022   • Kidney stones    • Lipid disorder    • Migraines        PSHx  Past Surgical History:   Procedure Laterality Date   • CATARACT EXTRACTION Left 11/19/2018   • CATARACT EXTRACTION Right 01/16/2019       CURRENT MEDS    Current Outpatient Medications:   •  clonazePAM (klonoPIN) 1 mg tablet, Take by mouth nightly.  , Disp: , Rfl: 5  •  escitalopram (LEXAPRO) 5 mg tablet, Take 5 mg by mouth daily., Disp: , Rfl:   •  REPATHA SURECLICK 140 mg/mL pen, INJECT 1 ML (140 MG TOTAL) UNDER THE SKIN EVERY 14 DAYS., Disp: 6 mL, Rfl: 3  •  SUMAtriptan (IMITREX) 100 mg tablet, Take by " "mouth as needed.  , Disp: , Rfl:   •  clonazePAM (KlonoPIN) 1 mg tablet, Take 1 tablet (1 mg total) by mouth 2 (two) times a day., Disp: 60 tablet, Rfl: 0  •  zoledronic acid/mannitol-water (RECLAST IV), Infuse into a venous catheter., Disp: , Rfl:     ALLERGIES  Patient has no known allergies.    FAMILY HISTORY  Family History   Problem Relation Age of Onset   • Hyperlipidemia Biological Mother    • Lung cancer Biological Mother    • Hyperlipidemia Biological Father    • Hypertension Biological Father    • Diabetes Biological Father    • Tetralogy of Fallot  Biological Sister    • Breast cancer Cousin      No known family history of sudden cardiac death.    SOCIAL HISTORY  Social History     Socioeconomic History   • Marital status:    Tobacco Use   • Smoking status: Never   • Smokeless tobacco: Never   Substance and Sexual Activity   • Alcohol use: Not Currently     Alcohol/week: 1.0 standard drink of alcohol     Types: 1 Glasses of wine per week     Comment: glass of wine 3 times per week ; \"I have 3 glasses per month\" last drink 1 month ago   • Drug use: Yes     Types: Cocaine     Comment: 35 years ago   • Sexual activity: Not Currently     Partners: Male     Birth control/protection: Post-menopausal   Social History Narrative    Two daughters - one has two children, younger daughter is an actress     She is a retired      Lives at [employer]     Social Determinants of Health     Stress: No Stress Concern Present (9/5/2023)    [employer] [place] [employer] - Occupational Stress Questionnaire    • Feeling of Stress : Only a little       PHYSICAL EXAM  Vitals:    12/07/23 0752   BP: 90/62   BP Location: Left upper arm   Patient Position: Sitting   Pulse: 90   SpO2: 98%       GEN  NAD, sitting comfortably upright in chair. Thin female  EYES     No conjunctivitis, no scleral icterus,   HEENT              No pallor, no alopecia, no facial rashes  LUNGS   CTA b/l, no wheezing "   CARDIO         RRR, nl s1/s2, No m/g/r  GI             Soft, non tender, non distended  NEURO  Alert and oriented x3. Gait preserved.   SKIN  No rashes, no livedo reticularis, no nodules. No periungal skin changes  MSK                     Preserved handgrip. BL 1st CMC squaring w/ scattered [place], Lonnie nodes.  No synovitis to b/l MCPs, PIPs, DIPs. Wrists and elbows w/o swelling, effusion and preserved ROM B/L. Bilateral GH joints w/ preserved ROM. Knees with preserved ROM w/o effusion, warmth, erythema. +tive crepitus B/L.     LABS AND IMAGING  Pertinent labs and imaging reviewed      ASSESSMENT AND PLAN  Ms. Jeffers is a 73 y.o. female with hx of osteoporosis who presents for osteoporosis care. Was dx with osteoporosis >4-5 years ago and s/p 4 treatments of IV Reclast. Serial DEXA showing internal improvement of L-spine and L femoral neck/hip with stable R femoral neck/hip. Alerted pt of interval findings. Given such, provided two options for the patient - consideration of drug holiday in light of stability; or pursuance of denosumab if the overall goal is to treat aggressively in prevention of hip fx from underlying osteoporosis. Discussed R/B/A of both. Together, we agreed to consider drug holiday as her absolute T-scores are <-2.5 with only BMD change in her R femoral neck/hip that has been stable since 2019. Discussed also benefit of weight training/resistance training for osteoporosis improvement.    #Osteoporosis  #DEXA review  #Exercise involving weight training  -Opting for drug holiday with continual surveillance  -Rpt DEXA in 12-18 mo. Pending DEXA, can consider continued drug holiday vs escalation to denosumab  -Continue w/ VD 4000IU daily  -Referral to PT for weight training therapies  -RTC after DEXA    I personally spent a total visit time of 62 minutes for Stephanie Jeffers on this date of service. My evaluation of this patient includes a review of the chart and notes written by other  providers, history, laboratory, imaging, discussion with the patient, coordination of care with other providers, placing of orders, and documentation of the plan as detailed above.      Kellen Soria MD  Rheumatology     Orders Placed This Encounter   Procedures   • DEXA BONE DENSITY   • Ambulatory referral to Physical Therapy

## 2023-12-11 ENCOUNTER — TELEPHONE (OUTPATIENT)
Dept: PSYCHIATRY | Facility: HOSPITAL | Age: 73
End: 2023-12-11
Payer: MEDICARE

## 2023-12-15 NOTE — PROGRESS NOTES
12/15/23  I rev'd records from [facility]. Per documentation, was dx osteoporosis and rec'd Reclast with 2nd infusion dated 5/13/21, 3rd infusion on 5/16/22. I do not have any documentations of a 4th infusion in 5/2023 but pt did report receiving an infusion of Reclast/zoledronic acid then. Given such, no change to current management which is to opt for drug holiday w/ rpt DEXA in 18-24 months. If there is interval change in BMD, would consider escalation to denosumab vs restarting of bisphosphonate.    Kellen Soria MD  Rheumatology
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
## 2023-12-21 ENCOUNTER — DOCUMENTATION (OUTPATIENT)
Dept: PSYCHIATRY | Facility: HOSPITAL | Age: 73
End: 2023-12-21
Payer: MEDICARE

## 2023-12-21 ENCOUNTER — HOSPITAL ENCOUNTER (INPATIENT)
Facility: HOSPITAL | Age: 73
LOS: 4 days | Discharge: HOME | DRG: 885 | End: 2023-12-25
Attending: PSYCHIATRY & NEUROLOGY | Admitting: PSYCHIATRY & NEUROLOGY
Payer: MEDICARE

## 2023-12-21 ENCOUNTER — TELEPHONE (OUTPATIENT)
Dept: PSYCHIATRY | Facility: HOSPITAL | Age: 73
End: 2023-12-21
Payer: MEDICARE

## 2023-12-21 ENCOUNTER — OFFICE VISIT (OUTPATIENT)
Dept: PSYCHIATRY | Facility: HOSPITAL | Age: 73
End: 2023-12-21
Attending: PSYCHIATRY & NEUROLOGY
Payer: MEDICARE

## 2023-12-21 ENCOUNTER — HOSPITAL ENCOUNTER (EMERGENCY)
Facility: HOSPITAL | Age: 73
End: 2023-12-21
Attending: STUDENT IN AN ORGANIZED HEALTH CARE EDUCATION/TRAINING PROGRAM
Payer: MEDICARE

## 2023-12-21 VITALS
BODY MASS INDEX: 18.27 KG/M2 | HEIGHT: 64 IN | DIASTOLIC BLOOD PRESSURE: 63 MMHG | HEART RATE: 85 BPM | RESPIRATION RATE: 18 BRPM | SYSTOLIC BLOOD PRESSURE: 125 MMHG | TEMPERATURE: 96.7 F | WEIGHT: 107 LBS | OXYGEN SATURATION: 99 %

## 2023-12-21 VITALS — WEIGHT: 107.6 LBS | BODY MASS INDEX: 18.47 KG/M2

## 2023-12-21 DIAGNOSIS — R45.851 DEPRESSION WITH SUICIDAL IDEATION: Primary | ICD-10-CM

## 2023-12-21 DIAGNOSIS — F41.1 GENERALIZED ANXIETY DISORDER: Primary | ICD-10-CM

## 2023-12-21 DIAGNOSIS — F41.1 GENERALIZED ANXIETY DISORDER: ICD-10-CM

## 2023-12-21 DIAGNOSIS — F32.A DEPRESSION WITH SUICIDAL IDEATION: Primary | ICD-10-CM

## 2023-12-21 DIAGNOSIS — F33.2 SEVERE EPISODE OF RECURRENT MAJOR DEPRESSIVE DISORDER, WITHOUT PSYCHOTIC FEATURES (CMS/HCC): Primary | ICD-10-CM

## 2023-12-21 LAB
AMPHET UR QL SCN: NOT DETECTED
ANION GAP SERPL CALC-SCNC: 5 MEQ/L (ref 3–15)
APAP SERPL-MCNC: 0.3 UG/ML (ref 10–30)
BARBITURATES UR QL SCN: NOT DETECTED
BASOPHILS # BLD: 0.02 K/UL (ref 0.01–0.1)
BASOPHILS NFR BLD: 0.3 %
BENZODIAZ UR QL SCN: NOT DETECTED
BILIRUB UR QL STRIP.AUTO: NEGATIVE MG/DL
BUN SERPL-MCNC: 17 MG/DL (ref 7–25)
CALCIUM SERPL-MCNC: 10 MG/DL (ref 8.6–10.3)
CANNABINOIDS UR QL SCN: NOT DETECTED
CHLORIDE SERPL-SCNC: 103 MEQ/L (ref 98–107)
CLARITY UR REFRACT.AUTO: CLEAR
CO2 SERPL-SCNC: 30 MEQ/L (ref 21–31)
COCAINE UR QL SCN: NOT DETECTED
COLOR UR AUTO: YELLOW
CREAT SERPL-MCNC: 0.9 MG/DL (ref 0.6–1.2)
DIFFERENTIAL METHOD BLD: ABNORMAL
EGFRCR SERPLBLD CKD-EPI 2021: >60 ML/MIN/1.73M*2
EOSINOPHIL # BLD: 0.03 K/UL (ref 0.04–0.36)
EOSINOPHIL NFR BLD: 0.4 %
ERYTHROCYTE [DISTWIDTH] IN BLOOD BY AUTOMATED COUNT: 13.9 % (ref 11.7–14.4)
ETHANOL SERPL-MCNC: <10 MG/DL
FENTANYL URINE SCR: NOT DETECTED
GLUCOSE SERPL-MCNC: 102 MG/DL (ref 70–99)
GLUCOSE UR STRIP.AUTO-MCNC: NEGATIVE MG/DL
HCT VFR BLDCO AUTO: 43.5 % (ref 35–45)
HGB BLD-MCNC: 13.6 G/DL (ref 11.8–15.7)
HGB UR QL STRIP.AUTO: NEGATIVE
IMM GRANULOCYTES # BLD AUTO: 0.01 K/UL (ref 0–0.08)
IMM GRANULOCYTES NFR BLD AUTO: 0.1 %
KETONES UR STRIP.AUTO-MCNC: NEGATIVE MG/DL
LEUKOCYTE ESTERASE UR QL STRIP.AUTO: NEGATIVE
LYMPHOCYTES # BLD: 1.8 K/UL (ref 1.2–3.5)
LYMPHOCYTES NFR BLD: 26.1 %
MCH RBC QN AUTO: 27 PG (ref 28–33.2)
MCHC RBC AUTO-ENTMCNC: 31.3 G/DL (ref 32.2–35.5)
MCV RBC AUTO: 86.5 FL (ref 83–98)
MONOCYTES # BLD: 0.57 K/UL (ref 0.28–0.8)
MONOCYTES NFR BLD: 8.3 %
NEUTROPHILS # BLD: 4.47 K/UL (ref 1.7–7)
NEUTS SEG NFR BLD: 64.8 %
NITRITE UR QL STRIP.AUTO: NEGATIVE
NRBC BLD-RTO: 0 %
OPIATES UR QL SCN: NOT DETECTED
PCP UR QL SCN: NOT DETECTED
PDW BLD AUTO: 9.7 FL (ref 9.4–12.3)
PH UR STRIP.AUTO: 6.5 [PH]
PLATELET # BLD AUTO: 242 K/UL (ref 150–369)
POTASSIUM SERPL-SCNC: 4.3 MEQ/L (ref 3.5–5.1)
PROT UR QL STRIP.AUTO: NEGATIVE
RBC # BLD AUTO: 5.03 M/UL (ref 3.93–5.22)
SALICYLATES SERPL-MCNC: <1.5 MG/DL
SARS-COV-2 AG RESP QL IA.RAPID: NORMAL
SODIUM SERPL-SCNC: 138 MEQ/L (ref 136–145)
SP GR UR REFRACT.AUTO: 1.01
UROBILINOGEN UR STRIP-ACNC: 0.2 EU/DL
WBC # BLD AUTO: 6.9 K/UL (ref 3.8–10.5)

## 2023-12-21 PROCEDURE — 90833 PSYTX W PT W E/M 30 MIN: CPT | Mod: 95 | Performed by: PSYCHIATRY & NEUROLOGY

## 2023-12-21 PROCEDURE — 63700000 HC SELF-ADMINISTRABLE DRUG: Performed by: PHYSICIAN ASSISTANT

## 2023-12-21 PROCEDURE — 84439 ASSAY OF FREE THYROXINE: CPT | Performed by: PSYCHIATRY & NEUROLOGY

## 2023-12-21 PROCEDURE — 36415 COLL VENOUS BLD VENIPUNCTURE: CPT | Performed by: STUDENT IN AN ORGANIZED HEALTH CARE EDUCATION/TRAINING PROGRAM

## 2023-12-21 PROCEDURE — 87426 SARSCOV CORONAVIRUS AG IA: CPT | Performed by: PHYSICIAN ASSISTANT

## 2023-12-21 PROCEDURE — 81003 URINALYSIS AUTO W/O SCOPE: CPT | Mod: 59 | Performed by: PHYSICIAN ASSISTANT

## 2023-12-21 PROCEDURE — 99285 EMERGENCY DEPT VISIT HI MDM: CPT

## 2023-12-21 PROCEDURE — G0480 DRUG TEST DEF 1-7 CLASSES: HCPCS | Mod: 59 | Performed by: STUDENT IN AN ORGANIZED HEALTH CARE EDUCATION/TRAINING PROGRAM

## 2023-12-21 PROCEDURE — 84443 ASSAY THYROID STIM HORMONE: CPT | Performed by: PSYCHIATRY & NEUROLOGY

## 2023-12-21 PROCEDURE — 83036 HEMOGLOBIN GLYCOSYLATED A1C: CPT | Performed by: PSYCHIATRY & NEUROLOGY

## 2023-12-21 PROCEDURE — 80307 DRUG TEST PRSMV CHEM ANLYZR: CPT | Performed by: STUDENT IN AN ORGANIZED HEALTH CARE EDUCATION/TRAINING PROGRAM

## 2023-12-21 PROCEDURE — 80048 BASIC METABOLIC PNL TOTAL CA: CPT | Performed by: STUDENT IN AN ORGANIZED HEALTH CARE EDUCATION/TRAINING PROGRAM

## 2023-12-21 PROCEDURE — 12400000 HC ROOM AND CARE SEMIPRIVATE PSYCH

## 2023-12-21 PROCEDURE — 93005 ELECTROCARDIOGRAM TRACING: CPT | Performed by: PSYCHIATRY & NEUROLOGY

## 2023-12-21 PROCEDURE — 99215 OFFICE O/P EST HI 40 MIN: CPT | Mod: 95 | Performed by: PSYCHIATRY & NEUROLOGY

## 2023-12-21 PROCEDURE — G0463 HOSPITAL OUTPT CLINIC VISIT: HCPCS | Mod: 95 | Performed by: PSYCHIATRY & NEUROLOGY

## 2023-12-21 PROCEDURE — 85025 COMPLETE CBC W/AUTO DIFF WBC: CPT | Performed by: STUDENT IN AN ORGANIZED HEALTH CARE EDUCATION/TRAINING PROGRAM

## 2023-12-21 PROCEDURE — 80061 LIPID PANEL: CPT | Performed by: PSYCHIATRY & NEUROLOGY

## 2023-12-21 PROCEDURE — 63700000 HC SELF-ADMINISTRABLE DRUG: Performed by: PSYCHIATRY & NEUROLOGY

## 2023-12-21 RX ORDER — CLONAZEPAM 1 MG/1
1 TABLET ORAL NIGHTLY
Status: COMPLETED | OUTPATIENT
Start: 2023-12-21 | End: 2023-12-21

## 2023-12-21 RX ORDER — HYDROXYZINE HYDROCHLORIDE 25 MG/1
50 TABLET, FILM COATED ORAL EVERY 6 HOURS PRN
Status: DISCONTINUED | OUTPATIENT
Start: 2023-12-21 | End: 2023-12-25 | Stop reason: HOSPADM

## 2023-12-21 RX ORDER — VENLAFAXINE HYDROCHLORIDE 75 MG/1
75 CAPSULE, EXTENDED RELEASE ORAL DAILY
Qty: 90 CAPSULE | Refills: 0 | Status: ON HOLD | OUTPATIENT
Start: 2023-12-21 | End: 2023-12-25

## 2023-12-21 RX ORDER — LORAZEPAM 0.5 MG/1
0.5 TABLET ORAL ONCE
Status: COMPLETED | OUTPATIENT
Start: 2023-12-21 | End: 2023-12-21

## 2023-12-21 RX ORDER — ACETAMINOPHEN 325 MG/1
650 TABLET ORAL EVERY 4 HOURS PRN
Status: DISCONTINUED | OUTPATIENT
Start: 2023-12-21 | End: 2023-12-25 | Stop reason: HOSPADM

## 2023-12-21 RX ORDER — IBUPROFEN/PSEUDOEPHEDRINE HCL 200MG-30MG
3 TABLET ORAL NIGHTLY PRN
Status: DISCONTINUED | OUTPATIENT
Start: 2023-12-21 | End: 2023-12-25 | Stop reason: HOSPADM

## 2023-12-21 RX ADMIN — CLONAZEPAM 1 MG: 1 TABLET ORAL at 22:20

## 2023-12-21 RX ADMIN — LORAZEPAM 0.5 MG: 0.5 TABLET ORAL at 16:13

## 2023-12-21 ASSESSMENT — ENCOUNTER SYMPTOMS
DIFFICULTY URINATING: 0
HALLUCINATIONS: 0
ABDOMINAL PAIN: 0
FEVER: 0
DIARRHEA: 0
DIZZINESS: 0
SLEEP DISTURBANCE: 1
FATIGUE: 1
HEADACHES: 0
SORE THROAT: 0
APPETITE CHANGE: 1
VOMITING: 0
DYSPHORIC MOOD: 1
SHORTNESS OF BREATH: 0
COUGH: 0
WOUND: 0

## 2023-12-21 ASSESSMENT — COGNITIVE AND FUNCTIONAL STATUS - GENERAL
AROUSAL LEVEL: ALERT
DELUSIONS: NONE OR AGE APPROPRIATE
CONCENTRATION: WNL
IMPULSE CONTROL: INTACT
RECENT MEMORY: WNL
DO YOU HAVE SERIOUS DIFFICULTY WALKING OR CLIMBING STAIRS: NO
AFFECT: TEARFUL
ORIENTATION: FULLY ORIENTED
LIBIDO: NON-CONTRIBUTORY
SLEEP_WAKE_CYCLE: NO CHANGE
REMOTE MEMORY: WNL
SPEECH: REGULAR
EYE_CONTACT: WNL
THOUGHT_CONTENT: APPROPRIATE
JUDGEMENT: IMPAIRED, MINIMALLY
MOOD: DEPRESSED;ANXIOUS
PERCEPTUAL FUNCTION: NORMAL
THOUGHT_PROCESS: WORRY
INSIGHT: IMPAIRED, MODERATELY
PSYCHOMOTOR FUNCTIONING: WNL
ATTENTION: WNL
APPETITE: DECREASED

## 2023-12-21 NOTE — Clinical Note
Can you please convert one of my open PEVS on Friday dec 29 at 1 pm to a 40 min apt followed by a 20 min apt. My current pt aristides will take the 40 min apt at 1 pm.

## 2023-12-21 NOTE — CONSULTS
"Patient Information     Patient Name  Stephanie Jeffers MRN  [id number] Legal Sex  Female  Age  1950 (73 y.o.) [facility]         Admit Date Department Dept Phone    2023 [facility] Emergency Department [phone]       Presenting Problems - Thu 2023     Row Name 1426       Presenting Problems    Who accompanied patient today? Patient's sister-Ewa.  Patient gave verbal permission for sister to remain present for  assessment.    Presenting Problems Patient is a 73-year-old female referred to the ED by outpatient psychiatrist at [facility]-Dr. Saleem for inpatient psych treatment for worsening SI and depression.  Patient has a history of depression.  Patient reported worsening SI and depression over the last couple weeks with thoughts about \"sloshing\" her wrists or overdosing on pills.  Patient denied acting on these thoughts.  Patient reported \"severe \"depression reporting \"sad, hopeless, anxiety.  \"Patient reported having \"no energy \"and does not want to get out of bed or see or talk to anyone.  Patient stated that she feels that she is a burden on her family and that they would be better off without her.  Patient reported anhedonia and having \"no appetite.  \"Low appetite has been much worse in the last 1 to 2 weeks.  Patient stated that she tries to force herself to eat but it has been very difficult to eat or drink in the context of depression symptoms.  Patient reported very dry mouth.  Patient stated that she was 108 pounds in September and has lost about 3 to 4 pounds in the last month.  Patient reported multiple medication changes in the last couple months since she was inpatient at [facility] in September.  Patient stated that she was started on Cymbalta and switch to Lexapro and has now switched to Effexor for the last 9 days.  Patient stated that she saw outpatient psychiatrist today who doubled her dose of effexor.  Patient also stated that she has been taking 1 mg of " Klonopin at night for the last 20 years which helps with her sleep. Pt denied the following: HI, AVH, past suicide attempts, eating disorder hx, access to guns, SIB, drug or alcohol use.    Patient Experiencing weight change;difficulty concentrating;energy;guilt;appetite;worthlessness;hopelessness;anxiety    Weight Change loss    Weight Change Amount 3-4 lbs    Weight Change Time Frame months  in the last month    Energy decreased    Appetite decreased    Stressors Pt stated that when she was IP in September this year, the main stressor was that she was estrangled from her older daughter but states that this relationship has since been mended. Pt not able to identify more recent trigger/stressor for current episode.             Mental Status Exam - Thu December 21, 2023     Row Name 1437       Mental Status Exam    Arousal Level Alert    Appearance --  Pt presents in hospital gown and sitting up in hospital bed.    Speech Regular    Psychomotor Functioning WNL    Eye Contact WNL    Orientation Fully oriented    Attention WNL    Concentration WNL    Recent Memory WNL    Remote Memory WNL    Thought Content Appropriate    Thought Process Worry    Insight Impaired, moderately    Judgement impaired, minimally    Impulse Control Intact    Perceptual Function Normal    Delusions None or age appropriate    Sleeping No Change  Pt states that sleep is good with her klonopin 1mg at night.    Appetite Decreased    Libido Non-Contributory    Affect Tearful    Mood Depressed;Anxious             Suicide and Homicide Risk - Thu December 21, 2023     Row Name 1434       [facility] Suicide and Homicide Risk    Do you currently have any suicidal ideation or thoughts? Yes    Do you currently or have you had any thoughts of self-harm? No    Do you currently have homicidal ideation or have you ever harmed anyone else?  No    Do you have easy access to firearms? No             Safe-T Assessment - Thu December 21, 2023     Row Name 1437        "SAFE-T Assessment Risk Factors      Current/Past Psychiatric Disorders Mood disorders    Key symptoms Anhedonia;Hopelessness;Anxiety/panic    Family History Risk Factors --  daughter-depression    Precipitants/Stressors/Interpersonal/Triggers Events leading to humiliation, shame or despair;Social isolation;Perceived burden on others;Family turmoil/chaos    Change in Treatment Recent discharge from psychiatric hospital;Provider or treatment change    Access to fire arms. No       SAFE-T Assessment Protective Factors    Internal factors Fear of death or the actual act of killing self,    External Factors Supportive social network of family or friends;Positive therapeutic relationships       SAFE-T Assessment Suicidal Inquiry     In the last month, how many times have you had suicidal thoughts? 2-5 times in week    In the last month, when you have had suicidal thoughts, how long do they last? 1-4 hours/a lot of time    In the last month, could/can you stop thinking about killing yourself or wanting to die if you want to? Can control thoughts with alot of difficulty    In the last month, are there things - anyone or anything (i.e. family, Evangelical, pain of death) - that stopped you from wanting to die or acting on thoughts of suicide?  Uncertain that deterrents stopped you    In the last month, what sorts of reasons did you have for thinking about wanting to die or killing yourself? Completely to end or stop the pain (you couldn’t go on living with the pain or how you were feeling)       SAFE-T Assessment Determination of Risk and Interventions    Safe T Assessment of Risk:  High Suicide Risk    Interventions Referral to higher level of care            Alcohol Use     Not Currently; 1.0 standard drink of alcohol per week; 1 Glasses of wine.    Comments: glass of wine 3 times per week ; \"I have 3 glasses per month\" last drink 1 month ago      Tobacco Use     Never smoked or used smokeless tobacco.      Problem List  " Current as of 12/21/23 1454           Acute left-sided low back pain without sciatica Age-related cataract of both eyes    Dizziness Elevated coronary artery calcium score    Elevated lipoprotein(a) Familial hypercholesterolemia    General medical exam Generalized anxiety disorder    IPMN (intraductal papillary mucinous neoplasm) Insomnia    Migraine Mood disorder (CMS/HCC)    Orthostatic hypotension Osteopenia    Poor appetite Recurrent UTI    Severe episode of recurrent major depressive disorder, without psychotic features (CMS/HCC) Underweight    Urinary frequency       Allergies    No Known Allergies     Results (last 24 hours)     Procedure Component Value Units Date/Time    Urine drug screen (UDS) [[id number]]  (Normal) Collected: 12/21/23 1352    Order Status: Completed Specimen: Urine, Clean Catch Updated: 12/21/23 1431     PCP Scrn, Ur Not Detected     Benzodiazepine Ur Qual Not Detected     Cocaine Screen, Urine Not Detected     Amphetamine+Methamphetamine Screen, Ur Not Detected     Cannabinoid Screen, Urine Not Detected     Opiate Scrn, Ur Not Detected     Barbiturate Screen, Ur Not Detected     Fentanyl Screen, Urine Not Detected    ER toxicology screen, serum [[id number]]  (Abnormal) Collected: 12/21/23 1306    Order Status: Completed Specimen: Blood, Venous Updated: 12/21/23 1346     Salicylate <1.5 mg/dL      Acetaminophen 0.3 ug/mL      Ethanol <10 mg/dL     Basic metabolic panel [[id number]]  (Abnormal) Collected: 12/21/23 1306    Order Status: Completed Specimen: Blood, Venous Updated: 12/21/23 1343     Sodium 138 mEQ/L      Potassium 4.3 mEQ/L      Chloride 103 mEQ/L      CO2 30 mEQ/L      BUN 17 mg/dL      Creatinine 0.9 mg/dL      Glucose 102 mg/dL      Calcium 10.0 mg/dL      eGFR >60.0 mL/min/1.73m*2      Anion Gap 5 mEQ/L     CBC and differential [[id number]]  (Abnormal) Collected: 12/21/23 1306    Order Status: Completed Specimen: Blood, Venous Updated: 12/21/23 1318     WBC 6.90 K/uL      RBC  "5.03 M/uL      Hemoglobin 13.6 g/dL      Hematocrit 43.5 %      MCV 86.5 fL      MCH 27.0 pg      MCHC 31.3 g/dL      RDW 13.9 %      Platelets 242 K/uL      MPV 9.7 fL      Differential Type Auto     nRBC 0.0 %      Immature Granulocytes 0.1 %      Neutrophils 64.8 %      Lymphocytes 26.1 %      Monocytes 8.3 %      Eosinophils 0.4 %      Basophils 0.3 %      Immature Granulocytes, Absolute 0.01 K/uL      Neutrophils, Absolute 4.47 K/uL      Lymphocytes, Absolute 1.80 K/uL      Monocytes, Absolute 0.57 K/uL      Eosinophils, Absolute 0.03 K/uL      Basophils, Absolute 0.02 K/uL       Medical History     Diagnosis Date Comment Source    Hypotension       IPMN (intraductal papillary mucinous neoplasm) 5/31/2022      Kidney stones       Lipid disorder       Migraines         Surgical History     Procedure Laterality Date Comment Source    CATARACT EXTRACTION Left 11/19/2018      CATARACT EXTRACTION Right 01/16/2019         Mental Health/Substance Use Treatment - Thu December 21, 2023     Row Name 1444       Previous Mental Health Treatment    Previous Mental Health Treatment inpatient treatment;partial hospitalization    IP Treatment Provider/Reason [facility] psych in September 2023    Partial Hospitalization Provider/Reason PHP at [facility] after IP in September 2023       Current Mental Health Treatment    Current Mental Health Treatment psychiatrist;counseling    Counseling Provider/Reason Derrick Kapadia    Counseling Years compliant \"many years\"    Psychiatrist Provider/Reason Dr. Paresh Saleem at [facility]       Previous Substance Use Treatment    Previous Substance Use Treatment none       Current Substance Use Treatment    Current Substance Use Treatment none             Living Environment - Thu December 21, 2023     Row Name 1445       Living Environment    People in Home alone    Current Living Arrangements apartment    Primary Care Provided by self    Provides Primary Care For no one    Family Caregiver if Needed none    Quality " of Family Relationships supportive    Able to Return to Prior Arrangements yes       County Agency Involved    County Agencies Involved? No            Employment History     No employment history on file.      Family and Education     Marital Status          Social Identity     Preferred Language Ethnicity Race    English Not , /a, or Maltese origin White          Diagnosis Codes - Thu December 21, 2023     Row Name 1446       Diagnosis    Primary Code 1 F32.9    Primary Code Description 1 Unspecified depressive disorder             Recommendations/Plan - Thu December 21, 2023     Row Name 1446       Recommendations/Plan    Clinical assessment summary Pt referred to the ED by OP psychiatrist at [facility]-Dr. Saleem who recommended IP psych admission for Pt. Pt is agreeable to IP psych tx for safety, stabilization, medication management, and individual and group therapy. Reviewed 201 and rights. Pt signed 201. Provided Pt with explanation of voluntary rights, bill of rights, and 10 things to know about [facility] psych unit. Explained next steps with placement process.    Recommended level of care Psychiatric, Voluntary (201)    Patient refused treatment recommendation No    Suicide Resource Information Provided NA       Plan/Follow-up    Tobacco Cessation Discharge Follow-up Not Indicated            Radiology Results (last 24 hours)    No matching results found     ECG Results (last 24 hours)    No matching results found     Microbiology Results     None      Home Medications         Taking? Start Date End Date Provider     clonazePAM (KlonoPIN) 1 mg tablet   12/13/23 01/12/24  Paresh Saleem, DO     Take 1 tablet (1 mg total) by mouth daily.     REPATHA SURECLICK 140 mg/mL pen   03/10/23  --  Jamia Singh CRNP     INJECT 1 ML (140 MG TOTAL) UNDER THE SKIN EVERY 14 DAYS.     SUMAtriptan (IMITREX) 100 mg tablet   --  --  ProviderJigar MD     Take by mouth as needed.       venlafaxine XR  (EFFEXOR XR) 75 mg 24 hr capsule   12/21/23 03/20/24  Paresh Saleem DO     Take 1 capsule (75 mg total) by mouth daily.    Ongoing Comment    Raman Matias    06/07/2021 11:48 AM     Lexapro 10 mg Dr Anamaria Argueta

## 2023-12-21 NOTE — TELEPHONE ENCOUNTER
"Stephanie called wanting to inform Dr Saleem that she felt that inpatient treatment was necessary and was going to ED.  Stephanie states she did not feel safe, was having SI.  Was asked about plan she did not divulge plan but reports \"If I'm alone I'm going to act on my thoughts\".  She had sister with her who would be taking her to [facility] ED.  Dr Saleem informed of call, Dr Saleem reached out to speak to [facility] ED physician and returned call to Stephanie.    "

## 2023-12-21 NOTE — ED PROVIDER NOTES
Emergency Medicine Note  HPI   HISTORY OF PRESENT ILLNESS     73-year-old female with history of hyperlipidemia, migraine, depression, anxiety presents to the ED for psychiatric evaluation.  Patient reports worsening depression over the past few weeks.  Associated with poor appetite and admits to SI without a plan.  No history of previous attempts.  Reports being hospitalized secondary to similar symptoms in September here at [facility] unit which helped.  She reports following with [facility] Dr. Saleem psychiatrist who changed her prescriptions from Lexapro to Effexor with worsening depression since.  Her psychiatrist sent her here for further evaluation today.  Patient lives alone, no weapons at home.  She has had trouble sleeping at baseline and takes medication with improvement in it.  No HI, hallucinations, recent alcohol or drug use.  Patient is agreeable for psychiatric inpatient hospitalization.      History provided by:  Patient        Patient History   PAST HISTORY     Reviewed from Nursing Triage:       Past Medical History:   Diagnosis Date   • Hypotension    • IPMN (intraductal papillary mucinous neoplasm) 5/31/2022   • Kidney stones    • Lipid disorder    • Migraines        Past Surgical History:   Procedure Laterality Date   • CATARACT EXTRACTION Left 11/19/2018   • CATARACT EXTRACTION Right 01/16/2019       Family History   Problem Relation Age of Onset   • Hyperlipidemia Biological Mother    • Lung cancer Biological Mother    • Hyperlipidemia Biological Father    • Hypertension Biological Father    • Diabetes Biological Father    • Tetralogy of Fallot  Biological Sister    • Breast cancer Cousin        Social History     Tobacco Use   • Smoking status: Never   • Smokeless tobacco: Never   Vaping Use   • Vaping Use: Never used   Substance Use Topics   • Alcohol use: Not Currently     Alcohol/week: 1.0 standard drink of alcohol     Types: 1 Glasses of wine per week      "Comment: glass of wine 3 times per week ; \"I have 3 glasses per month\" last drink 1 month ago   • Drug use: Yes     Types: Cocaine     Comment: 35 years ago         Review of Systems   REVIEW OF SYSTEMS     Review of Systems   Constitutional: Positive for appetite change (decrease) and fatigue. Negative for fever.   HENT: Negative for sore throat.    Eyes: Negative for visual disturbance.   Respiratory: Negative for cough and shortness of breath.    Cardiovascular: Negative for chest pain.   Gastrointestinal: Negative for abdominal pain, diarrhea and vomiting.   Genitourinary: Negative for difficulty urinating.   Musculoskeletal: Positive for arthralgias (chronic). Negative for gait problem.   Skin: Negative for wound.   Neurological: Negative for dizziness, syncope and headaches.   Psychiatric/Behavioral: Positive for dysphoric mood, sleep disturbance and suicidal ideas. Negative for hallucinations and self-injury.         VITALS     ED Vitals    Date/Time Temp Pulse Resp BP SpO2 [facility]   12/21/23 1722 35.9 °C (96.7 °F) 85 18 125/63 99 % SCT   12/21/23 1253 36.5 °C (97.7 °F) 94 16 111/59 97 % KADIE        Pulse Ox %: 97 % (12/21/23 1253)  Pulse Ox Interpretation: Normal (12/21/23 1253)           Physical Exam   PHYSICAL EXAM     Physical Exam  Vitals and nursing note reviewed.   Constitutional:       General: She is not in acute distress.  HENT:      Head: Normocephalic and atraumatic.      Mouth/Throat:      Mouth: Mucous membranes are moist.   Eyes:      Pupils: Pupils are equal, round, and reactive to light.   Cardiovascular:      Rate and Rhythm: Normal rate and regular rhythm.   Pulmonary:      Effort: Pulmonary effort is normal. No respiratory distress.      Breath sounds: Normal breath sounds.   Abdominal:      General: There is no distension.      Palpations: Abdomen is soft.      Tenderness: There is no abdominal tenderness. There is no guarding or rebound.   Musculoskeletal:      Cervical back: Neck supple.     "  Comments: AHSAN x 4   Skin:     General: Skin is warm and dry.   Neurological:      Mental Status: She is alert and oriented to person, place, and time.   Psychiatric:         Mood and Affect: Mood is depressed.         Speech: Speech normal.         Behavior: Behavior is cooperative.         Thought Content: Thought content includes suicidal ideation. Thought content does not include homicidal ideation. Thought content does not include suicidal plan.           PROCEDURES     Procedures     DATA     Results     Procedure Component Value Units Date/Time    UA w/ reflex culture (ED Only) [[id number]]  (Normal) Collected: 12/21/23 1615    Specimen: Urine, Clean Catch Updated: 12/21/23 1624    Narrative:      The following orders were created for panel order UA w/ reflex culture (ED Only).  Procedure                               Abnormality         Status                     ---------                               -----------         ------                     UA Reflex to Culture (Ma...[[id number]]  Normal              Final result                 Please view results for these tests on the individual orders.    UA Reflex to Culture (Macroscopic) [[id number]]  (Normal) Collected: 12/21/23 1615    Specimen: Urine, Clean Catch Updated: 12/21/23 1624     Color, Urine Yellow     Clarity, Urine Clear     Specific Gravity, Urine 1.010     pH, Urine 6.5     Leukocyte Esterase Negative     Comment: Results can be falsely negative due to high specific gravity, some antibiotics, glucose >3 g/dl, or WBC other than neutrophils.        Nitrite, Urine Negative     Protein, Urine Negative     Glucose, Urine Negative mg/dL      Ketones, Urine Negative mg/dL      Urobilinogen, Urine 0.2 EU/dL      Bilirubin, Urine Negative mg/dL      Blood, Urine Negative     Comment: The sensitivity of the occult blood test is equivalent to approximately 4 intact RBC/HPF.       SARS-CoV-2 (COVID-19) Farzad [[id number]]  (Normal) Collected: 12/21/23 0786     Specimen: Nasal Swab from Nares Updated: 12/21/23 1545    Narrative:      The following orders were created for panel order SARS-CoV-2 (COVID-19) Nares.  Procedure                               Abnormality         Status                     ---------                               -----------         ------                     SARS-COV-2, ANTIGEN Nares[[id number]]    Normal              Final result                 Please view results for these tests on the individual orders.    SARS-COV-2, ANTIGEN Nares [[id number]]  (Normal) Collected: 12/21/23 1507    Specimen: Nasal Swab from Nares Updated: 12/21/23 1545     SARS-COV-2 (COVID-19), Antigen Presumptive Negative, no Ag detected    Narrative:      This test is approved by FDA under EUA use. The performance of this test is not validated for asymptomatic patients and test results should be correlated with patient's condition.        Urine drug screen (UDS) [[id number]]  (Normal) Collected: 12/21/23 1352    Specimen: Urine, Clean Catch Updated: 12/21/23 1431     PCP Scrn, Ur Not Detected     Comment: Assay Detects: phencyclidine in urine. Lowest detectable concentration is 25 ng/mL of phencyclidine.        Benzodiazepine Ur Qual Not Detected     Comment: Assay Detects: benzodiazepines and metabolites at varying concentrations. Lowest detectable concentration is 200 ng/mL of oxazepam.        Cocaine Screen, Urine Not Detected     Comment: Assay Detects: benzoylecgonine and cocaine in urine. Lowest detectable concentration is 300 ng/mL of benzoylecgonine.        Amphetamine+Methamphetamine Screen, Ur Not Detected     Comment: Assay Detects: d-methamphetamine, d-amphetamine, methlyenedioxyamphetamine (MDA), and methlyenendioxymethamphetamine (MDMA) in urine. Lowest detectable concentration is 1000 ng/mL of d-methamphetamine.  Assay is less sensitive to MDA and MDMA (lowest detectable concentration, 2500 ng/mL) and could produce a false negative result. If MDMA overdose is  suspected and the result is negative, a more specific test should be requested.        Cannabinoid Screen, Urine Not Detected     Comment: Assay Detects: cannabinoid metabolites in urine. Lowest detectable concentration is 50 ng/mL        Opiate Scrn, Ur Not Detected     Comment: Assay Detects: codeine, dihydrocodeine, hydrocodone, hydromorphone, levorphanol, morphine, morphine-3-glucuronide, norcodeine, oxycodone in urine. Lowest detectable concentration is 300 ng/mL of morphine.        Barbiturate Screen, Ur Not Detected     Comment: Assay Detects: alphenal, amobarbital, aprobarbital, barbital, butabarbital, butalbital, butethal, diallybarbital, pentobarbital, secobarbital,talbutal, and thiopental. Lowest detectable concentration is 200 ng/mL of secobarbital.        Fentanyl Screen, Urine Not Detected     Comment: Assay Detects: fentanyl metabolite in urine, lowest detectable concentration is 5 ng/mL of norfentanyl.       ER toxicology screen, serum [[id number]]  (Abnormal) Collected: 12/21/23 1306    Specimen: Blood, Venous Updated: 12/21/23 1346     Salicylate <1.5 mg/dL      Acetaminophen 0.3 ug/mL      Ethanol <10 mg/dL     Basic metabolic panel [[id number]]  (Abnormal) Collected: 12/21/23 1306    Specimen: Blood, Venous Updated: 12/21/23 1343     Sodium 138 mEQ/L      Potassium 4.3 mEQ/L      Comment: Results obtained on plasma. Plasma Potassium values may be up to 0.4 mEQ/L less than serum values. The differences may be greater for patients with high platelet or white cell counts.        Chloride 103 mEQ/L      CO2 30 mEQ/L      BUN 17 mg/dL      Creatinine 0.9 mg/dL      Glucose 102 mg/dL      Calcium 10.0 mg/dL      eGFR >60.0 mL/min/1.73m*2      Comment: Calculation based on the Chronic Kidney Disease Epidemiology Collaboration (CKD-EPI) equation refit without adjustment for race.        Anion Gap 5 mEQ/L     CBC and differential [[id number]]  (Abnormal) Collected: 12/21/23 1306    Specimen: Blood, Venous  Updated: 12/21/23 1318     WBC 6.90 K/uL      RBC 5.03 M/uL      Hemoglobin 13.6 g/dL      Hematocrit 43.5 %      MCV 86.5 fL      MCH 27.0 pg      MCHC 31.3 g/dL      RDW 13.9 %      Platelets 242 K/uL      MPV 9.7 fL      Differential Type Auto     nRBC 0.0 %      Immature Granulocytes 0.1 %      Neutrophils 64.8 %      Lymphocytes 26.1 %      Monocytes 8.3 %      Eosinophils 0.4 %      Basophils 0.3 %      Immature Granulocytes, Absolute 0.01 K/uL      Neutrophils, Absolute 4.47 K/uL      Lymphocytes, Absolute 1.80 K/uL      Monocytes, Absolute 0.57 K/uL      Eosinophils, Absolute 0.03 K/uL      Basophils, Absolute 0.02 K/uL           Imaging Results    None         No orders to display       Scoring tools                                  ED Course & MDM   MDM / ED COURSE / CLINICAL IMPRESSION / DISPO     Medical Decision Making  ddx considered but not limited to depression, SI    Amount and/or Complexity of Data Reviewed  External Data Reviewed: labs and notes.  Labs: ordered. Decision-making details documented in ED Course.  Discussion of management or test interpretation with external provider(s): Consulted crisis team who evaluated patient, 201 signed, patient accepted to [facility] psych unit    Risk  Prescription drug management.  Decision regarding hospitalization.          ED Course as of 12/25/23 1950   Thu Dec 21, 2023   1326 Plan labs, UDS, EDS, one-to-one in place, crisis team eval [TC]   1407 Labs reviewed patient is medically cleared for crisis eval [TC]   1524 Signed 201, accepted to [facility] psych unit [TC]   1548 Patient becoming increasingly anxious while waiting for psych placement, Ativan p.o. ordered [TC]      ED Course User Index  [TC] Ayla Dan PA C     Clinical Impression      Depression with suicidal ideation     _________________     ED Disposition   Transfer to Behavioral Health                   Ayla Dan PA C  12/25/23 1951

## 2023-12-21 NOTE — ED ATTESTATION NOTE
I personally saw and evaluated the patient, participated in the management, and agree with the findings in the note above except as where stated. The physician assistant and I discussed the case, workup, and disposition.     73-year-old female past medical history significant for hyperlipidemia, anxiety, depression presenting for psychiatric evaluation.  Patient reports over the last few weeks has been having worsening depression with a poor appetite, feelings of anxiousness and hopelessness.  She started feeling suicidal without a plan and spoke with her psychiatrist today who referred her to the ER.  She reports that she takes Klonopin at night to help her sleep.  She denies any homicidal ideation, auditory or visual hallucinations.  She is interested in inpatient therapy.  She denies any medical complaints.  Denies chest pain, shortness breath, nausea, vomiting, abdominal pain.    Patient is awake alert pleasant  Heart regular rate rhythm normal S1-S2   Lungs are clear to auscultation bilaterally  Abdomen soft nontender    73-year-old female presenting for evaluation of depression and suicidal ideation.  One-to-one at bedside.  Patient medical cleared for crisis eval.       Roberto Marsh MD  12/21/23 3379

## 2023-12-21 NOTE — BEHAVIORAL HEALTH CRISIS PROGRESS NOTE
2:51pm - Reached out to Dr. Perez to review Pt for admission to [facility] psych unit.    3:45pm - Presented to Pt's room after PA alerted this [facility] that Pt is wanting to leave the ED. It appears that Pt is anxious about the admission process as ED staff are not able to give clear timelines on next steps with admission process. [facility] provided support and explained barriers to [facility] being able to give Pt exact times when labs will result or psychiatrist will respond to [facility], etc. Pt expressed understanding and agreeable to take medication to help with current high anxiety levels. Pt asked for a blanket. Updated nurse who will provide Pt with a blanket.    Provided Pt with sandwich.    Provided update to Pt that covid is negative and waiting for UA result.    4:35pm - Provided update to Pt that UA resulted and this [facility] reached out to the psychiatrist that is reviewing for admission.    5:50pm - Dr. Perez accepted Pt to [facility] psych unit.  Spoke with transfer center/Larry and updated that Dr. Perez accepted Pt and Larry stated that he will call back regarding which floor on psych unit.    6:13pm - Spoke with [facility] psych 3E nurse who stated that they are not sure which floor Pt is going to yet and that they will not be able to take report before 8pm. Updated Pt's ED nurse.   Updated Pt bedside. Pt requested that her sister be called with an update as she went home for the night.    6:57pm - Spoke with Pt's sister Ewa phone# [phone] and provided update that Pt accepted to [facility] psych unit and will be going up to the unit after 8pm.    Pt has Medicare A & B insurance and therefore does not require pre-cert.

## 2023-12-22 LAB
ALBUMIN SERPL-MCNC: 4.5 G/DL (ref 3.5–5.7)
ALP SERPL-CCNC: 46 IU/L (ref 34–125)
ALT SERPL-CCNC: 13 IU/L (ref 7–52)
ANION GAP SERPL CALC-SCNC: 4 MEQ/L (ref 3–15)
AST SERPL-CCNC: 20 IU/L (ref 13–39)
ATRIAL RATE: 74
BILIRUB SERPL-MCNC: 0.5 MG/DL (ref 0.3–1.2)
BUN SERPL-MCNC: 22 MG/DL (ref 7–25)
CALCIUM SERPL-MCNC: 10.1 MG/DL (ref 8.6–10.3)
CHLORIDE SERPL-SCNC: 104 MEQ/L (ref 98–107)
CHOLEST SERPL-MCNC: 219 MG/DL
CO2 SERPL-SCNC: 31 MEQ/L (ref 21–31)
CREAT SERPL-MCNC: 1.2 MG/DL (ref 0.6–1.2)
EGFRCR SERPLBLD CKD-EPI 2021: 47.3 ML/MIN/1.73M*2
EST. AVERAGE GLUCOSE BLD GHB EST-MCNC: 111 MG/DL
GLUCOSE SERPL-MCNC: 93 MG/DL (ref 70–99)
HBA1C MFR BLD: 5.5 %
HDLC SERPL-MCNC: 99 MG/DL
HDLC SERPL: 2.2 {RATIO}
LDLC SERPL CALC-MCNC: 95 MG/DL
NONHDLC SERPL-MCNC: 120 MG/DL
P AXIS: 68
POTASSIUM SERPL-SCNC: 5.3 MEQ/L (ref 3.5–5.1)
PR INTERVAL: 158
PROT SERPL-MCNC: 7.2 G/DL (ref 6–8.2)
QRS DURATION: 84
QT INTERVAL: 384
QTC CALCULATION(BAZETT): 426
R AXIS: 69
SODIUM SERPL-SCNC: 139 MEQ/L (ref 136–145)
T WAVE AXIS: 54
T4 FREE SERPL-MCNC: 1.03 NG/DL (ref 0.58–1.64)
TRIGL SERPL-MCNC: 127 MG/DL
TSH SERPL DL<=0.05 MIU/L-ACNC: 1.2 MIU/L (ref 0.34–5.6)
VENTRICULAR RATE: 74

## 2023-12-22 PROCEDURE — 63700000 HC SELF-ADMINISTRABLE DRUG: Performed by: PSYCHIATRY & NEUROLOGY

## 2023-12-22 PROCEDURE — 12400000 HC ROOM AND CARE SEMIPRIVATE PSYCH

## 2023-12-22 PROCEDURE — 80053 COMPREHEN METABOLIC PANEL: CPT | Performed by: PSYCHIATRY & NEUROLOGY

## 2023-12-22 PROCEDURE — 93010 ELECTROCARDIOGRAM REPORT: CPT | Performed by: INTERNAL MEDICINE

## 2023-12-22 PROCEDURE — 90792 PSYCH DIAG EVAL W/MED SRVCS: CPT | Performed by: PSYCHIATRY & NEUROLOGY

## 2023-12-22 PROCEDURE — 36415 COLL VENOUS BLD VENIPUNCTURE: CPT | Performed by: PSYCHIATRY & NEUROLOGY

## 2023-12-22 PROCEDURE — 200200 PR NO CHARGE: Performed by: NURSE PRACTITIONER

## 2023-12-22 RX ORDER — SUMATRIPTAN 6 MG/.5ML
6 INJECTION, SOLUTION SUBCUTANEOUS
Status: DISCONTINUED | OUTPATIENT
Start: 2023-12-22 | End: 2023-12-25 | Stop reason: HOSPADM

## 2023-12-22 RX ORDER — CLONAZEPAM 1 MG/1
1 TABLET ORAL NIGHTLY
Status: DISCONTINUED | OUTPATIENT
Start: 2023-12-22 | End: 2023-12-25 | Stop reason: HOSPADM

## 2023-12-22 RX ORDER — ARIPIPRAZOLE 2 MG/1
1 TABLET ORAL DAILY
Status: DISCONTINUED | OUTPATIENT
Start: 2023-12-22 | End: 2023-12-25 | Stop reason: HOSPADM

## 2023-12-22 RX ORDER — VENLAFAXINE HYDROCHLORIDE 75 MG/1
75 CAPSULE, EXTENDED RELEASE ORAL
Status: DISCONTINUED | OUTPATIENT
Start: 2023-12-22 | End: 2023-12-25 | Stop reason: HOSPADM

## 2023-12-22 RX ORDER — SUMATRIPTAN SUCCINATE 100 MG/1
100 TABLET ORAL EVERY 2 HOUR PRN
Status: DISCONTINUED | OUTPATIENT
Start: 2023-12-22 | End: 2023-12-25 | Stop reason: HOSPADM

## 2023-12-22 RX ADMIN — ARIPIPRAZOLE 1 MG: 2 TABLET ORAL at 16:25

## 2023-12-22 RX ADMIN — CLONAZEPAM 1 MG: 1 TABLET ORAL at 22:02

## 2023-12-22 RX ADMIN — VENLAFAXINE HYDROCHLORIDE 75 MG: 75 CAPSULE, EXTENDED RELEASE ORAL at 09:28

## 2023-12-22 NOTE — PLAN OF CARE
Problem: Adult Behavioral Health Plan of Care  Goal: Plan of Care Review  Flowsheets (Taken 12/21/2023 2159)  Progress: improving  Patient Agreement with Plan of Care: agrees  Outcome Evaluation: Stephanie is on a 201 commitment transferring from [facility] ED. Discharged from this unit on 9/11/23. Cooperative with assessment and tearful at times. Reports feeling depressed and anxious with some intermittent SI. Has been feeling lonely and hopeless and spending a lot of time sleeping. Also reports poor appetite. Has been seeing a therapist and psychiatrist. Reports initially doing well after discharge on Cymbalta but started declining and was switched to Lexapro which was not very helpful. Nine days ago was started on Effexor 37.5mg and the dosage was raised today to 75mg. Reports Denies current SI/SH/HI/AVH. Denies having any pain. Reports a hx of chronic back pain but does not take any medication to manage it. Also reports a hx of migraines. Denies current use of drugs and tobacco. Reports drinking a glass of wine once or twice a month. Brief orientation to unit programming.   Plan of Care Reviewed With: patient  Intervention(s): Nursing assessment, medication management, Q15 min safety rounds, Brief orientation to unit programming     Problem: Suicidal Behavior  Goal: Suicidal Behavior is Absent or Managed  Flowsheets (Taken 12/21/2023 2159)  [place] Goal: shares suicidal thoughts  Individual Goal: Stephanie will seek staff support if having SI     Problem: Depressive Signs/Symptoms  Goal: Improved Mood Symptoms (Depressive Signs/Symptoms)  Flowsheets (Taken 12/21/2023 2159)  Individual Goal: Stephanie will participate in unit programming daily  [place] Goal: acknowledges progress

## 2023-12-22 NOTE — CONSULTS
"   [facility] Medicine Service     Psychiatric Unit Medical Evaluation         Requesting:  Inpatient Psychiatric Unit Team    Reason for Consultation:  Initial Medical Evaluation     HISTORY OF PRESENT ILLNESS        This is a 73 y.o. female with PMH hyperlipidemia, migraine, depression and anxiety who presented to the emergency room for psychiatric evaluation for worsening depression.  Patient states she has \"very serious depression\", states she is \"incapcitated to eat much at all.\"    She has h/o hypotension/orthostatic hypotension, follows with Dr. Price at [facility]. When she last spoke to him he encouraged her to gain weight and see him in March.  Her BP was low this morning, taken sitting. She had \"a little lightheaded.\" Symptoms and BP improved after drinking water.  She denies any cigarette use, alcohol use or other drug use.    She states she feels \"exhausted.\"  She denies any chest pain or discomfort, shortness of breath or cough, nausea or abdominal pain, constipation or diarrhea, dysuria or urinary frequency.  She is anxious to speak to a psychiatrist to address her depression.    PAST MEDICAL AND SURGICAL HISTORY        Past Medical History:   Diagnosis Date   • Hypotension    • IPMN (intraductal papillary mucinous neoplasm) 5/31/2022   • Kidney stones    • Lipid disorder    • Migraines        Past Surgical History:   Procedure Laterality Date   • CATARACT EXTRACTION Left 11/19/2018   • CATARACT EXTRACTION Right 01/16/2019       PCP: Libby Wang MD    MEDICATIONS        Home Medications:  Medications Prior to Admission   Medication Sig Dispense Refill Last Dose   • clonazePAM (KlonoPIN) 1 mg tablet Take 1 tablet (1 mg total) by mouth daily. 30 tablet 1 12/20/2023   • REPATHA SURECLICK 140 mg/mL pen INJECT 1 ML (140 MG TOTAL) UNDER THE SKIN EVERY 14 DAYS. 6 mL 3 12/16/2023   • SUMAtriptan (IMITREX) 100 mg tablet Take 100 mg by mouth daily as needed for migraine. Only 1 dose in 24 hours   Past Week " "  • venlafaxine XR (EFFEXOR XR) 75 mg 24 hr capsule Take 1 capsule (75 mg total) by mouth daily. 90 capsule 0 12/21/2023       Current inpatient medications were personally reviewed.    ALLERGIES        Patient has no known allergies.    FAMILY HISTORY        Family History   Problem Relation Age of Onset   • Hyperlipidemia Biological Mother    • Lung cancer Biological Mother    • Hyperlipidemia Biological Father    • Hypertension Biological Father    • Diabetes Biological Father    • Tetralogy of Fallot  Biological Sister    • Breast cancer Cousin        SOCIAL HISTORY        Social History     Tobacco Use   • Smoking status: Never   • Smokeless tobacco: Never   Vaping Use   • Vaping Use: Never used   Substance Use Topics   • Alcohol use: Not Currently     Alcohol/week: 1.0 standard drink of alcohol     Types: 1 Glasses of wine per week     Comment: glass of wine 3 times per week ; \"I have 3 glasses per month\" last drink 1 month ago   • Drug use: Yes     Types: Cocaine     Comment: 35 years ago        REVIEW OF SYSTEMS        All other systems reviewed and negative except as noted in HPI    PHYSICAL EXAMINATION        Visit Vitals  /64 (BP Location: Left upper arm, Patient Position: Sitting)   Pulse 70   Temp 36.2 °C (97.1 °F)   Resp 16   Ht 1.626 m (5' 4\")   Wt 48.3 kg (106 lb 8 oz)   SpO2 96%   Breastfeeding No   BMI 18.28 kg/m²     Body mass index is 18.28 kg/m².      Physical Exam   Constitutional: Thin female. Appears younger than stated age. No distress.   HENT:   Head: Normocephalic and atraumatic.   Eyes: No scleral icterus.   Cardiovascular: S1, S2. Normal rate and regular rhythm. No murmur appreciated.   Pulmonary/Chest: CTAB. No r/r/w. Respirations unlabored.  Abdominal: Soft. Bowel sounds are normal. No tenderness or distension.  Musculoskeletal:  no edema.   Neurological: alert and oriented to person, place, and time. No gross focal deficits.  Skin: Skin is warm and dry. No rash on exposed " skin.  Psychiatric: calm, cooperative.      LABS / EKG        Labs  Results from last 7 days   Lab Units 12/21/23  1306   WBC K/uL 6.90   HEMOGLOBIN g/dL 13.6   HEMATOCRIT % 43.5   PLATELETS K/uL 242       Results from last 7 days   Lab Units 12/21/23  1306   SODIUM mEQ/L 138   POTASSIUM mEQ/L 4.3   CHLORIDE mEQ/L 103   CO2 mEQ/L 30   BUN mg/dL 17   CREATININE mg/dL 0.9   CALCIUM mg/dL 10.0   GLUCOSE mg/dL 102*       Lab Results   Component Value Date    TSH 1.20 12/21/2023        Urine Drug Screen Results       12/21/23     1352    PCP Scrn Ur Not Detected    Benzodiazepine Ur Qual Not Detected    Amphetamine+Methamphetamine Scrn Ur Not Detected    Cannabinoid Screen Ur Not Detected    Opiate Scrn Ur Not Detected    Barbiturate Screen Ur Not Detected         Comment for PCP Scrn Ur at 1352 on 12/21/23: Assay Detects: phencyclidine in urine. Lowest detectable concentration is 25 ng/mL of phencyclidine.    Comment for Benzodiazepine Ur Qual at 1352 on 12/21/23: Assay Detects: benzodiazepines and metabolites at varying concentrations. Lowest detectable concentration is 200 ng/mL of oxazepam.    Comment for Amphetamine+Methamphetamine Scrn Ur at 1352 on 12/21/23: Assay Detects: d-methamphetamine, d-amphetamine, methlyenedioxyamphetamine (MDA), and methlyenendioxymethamphetamine (MDMA) in urine. Lowest detectable concentration is 1000 ng/mL of d-methamphetamine.  Assay is less sensitive to MDA and MDMA (lowest detectable concentration, 2500 ng/mL) and could produce a false negative result. If MDMA overdose is suspected and the result is negative, a more specific test should be requested.    Comment for Cannabinoid Screen Ur at 1352 on 12/21/23: Assay Detects: cannabinoid metabolites in urine. Lowest detectable concentration is 50 ng/mL    Comment for Opiate Scrn Ur at 1352 on 12/21/23: Assay Detects: codeine, dihydrocodeine, hydrocodone, hydromorphone, levorphanol, morphine, morphine-3-glucuronide, norcodeine, oxycodone  in urine. Lowest detectable concentration is 300 ng/mL of morphine.    Comment for Barbiturate Screen Ur at 1352 on 12/21/23: Assay Detects: alphenal, amobarbital, aprobarbital, barbital, butabarbital, butalbital, butethal, diallybarbital, pentobarbital, secobarbital,talbutal, and thiopental. Lowest detectable concentration is 200 ng/mL of secobarbital.           SARS-CoV-2 (COVID-19) (no units)   Date/Time Value   09/05/2023 1159 Negative          EKG:  I have independently reviewed the ECG. No significant findings.  12/21/23. 22:35 - 74 bpm. NSR. QTc 426 ms      ASSESSMENT AND RECOMMENDATIONS           * Depression  Assessment & Plan  Plan per psychiatry  HMS will remain available on an as-needed basis    Orthostatic hypotension  Assessment & Plan  Follows with Gorge Bundy    Encourage oral intake/hydration  Reinforced importance of slow position changes  Continue to monitor BP per routine    Outpatient f/u with Dr. Price       Poor appetite  Assessment & Plan  Seems to be related to depression  Not interested in supplements    Encourage oral intake  Depression management per psychiatry    Familial hypercholesterolemia  Assessment & Plan  She follows with Dr. Price. Managed on Repatha    Continue outpatient f/u    Osteopenia  Assessment & Plan  Continue outpatient f/u with Endocrine. Is on yearly Reclast    Migraine  Assessment & Plan  H/o migraines without aura  Currently without any headache or pain    Continue outpatient Imitrex as needed       Please communicate acute concerns with the covering hospitalist.    The patient should follow up with Primary Care upon discharge from the [facility].        ALEC Sanchez  12/22/2023

## 2023-12-22 NOTE — PLAN OF CARE
Problem: Adult Behavioral Health Plan of Care  Goal: Plan of Care Review  Outcome: Progressing  Flowsheets (Taken 12/22/2023 1812)  Progress: improving  Patient Agreement with Plan of Care: agrees  Outcome Evaluation: Stephanie states that they are not having thoughts of suicide or homicide. States that they are not having audio/visual hallucinations. They have been out of their room and attending groups. BP was low this morning at 0830, but patient denied symptoms including dizziness or lightheadedness. RN encouraged PO fluids and rechecked BP which normalized within an hour. Stephanie reports increased appetite(eating >100% of meals), which she is happy about given recent weight loss. Will continue to monitor.   Plan of Care Reviewed With: patient  Intervention(s): Nursing assessment, medication management, encouraged use of positive coping mechanisms, emotional support provided.

## 2023-12-22 NOTE — H&P
"Psychiatry History and Evaluation    CC:  Worsening SI, depression    Stephanie Jeffers is a 73 y.o. female with past history of MDD (1 hospitalization at [facility] in 9/2023), no suicide attempts, hyperlipidemia, osteoporosis who presented to the ER for worsening depression and anxiety in the context of multiple recent medication changes, loneliness, somatic worries. She has been attending [facility] since discharge from [facility] in 9/2023.    Prior to last admission, went off her antidepressant for a year, depression worsened.    Was here in 9/2023, put on Cymbalta, did well, started having side effects, was switched to Lexapro which did not help her.     \"A lot of physical manifestations of my depression, I lost weight, if I lose 3 pounds I look like an Aus[place]witz victim.. Dr Saleem put me on Effexor, she upped to 75, I thought about not living. Thought the 37.5 was too low.\"     Substance: cocaine when younger, none for several decades, went to rehab \"I stopped because I decided to stop.\"    Family Hx: entire mom's side, all the way back.   Lots of medical illnesses in family- dad lost eye to melanoma Denied substance in family    Output by Drain (mL) 12/20/23 0700 - 12/20/23 1859 12/20/23 1900 - 12/21/23 0659 12/21/23 0700 - 12/21/23 1859 12/21/23 1900 - 12/22/23 0659 12/22/23 0700 - 12/22/23 1840   Patient has no LDAs of requested type attached.    L    Psychiatric History:  Current Psychiatrist: yes - Dr. Yoder  Past psychiatric Hospitalization: yes - [facility] 9/2023  Medication Trials:  yes - Remeron \"like a train hit me\" Cymbalta- \"made me really hungry\" changed which she saw as a mistake (to Lexapro) which was ineffective  ECT trials: no    Suicide Risk Assessment Components:     Previous Suicide Attempts: No     Access to Firearms: No     Chronic Suicide Risk Factors: Psychiatric illness (mood, anxiety, psychotic, personality, SUDS, other), Abuse or trauma history and Chronic physical illness or pain     Proximal Suicide Risk " "Factors: Other: hopelessness yesterday     Protective Factors: Connectedness to individuals, family, community, or social institutions, Identified reasons for living, Responsibility to dependents/pets, Access to effective mental health care and Problem-solving and conflict resolution skills    Substance Use History:  Substance use:   Consequences of use: Yes:  Health Problems  Past D&A Treatment: Yes: Rehab: for cocaine    Family History:   Family History   Problem Relation Age of Onset   • Hyperlipidemia Biological Mother    • Lung cancer Biological Mother    • Hyperlipidemia Biological Father    • Hypertension Biological Father    • Diabetes Biological Father    • Tetralogy of Fallot  Biological Sister    • Breast cancer Cousin        Social History:   Social History     Socioeconomic History   • Marital status:      Spouse name: None   • Number of children: None   • Years of education: None   • Highest education level: None   Tobacco Use   • Smoking status: Never   • Smokeless tobacco: Never   Vaping Use   • Vaping Use: Never used   Substance and Sexual Activity   • Alcohol use: Not Currently     Alcohol/week: 1.0 standard drink of alcohol     Types: 1 Glasses of wine per week     Comment: glass of wine 3 times per week ; \"I have 3 glasses per month\" last drink 1 month ago   • Drug use: Yes     Types: Cocaine     Comment: 35 years ago   • Sexual activity: Not Currently     Partners: Male     Birth control/protection: Post-menopausal   Social History Narrative    Two daughters - one has two children, younger daughter is an actress     She is a retired      Lives at [place]     Social Determinants of Health     Food Insecurity: No Food Insecurity (12/21/2023)    Hunger Vital Sign    • Worried About Running Out of Food in the Last Year: Never true    • Ran Out of Food in the Last Year: Never true   Stress: Stress Concern Present (12/21/2023)    [facility] [employer] " "Health - Occupational Stress Questionnaire    • Feeling of Stress : Very much       Medical History:   Past Medical History:   Diagnosis Date   • Hypotension    • IPMN (intraductal papillary mucinous neoplasm) 5/31/2022   • Kidney stones    • Lipid disorder    • Migraines        Surgical History:   Past Surgical History:   Procedure Laterality Date   • CATARACT EXTRACTION Left 11/19/2018   • CATARACT EXTRACTION Right 01/16/2019       Allergies: No Known Allergies    Current Medications:  SCHEDULED:  • ARIPiprazole  1 mg oral Daily   • clonazePAM  1 mg oral Nightly   • venlafaxine XR  75 mg oral Daily with breakfast     PRN  •  acetaminophen  •  hydrOXYzine  •  melatonin  •  mouth moisturizer  •  SUMAtriptan  •  SUMAtriptan succinate      Review of Systems  Musculoskeletal:  Denies complaints    Objective     Vital Signs for the last 24 hours:  Temp:  [36.2 °C (97.1 °F)-36.6 °C (97.9 °F)] 36.6 °C (97.9 °F)  Heart Rate:  [67-96] 77  Resp:  [16] 16  BP: ()/(63-68) 102/63    Labs  Results from last 7 days   Lab Units 12/22/23  1104 12/21/23  1306   HEMOGLOBIN g/dL  --  13.6   WBC K/uL  --  6.90   PLATELETS K/uL  --  242   POTASSIUM mEQ/L 5.3* 4.3   SODIUM mEQ/L 139 138   CREATININE mg/dL 1.2 0.9     Results from last 7 days   Lab Units 12/22/23  1104   ALT IU/L 13   AST IU/L 20   ALK PHOS IU/L 46     Ethanol   Date Value Ref Range Status   12/21/2023 <10 <10 mg/dL Final   09/05/2023 <10 <10 mg/dL Final     Lab Results   Component Value Date    TSH 1.20 12/21/2023    FREET4 1.03 12/21/2023    VOSMEOUY69 363 08/10/2023           No lab exists for component: \"BENZOURQL\", \"BARBSCRNUR\"  Lab Results   Component Value Date    VENTRICRATE 74 12/21/2023    QTCCALCULAT 426 12/21/2023     Lab Results   Component Value Date    HDL 99 12/21/2023    LDLCALC 95 12/21/2023    TRIG 127 12/21/2023    CHOL 219 (H) 12/21/2023    HGBA1C 5.5 12/21/2023       No results found.    MENTAL STATUS EXAM  Appearance: meticulously groomed, " appropriate attire and clean  Gait and Motor: no abnormal movements and normal  Speech: normal rate/rhythm/volume and fluent  Mood: depressed and anxious  Affect: expansive  Associations: coherent and logical  Thought Process: tangential  Thought Content: no auditory or visual hallucinations. and appropriate to situation  Suicidality/Homicidality: denies  Judgement/Insight: good, acknowledges illness and help accepting  Orientation: day  Memory: recalls recent events, recalls remote events and knows current president  Attention: alert  Knowledge: normal and appropriate for education  Language: normal       Assessment/Plan  Severe episode of recurrent major depressive disorder, without psychotic features (CMS/HCC)  Assessment & Plan  Worsening depression and anxiety in the context of multiple medication changes, discharged from [facility] in 9/23 and attending [facility].     1) continue 201  2) continue home meds  3) start Abilify 1 mg daily as augmentation to Effexor   4) PRN's as ordered as well as mouth moisturizer  5) new CMP to recheck eGFR and other values since hydration  6) group and milieu therapy  7) collatreral: Dr Yoder, daughter

## 2023-12-22 NOTE — PROGRESS NOTES
12/22/23 1030   Activity/Group Checklist   Group Title Wellness tools   Attendance Did not attend

## 2023-12-22 NOTE — ASSESSMENT & PLAN NOTE
Seems to be related to depression  Not interested in supplements    Encourage oral intake  Depression management per psychiatry   Attending Attestation (For Attendings USE Only)...

## 2023-12-22 NOTE — ASSESSMENT & PLAN NOTE
Follows with Gorge Bundy    Encourage oral intake/hydration  Reinforced importance of slow position changes  Continue to monitor BP per routine    Outpatient f/u with Dr. Price

## 2023-12-22 NOTE — PROGRESS NOTES
12/22/23 0930   Activity/Group Checklist   Group Title Community meeting   Attendance Did not attend

## 2023-12-22 NOTE — ASSESSMENT & PLAN NOTE
12/23: Continue current plan for now; call daughter Yuly tomorrow    12/24: Feeling better, denies SI, collateral from daughter yuly consistent with BPD. Asked to discharge tomorrow.    1) continue 201  2) continue home meds  3) continue Abilify 1 mg daily as augmentation to Effexor   4) PRN's as ordered as well as mouth moisturizer  5) new CMP to recheck eGFR and other values since hydration: much improved eGFR at 58.2  6) group and milieu therapy  7) collateral: Dr Yoder, spoke with daughter today

## 2023-12-22 NOTE — PROGRESS NOTES
"   12/22/23 1300   Activity/Group Checklist   Group Title Other (Comment)  (Music Therapy: Pts created a group \"I Am\" poem, incorporating their thoughts and feelings into a structured poem, as a means of increasing collaboration, safe self-expression, and building a sense of self.)   Attendance Attended   Attendance Duration (min) 46-60   Follows Direction Followed directions   Interactions Interacted reciprocally   Affect/Mood Range Normal range   Affect/Mood Display Alert;Calm   Goals Achieved Able to engage in interactions;Able to listen to others;Discussed coping/wellness tools;Able to reflect/comment on own behavior;Identified feelings     Stephanie accepted the provided a worksheet in order to build a sense of self. Pt incorporated her thoughts and feelings into a structured group song/collage, as a means of increasing collaboration and safe self-expression. Pt reported, \"I am someone who sees and wants to be seen.\"   "

## 2023-12-22 NOTE — ASSESSMENT & PLAN NOTE
H/o migraines without aura  Currently without any headache or pain    Continue outpatient Imitrex as needed

## 2023-12-22 NOTE — CONSULTS
Psychiatry Consult     Received a call from LUCIO Armendariz to place general psychiatry admission orders. Chart reviewed. Vitals reviewed. Allergies reviewed. Orders placed. Pending EKG.     Penny Salinas MD

## 2023-12-22 NOTE — PLAN OF CARE
Problem: Adult Behavioral Health Plan of Care  Goal: Plan of Care Review  Flowsheets (Taken 12/22/2023 1400)  Outcome Evaluation: Pt was seen for MST score and BMI 18.2 in underweight range. Pt was in group during visit, chart reviewed and spoke to nursing. Pt with no N/V/D, no c/o chewing or swallowing problems. Per nursing pt did c/o decrease appetite and po intake PTA, pt ate good today 100% of meals, pt has declined supplements at this time. Per EMR wts pt's wt has been 106-110# over the past year.    Goals:  Pt will meet >/= 75% of needs via po intake    Recommendation/Interventions:  1.  Will liberalize diet to 3-4 gm Na (NITESH) diet, if potassium remains elevated may consider 2 gm K diet  2.  Encourage good po intake at meals  3.  Consider MVI with mineral daily  4.  Monitor wts weekly  5.  Will follow up with po intake, labs, wts

## 2023-12-22 NOTE — PROGRESS NOTES
12/22/23 1445   Activity/Group Checklist   Group Title Interactive therapy   Attendance Did not attend

## 2023-12-23 LAB
ALBUMIN SERPL-MCNC: 4.5 G/DL (ref 3.5–5.7)
ALP SERPL-CCNC: 45 IU/L (ref 34–125)
ALT SERPL-CCNC: 14 IU/L (ref 7–52)
ANION GAP SERPL CALC-SCNC: 3 MEQ/L (ref 3–15)
AST SERPL-CCNC: 21 IU/L (ref 13–39)
BILIRUB SERPL-MCNC: 0.5 MG/DL (ref 0.3–1.2)
BUN SERPL-MCNC: 21 MG/DL (ref 7–25)
CALCIUM SERPL-MCNC: 10.1 MG/DL (ref 8.6–10.3)
CHLORIDE SERPL-SCNC: 104 MEQ/L (ref 98–107)
CO2 SERPL-SCNC: 32 MEQ/L (ref 21–31)
CREAT SERPL-MCNC: 1 MG/DL (ref 0.6–1.2)
EGFRCR SERPLBLD CKD-EPI 2021: 58.9 ML/MIN/1.73M*2
GLUCOSE SERPL-MCNC: 102 MG/DL (ref 70–99)
POTASSIUM SERPL-SCNC: 5.1 MEQ/L (ref 3.5–5.1)
PROT SERPL-MCNC: 7.2 G/DL (ref 6–8.2)
SODIUM SERPL-SCNC: 139 MEQ/L (ref 136–145)

## 2023-12-23 PROCEDURE — 63700000 HC SELF-ADMINISTRABLE DRUG: Performed by: PSYCHIATRY & NEUROLOGY

## 2023-12-23 PROCEDURE — 12400000 HC ROOM AND CARE SEMIPRIVATE PSYCH

## 2023-12-23 PROCEDURE — 36415 COLL VENOUS BLD VENIPUNCTURE: CPT | Performed by: PSYCHIATRY & NEUROLOGY

## 2023-12-23 PROCEDURE — 80053 COMPREHEN METABOLIC PANEL: CPT | Performed by: PSYCHIATRY & NEUROLOGY

## 2023-12-23 PROCEDURE — 99232 SBSQ HOSP IP/OBS MODERATE 35: CPT | Performed by: PSYCHIATRY & NEUROLOGY

## 2023-12-23 RX ORDER — DOCUSATE SODIUM 100 MG/1
100 CAPSULE, LIQUID FILLED ORAL 2 TIMES DAILY PRN
Status: DISCONTINUED | OUTPATIENT
Start: 2023-12-23 | End: 2023-12-25 | Stop reason: HOSPADM

## 2023-12-23 RX ORDER — SENNOSIDES 8.6 MG/1
1 TABLET ORAL 2 TIMES DAILY PRN
Status: DISCONTINUED | OUTPATIENT
Start: 2023-12-23 | End: 2023-12-25 | Stop reason: HOSPADM

## 2023-12-23 RX ADMIN — VENLAFAXINE HYDROCHLORIDE 75 MG: 75 CAPSULE, EXTENDED RELEASE ORAL at 09:14

## 2023-12-23 RX ADMIN — DOCUSATE SODIUM 100 MG: 100 CAPSULE, LIQUID FILLED ORAL at 11:03

## 2023-12-23 RX ADMIN — ARIPIPRAZOLE 1 MG: 2 TABLET ORAL at 09:13

## 2023-12-23 RX ADMIN — SENNOSIDES 1 TABLET: 8.6 TABLET, FILM COATED ORAL at 20:46

## 2023-12-23 RX ADMIN — CLONAZEPAM 1 MG: 1 TABLET ORAL at 21:26

## 2023-12-23 NOTE — PROGRESS NOTES
12/23/23 1423   Activity/Group Checklist   Group Title Interactive therapy   Attendance Did not attend

## 2023-12-23 NOTE — PLAN OF CARE
Problem: Suicidal Behavior  Goal: Suicidal Behavior is Absent or Managed  Outcome: Progressing  Flowsheets (Taken 12/23/2023 161)  [place] Goal: sets future-oriented goal  Individual Goal: Stephanie will attend all Group Therapy Sessions, to learn 2-3 new coping skills to manage distress tolerance.  Goal Outcome: progressing   Plan of Care Review  Patient Agreement with Plan of Care: agrees  Stephanie has been visible in the community area, and interacts well with select peers.  Pt denies SI.  Pt. Is med compliant and attends Group Sessions.

## 2023-12-23 NOTE — PROGRESS NOTES
12/23/23 1100   Activity/Group Checklist   Group Title Cognitive therapy   Attendance Attended   Attendance Duration (min) 46-60   Follows Direction Followed directions   Interactions Initiates interaction   Affect/Mood Range Normal range   Affect/Mood Display Bright;Alert   Goals Achieved Identified feelings;Accepted feelings;Discussed coping/wellness tools;Discussed self-esteem issues;Displayed empathy;Able to listen to others;Able to engage in interactions;Able to receive feedback;Able to accept feedback;Able to offer feedback or support to another     Stephanie participated in group therapy discussing personal strengths. Members of the group with higher acuity dominated the conversation during this group. Stephanie did a great job of offering empathic support to group member JACKIE who was struggling. She offered appropriate self disclosure and validation of feelings. Stephanie voiced that she feels strength in being able to offer support when it is needed.

## 2023-12-23 NOTE — PROGRESS NOTES
"PSYCHIATRIC PROGRESS NOTE    Chief Complaint/Reason for follow-up: \"I know I took a whole tiny pill yesterday\"    Interval History: Patient seen and interviewed. Very focused on low BP, potential side effects of Abilify and remembering having taken a single pill of Abilify as opposed to a 1/2 tab. First BP this am was 80/59. Hx of hypotension. Dehydrated on admission, has been encouraged to drink plenty of water. Also c/o constipation with no BM since admission. Reports discomfort with bed and pillow. Says that since a week after starting the Effexor she has had restless sleep, awakening every hour or two. We discussed avoiding blue light in the evening, considering smart bulbs to control light color, meditation. She has tried gabapentin in the past and did not find it helpful. Sensitive to many medications.     Psychoeducation provided re psychotherapy; she feels her coping skills are fairly strong but she needs work on loss and childhood issues. Suggested psychodynamic therapy for this work.     Asking to discharge soon, before Tuesday. Informed her that do not have staff especially over the holiday weekend to achieve that but can view Tuesday as a goal.   Denies SI/HI/aVH.      Vital Signs for the last 24 hours:  Temp:  [36.4 °C (97.6 °F)-36.5 °C (97.7 °F)] 36.5 °C (97.7 °F)  Heart Rate:  [] 67  Resp:  [16-18] 16  BP: ()/(59-72) 125/72    Scheduled Meds:  • ARIPiprazole  1 mg oral Daily   • clonazePAM  1 mg oral Nightly   • venlafaxine XR  75 mg oral Daily with breakfast       Labs:  Selected Electrolytes  Results from last 7 days   Lab Units 12/23/23  1411 12/22/23  1104   POTASSIUM mEQ/L 5.1 5.3*   SODIUM mEQ/L 139 139   CREATININE mg/dL 1.0 1.2       Recent EKG HR/QTC  Lab Results   Component Value Date    VENTRICRATE 74 12/21/2023    QTCCALCULAT 426 12/21/2023       No results found for: \"VPA\", \"LITHIUM\"    Mental Status Exam  Appearance:    appropriate grooming, dress, and hygiene , appears other " "than stated age and very [id number] apearing   Attention:  attends appropriately during exam   Behavior (General):  awake and alert, calm and cooperative and engaged in interview   Behavior (Motoric):  typical coordinated movements appropriate to situation   Speech:  normal rate/rhythm/volume   Language:  fluent   Affect:  congruent with mood/situation, dsyphoric and anxious   Mood:  \"better\", depressed and anxious   Associations:  coherent   Thought Process:  goal-directed, tangential and off-topic, circumstantial and over-inclusive   Thought Content:  no auditory or visual hallucinations on exam, no paranoia or delusions on exam, appropriate to situation, obsessional thinking and misperceptions   Suicidality:  denies desire or thoughts to harm or kill self   Orientation:  person, age, day, month, year, season, type of place, name of place, city and situation   Insight/Judgement:  acknowledges illness   Memory:  recalls recent events, recalls remote events and knows current president       Assessment/Plan  Severe episode of recurrent major depressive disorder, without psychotic features (CMS/HCC)  Assessment & Plan  Worsening depression and anxiety in the context of multiple medication changes, discharged from [facility] in 9/23 and attending [facility].     12/23: Continue current plan for now; call daughter Yuly tomorrow    1) continue 201  2) continue home meds  3) start Abilify 1 mg daily as augmentation to Effexor   4) PRN's as ordered as well as mouth moisturizer  5) new CMP to recheck eGFR and other values since hydration: much improved eGFR at 58.2  6) group and milieu therapy  7) collatreral: Dr Yoder, daughter    "

## 2023-12-23 NOTE — PLAN OF CARE
"  Problem: Suicidal Behavior  Goal: Suicidal Behavior is Absent or Managed  Outcome: Progressing  Flowsheets (Taken 12/23/2023 1619)  [place] Goal: sets future-oriented goal  Individual Goal: Stephanie will attend all Group Therapy Sessions, to learn 2-3 new coping skills to manage distress tolerance.  Goal Outcome: progressing     Problem: Adult Behavioral Health Plan of Care  Goal: Adheres to Safety Considerations for Self and Others  Outcome: Progressing  Goal: Absence of New-Onset Illness or Injury  Outcome: Progressing   Plan of Care Review  Patient Agreement with Plan of Care: agrees   Stephanie has been visible in the community and interacts well with peers, and provides peer support. B/P this morning was 80/59 P-108.  Pt. Was standing up.  Pt. Denies dizziness.  Repeat B/P 95/66 P-82 rr-16.  Pt. Is sitting. Pt. Denies feeling lightheaded.  Pt. Stated, \"I always have low B/P.  Pt. Was encouraged to increase PO fluids, and water and fluids of choice were offered.  Dr. Be was notified.  Both Dr. Be and [facility] were notified of pt's K+ 5.3 on 12/22.  New order noted for repeat potassium level. CMP was ordered already.  K+ 5.1 on 12/23/23.  Pt. Is able to verbalize needs.  Pt. Has been attending Group Therapy and pt. Is med compliant.   "

## 2023-12-23 NOTE — PLAN OF CARE
Problem: Adult Behavioral Health Plan of Care  Goal: Plan of Care Review  Outcome: Progressing  Flowsheets (Taken 12/22/2023 5130)  Progress: improving  Patient Agreement with Plan of Care: agrees  Outcome Evaluation: Stephanie was calm, visible and social. She denied SI/HI/AVH. She was hopeful regarding the additional medication she is receiving. She was reminded to drink fluids.  Iraida did have a disagreement with a peer over the TV remote. Therapeutic intervention successfully utilized. HS medication administered. Stephanie is able to make her needs known and will continue to be monitored.  Plan of Care Reviewed With: patient  Intervention(s): Nursing and safety assessment. Offer support and encouragement

## 2023-12-23 NOTE — PROGRESS NOTES
12/23/23 5792   Activity/Group Checklist   Group Title Community meeting   Attendance Attended   Attendance Duration (min) 16-30   Follows Direction Followed directions   Interactions Initiates interaction   Affect/Mood Range Normal range   Affect/Mood Display Alert   Goals Achieved Able to listen to others;Able to engage in interactions     Stephanie participated in community meeting. Her goal for today is to get her blood pressure up and feel better.

## 2023-12-23 NOTE — PROGRESS NOTES
met with Stephanie to obtain information for the assessment. She is fully ambulatory and not assessed to be high risk. Stephanie stated she wants to continue working with Dr. Saleem, her psychiatrist. She stated that when discharged, she would also like to be connected to a women's therapy group. Stephanie noted that upon admission to the unit, she started to feel better.  asked Stephanie to think about what it is about being on the unit that helps her to feel better and how  can she transfer what it is to her life after she is discharged.                                                                    12/22/23 2000   General Information,    Admission Status voluntary admission   Arrived From emergency department  ([facility])   Highest Level of Education Bachelor's Degree   Contact Information   Permission Granted to Share Info With family/designee;psychiatrist  (sister is main family support)   Contact Information Obtained for psychiatrist   Psychiatrist Information   Name, Psychiatrist Dr. Paresh Saleem  ([facility])   Living Environment   People in Home alone   Current Living Arrangements apartment   Primary Care Provided by self   Provides Primary Care For no one   Quality of Family Relationships supportive   Able to Return to Prior Arrangements yes   Employment/   Employment Status retired   Current or Previous Occupation education  ()   Financial/Legal   Source of Income social security;pension/senior care   Legal   Criminal Activity/Legal Involvement none   Values/Beliefs   Spiritual, Cultural Beliefs, Yazidism Practices, Values that Affect Care no   Personal Safety   Feels Safe at Home or Work/School yes   Feels Threatened by Someone no   Does Anyone Try to Keep You From Having Contact with Others or Doing Things Outside Your Home? no   Physical Signs of Abuse Present no   Violence Risk   History of Violence 0-->No   Aggressive/Violent  Observed Behaviors 0-->No observed behaviors   Total Score 0   Do You Have Any Dangerous Items in Your Possession no   Homicidal ideation No   Current Self-injurious Behavior   Current Self-injurious Behavior denies   Past Self-injurious Behavior   Past Self-injurious Behavior denies   Behavioral Health Condition Management   Behavioral Health Symptoms/Conditions anxiety;depression   Behavioral Management Strategies activity;complementary therapy(ies);counseling;exercise;medication therapy;support system   Behavioral Health Self-Management Outcome 3 (uncertain)   Mental Health Treatment   Previous Mental Health Treatment inpatient treatment;partial hospitalization;outpatient treatment;medication;psychiatrist   Current Mental Health Treatment inpatient treatment   Current Mental Health Tx Start Date 12/21/23   Substance Use   Substance Use Status past street drug/inhalant/medication abuse  (inpt rehab for cocaine 35 years ago)   Coping/Stress, BH   Major Change/Loss/Stressor limited support system;mental health condition   Sources of Support mental health providers;adult child(edwina)   Techniques to [place] with Loss/Stress/Change counseling;diversional activities;medication   Reaction to Health Status anxious;uncertainty   Understanding of Condition and Treatment needs time to process   Developmental Stage (Eriksson's)   Developmental Stage Stage 8 (65 years-death/Late Adulthood) Integrity vs. Despair   Discharge Needs Assessment, BH   Readmission Within the Last 30 Days no previous admission in last 30 days   Concerns to be Addressed discharge planning;mental health;medication   Patient/Family Anticipates Transition to home   Patient/Family Anticipated Services at Transition mental health services   Transportation Concerns none   Anticipated Discharge Disposition home or self-care   Discharge Planning   Anticipated Discharge Disposition home with outpatient services   Type of Outpatient Services counseling;medication  management;psychiatric care  (would like to participate in a women's therapy group)   Voluntary Admission   Voluntary Admit Date 12/21/23   Plan/Follow-up   Tobacco Cessation Discharge Follow-up Not Indicated

## 2023-12-24 PROCEDURE — 63700000 HC SELF-ADMINISTRABLE DRUG: Performed by: PSYCHIATRY & NEUROLOGY

## 2023-12-24 PROCEDURE — 12400000 HC ROOM AND CARE SEMIPRIVATE PSYCH

## 2023-12-24 PROCEDURE — 99233 SBSQ HOSP IP/OBS HIGH 50: CPT | Performed by: PSYCHIATRY & NEUROLOGY

## 2023-12-24 RX ADMIN — CLONAZEPAM 1 MG: 1 TABLET ORAL at 21:39

## 2023-12-24 RX ADMIN — VENLAFAXINE HYDROCHLORIDE 75 MG: 75 CAPSULE, EXTENDED RELEASE ORAL at 08:41

## 2023-12-24 RX ADMIN — ARIPIPRAZOLE 1 MG: 2 TABLET ORAL at 08:41

## 2023-12-24 RX ADMIN — SENNOSIDES 1 TABLET: 8.6 TABLET, FILM COATED ORAL at 11:49

## 2023-12-24 NOTE — PROGRESS NOTES
12/24/23 1015   Activity/Group Checklist   Group Title Cognitive therapy   Attendance Did not attend     Stephanie was provided with handouts related to the group topic of coping skills to manage anxiety.

## 2023-12-24 NOTE — PROGRESS NOTES
12/24/23 1500   Activity/Group Checklist   Group Title Interactive therapy  (Holiday music bingo and holiday trivia/riddles)   Attendance Did not attend     Stephanie was meeting with her doctor at the time group was held.

## 2023-12-24 NOTE — PROGRESS NOTES
"   12/24/23 0930   Activity/Group Checklist   Group Title Community meeting   Attendance Attended   Attendance Duration (min) 16-30   Follows Direction Followed directions   Interactions Initiates interaction   Affect/Mood Range Normal range   Affect/Mood Display Calm   Goals Achieved Able to listen to others;Able to engage in interactions  (Stephanie's goal for the day is \"to discuss discharge with my doctor.\")       "

## 2023-12-24 NOTE — PLAN OF CARE
Problem: Adult Behavioral Health Plan of Care  Goal: Plan of Care Review  Outcome: Progressing  Flowsheets (Taken 12/23/2023 6077)  Progress: improving  Patient Agreement with Plan of Care: agrees  Outcome Evaluation: Stephanie was visible and social out in the milieu with peers tonight before bed. Cooperative upon interaction with staff, pleasant. Makes needs known appropriately, though discharged focused. Adhered to scheduled medication regimen. Did not endorse experiencing thoughts of suicide, self-harm, or hallucinations. Care plan updated.    Plan of Care Reviewed With: patient  Intervention(s): Nursing assessment, medication management, emotional support and positive reinforcement given, encouraged patient to make needs known to staff

## 2023-12-24 NOTE — PROGRESS NOTES
12/24/23 1315   Activity/Group Checklist   Group Title Support and Self-expression  (Holidays past and intentions for the future)   Attendance Duration (min) 46-60   Follows Direction Followed directions   Interactions Initiates interaction   Affect/Mood Range Normal range   Affect/Mood Display Alert;Calm;Bright   Goals Achieved Able to listen to others;Able to engage in interactions;Able to reflect/comment on own behavior;Able to offer feedback or support to another     During the group, patients were prompted to talk about their favorite holiday foods, movies/shows, music, gifts, and memories/traditions. They were then asked to complete an exercise to envision their future ideal holiday including location, people, positive emotions, and new traditions they want to create. Stephanie was engaged in the group activity and able to relate well to peers. She offered support to those who struggled sharing answers to the prompts.

## 2023-12-24 NOTE — PROGRESS NOTES
"Addendum: Spent over and hour discussing with nursing and SW, interviewing patient, collateral and update with daughter, documentation.     PSYCHIATRIC PROGRESS NOTE    Chief Complaint/Reason for follow-up: \"I'd like to go tomorrow\"    Interval History: Patient seen and interviewed. Well dresed and groomed. Good sleep appetite. Denies side effects from the Abilify although perseverating on her heart rate of around 105 and low BP; discussed with her that it is more likely from dehydration and lack of nutrition than from Abilify 1 mg.  Denies SI/HI/AVH.     She asked to discharge tomorrow; informed her will need to ask SW as we do not have full staff due to the holiday. Later informed her SW can do it. Discussed our recommendations for aftercare: PHP/IOP at [facility] for coping skills then long term psychodynamic psychotherapy with an experienced therapist she feels comfortable with. Provided a [facility] BPD questionnaire for her to review to see if it resonates after discussion with la Emery.    Called daughter Yuly who asked about what she thought might be hypomanic symptoms. On review of details, she said they manifest as lability and lashing out, abandonment panic, idealization an devaluation. Denied lack of need for sleep, increased goal directed activity, pressured speech. It was suggested that she may meet criteria for a Cluster B PD; she also agreed with this, expressing taht she does see her mom meeting all the criteria for BPD. 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     Vital Signs for the last 24 hours:  Temp:  [36.4 °C (97.6 °F)] 36.4 °C (97.6 °F)  Heart Rate:  [] 77  Resp:  [16-17] 17  BP: ()/(62-73) 125/73    Scheduled Meds:  • ARIPiprazole  1 mg oral Daily   • clonazePAM  1 mg oral Nightly   • venlafaxine XR  75 mg oral Daily with breakfast       Labs:  Selected Electrolytes  Results from last 7 " "days   Lab Units 12/23/23  1411 12/22/23  1104   POTASSIUM mEQ/L 5.1 5.3*   SODIUM mEQ/L 139 139   CREATININE mg/dL 1.0 1.2       Recent EKG HR/QTC  Lab Results   Component Value Date    VENTRICRATE 74 12/21/2023    QTCCALCULAT 426 12/21/2023       No results found for: \"VPA\", \"LITHIUM\"    Mental Status Exam  Appearance:    meticulous grooming, appears other than stated age and very young for age   Attention:  attends appropriately during exam   Behavior (General):  awake and alert, calm and cooperative and engaged in interview   Behavior (Motoric):  typical coordinated movements appropriate to situation   Speech:  normal rate/rhythm/volume   Language:  fluent   Affect:  congruent with mood/situation   Mood:  \"better\" and depressed   Associations:  coherent and logical   Thought Process:  goal-directed   Thought Content:  no auditory or visual hallucinations on exam, no paranoia or delusions on exam and appropriate to situation   Suicidality:  denies desire or thoughts to harm or kill self   Orientation:  person, age, day, month, year, season, type of place, name of place, city and situation   Insight/Judgement:  acknowledges illness and help accepting   Memory:  recalls recent events and recalls remote events       Assessment/Plan  Severe episode of recurrent major depressive disorder, without psychotic features (CMS/HCC)  Assessment & Plan      12/23: Continue current plan for now; call fannie Emery tomorrow    12/24: Feeling better, denies SI, collateral from fannie emery consistent with BPD. Asked to discharge tomorrow.    1) continue 201  2) continue home meds  3) continue Abilify 1 mg daily as augmentation to Effexor   4) PRN's as ordered as well as mouth moisturizer  5) new CMP to recheck eGFR and other values since hydration: much improved eGFR at 58.2  6) group and milieu therapy  7) collateral: Dr Yoder, spoke with daughter today     "

## 2023-12-24 NOTE — PLAN OF CARE
Plan of Care Review  Plan of Care Reviewed With: patient  Patient Agreement with Plan of Care: agrees  Consent Given to Review Plan with: Pt  Progress: improving  Intervention(s): Discharge planning  Outcome Evaluation: SW was informed by attending physician that pt is for discharge tomorrow. SW was able to confirm that pt has an [facility] follow-up appointment with her psychiatrist, Dr. Saleem, at [facility] on 12/29/23. SW met with pt and completed Safety Plan. Pt will call her sister for a ride home tomorrow.

## 2023-12-24 NOTE — PLAN OF CARE
Problem: Suicidal Behavior  Goal: Suicidal Behavior is Absent or Managed  Outcome: Progressing  Flowsheets (Taken 12/24/2023 1440)  [place] Goal: identifies crisis plan  Individual Goal: Stephanie will come to staff with any suicidal ideations  Goal Outcome: progressing   Plan of Care Review  Plan of Care Reviewed With: patient  Patient Agreement with Plan of Care: agrees  Progress: improving  Intervention(s): Nursing assessment, medication management, encourage participation in treatment and Q 15 minute rounds for safety  Outcome Evaluation: Stephanie is visible in the community. Able to state needs. Reports that she feels like she has gotten a lot out of this stay and feels that being here long term would not be helpful. Reports she was able to celebrate DadShed and make food for her family. Talks about her social supports being her sister and her daughter. No other issues or concerns at this time and treatment plan remains ongoing.

## 2023-12-25 VITALS
BODY MASS INDEX: 18.18 KG/M2 | SYSTOLIC BLOOD PRESSURE: 94 MMHG | RESPIRATION RATE: 16 BRPM | TEMPERATURE: 97.9 F | HEART RATE: 83 BPM | HEIGHT: 64 IN | DIASTOLIC BLOOD PRESSURE: 68 MMHG | OXYGEN SATURATION: 99 % | WEIGHT: 106.5 LBS

## 2023-12-25 PROCEDURE — 99238 HOSP IP/OBS DSCHRG MGMT 30/<: CPT | Performed by: PSYCHIATRY & NEUROLOGY

## 2023-12-25 PROCEDURE — 63700000 HC SELF-ADMINISTRABLE DRUG: Performed by: PSYCHIATRY & NEUROLOGY

## 2023-12-25 RX ORDER — VENLAFAXINE HYDROCHLORIDE 75 MG/1
75 CAPSULE, EXTENDED RELEASE ORAL DAILY
Qty: 30 CAPSULE | Refills: 0 | Status: SHIPPED | OUTPATIENT
Start: 2023-12-25 | End: 2023-12-29 | Stop reason: SDUPTHER

## 2023-12-25 RX ORDER — ARIPIPRAZOLE 2 MG/1
1 TABLET ORAL DAILY
Qty: 15 TABLET | Refills: 0 | Status: SHIPPED | OUTPATIENT
Start: 2023-12-26 | End: 2023-12-29 | Stop reason: SDUPTHER

## 2023-12-25 RX ADMIN — ARIPIPRAZOLE 1 MG: 2 TABLET ORAL at 08:40

## 2023-12-25 RX ADMIN — VENLAFAXINE HYDROCHLORIDE 75 MG: 75 CAPSULE, EXTENDED RELEASE ORAL at 08:40

## 2023-12-25 ASSESSMENT — ENCOUNTER SYMPTOMS: ARTHRALGIAS: 1

## 2023-12-25 NOTE — PROGRESS NOTES
12/25/23 0930   Activity/Group Checklist   Group Title Community meeting   Attendance Attended   Attendance Duration (min) 46-60   Follows Direction Followed directions   Interactions Initiates interaction   Affect/Mood Range Normal range   Affect/Mood Display Alert   Goals Achieved Identified feelings;Able to engage in interactions;Able to listen to others      Stephanie attended community meeting. Stephanie stated that today she feels good. Her goal for today is to be discharged today. Stephanie answered the question of the day, What is your favorite desert. Stephanie replied, coffee ice cream.

## 2023-12-25 NOTE — PROGRESS NOTES
12/25/23 1100   Activity/Group Checklist   Group Title Support and Self-expression  (Name that toon)   Attendance Attended   Attendance Duration (min) 46-60   Follows Direction Followed directions   Interactions Initiates interaction   Affect/Mood Range Normal range   Affect/Mood Display Alert   Goals Achieved Identified feelings;Discussed coping/wellness tools;Able to listen to others;Able to engage in interactions

## 2023-12-25 NOTE — DISCHARGE INSTRUCTIONS
Recommend you seek a long term psychodynamic psychotherapy relationship after completing intensive outptatient or partial hospitalization program at [facility].

## 2023-12-25 NOTE — DISCHARGE SUMMARY
Inpatient Psychiatry Discharge Summary    BRIEF OVERVIEW  Admitting Provider: Iman Perez, DO  Discharge Provider: Rebeca Be*  Primary Care Physician at Discharge: Libby Wang [phone]     Admission Date: 12/21/2023     Discharge Date: 12/25/2023    Discharge Diagnosis  MDD, recurrent severe without psychotic features  Likely borderline personality disorder    Reason for Admission   You came to the ER with worsening depression and developing suicidal ideation.    Major Procedures and Tests  Imaging Results  No results found.    No Surgical Procedures    Advance Directives  Does patient have advance directive?: No  Patient does not have Advance Directive: Patient/Family declines further information  Does patient have current OOH DNR form?: No  Patient does not have current OOH DNR form: Patient/Family declines further information  Does patient have current POLST?: No  Patient does not have current POLST: Patient/Family declines further information  Does patient have mental health advance directive?: No  Patient does not have Mental Health Advance Directive: Patient/Family declines further information           Discharge Disposition  Home     Discharge Medications     Medication List      START taking these medications    ARIPiprazole 2 mg tablet  Commonly known as: ABILIFY  Start taking on: December 26, 2023  Take 0.5 tablets (1 mg total) by mouth daily. Use pill cutter or fine blade to cut 2 mg tab in half  Dose: 1 mg        CONTINUE taking these medications    clonazePAM 1 mg tablet  Commonly known as: KlonoPIN  Take 1 tablet (1 mg total) by mouth daily.  Dose: 1 mg     REPATHA SURECLICK 140 mg/mL pen  INJECT 1 ML (140 MG TOTAL) UNDER THE SKIN EVERY 14 DAYS.  Generic drug: evolocumab     SUMAtriptan 100 mg tablet  Commonly known as: IMITREX  Take 100 mg by mouth daily as needed for migraine. Only 1 dose in 24 hours  Dose: 100 mg     venlafaxine XR 75 mg 24 hr capsule  Commonly  known as: EFFEXOR XR  Take 1 capsule (75 mg total) by mouth daily.  Dose: 75 mg               Outpatient Follow-Up            In 2 weeks Antonio Price MD [facility] Cardiology at [facility]    In 6 months Ashley Aguilar MD [facility] at [facility]          Test Results Pending at Discharge  Unresulted Labs (From admission, onward)    None          DETAILS OF HOSPITAL STAY    Presenting Problem/History of Present Illness  Patient presented to the ER due to worsening depression and suicidal ideation    Hospital Course  Patient signed a 201 to be admitted to the inpatient psychiatry unit under a voluntary commitment status. She was for the most part pleasant and cooperative, attended groups and interacted well with others on the unit. She said she has reactions to pretty much every medication, however that a daughter had done well augmenting her antidepressant with Abilify and suggested it for her. She agreed to try 1 mg daily due to her sensitivity to side effects and appeared to tolerate it well with mild tachycardia (up to 105) and somewhat low BP at times although she was dehydrated on admission and said she tends not to drink enough fluids. She said she will see her cardiologist on discharge.   By 12/24 she felt she wanted discharge. Daughter Yuly was called. She said she has wondered about bipolar at times, however the symptoms she reported (mood swings, lashing out) were reported as occurring off and on on the same day rather than a continuous mood state for several days, and she denied being aware of lack of need for sleep, increased energy, pressured speech or goal directed activity. We discussed BPD which seemed to better fit. Katia BPD questionnaire provided to patient for ehr review and the next day she endorsed all but one symptom. She appreciated the knowledge, wondering why she hadn't been told previously. We discussed  "how BPD is not generally diagnosed on a first meeting and in fact we will not document a definitive diagnosis, rather \"likely' or \"features of.\" However she seems to identify with the diagnosis. WE discussed strengthening her coping skills with PHP/IOP then seeking an experienced psychodynamically trained psychotherapist who can tolerate her cycles of idealization and devaluation/lashing out.  On day of discharge she denied SI/HI/AVH expressing eagerness to start outpatient treatment and feeling she has maximized the benefit she can gain from IPLOC. Denied having access to a gun. Daughter deneid concern with discharge.   [facility] updated including Dr. Paresh Saleem, her OP psychiatrist.    Consults:   Orders Placed This Encounter   Procedures   • Inpatient consult to Hospitalist       Procedures: None    Mental Status Exam at Discharge  Heart Rate: 83  Resp: 16  BP: 94/68  Temp: 36.6 °C (97.9 °F)  Weight: 48.3 kg (106 lb 8 oz) (12/21/23 wt)    Appearance: meticulously groomed  Gait and Motor: no abnormal movements and normal gait  Speech: normal rate/rhythm/volume and fluent  Mood: anxious and better  Affect: normal  Associations: logical  Thought Process: goal-directed  Thought Content: no auditory or visual hallucinations. and appropriate to situation  Suicidality/Homicidality: denies  Judgement/Insight: good and help accepting  Orientation: day, month, year, season, type of place, name of place, city and situation  Memory: recalls recent events, recalls remote events and knows current president  Attention: alert  Knowledge: normal and appropriate for education  Language: normal  "

## 2023-12-25 NOTE — PLAN OF CARE
Initial Anesthesia Post-op Note    Patient: Marty Tadeo  Procedure(s) Performed: LEFT ARM ARTERIOVENOUS FISTULA  Anesthesia type: Monitor Anesthesia Care    Last 24 I/O:   Intake/Output Summary (Last 24 hours) at 05/11/18 1051  Last data filed at 05/11/18 1045   Gross per 24 hour   Intake              500 ml   Output                0 ml   Net              500 ml       PATIENT LOCATION: PACU Phase 1  POST-OP VITAL SIGNS: stable  LEVEL OF CONSCIOUSNESS: participates in exam, awake, answers questions appropriately and oriented  RESPIRATORY STATUS: spontaneous ventilation and room air  CARDIOVASCULAR: blood pressure returned to baseline and stable  HYDRATION: euvolemic    PAIN MANAGEMENT: adequately controlled (C/O low back pain (chronic in nature))  NAUSEA: None  AIRWAY PATENCY: patent  POST-OP ASSESSMENT: no complications  COMPLICATIONS: none  HANDOFF:  Handoff to receiving nurse was performed and questions were answered       Problem: Adult Behavioral Health Plan of Care  Goal: Plan of Care Review  Outcome: Progressing  Flowsheets (Taken 12/25/2023 0207)  Progress: improving  Patient Agreement with Plan of Care: agrees  Outcome Evaluation: Stephanie spent the evening in the community room watching T.V and socializing with her peers. She said she is feeling ready for discharge tomorrow. She said she is not having suicidal or self harm thoughts.   Plan of Care Reviewed With: patient  Intervention(s): Assessed for suicidal thoughts.Monitored for safety on q15 minute rounds.

## 2023-12-25 NOTE — PLAN OF CARE
Stephanie was discharged at 1430. At time of discharge, patient stated that they were not having thoughts of suicide or homicide. Patient stated that they are not having audio/visual hallucinations. Discharge instructions were reviewed with the patient and all questions were answered. All belongings were returned to patient. Patient was accompanied by staff to the parking lot, where they were picked up by their sister and transported home by car.

## 2023-12-27 ENCOUNTER — TELEPHONE (OUTPATIENT)
Dept: PSYCHIATRY | Facility: HOSPITAL | Age: 73
End: 2023-12-27
Payer: MEDICARE

## 2023-12-27 NOTE — TELEPHONE ENCOUNTER
LCSW called and LVM for pt to discuss PHP in anticipation of her upcoming appointment with Dr. Saleem on Friday 12/29.  Requested return call to discuss PHP and, if pt is willing, to get scheduled for PHP evaluations.  Provided main office number and requested a return call.

## 2023-12-28 ENCOUNTER — TELEPHONE (OUTPATIENT)
Dept: SCHEDULING | Facility: CLINIC | Age: 73
End: 2023-12-28
Payer: MEDICARE

## 2023-12-28 NOTE — TELEPHONE ENCOUNTER
Called and left voicemail in regards to keeping her appt after her injection. Awaiting callback to see if she would like to rescheduled

## 2023-12-28 NOTE — TELEPHONE ENCOUNTER
Pt to let Dr Price know she is getting an epidural with sedation the day before 1/11/2024 her appt with him on 1/12/2024 and wasn't sure if she should reschedule her appt with him. Please advise

## 2023-12-29 ENCOUNTER — TELEMEDICINE (OUTPATIENT)
Dept: PSYCHIATRY | Facility: HOSPITAL | Age: 73
End: 2023-12-29
Attending: PSYCHIATRY & NEUROLOGY
Payer: MEDICARE

## 2023-12-29 DIAGNOSIS — F41.1 GENERALIZED ANXIETY DISORDER: ICD-10-CM

## 2023-12-29 DIAGNOSIS — F33.2 SEVERE EPISODE OF RECURRENT MAJOR DEPRESSIVE DISORDER, WITHOUT PSYCHOTIC FEATURES (CMS/HCC): Primary | ICD-10-CM

## 2023-12-29 PROCEDURE — 99215 OFFICE O/P EST HI 40 MIN: CPT | Mod: 95 | Performed by: PSYCHIATRY & NEUROLOGY

## 2023-12-29 PROCEDURE — 90833 PSYTX W PT W E/M 30 MIN: CPT | Mod: 95 | Performed by: PSYCHIATRY & NEUROLOGY

## 2023-12-29 RX ORDER — VENLAFAXINE HYDROCHLORIDE 75 MG/1
75 CAPSULE, EXTENDED RELEASE ORAL DAILY
Qty: 90 CAPSULE | Refills: 0 | Status: SHIPPED | OUTPATIENT
Start: 2023-12-29 | End: 2024-02-07

## 2023-12-29 RX ORDER — ARIPIPRAZOLE 2 MG/1
1 TABLET ORAL DAILY
Qty: 45 TABLET | Refills: 0 | Status: SHIPPED | OUTPATIENT
Start: 2023-12-29 | End: 2024-02-07 | Stop reason: SDUPTHER

## 2023-12-29 NOTE — Clinical Note
Omar Garcia, just to confirm that you did send the [employer] testing kit to Stephanie. She mentioned that she hadn't received it yet.  --Can you please obtain THIERRY for [facility] Dr. Shawna Coats. [phone]
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
## 2024-01-05 ENCOUNTER — TELEMEDICINE (OUTPATIENT)
Dept: PSYCHIATRY | Facility: HOSPITAL | Age: 74
End: 2024-01-05
Attending: PSYCHIATRY & NEUROLOGY
Payer: MEDICARE

## 2024-01-05 DIAGNOSIS — F41.1 GENERALIZED ANXIETY DISORDER: ICD-10-CM

## 2024-01-05 DIAGNOSIS — F33.2 SEVERE EPISODE OF RECURRENT MAJOR DEPRESSIVE DISORDER, WITHOUT PSYCHOTIC FEATURES (CMS/HCC): Primary | ICD-10-CM

## 2024-01-05 PROCEDURE — 90833 PSYTX W PT W E/M 30 MIN: CPT | Mod: 95 | Performed by: PSYCHIATRY & NEUROLOGY

## 2024-01-05 PROCEDURE — 99215 OFFICE O/P EST HI 40 MIN: CPT | Mod: 95 | Performed by: PSYCHIATRY & NEUROLOGY

## 2024-01-05 RX ORDER — CLONAZEPAM 1 MG/1
1 TABLET ORAL DAILY
Qty: 30 TABLET | Refills: 1 | Status: SHIPPED | OUTPATIENT
Start: 2024-01-18 | End: 2024-02-07 | Stop reason: SDUPTHER

## 2024-01-05 NOTE — LETTER
[facility] PSYCH ONLY TREATMENT PLAN 1/5/24   Effective from: 1/5/2024  Effective to: 7/3/2024    Active  Plan ID: [id number]           Participants as of 1/5/2024    Name Type Comments Contact Info    Paresh Saleem DO Consulting Physician  [phone]    Stephanie Jeffers Patient  [phone]      Patient Strengths & Barriers     None      Psych Only Treatment Plan          Psychiatry Treatment Plan  Date: 1/5/2024  Expiration Date:  5/4/2024      Patient Name:  Stephanie Jeffers       YOB: 1950    Symptom/ Problem Intervention Frequency and Duration Goal Progress Objective by discharge       Anxiety as evidenced by Racing Thoughts and Scary Thoughts Medication as prescribed with continued  monitoring. Follow up in 4 Weeks  Fair Alleviate symptoms and return to previous level of effective functioning   Mood  issues as evidenced by Dysphoria, Decreased appetite, and Social withdrawal Medication as prescribed with continued  monitoring.  Follow up in 4 Weeks   New     Alleviate symptoms and return to previous level of effective functioning         Treatment Plan Created/Updated By: Paresh Saleem DO
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
## 2024-01-12 ENCOUNTER — OFFICE VISIT (OUTPATIENT)
Dept: CARDIOLOGY | Facility: CLINIC | Age: 74
End: 2024-01-12
Payer: MEDICARE

## 2024-01-12 VITALS
BODY MASS INDEX: 18.1 KG/M2 | SYSTOLIC BLOOD PRESSURE: 116 MMHG | DIASTOLIC BLOOD PRESSURE: 72 MMHG | HEIGHT: 64 IN | OXYGEN SATURATION: 99 % | HEART RATE: 88 BPM | WEIGHT: 106 LBS

## 2024-01-12 DIAGNOSIS — I95.1 ORTHOSTATIC HYPOTENSION: ICD-10-CM

## 2024-01-12 DIAGNOSIS — R93.1 ELEVATED CORONARY ARTERY CALCIUM SCORE: ICD-10-CM

## 2024-01-12 DIAGNOSIS — R00.0 HEART RATE FAST: ICD-10-CM

## 2024-01-12 DIAGNOSIS — E78.01 FAMILIAL HYPERCHOLESTEROLEMIA: Primary | ICD-10-CM

## 2024-01-12 DIAGNOSIS — E78.41 ELEVATED LP(A): ICD-10-CM

## 2024-01-12 PROCEDURE — 93000 ELECTROCARDIOGRAM COMPLETE: CPT | Performed by: INTERNAL MEDICINE

## 2024-01-12 PROCEDURE — 99214 OFFICE O/P EST MOD 30 MIN: CPT | Performed by: INTERNAL MEDICINE

## 2024-01-12 RX ORDER — EVOLOCUMAB 140 MG/ML
140 INJECTION, SOLUTION SUBCUTANEOUS
Qty: 4 ML | Refills: 0 | COMMUNITY
Start: 2024-01-12 | End: 2024-03-22 | Stop reason: SDUPTHER

## 2024-01-12 NOTE — ASSESSMENT & PLAN NOTE
She has a history of orthostatic hypotension though on her current psychiatric medications her blood pressure has increased.  She still has significant variability in her blood pressure readings.  I asked her to contact me if her blood pressure is consistently greater than 130/80.  She will bring her blood pressure cuff to her next office visit to ensure accuracy.

## 2024-01-12 NOTE — PROGRESS NOTES
Antonio Price MD  Cardiology    [facility]    [facility]  [facility] [address]  [address] [place] [address]    TEL  [phone]  [facility]     01/12/24     Dear Dr. Wang:    It was my pleasure to see Ms. Stephanie Jeffers at the [facility] today for follow-up.    She was recently admitted to the hospital with significant depression.  She was started on new medications that have helped significantly.  Since starting the medications she has noted a rise in her blood pressure and heart rate values.  She was told by her psychiatric team that this is to be expected.  Her appetite is improving though she reports that due to her weight loss she is eating whatever she needs to in order to gain weight.  She is slowly restarting an exercise regimen.  Her heart rate at home has been in the 80s to 90s.  Never higher.  Blood pressures are variable between the 110s-130s/70s-80s.  She has no cardiopulmonary symptoms at rest or with activity.  Specifically, no chest pain or pressure.  No dyspnea at rest with activity.  No lower extremity edema, orthopnea, PND.  No palpitations or syncope.  She is compliant with Repatha.  No specific concerns or complaints today.    Cardiovascular History:  1. Familial hypercholesterolemia, elevated Lp(a)  2. Coronary atherosclerosis   3. Orthostatic hypotension    Past Medical History: Migraines, History of HBV    Past Surgical History:  Past Surgical History:   Procedure Laterality Date   • CATARACT EXTRACTION Left 11/19/2018   • CATARACT EXTRACTION Right 01/16/2019       Medications:  Current Outpatient Medications   Medication Sig Dispense Refill   • ARIPiprazole (ABILIFY) 2 mg tablet Take 0.5 tablets (1 mg total) by mouth daily. Use pill cutter or fine blade to cut 2 mg tab in half 45 tablet 0   • [START ON 1/18/2024] clonazePAM (KlonoPIN) 1 mg tablet Take 1 tablet (1 mg total) by mouth daily. 30 tablet 1    • REPATHA SURECLICK 140 mg/mL pen INJECT 1 ML (140 MG TOTAL) UNDER THE SKIN EVERY 14 DAYS. 6 mL 3   • SUMAtriptan (IMITREX) 100 mg tablet Take 100 mg by mouth daily as needed for migraine. Only 1 dose in 24 hours     • venlafaxine XR (EFFEXOR XR) 75 mg 24 hr capsule Take 1 capsule (75 mg total) by mouth daily. 90 capsule 0     No current facility-administered medications for this visit.       Allergies: Patient has no known allergies.    Social History: She is .  She has 2 children.  Retired .  She is a never smoker.  Approximately 3 glasses of wine per week.  No recreational drugs.    Family History: She has a sister who has tetralogy of Fallot. Her brother has no documented coronary artery disease. Her mother  at a young age from complications of lung cancer.  She had hypercholesterolemia.  Her father lived to be 92.  He had a stroke late in life and also had hypercholesterolemia.  No family history of premature coronary artery disease, arrhythmias, cardiomyopathies, or sudden cardiac death.    Review of Systems: A complete 14-point review of systems is negative, except as noted in the HPI.    Exam:   Objective   Vitals:    24 1019   BP: 116/72   Pulse: 88   SpO2: 99%     Wt Readings from Last 3 Encounters:   24 48.1 kg (106 lb)   23 48.5 kg (107 lb)   23 47.2 kg (104 lb)     Body mass index is 18.19 kg/m².  Constitutional: Appears comfortable.   Eyes: No icterus.    ENT: Deferred. Patient wearing a mask.   Neck: No jugular venous distention.   Vascular: No carotid bruits.   Cardiac: Normal S1 and S2, regular rhythm. No murmurs, rubs, or gallops appreciated.  Lungs: Clear to auscultation bilaterally.    GI: Soft, normoactive bowel sounds.  Extremities: Warm. No lower extremity edema.   Skin: Dry.  Neurologic: Awake, alert, oriented.    Psychiatric: No agitation.    Labs: Personally reviewed and discussed with the patient.  Notable for the following.    Lab Results   Component Value Date    LDLCALC 95 12/21/2023    CHOL 219 (H) 12/21/2023    TRIG 127 12/21/2023    HDL 99 12/21/2023    HGBA1C 5.5 12/21/2023     12/23/2023    K 5.1 12/23/2023    BUN 21 12/23/2023    CREATININE 1.0 12/23/2023    WBC 6.90 12/21/2023    HGB 13.6 12/21/2023     12/21/2023     Lab Results   Component Value Date    ALT 14 12/23/2023    AST 21 12/23/2023    ALKPHOS 45 12/23/2023    BILITOT 0.5 12/23/2023 1/2022-  , , HDL 75, LDL 87  High-sensitivity CRP 0.9  Sodium 139, potassium 4.3, creatinine 0.9  LFTs within normal limits  TSH within normal limits    6/2019- , TG 82, ,     Cardiovascular Studies:   1. Stress echo, 1/2017: No ischemia. Excellent exercise tolerance. Baseline echo- Normal wall motion and excursion. 1+ MR and TR. RVSP 13 mmHg.   2.  CAC, 7/2021: Composite 120 (left main 1, LAD 51, RCA 68).  75-90th percentile.  Mild-moderate atherosclerotic calcification of the aorta.  Otherwise unremarkable noncardiac findings.  3.  Zio, 4/2023: Sinus (73, ). Isolated PAC and PVC. Symptoms corresponded with sinus rhythm.   4.  Stres echo, 5/2023: No ischemia. Excellent exercise capacity. Baseline echo- Normal LV size, thickness, LVEF 60%. No WMAs. Normal RV size/function. Mild MR.     ECG: Tracing from today personally reviewed.  It reveals sinus rhythm.    Assessment/Plan   Problem List Items Addressed This Visit        Circulatory    Orthostatic hypotension     She has a history of orthostatic hypotension though on her current psychiatric medications her blood pressure has increased.  She still has significant variability in her blood pressure readings.  I asked her to contact me if her blood pressure is consistently greater than 130/80.  She will bring her blood pressure cuff to her next office visit to ensure accuracy.         Elevated coronary artery calcium score     Her stress echocardiogram showed no evidence of ischemia.   She has no cardiopulmonary symptoms presently.  She knows to contact me for any new cardiopulmonary symptoms.  Aspirin was previously stopped due to easy bruising.  Lipid management as below.           Heart rate fast     She is asymptomatic but has noted an increase in her heart rate from her prior baseline.  Her values are still within normal limits and she is in sinus rhythm today.  Will continue to monitor clinically.  She will contact me if she develops any symptoms.  The elevation is likely related to her psychiatric medications.            Other    Familial hypercholesterolemia - Primary     She is tolerating Repatha without side effects.  Her last LDL was above goal though her lifestyle habits have been far from her prior baseline.  Will make no changes to her pharmacotherapy at this time as she works to get back to her prior lifestyle habits.  At that time she will repeat a lipid profile and we can discuss changes to her medications as necessary.  We would likely retry ezetimibe if needed.         Relevant Orders    ECG 12 LEAD-OFFICE PERFORMED (Completed)    Lipid panel   Other Visit Diagnoses     Elevated Lp(a)            It was my pleasure to visit with Stephanie Jeffers in clinic today.  She will follow up with me in 6 months.  Please do not hesitate to contact me with any questions.      Sincerely,       ________________  Antonio Price MD

## 2024-01-12 NOTE — ASSESSMENT & PLAN NOTE
Her stress echocardiogram showed no evidence of ischemia.  She has no cardiopulmonary symptoms presently.  She knows to contact me for any new cardiopulmonary symptoms.  Aspirin was previously stopped due to easy bruising.  Lipid management as below.

## 2024-01-12 NOTE — ASSESSMENT & PLAN NOTE
She is tolerating Repatha without side effects.  Her last LDL was above goal though her lifestyle habits have been far from her prior baseline.  Will make no changes to her pharmacotherapy at this time as she works to get back to her prior lifestyle habits.  At that time she will repeat a lipid profile and we can discuss changes to her medications as necessary.  We would likely retry ezetimibe if needed.

## 2024-01-12 NOTE — ASSESSMENT & PLAN NOTE
She is asymptomatic but has noted an increase in her heart rate from her prior baseline.  Her values are still within normal limits and she is in sinus rhythm today.  Will continue to monitor clinically.  She will contact me if she develops any symptoms.  The elevation is likely related to her psychiatric medications.

## 2024-01-26 ENCOUNTER — TELEMEDICINE (OUTPATIENT)
Dept: PSYCHIATRY | Facility: HOSPITAL | Age: 74
End: 2024-01-26
Attending: PSYCHIATRY & NEUROLOGY
Payer: MEDICARE

## 2024-01-26 DIAGNOSIS — F41.1 GENERALIZED ANXIETY DISORDER: ICD-10-CM

## 2024-01-26 DIAGNOSIS — F33.2 SEVERE EPISODE OF RECURRENT MAJOR DEPRESSIVE DISORDER, WITHOUT PSYCHOTIC FEATURES (CMS/HCC): Primary | ICD-10-CM

## 2024-01-26 PROCEDURE — 90833 PSYTX W PT W E/M 30 MIN: CPT | Mod: 95 | Performed by: PSYCHIATRY & NEUROLOGY

## 2024-01-26 PROCEDURE — 99214 OFFICE O/P EST MOD 30 MIN: CPT | Mod: 95 | Performed by: PSYCHIATRY & NEUROLOGY

## 2024-01-26 RX ORDER — CLONAZEPAM 1 MG/1
1 TABLET ORAL NIGHTLY
Qty: 30 TABLET | Refills: 1 | Status: CANCELLED | OUTPATIENT
Start: 2024-01-26 | End: 2024-02-25

## 2024-01-26 RX ORDER — DESVENLAFAXINE SUCCINATE 25 MG/1
25 TABLET, EXTENDED RELEASE ORAL DAILY
Qty: 30 TABLET | Refills: 1 | Status: SHIPPED | OUTPATIENT
Start: 2024-01-26 | End: 2024-02-07
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
## 2024-02-07 ENCOUNTER — TELEMEDICINE (OUTPATIENT)
Dept: PSYCHIATRY | Facility: HOSPITAL | Age: 74
End: 2024-02-07
Attending: PSYCHIATRY & NEUROLOGY
Payer: MEDICARE

## 2024-02-07 DIAGNOSIS — F33.2 SEVERE EPISODE OF RECURRENT MAJOR DEPRESSIVE DISORDER, WITHOUT PSYCHOTIC FEATURES (CMS/HCC): Primary | ICD-10-CM

## 2024-02-07 DIAGNOSIS — F41.1 GENERALIZED ANXIETY DISORDER: ICD-10-CM

## 2024-02-07 PROCEDURE — 90833 PSYTX W PT W E/M 30 MIN: CPT | Mod: 95 | Performed by: PSYCHIATRY & NEUROLOGY

## 2024-02-07 PROCEDURE — 99214 OFFICE O/P EST MOD 30 MIN: CPT | Mod: 95 | Performed by: PSYCHIATRY & NEUROLOGY

## 2024-02-07 RX ORDER — ARIPIPRAZOLE 2 MG/1
1 TABLET ORAL DAILY
Qty: 45 TABLET | Refills: 0 | Status: SHIPPED | OUTPATIENT
Start: 2024-02-07 | End: 2024-03-18 | Stop reason: SDUPTHER

## 2024-02-07 RX ORDER — CLONAZEPAM 1 MG/1
1 TABLET ORAL DAILY
Qty: 30 TABLET | Refills: 1 | Status: SHIPPED | OUTPATIENT
Start: 2024-02-18 | End: 2024-03-27 | Stop reason: SDUPTHER
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
## 2024-02-19 ENCOUNTER — TELEPHONE (OUTPATIENT)
Dept: SCHEDULING | Facility: CLINIC | Age: 74
End: 2024-02-19
Payer: MEDICARE

## 2024-02-19 NOTE — TELEPHONE ENCOUNTER
I spoke with Chloe, I set aside two boxes of Repatha for pt.    I will call and let her know they are available for pick-up

## 2024-02-19 NOTE — TELEPHONE ENCOUNTER
Medication Sample Request    Stephanie Jeffers    Caller: self     Relationship: self     Medication/dosage: evolocumab (REPATHA SURECLICK) 140 mg/mL pen    Contact number: [phone]    Additional note: please advise on med

## 2024-02-21 RX ORDER — EVOLOCUMAB 140 MG/ML
140 INJECTION, SOLUTION SUBCUTANEOUS
Qty: 2 ML | Refills: 0 | COMMUNITY
Start: 2024-02-21 | End: 2024-03-22

## 2024-02-26 ENCOUNTER — TELEPHONE (OUTPATIENT)
Dept: PSYCHIATRY | Facility: HOSPITAL | Age: 74
End: 2024-02-26
Payer: MEDICARE

## 2024-02-26 NOTE — TELEPHONE ENCOUNTER
Rescheduled patient 03/13 to 04/24 9:40 am. Patient will be in [place] for a movie premiere 03/13 for that her daughter is in. Patient was concerned that the rescheduled follow-up 04/24 is so far out due to changes. Added patient to wait list as well.

## 2024-03-22 RX ORDER — EVOLOCUMAB 140 MG/ML
140 INJECTION, SOLUTION SUBCUTANEOUS
Qty: 4 ML | Refills: 1 | Status: SHIPPED | OUTPATIENT
Start: 2024-03-22 | End: 2024-07-26

## 2024-03-26 ENCOUNTER — TRANSCRIBE ORDERS (OUTPATIENT)
Dept: SCHEDULING | Age: 74
End: 2024-03-26

## 2024-03-26 DIAGNOSIS — M51.369 OTHER INTERVERTEBRAL DISC DEGENERATION, LUMBAR REGION: Primary | ICD-10-CM

## 2024-03-27 ENCOUNTER — TELEMEDICINE (OUTPATIENT)
Dept: PSYCHIATRY | Facility: HOSPITAL | Age: 74
End: 2024-03-27
Attending: PSYCHIATRY & NEUROLOGY
Payer: MEDICARE

## 2024-03-27 DIAGNOSIS — F33.2 SEVERE EPISODE OF RECURRENT MAJOR DEPRESSIVE DISORDER, WITHOUT PSYCHOTIC FEATURES (CMS/HCC): Primary | ICD-10-CM

## 2024-03-27 DIAGNOSIS — F41.1 GENERALIZED ANXIETY DISORDER: ICD-10-CM

## 2024-03-27 PROCEDURE — 90833 PSYTX W PT W E/M 30 MIN: CPT | Mod: 95 | Performed by: PSYCHIATRY & NEUROLOGY

## 2024-03-27 PROCEDURE — 99214 OFFICE O/P EST MOD 30 MIN: CPT | Mod: 95 | Performed by: PSYCHIATRY & NEUROLOGY

## 2024-03-27 RX ORDER — CLONAZEPAM 1 MG/1
1 TABLET ORAL DAILY
Qty: 30 TABLET | Refills: 0 | Status: SHIPPED | OUTPATIENT
Start: 2024-04-19 | End: 2024-04-24 | Stop reason: SDUPTHER
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
## 2024-04-11 ENCOUNTER — HOSPITAL ENCOUNTER (OUTPATIENT)
Dept: RADIOLOGY | Facility: CLINIC | Age: 74
Discharge: HOME | End: 2024-04-11
Attending: PHYSICAL MEDICINE & REHABILITATION
Payer: MEDICARE

## 2024-04-11 DIAGNOSIS — M51.369 OTHER INTERVERTEBRAL DISC DEGENERATION, LUMBAR REGION: ICD-10-CM

## 2024-04-24 ENCOUNTER — TELEPHONE (OUTPATIENT)
Dept: PSYCHIATRY | Facility: HOSPITAL | Age: 74
End: 2024-04-24
Payer: MEDICARE

## 2024-04-24 RX ORDER — CLONAZEPAM 1 MG/1
1 TABLET ORAL DAILY
Qty: 30 TABLET | Refills: 0 | Status: SHIPPED | OUTPATIENT
Start: 2024-04-24 | End: 2024-05-25 | Stop reason: SDUPTHER

## 2024-04-24 NOTE — TELEPHONE ENCOUNTER
Spoke with Stephanie she reports no need to speak to Dr Saleem only wanted to request refill.  Refill addressed on refill encounter Total Time: 7

## 2024-04-24 NOTE — TELEPHONE ENCOUNTER
Refill needed for clonazepam (Klonopin) 1 mg. Pharmacy confirmed.    Pt wants a phone call from Dr. Saleem.

## 2024-04-24 NOTE — TELEPHONE ENCOUNTER
Stephanie july refill however Rx sent on 3/27 to be dispensed after 4/19 no need at this time for additional Rx to be sent.  Pharmacy contacted and requested to have medication dispensed.             PDMP Reviewed Date:   : 04/24/24 (4/24/2024 10:26 AM)  Comments: : no evidence of misuse (4/24/2024 10:26 AM)  PDMP Reviewed by: : eduarda morgan (4/24/2024 10:26 AM)    Medicine Refill Request    Last Telemedicine Visit: 3/27/2024 Paresh Saleem, DO    Next Appointment: 6/14/2024 Paresh Saleem, DO        Current Outpatient Medications:     evolocumab (REPATHA SURECLICK) 140 mg/mL pen, Inject 1 mL (140 mg total) under the skin every 14 (fourteen) days., Disp: 4 mL, Rfl: 1    ARIPiprazole (ABILIFY) 2 mg tablet, Take 1 tablet (2 mg total) by mouth daily. This is a dose increase., Disp: 90 tablet, Rfl: 0    clonazePAM (KlonoPIN) 1 mg tablet, Take 1 tablet (1 mg total) by mouth daily., Disp: 30 tablet, Rfl: 0    desvenlafaxine succinate (PRISTIQ) 25 mg tablet extended release 24 hr, Take 1 tablet (25 mg total) by mouth daily., Disp: 90 tablet, Rfl: 0    SUMAtriptan (IMITREX) 100 mg tablet, Take 100 mg by mouth daily as needed for migraine. Only 1 dose in 24 hours, Disp: , Rfl:       BP Readings from Last 3 Encounters:   01/12/24 116/72   12/25/23 94/68   12/21/23 125/63       Recent Lab results:  Lab Results   Component Value Date    CHOL 219 (H) 12/21/2023   ,   Lab Results   Component Value Date    HDL 99 12/21/2023   ,   Lab Results   Component Value Date    LDLCALC 95 12/21/2023   ,   Lab Results   Component Value Date    TRIG 127 12/21/2023        Lab Results   Component Value Date    GLUCOSE 102 (H) 12/23/2023   ,   Lab Results   Component Value Date    HGBA1C 5.5 12/21/2023         Lab Results   Component Value Date    CREATININE 1.0 12/23/2023       Lab Results   Component Value Date    TSH 1.20 12/21/2023           Lab Results   Component Value Date    HGBA1C 5.5 12/21/2023     Total Time: 8

## 2024-05-01 ENCOUNTER — APPOINTMENT (OUTPATIENT)
Dept: LAB | Facility: CLINIC | Age: 74
End: 2024-05-01
Attending: INTERNAL MEDICINE
Payer: MEDICARE

## 2024-05-01 ENCOUNTER — TELEPHONE (OUTPATIENT)
Dept: CARDIOLOGY | Facility: CLINIC | Age: 74
End: 2024-05-01
Payer: MEDICARE

## 2024-05-01 DIAGNOSIS — E78.01 FAMILIAL HYPERCHOLESTEROLEMIA: ICD-10-CM

## 2024-05-01 LAB
CHOLEST SERPL-MCNC: 225 MG/DL
HDLC SERPL-MCNC: 101 MG/DL
HDLC SERPL: 2.2 {RATIO}
LDLC SERPL CALC-MCNC: 105 MG/DL
NONHDLC SERPL-MCNC: 124 MG/DL
TRIGL SERPL-MCNC: 95 MG/DL

## 2024-05-01 PROCEDURE — 36415 COLL VENOUS BLD VENIPUNCTURE: CPT

## 2024-05-01 PROCEDURE — 80061 LIPID PANEL: CPT

## 2024-05-10 ENCOUNTER — TELEMEDICINE (OUTPATIENT)
Dept: CARDIOLOGY | Facility: CLINIC | Age: 74
End: 2024-05-10
Payer: MEDICARE

## 2024-05-10 ENCOUNTER — TELEPHONE (OUTPATIENT)
Dept: CARDIOLOGY | Facility: CLINIC | Age: 74
End: 2024-05-10

## 2024-05-10 DIAGNOSIS — E78.41 ELEVATED LIPOPROTEIN(A): ICD-10-CM

## 2024-05-10 DIAGNOSIS — I95.1 ORTHOSTATIC HYPOTENSION: ICD-10-CM

## 2024-05-10 DIAGNOSIS — E78.01 FAMILIAL HYPERCHOLESTEROLEMIA: Primary | ICD-10-CM

## 2024-05-10 DIAGNOSIS — I25.10 ATHEROSCLEROSIS OF NATIVE CORONARY ARTERY OF NATIVE HEART WITHOUT ANGINA PECTORIS: ICD-10-CM

## 2024-05-10 DIAGNOSIS — R00.0 TACHYCARDIA: ICD-10-CM

## 2024-05-10 PROCEDURE — 99214 OFFICE O/P EST MOD 30 MIN: CPT | Mod: 95 | Performed by: INTERNAL MEDICINE

## 2024-05-10 RX ORDER — EZETIMIBE 10 MG/1
10 TABLET ORAL NIGHTLY
Start: 2024-05-10 | End: 2024-08-16

## 2024-05-10 NOTE — PROGRESS NOTES
Antonio Price MD  Cardiology    [facility]    [facility]  [facility] [address]  [address] [place] [address]    TEL  [phone]  [email or web address]     05/10/24     Verification of Patient Location:  The patient affirms they are currently located in the following state: [place]    Request for Consent:    Audio and Video Encounter   Pari, my name is Antonio Price MD.  Before we proceed, can you please verify your identification by telling me your full name and date of birth?  Can you tell me who is in the room with you?    You and I are about to have a telemedicine check-in or visit because you have requested it.  This is a live video-conference.  I am a real person, speaking to you in real time.  There is no one else with me on the video-conference. I am not recording this conversation and I am asking you not to record it.  This telemedicine visit will be billed to your health insurance or you, if you are self-insured.  You understand you will be responsible for any copayments or coinsurances that apply to your telemedicine visit.  Communication platform used for this encounter:  [employer] Video Visit (Epic Video Client)       Before starting our telemedicine visit, I am required to get your consent for this virtual check-in or visit by telemedicine. Do you consent?    Patient Response to Request for Consent:  Yes        Dear Dr. Wang:    It was my pleasure to see Ms. Stephanie Jeffers at the [facility] today for follow-up.    She has been doing well overall. Undergoing treatment for back pain, which limits her exercise routine. Her diet has improved back to baseline. No new cardiopulmonary concerns or complaints. Specifically, no chest pain or pressure.  No dyspnea at rest or with activity.  No lower extremity edema, orthopnea, PND.  No palpitations or syncope.  She is compliant with Repatha. No side effects. Blood pressure and  heart rate have reportedly been normal.    One of her daughters recently got engaged.     Cardiovascular History:  1. Familial hypercholesterolemia, elevated Lp(a)  2. Coronary atherosclerosis   3. Orthostatic hypotension    Past Medical History: Migraines, History of HBV    Past Surgical History:  Past Surgical History:   Procedure Laterality Date    CATARACT EXTRACTION Left 2018    CATARACT EXTRACTION Right 2019       Medications:  Current Outpatient Medications   Medication Sig Dispense Refill    evolocumab (REPATHA SURECLICK) 140 mg/mL pen Inject 1 mL (140 mg total) under the skin every 14 (fourteen) days. 4 mL 1    ezetimibe (ZETIA) 10 mg tablet Take 1 tablet (10 mg total) by mouth nightly.      ARIPiprazole (ABILIFY) 2 mg tablet Take 1 tablet (2 mg total) by mouth daily. This is a dose increase. 90 tablet 0    clonazePAM (KlonoPIN) 1 mg tablet Take 1 tablet (1 mg total) by mouth daily. 30 tablet 0    desvenlafaxine succinate (PRISTIQ) 25 mg tablet extended release 24 hr Take 1 tablet (25 mg total) by mouth daily. 90 tablet 0    SUMAtriptan (IMITREX) 100 mg tablet Take 100 mg by mouth daily as needed for migraine. Only 1 dose in 24 hours       No current facility-administered medications for this visit.       Allergies: Patient has no known allergies.    Social History: She is .  She has 2 children.  Retired .  She is a never smoker.  Approximately 3 glasses of wine per week.  No recreational drugs.    Family History: She has a sister who has tetralogy of Fallot. Her brother has no documented coronary artery disease. Her mother  at a young age from complications of lung cancer.  She had hypercholesterolemia.  Her father lived to be 92.  He had a stroke late in life and also had hypercholesterolemia.  No family history of premature coronary artery disease, arrhythmias, cardiomyopathies, or sudden cardiac death.    Review of Systems: A complete 14-point review of  systems is negative, except as noted in the HPI.    Exam: N/A given telemedicine visit. Below is her exam from her last in person visit.   Objective   There were no vitals filed for this visit.  Wt Readings from Last 3 Encounters:   01/12/24 48.1 kg (106 lb)   12/21/23 48.5 kg (107 lb)   09/05/23 47.2 kg (104 lb)     There is no height or weight on file to calculate BMI.  Constitutional: Appears comfortable.   Eyes: No icterus.    ENT: Deferred.   Neck: No jugular venous distention.   Vascular: No carotid bruits.   Cardiac: Normal S1 and S2, regular rhythm. No murmurs, rubs, or gallops appreciated.  Lungs: Clear to auscultation bilaterally.    GI: Soft, normoactive bowel sounds.  Extremities: Warm. No lower extremity edema.   Skin: Dry.  Neurologic: Awake, alert, oriented.    Psychiatric: No agitation.    Labs: Personally reviewed and discussed with the patient.  Notable for the following.   Lab Results   Component Value Date    LDLCALC 105 (H) 05/01/2024    CHOL 225 (H) 05/01/2024    TRIG 95 05/01/2024     05/01/2024    HGBA1C 5.5 12/21/2023     12/23/2023    K 5.1 12/23/2023    BUN 21 12/23/2023    CREATININE 1.0 12/23/2023    WBC 6.90 12/21/2023    HGB 13.6 12/21/2023     12/21/2023     Lab Results   Component Value Date    ALT 14 12/23/2023    AST 21 12/23/2023    ALKPHOS 45 12/23/2023    BILITOT 0.5 12/23/2023 1/2022-  , , HDL 75, LDL 87  High-sensitivity CRP 0.9  Sodium 139, potassium 4.3, creatinine 0.9  LFTs within normal limits  TSH within normal limits    6/2019- , TG 82, ,     Cardiovascular Studies:   1. Stress echo, 1/2017: No ischemia. Excellent exercise tolerance. Baseline echo- Normal wall motion and excursion. 1+ MR and TR. RVSP 13 mmHg.   2.  CAC, 7/2021: Composite 120 (left main 1, LAD 51, RCA 68).  75-90th percentile.  Mild-moderate atherosclerotic calcification of the aorta.  Otherwise unremarkable noncardiac findings.  3.  Amadou, 4/2023:  Sinus (73, ). Isolated PAC and PVC. Symptoms corresponded with sinus rhythm.   4.  Stres echo, 5/2023: No ischemia. Excellent exercise capacity. Baseline echo- Normal LV size, thickness, LVEF 60%. No WMAs. Normal RV size/function. Mild MR.       Assessment/Plan     Familial hypercholesterolemia, elevated Lp(a)  She is tolerating Repatha without side effects.  Given her risk factors her most recent LDL is above threshold. Following a shared decision making conversation we decided to start Zetia 10 mg daily. Potential side effects reviewed. She will remain on Repatha. She will have labs before a 3 month follow up visit. She continues to work on therapeutic lifestyle change. She has made first degree family members aware of her Lp(a) elevation.     Coronary atherosclerosis   Her stress echocardiogram showed no evidence of ischemia.  She has no cardiopulmonary symptoms presently.  She knows to contact me for any new cardiopulmonary symptoms.  Aspirin was previously stopped due to easy bruising.  Lipid management as above.    Orthostatic hypotension  She reports that her blood pressure has been stable. She is asymptomatic. Will continue to monitor.    Tachycardia  She reports that her heart rate has normalized. She believes the tachycardia was related to prior medications.       It was my pleasure to visit with Stephanie Jeffers in clinic today.  She will call to schedule a 3 month visit.  Please do not hesitate to contact me with any questions.      Sincerely,       ________________  Antonio Price MD      Time Spent:  I spent 25 minutes on this date of service performing the following activities: obtaining history, entering orders, documenting, preparing for visit, obtaining / reviewing records, providing counseling and education, communicating results, and coordinating care.

## 2024-05-10 NOTE — TELEPHONE ENCOUNTER
Patient asking if she could have Repatha samples from [facility]. Can this be arranged? Thanks.

## 2024-05-28 ENCOUNTER — TELEPHONE (OUTPATIENT)
Dept: PSYCHIATRY | Facility: HOSPITAL | Age: 74
End: 2024-05-28
Payer: MEDICARE

## 2024-05-28 RX ORDER — CLONAZEPAM 1 MG/1
1 TABLET ORAL DAILY
Qty: 30 TABLET | Refills: 0 | Status: SHIPPED | OUTPATIENT
Start: 2024-05-28 | End: 2024-06-26 | Stop reason: SDUPTHER

## 2024-05-28 NOTE — TELEPHONE ENCOUNTER
PDMP Reviewed Date:   : 05/28/24 (5/28/2024  8:54 AM)  Comments: : no evidence of misuse (5/28/2024  8:54 AM)  PDMP Reviewed by: : eduarda morgan (5/28/2024  8:54 AM)    Medicine Refill Request  Last Telemedicine Visit: 3/27/2024 Paresh Saleem,      Next Appointment: 5/31/2024  Paresh Saleem DO       Current Outpatient Medications:     ARIPiprazole (ABILIFY) 2 mg tablet, Take 1 tablet (2 mg total) by mouth daily. This is a dose increase., Disp: 90 tablet, Rfl: 0    clonazePAM (KlonoPIN) 1 mg tablet, Take 1 tablet (1 mg total) by mouth daily., Disp: 30 tablet, Rfl: 0    desvenlafaxine succinate (PRISTIQ) 25 mg tablet extended release 24 hr, Take 1 tablet (25 mg total) by mouth daily., Disp: 90 tablet, Rfl: 0    evolocumab (REPATHA SURECLICK) 140 mg/mL pen, Inject 1 mL (140 mg total) under the skin every 14 (fourteen) days., Disp: 4 mL, Rfl: 1    ezetimibe (ZETIA) 10 mg tablet, Take 1 tablet (10 mg total) by mouth nightly., Disp: , Rfl:     SUMAtriptan (IMITREX) 100 mg tablet, Take 100 mg by mouth daily as needed for migraine. Only 1 dose in 24 hours, Disp: , Rfl:       BP Readings from Last 3 Encounters:   01/12/24 116/72   12/25/23 94/68   12/21/23 125/63       Recent Lab results:  Lab Results   Component Value Date    CHOL 225 (H) 05/01/2024   ,   Lab Results   Component Value Date     05/01/2024   ,   Lab Results   Component Value Date    LDLCALC 105 (H) 05/01/2024   ,   Lab Results   Component Value Date    TRIG 95 05/01/2024        Lab Results   Component Value Date    GLUCOSE 102 (H) 12/23/2023   ,   Lab Results   Component Value Date    HGBA1C 5.5 12/21/2023         Lab Results   Component Value Date    CREATININE 1.0 12/23/2023       Lab Results   Component Value Date    TSH 1.20 12/21/2023           Lab Results   Component Value Date    HGBA1C 5.5 12/21/2023     Total Time: 6

## 2024-05-31 ENCOUNTER — TELEMEDICINE (OUTPATIENT)
Dept: PSYCHIATRY | Facility: HOSPITAL | Age: 74
End: 2024-05-31
Attending: PSYCHIATRY & NEUROLOGY
Payer: MEDICARE

## 2024-05-31 DIAGNOSIS — F33.2 SEVERE EPISODE OF RECURRENT MAJOR DEPRESSIVE DISORDER, WITHOUT PSYCHOTIC FEATURES (CMS/HCC): Primary | ICD-10-CM

## 2024-05-31 DIAGNOSIS — F41.1 GENERALIZED ANXIETY DISORDER: ICD-10-CM

## 2024-05-31 PROCEDURE — 99214 OFFICE O/P EST MOD 30 MIN: CPT | Mod: 95 | Performed by: PSYCHIATRY & NEUROLOGY

## 2024-05-31 RX ORDER — DESVENLAFAXINE SUCCINATE 25 MG/1
25 TABLET, EXTENDED RELEASE ORAL DAILY
Qty: 90 TABLET | Refills: 0 | Status: SHIPPED | OUTPATIENT
Start: 2024-05-31 | End: 2024-07-31 | Stop reason: SDUPTHER

## 2024-05-31 RX ORDER — BUPROPION HYDROCHLORIDE 75 MG/1
75 TABLET ORAL DAILY
Qty: 30 TABLET | Refills: 1 | Status: SHIPPED | OUTPATIENT
Start: 2024-05-31 | End: 2024-07-31

## 2024-06-26 ENCOUNTER — TELEPHONE (OUTPATIENT)
Dept: PSYCHIATRY | Facility: HOSPITAL | Age: 74
End: 2024-06-26
Payer: MEDICARE

## 2024-06-26 RX ORDER — CLONAZEPAM 1 MG/1
1 TABLET ORAL DAILY
Qty: 30 TABLET | Refills: 0 | Status: SHIPPED | OUTPATIENT
Start: 2024-06-26 | End: 2024-07-26 | Stop reason: SDUPTHER

## 2024-06-26 NOTE — TELEPHONE ENCOUNTER
Refill needed for clonazepam (klonopin) 1 mg tablet. Pharmacy confirmed: [facility], [address], [place].

## 2024-06-26 NOTE — TELEPHONE ENCOUNTER
PDMP Reviewed Date:   : 06/26/24 (6/26/2024 10:24 AM)  Comments: : no evidence of misuse (6/26/2024 10:24 AM)  PDMP Reviewed by: : eduarda morgan (6/26/2024 10:24 AM)    Medicine Refill Request    Last Telemedicine Visit: 5/31/2024 Paresh Saleem, DO    Next Appointment: 7/31/2024 Paresh Saleem DO      Current Outpatient Medications:     ARIPiprazole (ABILIFY) 2 mg tablet, Take 1 tablet (2 mg total) by mouth daily. This is a dose increase., Disp: 90 tablet, Rfl: 0    buPROPion (WELLBUTRIN) 75 mg tablet, Take 1 tablet (75 mg total) by mouth daily., Disp: 30 tablet, Rfl: 1    clonazePAM (KlonoPIN) 1 mg tablet, Take 1 tablet (1 mg total) by mouth daily., Disp: 30 tablet, Rfl: 0    desvenlafaxine succinate (PRISTIQ) 25 mg tablet extended release 24 hr, Take 1 tablet (25 mg total) by mouth daily., Disp: 90 tablet, Rfl: 0    evolocumab (REPATHA SURECLICK) 140 mg/mL pen, Inject 1 mL (140 mg total) under the skin every 14 (fourteen) days., Disp: 4 mL, Rfl: 1    ezetimibe (ZETIA) 10 mg tablet, Take 1 tablet (10 mg total) by mouth nightly., Disp: , Rfl:     SUMAtriptan (IMITREX) 100 mg tablet, Take 100 mg by mouth daily as needed for migraine. Only 1 dose in 24 hours, Disp: , Rfl:       BP Readings from Last 3 Encounters:   01/12/24 116/72   12/25/23 94/68   12/21/23 125/63       Recent Lab results:  Lab Results   Component Value Date    CHOL 225 (H) 05/01/2024   ,   Lab Results   Component Value Date     05/01/2024   ,   Lab Results   Component Value Date    LDLCALC 105 (H) 05/01/2024   ,   Lab Results   Component Value Date    TRIG 95 05/01/2024        Lab Results   Component Value Date    GLUCOSE 102 (H) 12/23/2023   ,   Lab Results   Component Value Date    HGBA1C 5.5 12/21/2023         Lab Results   Component Value Date    CREATININE 1.0 12/23/2023       Lab Results   Component Value Date    TSH 1.20 12/21/2023           Lab Results   Component Value Date    HGBA1C 5.5 12/21/2023     Total Time: 4

## 2024-07-03 ENCOUNTER — APPOINTMENT (OUTPATIENT)
Dept: LAB | Facility: CLINIC | Age: 74
End: 2024-07-03
Attending: INTERNAL MEDICINE
Payer: MEDICARE

## 2024-07-03 DIAGNOSIS — I25.10 ATHEROSCLEROSIS OF NATIVE CORONARY ARTERY OF NATIVE HEART WITHOUT ANGINA PECTORIS: ICD-10-CM

## 2024-07-03 DIAGNOSIS — E78.01 FAMILIAL HYPERCHOLESTEROLEMIA: ICD-10-CM

## 2024-07-03 DIAGNOSIS — E78.41 ELEVATED LIPOPROTEIN(A): ICD-10-CM

## 2024-07-03 LAB
ALBUMIN SERPL-MCNC: 4.1 G/DL (ref 3.5–5.7)
ALP SERPL-CCNC: 40 IU/L (ref 34–125)
ALT SERPL-CCNC: 33 IU/L (ref 7–52)
AST SERPL-CCNC: 29 IU/L (ref 13–39)
BILIRUB DIRECT SERPL-MCNC: 0.1 MG/DL
BILIRUB SERPL-MCNC: 0.5 MG/DL (ref 0.3–1.2)
CHOLEST SERPL-MCNC: 218 MG/DL
HDLC SERPL-MCNC: 112 MG/DL
HDLC SERPL: 1.9 {RATIO}
LDLC SERPL CALC-MCNC: 86 MG/DL
NONHDLC SERPL-MCNC: 106 MG/DL
PROT SERPL-MCNC: 6.6 G/DL (ref 6–8.2)
TRIGL SERPL-MCNC: 100 MG/DL

## 2024-07-03 PROCEDURE — 36415 COLL VENOUS BLD VENIPUNCTURE: CPT

## 2024-07-03 PROCEDURE — 80061 LIPID PANEL: CPT

## 2024-07-03 PROCEDURE — 80076 HEPATIC FUNCTION PANEL: CPT

## 2024-07-08 ENCOUNTER — TELEPHONE (OUTPATIENT)
Dept: CARDIOLOGY | Facility: CLINIC | Age: 74
End: 2024-07-08
Payer: MEDICARE

## 2024-07-08 DIAGNOSIS — E78.01 FAMILIAL HYPERCHOLESTEROLEMIA: Primary | ICD-10-CM

## 2024-07-08 NOTE — TELEPHONE ENCOUNTER
----- Message from Antonio Price MD sent at 7/3/2024  8:07 PM EDT -----  Please follow-up with patient.  LFTs normal.  Cholesterol has improved with the addition of ezetimibe.  Please ensure that labs were obtained at the correct time point between Repatha doses.  For now I would recommend no changes to her medication.  Recommend a 6-month follow-up visit with repeat lipids before the visit.  Thanks.

## 2024-07-08 NOTE — TELEPHONE ENCOUNTER
Lipid panel labs sent to [facility]. Pt advised to fast prior to lab draw.     PSR Pool: Please call pt to schedule 6 month follow-up with Dr. Price.   Thank you!

## 2024-07-25 NOTE — TELEPHONE ENCOUNTER
Called patient and she would like to speak with Dr Price first before scheduling OV. She wanted to reach out to him through the portal

## 2024-07-26 RX ORDER — CLONAZEPAM 1 MG/1
1 TABLET ORAL DAILY
Qty: 30 TABLET | Refills: 0 | Status: SHIPPED | OUTPATIENT
Start: 2024-07-26 | End: 2024-08-01 | Stop reason: SDUPTHER

## 2024-07-26 RX ORDER — EVOLOCUMAB 140 MG/ML
INJECTION, SOLUTION SUBCUTANEOUS
Qty: 6 ML | Refills: 1 | Status: SHIPPED | OUTPATIENT
Start: 2024-07-26 | End: 2025-01-15 | Stop reason: SDUPTHER

## 2024-07-31 ENCOUNTER — TELEMEDICINE (OUTPATIENT)
Dept: PSYCHIATRY | Facility: HOSPITAL | Age: 74
End: 2024-07-31
Attending: PSYCHIATRY & NEUROLOGY
Payer: MEDICARE

## 2024-07-31 DIAGNOSIS — F41.1 GENERALIZED ANXIETY DISORDER: ICD-10-CM

## 2024-07-31 DIAGNOSIS — F33.2 SEVERE EPISODE OF RECURRENT MAJOR DEPRESSIVE DISORDER, WITHOUT PSYCHOTIC FEATURES (CMS/HCC): Primary | ICD-10-CM

## 2024-07-31 PROCEDURE — 90833 PSYTX W PT W E/M 30 MIN: CPT | Mod: 95 | Performed by: PSYCHIATRY & NEUROLOGY

## 2024-07-31 PROCEDURE — 99214 OFFICE O/P EST MOD 30 MIN: CPT | Mod: 95 | Performed by: PSYCHIATRY & NEUROLOGY

## 2024-07-31 RX ORDER — DESVENLAFAXINE SUCCINATE 25 MG/1
50 TABLET, EXTENDED RELEASE ORAL DAILY
Qty: 180 TABLET | Refills: 0 | Status: SHIPPED | OUTPATIENT
Start: 2024-07-31 | End: 2024-09-23 | Stop reason: SDUPTHER

## 2024-07-31 RX ORDER — DESVENLAFAXINE SUCCINATE 25 MG/1
25 TABLET, EXTENDED RELEASE ORAL DAILY
Qty: 90 TABLET | Refills: 0 | Status: CANCELLED | OUTPATIENT
Start: 2024-07-31 | End: 2024-10-29

## 2024-07-31 RX ORDER — ARIPIPRAZOLE 2 MG/1
2 TABLET ORAL DAILY
Qty: 90 TABLET | Refills: 0 | Status: CANCELLED | OUTPATIENT
Start: 2024-07-31 | End: 2024-10-29
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
## 2024-08-01 DIAGNOSIS — F41.1 GENERALIZED ANXIETY DISORDER: ICD-10-CM

## 2024-08-01 DIAGNOSIS — F33.2 SEVERE EPISODE OF RECURRENT MAJOR DEPRESSIVE DISORDER, WITHOUT PSYCHOTIC FEATURES (CMS/HCC): Primary | ICD-10-CM

## 2024-08-01 RX ORDER — CLONAZEPAM 1 MG/1
1 TABLET ORAL 2 TIMES DAILY
Qty: 60 TABLET | Refills: 0 | Status: SHIPPED | OUTPATIENT
Start: 2024-08-26 | End: 2024-10-25 | Stop reason: SDUPTHER

## 2024-08-01 NOTE — TELEPHONE ENCOUNTER
This is intended to be a 60 d supply since pt is [place]. Next refill due 10/26/24.

## 2024-08-04 ENCOUNTER — APPOINTMENT (EMERGENCY)
Dept: RADIOLOGY | Facility: HOSPITAL | Age: 74
End: 2024-08-04
Payer: MEDICARE

## 2024-08-04 ENCOUNTER — HOSPITAL ENCOUNTER (EMERGENCY)
Facility: HOSPITAL | Age: 74
Discharge: HOME | End: 2024-08-04
Attending: EMERGENCY MEDICINE
Payer: MEDICARE

## 2024-08-04 VITALS
OXYGEN SATURATION: 99 % | HEIGHT: 64 IN | TEMPERATURE: 97.7 F | DIASTOLIC BLOOD PRESSURE: 63 MMHG | WEIGHT: 109 LBS | HEART RATE: 63 BPM | RESPIRATION RATE: 19 BRPM | BODY MASS INDEX: 18.61 KG/M2 | SYSTOLIC BLOOD PRESSURE: 144 MMHG

## 2024-08-04 DIAGNOSIS — K21.9 GASTROESOPHAGEAL REFLUX DISEASE WITHOUT ESOPHAGITIS: ICD-10-CM

## 2024-08-04 DIAGNOSIS — R07.9 ACUTE CHEST PAIN: Primary | ICD-10-CM

## 2024-08-04 LAB
ALBUMIN SERPL-MCNC: 4.2 G/DL (ref 3.5–5.7)
ALP SERPL-CCNC: 49 IU/L (ref 34–125)
ALT SERPL-CCNC: 38 IU/L (ref 7–52)
ANION GAP SERPL CALC-SCNC: 6 MEQ/L (ref 3–15)
AST SERPL-CCNC: 59 IU/L (ref 13–39)
BASOPHILS # BLD: 0.01 K/UL (ref 0.01–0.1)
BASOPHILS NFR BLD: 0.1 %
BILIRUB SERPL-MCNC: 0.6 MG/DL (ref 0.3–1.2)
BUN SERPL-MCNC: 17 MG/DL (ref 7–25)
CALCIUM SERPL-MCNC: 9.1 MG/DL (ref 8.6–10.3)
CHLORIDE SERPL-SCNC: 102 MEQ/L (ref 98–107)
CO2 SERPL-SCNC: 30 MEQ/L (ref 21–31)
CREAT SERPL-MCNC: 0.7 MG/DL (ref 0.6–1.2)
DIFFERENTIAL METHOD BLD: ABNORMAL
EGFRCR SERPLBLD CKD-EPI 2021: >60 ML/MIN/1.73M*2
EOSINOPHIL # BLD: 0.03 K/UL (ref 0.04–0.36)
EOSINOPHIL NFR BLD: 0.4 %
ERYTHROCYTE [DISTWIDTH] IN BLOOD BY AUTOMATED COUNT: 13.9 % (ref 11.7–14.4)
GLUCOSE SERPL-MCNC: 113 MG/DL (ref 70–99)
HCT VFR BLD AUTO: 41 % (ref 35–45)
HGB BLD-MCNC: 13.2 G/DL (ref 11.8–15.7)
IMM GRANULOCYTES # BLD AUTO: 0.03 K/UL (ref 0–0.08)
IMM GRANULOCYTES NFR BLD AUTO: 0.4 %
LIPASE SERPL-CCNC: 34 U/L (ref 11–82)
LYMPHOCYTES # BLD: 2.28 K/UL (ref 1.2–3.5)
LYMPHOCYTES NFR BLD: 31.7 %
MCH RBC QN AUTO: 27.4 PG (ref 28–33.2)
MCHC RBC AUTO-ENTMCNC: 32.2 G/DL (ref 32.2–35.5)
MCV RBC AUTO: 85.1 FL (ref 83–98)
MONOCYTES # BLD: 0.73 K/UL (ref 0.28–0.8)
MONOCYTES NFR BLD: 10.2 %
NEUTROPHILS # BLD: 4.11 K/UL (ref 1.7–7)
NEUTS SEG NFR BLD: 57.2 %
NRBC BLD-RTO: 0 %
PDW BLD AUTO: 10 FL (ref 9.4–12.3)
PLATELET # BLD AUTO: 232 K/UL (ref 150–369)
POTASSIUM SERPL-SCNC: 3.9 MEQ/L (ref 3.5–5.1)
PROT SERPL-MCNC: 6.6 G/DL (ref 6–8.2)
RBC # BLD AUTO: 4.82 M/UL (ref 3.93–5.22)
SODIUM SERPL-SCNC: 138 MEQ/L (ref 136–145)
TROPONIN I SERPL HS-MCNC: 2.7 PG/ML
TROPONIN I SERPL HS-MCNC: 3.2 PG/ML
WBC # BLD AUTO: 7.19 K/UL (ref 3.8–10.5)

## 2024-08-04 PROCEDURE — 63600000 HC DRUGS/DETAIL CODE: Mod: JZ | Performed by: PHYSICIAN ASSISTANT

## 2024-08-04 PROCEDURE — 85025 COMPLETE CBC W/AUTO DIFF WBC: CPT | Performed by: EMERGENCY MEDICINE

## 2024-08-04 PROCEDURE — 93005 ELECTROCARDIOGRAM TRACING: CPT | Performed by: EMERGENCY MEDICINE

## 2024-08-04 PROCEDURE — 85025 COMPLETE CBC W/AUTO DIFF WBC: CPT

## 2024-08-04 PROCEDURE — 71045 X-RAY EXAM CHEST 1 VIEW: CPT

## 2024-08-04 PROCEDURE — 96374 THER/PROPH/DIAG INJ IV PUSH: CPT

## 2024-08-04 PROCEDURE — 80053 COMPREHEN METABOLIC PANEL: CPT | Performed by: EMERGENCY MEDICINE

## 2024-08-04 PROCEDURE — 3E033GC INTRODUCTION OF OTHER THERAPEUTIC SUBSTANCE INTO PERIPHERAL VEIN, PERCUTANEOUS APPROACH: ICD-10-PCS | Performed by: EMERGENCY MEDICINE

## 2024-08-04 PROCEDURE — 83690 ASSAY OF LIPASE: CPT | Performed by: PHYSICIAN ASSISTANT

## 2024-08-04 PROCEDURE — 36415 COLL VENOUS BLD VENIPUNCTURE: CPT

## 2024-08-04 PROCEDURE — 93005 ELECTROCARDIOGRAM TRACING: CPT

## 2024-08-04 PROCEDURE — 84484 ASSAY OF TROPONIN QUANT: CPT | Mod: 91 | Performed by: EMERGENCY MEDICINE

## 2024-08-04 PROCEDURE — 96375 TX/PRO/DX INJ NEW DRUG ADDON: CPT

## 2024-08-04 PROCEDURE — 99284 EMERGENCY DEPT VISIT MOD MDM: CPT | Mod: 25

## 2024-08-04 PROCEDURE — 25000000 HC PHARMACY GENERAL: Performed by: PHYSICIAN ASSISTANT

## 2024-08-04 PROCEDURE — 84484 ASSAY OF TROPONIN QUANT: CPT | Performed by: EMERGENCY MEDICINE

## 2024-08-04 RX ORDER — FAMOTIDINE 10 MG/ML
20 INJECTION INTRAVENOUS ONCE
Status: COMPLETED | OUTPATIENT
Start: 2024-08-04 | End: 2024-08-04

## 2024-08-04 RX ORDER — PANTOPRAZOLE SODIUM 40 MG/10ML
40 INJECTION, POWDER, LYOPHILIZED, FOR SOLUTION INTRAVENOUS ONCE
Status: DISCONTINUED | OUTPATIENT
Start: 2024-08-04 | End: 2024-08-04

## 2024-08-04 RX ORDER — ONDANSETRON HYDROCHLORIDE 2 MG/ML
4 INJECTION, SOLUTION INTRAVENOUS ONCE
Status: COMPLETED | OUTPATIENT
Start: 2024-08-04 | End: 2024-08-04

## 2024-08-04 RX ADMIN — FAMOTIDINE 20 MG: 10 INJECTION, SOLUTION INTRAVENOUS at 12:25

## 2024-08-04 RX ADMIN — ONDANSETRON 4 MG: 2 INJECTION INTRAMUSCULAR; INTRAVENOUS at 12:21

## 2024-08-04 ASSESSMENT — ENCOUNTER SYMPTOMS
LIGHT-HEADEDNESS: 1
VOMITING: 0
FEVER: 0
HEARTBURN: 1
ABDOMINAL PAIN: 1
SHORTNESS OF BREATH: 1
NAUSEA: 1
DIAPHORESIS: 1
NUMBNESS: 0

## 2024-08-04 NOTE — ED ATTESTATION NOTE
I have personally seen and examined Stephanie Jeffers.  I was involved in the care and medical decision making for this patient.    I reviewed and agree with physician assistant / nurse practitioner’s assessment and plan of care; any exceptions are documented below.      My focused history, examination, assessment and plan of care of Stephanie Jeffers is as follows:    Brief History:  HPI  73 year old female with family history of hyperlipidemia and an elevated calcium score presents with epigastric abdominal pain that is radiating to her chest and shoulders that started 2 hours ago.  Patient noted the symptoms developed shortly after she had tomato sandwich and coffee.  Patient reports she feels like indigestion but she does not have a history of GERD.      Focused Physical Exam:  Physical Exam  Patient is awake alert in no acute distress.  She has no respiratory distress.  Patient is afebrile.  She has normal room air oxygen saturation.  Her skin is dry normal in color.  Her heart rate is normal.      Assessment / Plan:        Medical Decision Making  Amount and/or Complexity of Data Reviewed  Labs: ordered. Decision-making details documented in ED Course.  Radiology: ordered. Decision-making details documented in ED Course.  ECG/medicine tests: ordered and independent interpretation performed.    Risk  Prescription drug management.      EKG shows sinus rhythm with no acute ischemic changes.  Troponins x 2 are normal.  Her chest x-ray shows no acute disease.  Patient will need to follow-up with cardiology as an outpatient.  Patient was given Pepcid and Zofran in the emergency department.  Strict return precautions were given and patient was discharged  I was physically present for the key/critical portions of the following procedures:  Procedures         Tonie Noble MD  08/04/24 [id number]

## 2024-08-04 NOTE — ED PROVIDER NOTES
Emergency Medicine Note  HPI   HISTORY OF PRESENT ILLNESS     73-year-old female with a history of hyperlipidemia presents to the emergency department with complaints of epigastric abdominal discomfort radiating up to her chest and shoulders which occurred 2 hours prior to arrival.  She states it was half an hour after having breakfast when the symptoms began.  She states she had toast with tomato and a coffee.  She states it was very acidic but she does not typically get issues with acid reflux.  She denies prior history of gallstones.  She states she broke out in a cold sweat and felt like she was going to pass out.  She had a friend drive her here.  She states it gets worse when lying flat.  She denies any known history of aortic aneurysm.  She states she had a stress test about a year ago and it was normal.  She is followed by Dr. Price with [facility].  She denies fever or cough.      History provided by:  Patient   used: No    Chest Pain  Pain location:  Epigastric  Pain quality: aching and burning    Pain radiates to:  Neck  Pain severity:  Moderate  Onset quality:  Sudden  Duration:  2 hours  Timing:  Constant  Progression:  Unchanged  Chronicity:  New  Context: eating    Relieved by:  Nothing  Worsened by:  Certain positions  Ineffective treatments:  Antacids  Associated symptoms: abdominal pain, diaphoresis, heartburn, nausea and shortness of breath    Associated symptoms: no fever, no numbness and no vomiting          Patient History   PAST HISTORY     Reviewed from Nursing Triage:       Past Medical History:   Diagnosis Date    Hypotension     IPMN (intraductal papillary mucinous neoplasm) 05/31/2022    Kidney stones     Lipid disorder     Migraines     Screening for breast cancer 03/04/2024    Birads 2 - Benign ([facility])       Past Surgical History:   Procedure Laterality Date    CATARACT EXTRACTION Left 11/19/2018    CATARACT EXTRACTION Right 01/16/2019        Family History   Problem Relation Age of Onset    Hyperlipidemia Biological Mother     Lung cancer Biological Mother     Hyperlipidemia Biological Father     Hypertension Biological Father     Diabetes Biological Father     Tetralogy of Fallot  Biological Sister     Breast cancer Cousin        Social History     Tobacco Use    Smoking status: Never    Smokeless tobacco: Never   Vaping Use    Vaping Use: Never used   Substance Use Topics    Alcohol use: Not Currently     Alcohol/week: 1.0 standard drink of alcohol     Types: 1 Glasses of wine per week     Comment: occasioal    Drug use: Yes     Types: Cocaine     Comment: 35 years ago         Review of Systems   REVIEW OF SYSTEMS     Review of Systems   Constitutional:  Positive for diaphoresis. Negative for fever.   Respiratory:  Positive for shortness of breath.    Cardiovascular:  Positive for chest pain.   Gastrointestinal:  Positive for abdominal pain, heartburn and nausea. Negative for vomiting.   Neurological:  Positive for light-headedness. Negative for numbness.   All other systems reviewed and are negative.        VITALS     ED Vitals      Date/Time Temp Pulse Resp BP SpO2 [facility]   08/04/24 1300 -- 63 19 144/63 99 % [facility]   08/04/24 1201 36.5 °C (97.7 °F) 61 19 135/61 100 % [facility]   08/04/24 1132 -- 64 -- -- 100 % [facility]   08/04/24 1122 36 °C (96.8 °F) 72 18 140/74 98 % [facility]          Pulse Ox %: 100 % (08/04/24 1207)  Pulse Ox Interpretation: Normal (08/04/24 1207)           Physical Exam   PHYSICAL EXAM     Physical Exam  Vitals and nursing note reviewed.   Constitutional:       Appearance: She is well-developed.   HENT:      Head: Normocephalic and atraumatic.   Cardiovascular:      Rate and Rhythm: Normal rate and regular rhythm.      Heart sounds: Normal heart sounds.   Pulmonary:      Effort: Pulmonary effort is normal.      Breath sounds: Normal breath sounds.   Chest:      Chest wall: No tenderness.   Abdominal:      Palpations: Abdomen is soft.       Tenderness: There is abdominal tenderness.      Comments: Tenderness on palpation of the epigastric area, no guarding rebound or rigidity.   Musculoskeletal:         General: Normal range of motion.      Right lower leg: No tenderness. No edema.      Left lower leg: No tenderness. No edema.   Skin:     General: Skin is warm and dry.      Capillary Refill: Capillary refill takes less than 2 seconds.   Neurological:      General: No focal deficit present.      Mental Status: She is alert.   Psychiatric:         Mood and Affect: Mood normal.           PROCEDURES     Procedures     DATA     Results       Procedure Component Value Units Date/Time    Lipase [[id number]]  (Normal) Collected: 08/04/24 1127    Specimen: Blood, Venous Updated: 08/04/24 1228     Lipase 34 U/L     HS Troponin (with 2 hour reflex) [[id number]]  (Normal) Collected: 08/04/24 1127    Specimen: Blood, Venous Updated: 08/04/24 1210     High Sens Troponin I 3.2 pg/mL     Comprehensive metabolic panel [[id number]]  (Abnormal) Collected: 08/04/24 1127    Specimen: Blood, Venous Updated: 08/04/24 1204     Sodium 138 mEQ/L      Potassium 3.9 mEQ/L      Comment: Results obtained on plasma. Plasma Potassium values may be up to 0.4 mEQ/L less than serum values. The differences may be greater for patients with high platelet or white cell counts.        Chloride 102 mEQ/L      CO2 30 mEQ/L      BUN 17 mg/dL      Creatinine 0.7 mg/dL      Glucose 113 mg/dL      Calcium 9.1 mg/dL      AST (SGOT) 59 IU/L      ALT (SGPT) 38 IU/L      Alkaline Phosphatase 49 IU/L      Total Protein 6.6 g/dL      Comment: Test performed on plasma which typically contains approximately 0.4 g/dL more protein than serum.        Albumin 4.2 g/dL      Bilirubin, Total 0.6 mg/dL      eGFR >60.0 mL/min/1.73m*2      Comment: Calculation based on the Chronic Kidney Disease Epidemiology Collaboration (CKD-EPI) equation refit without adjustment for race.        Anion Gap 6 mEQ/L     CBC and  differential [[id number]]  (Abnormal) Collected: 08/04/24 1127    Specimen: Blood, Venous Updated: 08/04/24 1143     WBC 7.19 K/uL      RBC 4.82 M/uL      Hemoglobin 13.2 g/dL      Hematocrit 41.0 %      MCV 85.1 fL      MCH 27.4 pg      MCHC 32.2 g/dL      RDW 13.9 %      Platelets 232 K/uL      MPV 10.0 fL      Differential Type Auto     nRBC 0.0 %      Immature Granulocytes 0.4 %      Neutrophils 57.2 %      Lymphocytes 31.7 %      Monocytes 10.2 %      Eosinophils 0.4 %      Basophils 0.1 %      Immature Granulocytes, Absolute 0.03 K/uL      Neutrophils, Absolute 4.11 K/uL      Lymphocytes, Absolute 2.28 K/uL      Monocytes, Absolute 0.73 K/uL      Eosinophils, Absolute 0.03 K/uL      Basophils, Absolute 0.01 K/uL     [place] Draw Panel [[id number]] Collected: 08/04/24 1127    Specimen: Blood, Venous Updated: 08/04/24 1136    Narrative:      The following orders were created for panel order [place] Draw Panel.  Procedure                               Abnormality         Status                     ---------                               -----------         ------                     RAINBOW RED[[id number]]                                      In process                 RAINBOW LT BLUE[[id number]]                                  In process                 RAINBOW GOLD[[id number]]                                     In process                   Please view results for these tests on the individual orders.    RAINBOW RED [[id number]] Collected: 08/04/24 1127    Specimen: Blood, Venous Updated: 08/04/24 1136    RAINBOW LT BLUE [[id number]] Collected: 08/04/24 1127    Specimen: Blood, Venous Updated: 08/04/24 1136    RAINBOW GOLD [[id number]] Collected: 08/04/24 1127    Specimen: Blood, Venous Updated: 08/04/24 1136            Imaging Results              X-RAY CHEST 1 VIEW (Final result)  Result time 08/04/24 12:12:02      Final result                   Impression:    IMPRESSION:    No active disease                Narrative:    CLINICAL HISTORY:  chest pain    COMPARISON: November 2022.    COMMENT: 1 view of the chest was performed    The lungs are clear.  There is no pneumothorax or pleural effusion.  Normal heart size.  Mediastinal contours are within normal limits for age.  No acute osseous abnormality.                                      ECG 12 lead   Independent Interpretation by ED Provider   EKG performed at 1118 showing a normal sinus rhythm at a rate of 71.  Normal EKG.          Scoring tools                                  ED Course & MDM   MDM / ED COURSE / CLINICAL IMPRESSION / DISPO     Medical Decision Making  73-year-old female with a history of hyperlipidemia who presents to the emergency department with complaints of epigastric abdominal pain radiating up to her neck which started half an hour after eating an acidic breakfast.  She denies any prior history of acid reflux.  Her work appears reassuring.  She had 2 negative troponins, normal lipase, normal chest x-ray and EKG and symptoms improved with IV Pepcid.  Advised to take Pepcid over-the-counter, follow bland diet and follow-up with GI and her cardiologist.  Strict warnings were given for her to return for worsening symptoms.  Patient and friend comfortable with plan.    Problems Addressed:  Acute chest pain: acute illness or injury  Gastroesophageal reflux disease without esophagitis: acute illness or injury    Amount and/or Complexity of Data Reviewed  Labs: ordered. Decision-making details documented in ED Course.  Radiology: ordered. Decision-making details documented in ED Course.  ECG/medicine tests: ordered and independent interpretation performed. Decision-making details documented in ED Course.    Risk  OTC drugs.  Prescription drug management.        ED Course as of 08/04/24 1437   Sun Aug 04, 2024   1211 High Sens Troponin I: 3.2 [CB]   1217 X-RAY CHEST 1 VIEW  IMPRESSION:     No active disease   [CB]   1232 Lipase: 34 [CB]   1359 High Sens  Troponin I: 2.7 [CB]   1436 Bilirubin, Total: 0.6 [CB]      ED Course User Index  [CB] Isa Hale PA     Clinical Impression      Acute chest pain   Gastroesophageal reflux disease without esophagitis     _________________       ED Disposition   Discharge                       Isa Hale PA  08/04/24 1434

## 2024-08-04 NOTE — DISCHARGE INSTRUCTIONS
Your workup is reassuring.  Take over-the-counter Pepcid to help with the symptoms and follow a bland diet.  Follow-up with GI and with your cardiologist for further evaluation.  Return to the emergency department for worsening symptoms, nausea, vomiting, worsening chest pain, worsening abdominal pain.

## 2024-08-05 LAB
ATRIAL RATE: 71
P AXIS: 56
PR INTERVAL: 162
QRS DURATION: 84
QT INTERVAL: 394
QTC CALCULATION(BAZETT): 428
R AXIS: 67
T WAVE AXIS: 55
VENTRICULAR RATE: 71

## 2024-08-05 PROCEDURE — 93010 ELECTROCARDIOGRAM REPORT: CPT | Performed by: INTERNAL MEDICINE

## 2024-08-15 ENCOUNTER — TELEPHONE (OUTPATIENT)
Dept: SCHEDULING | Facility: CLINIC | Age: 74
End: 2024-08-15

## 2024-08-15 ENCOUNTER — TELEPHONE (OUTPATIENT)
Dept: CARDIOLOGY | Facility: CLINIC | Age: 74
End: 2024-08-15
Payer: MEDICARE

## 2024-08-15 ENCOUNTER — APPOINTMENT (OUTPATIENT)
Dept: LAB | Facility: CLINIC | Age: 74
End: 2024-08-15
Attending: INTERNAL MEDICINE
Payer: MEDICARE

## 2024-08-15 DIAGNOSIS — E78.01 FAMILIAL HYPERCHOLESTEROLEMIA: ICD-10-CM

## 2024-08-15 DIAGNOSIS — E78.5 HYPERLIPIDEMIA, UNSPECIFIED HYPERLIPIDEMIA TYPE: ICD-10-CM

## 2024-08-15 DIAGNOSIS — R79.89 ELEVATED LFTS: Primary | ICD-10-CM

## 2024-08-15 LAB
ALBUMIN SERPL-MCNC: 4.4 G/DL (ref 3.5–5.7)
ALP SERPL-CCNC: 92 IU/L (ref 34–125)
ALT SERPL-CCNC: 232 IU/L (ref 7–52)
AST SERPL-CCNC: 86 IU/L (ref 13–39)
BILIRUB DIRECT SERPL-MCNC: 0.1 MG/DL
BILIRUB SERPL-MCNC: 0.4 MG/DL (ref 0.3–1.2)
PROT SERPL-MCNC: 6.8 G/DL (ref 6–8.2)

## 2024-08-15 PROCEDURE — 36415 COLL VENOUS BLD VENIPUNCTURE: CPT

## 2024-08-15 PROCEDURE — 80076 HEPATIC FUNCTION PANEL: CPT

## 2024-08-15 NOTE — TELEPHONE ENCOUNTER
----- Message from Hyacinth White MA sent at 8/15/2024  3:33 PM EDT -----  Regarding: FW: Diane  Contact: [phone]    ----- Message -----  From: Stephanie Jeffers  Sent: 8/15/2024   3:24 PM EDT  To: #  Subject: Zetia                                            Hi Zahira- I just left a message for you by phone. My hepatic test came back much worse.I'm thing it must be the Zetia(hope so). I will stop taking it but should I be concerned? Will Dr. Price want to see me?   Thanks,Stephanie

## 2024-08-15 NOTE — TELEPHONE ENCOUNTER
Called pt and left VM. After discussion with ALEC Moy, pt was advised to stop taking zetia and repeat her Hepatic function studies in 2 weeks. Replied via her my chart message as well. Thank you.

## 2024-08-16 ENCOUNTER — OFFICE VISIT (OUTPATIENT)
Dept: OBSTETRICS AND GYNECOLOGY | Facility: CLINIC | Age: 74
End: 2024-08-16
Payer: MEDICARE

## 2024-08-16 VITALS
HEIGHT: 64 IN | DIASTOLIC BLOOD PRESSURE: 66 MMHG | SYSTOLIC BLOOD PRESSURE: 108 MMHG | WEIGHT: 110 LBS | BODY MASS INDEX: 18.78 KG/M2

## 2024-08-16 DIAGNOSIS — Z12.4 PAP SMEAR FOR CERVICAL CANCER SCREENING: Primary | ICD-10-CM

## 2024-08-16 DIAGNOSIS — Z12.31 ENCOUNTER FOR SCREENING MAMMOGRAM FOR MALIGNANT NEOPLASM OF BREAST: ICD-10-CM

## 2024-08-16 DIAGNOSIS — Z01.419 ENCOUNTER FOR ROUTINE GYNECOLOGIC EXAMINATION IN MEDICARE PATIENT: ICD-10-CM

## 2024-08-16 PROCEDURE — 87624 HPV HI-RISK TYP POOLED RSLT: CPT | Performed by: OBSTETRICS & GYNECOLOGY

## 2024-08-16 PROCEDURE — G0145 SCR C/V CYTO,THINLAYER,RESCR: HCPCS | Performed by: OBSTETRICS & GYNECOLOGY

## 2024-08-16 PROCEDURE — 87625 HPV TYPES 16 & 18 ONLY: CPT | Performed by: OBSTETRICS & GYNECOLOGY

## 2024-08-16 PROCEDURE — G0101 CA SCREEN;PELVIC/BREAST EXAM: HCPCS | Mod: GA | Performed by: OBSTETRICS & GYNECOLOGY

## 2024-08-16 RX ORDER — ESTRADIOL 0.1 MG/G
2 CREAM VAGINAL 2 TIMES WEEKLY
Status: ON HOLD | COMMUNITY
End: 2025-01-24

## 2024-08-16 RX ORDER — VALACYCLOVIR HYDROCHLORIDE 500 MG/1
500 TABLET, FILM COATED ORAL AS NEEDED
COMMUNITY

## 2024-08-23 ENCOUNTER — TELEPHONE (OUTPATIENT)
Dept: OBSTETRICS AND GYNECOLOGY | Facility: CLINIC | Age: 74
End: 2024-08-23
Payer: MEDICARE

## 2024-08-23 LAB
CASE RPRT: NORMAL
CLINICAL INFO: NORMAL
CLINICAL INFO: NORMAL
HPV 16 E6+E7 MRNA CVX QL NAA+PROBE: NEGATIVE
HPV E6+E7 MRNA CVX QL NAA+PROBE: POSITIVE
HPV18+45 E6+E7 MRNA CVX QL NAA+PROBE: NEGATIVE
SPECIMEN PROCESSING COMMENT: NORMAL
THIN PREP CVX: NORMAL

## 2024-08-23 NOTE — TELEPHONE ENCOUNTER
Returned patient's call regarding pap results. 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 Her pap last year was ASCUS / +HR HPV. I discussed with her that cell changes have progressed a little but still remain low grade. I discussed that next step would be a colposcopy. She had a very bad experience with a colposcopy in the past. Given her persistent pap smears I offered her alternative option of LEEP instead of colposcopy. LEEP would be under anesthesia. She is very interested in this option and would like to proceed with scheduling. She is going to [facility] in October. She was asking if it would be ok for her to schedule after this. Given low grade changes I am ok with this. I will have Parag work with her to get on schedule.     Ashley Aguilar MD

## 2024-08-30 ENCOUNTER — APPOINTMENT (OUTPATIENT)
Dept: LAB | Facility: CLINIC | Age: 74
End: 2024-08-30
Payer: MEDICARE

## 2024-08-30 DIAGNOSIS — R79.89 ELEVATED LFTS: ICD-10-CM

## 2024-08-30 LAB
ALBUMIN SERPL-MCNC: 4.4 G/DL (ref 3.5–5.7)
ALP SERPL-CCNC: 102 IU/L (ref 34–125)
ALT SERPL-CCNC: 193 IU/L (ref 7–52)
AST SERPL-CCNC: 122 IU/L (ref 13–39)
BILIRUB DIRECT SERPL-MCNC: 0.1 MG/DL
BILIRUB SERPL-MCNC: 0.4 MG/DL (ref 0.3–1.2)
PROT SERPL-MCNC: 6.7 G/DL (ref 6–8.2)

## 2024-08-30 PROCEDURE — 80076 HEPATIC FUNCTION PANEL: CPT

## 2024-08-30 PROCEDURE — 36415 COLL VENOUS BLD VENIPUNCTURE: CPT

## 2024-09-03 ENCOUNTER — TELEPHONE (OUTPATIENT)
Dept: CARDIOLOGY | Facility: CLINIC | Age: 74
End: 2024-09-03
Payer: MEDICARE

## 2024-09-03 DIAGNOSIS — R79.89 ELEVATED LFTS: Primary | ICD-10-CM

## 2024-09-03 NOTE — TELEPHONE ENCOUNTER
"Regarding: FW: Hepatic results  Contact: [phone]        ----- Message -----  From: Iraida Valdovinos  Sent: 9/3/2024   1:21 PM EDT  To: [facility] General Cardiology \"A\" Provider Pool  Subject: Hepatic results                                  ----- Message from Iraida Valdovinos sent at 9/3/2024  1:21 PM EDT -----       ----- Message from Stephanie Jeffers to Antonio Price MD sent at 9/3/2024  7:49 AM -----   I would greatly appreciate a phone call after a very long weekend with this on my mind. Thank you for taking the time to reach out to me . Stephanie      ----- Message -----       From:Stephanie Jeffers       Sent:8/31/2024  8:31 AM EDT         To:Antonio Price    Subject:Hepatic results    Dr Price.I waited 2 weeks to repeat my test and the results are still bad on the enzymes.Is it possible the Zetia is still in my body to affect these numbers.. My liver enzymes have been fine right up to taking Zetia.I had this same trouble with statins. I'm very concerned that they remain very high after cessation for 2 wks. Is this something you have seen before or do I have a real problem? Thank you,Stephanie"

## 2024-09-11 ENCOUNTER — APPOINTMENT (OUTPATIENT)
Dept: LAB | Facility: CLINIC | Age: 74
End: 2024-09-11
Attending: INTERNAL MEDICINE
Payer: MEDICARE

## 2024-09-11 ENCOUNTER — TRANSCRIBE ORDERS (OUTPATIENT)
Dept: REGISTRATION | Facility: CLINIC | Age: 74
End: 2024-09-11

## 2024-09-11 DIAGNOSIS — R74.01 ELEVATION OF LEVELS OF LIVER TRANSAMINASE LEVELS: ICD-10-CM

## 2024-09-11 DIAGNOSIS — Z11.59 ENCOUNTER FOR SCREENING FOR OTHER VIRAL DISEASES: ICD-10-CM

## 2024-09-11 DIAGNOSIS — R74.01 ELEVATION OF LEVELS OF LIVER TRANSAMINASE LEVELS: Primary | ICD-10-CM

## 2024-09-11 LAB
ALBUMIN SERPL-MCNC: 4.1 G/DL (ref 3.5–5.7)
ALP SERPL-CCNC: 150 IU/L (ref 34–125)
ALT SERPL-CCNC: 301 IU/L (ref 7–52)
ANION GAP SERPL CALC-SCNC: 5 MEQ/L (ref 3–15)
AST SERPL-CCNC: 222 IU/L (ref 13–39)
BILIRUB SERPL-MCNC: 0.5 MG/DL (ref 0.3–1.2)
BUN SERPL-MCNC: 17 MG/DL (ref 7–25)
CALCIUM SERPL-MCNC: 9.5 MG/DL (ref 8.6–10.3)
CHLORIDE SERPL-SCNC: 104 MEQ/L (ref 98–107)
CO2 SERPL-SCNC: 30 MEQ/L (ref 21–31)
CREAT SERPL-MCNC: 0.7 MG/DL (ref 0.6–1.2)
EGFRCR SERPLBLD CKD-EPI 2021: >60 ML/MIN/1.73M*2
FERRITIN SERPL-MCNC: 97 NG/ML (ref 11–250)
GLUCOSE SERPL-MCNC: 97 MG/DL (ref 70–99)
HAV IGM SER QL: NONREACTIVE
HBV CORE IGM SER QL: NONREACTIVE
HBV SURFACE AB SER QL: REACTIVE
HBV SURFACE AG SER QL: NONREACTIVE
IRON SATN MFR SERPL: 43 % (ref 15–45)
IRON SERPL-MCNC: 141 UG/DL (ref 35–150)
POTASSIUM SERPL-SCNC: 4.3 MEQ/L (ref 3.5–5.1)
PROT SERPL-MCNC: 6.5 G/DL (ref 6–8.2)
SODIUM SERPL-SCNC: 139 MEQ/L (ref 136–145)
TIBC SERPL-MCNC: 326 UG/DL (ref 270–460)
UIBC SERPL-MCNC: 185 UG/DL (ref 180–360)

## 2024-09-11 PROCEDURE — 87522 HEPATITIS C REVRS TRNSCRPJ: CPT

## 2024-09-11 PROCEDURE — 86708 HEPATITIS A ANTIBODY: CPT

## 2024-09-11 PROCEDURE — 86706 HEP B SURFACE ANTIBODY: CPT

## 2024-09-11 PROCEDURE — 86705 HEP B CORE ANTIBODY IGM: CPT

## 2024-09-11 PROCEDURE — 36415 COLL VENOUS BLD VENIPUNCTURE: CPT

## 2024-09-11 PROCEDURE — 82728 ASSAY OF FERRITIN: CPT

## 2024-09-11 PROCEDURE — 80053 COMPREHEN METABOLIC PANEL: CPT

## 2024-09-11 PROCEDURE — 86381 MITOCHONDRIAL ANTIBODY EACH: CPT

## 2024-09-11 PROCEDURE — 87340 HEPATITIS B SURFACE AG IA: CPT

## 2024-09-11 PROCEDURE — 83550 IRON BINDING TEST: CPT

## 2024-09-11 PROCEDURE — 86709 HEPATITIS A IGM ANTIBODY: CPT

## 2024-09-12 LAB
HAV AB SER QL IA: REACTIVE
HCV RNA SERPL NAA+PROBE-ACNC: NORMAL IU/ML
HCV RNA SERPL NAA+PROBE-LOG IU: NORMAL LOG IU/ML
MITOCHONDRIA AB SER QL IF: NEGATIVE

## 2024-09-21 ENCOUNTER — TRANSCRIBE ORDERS (OUTPATIENT)
Dept: SCHEDULING | Age: 74
End: 2024-09-21

## 2024-09-21 DIAGNOSIS — R74.01 ELEVATION OF LEVELS OF LIVER TRANSAMINASE LEVELS: ICD-10-CM

## 2024-09-21 DIAGNOSIS — K80.20 CALCULUS OF GALLBLADDER WITHOUT CHOLECYSTITIS WITHOUT OBSTRUCTION: Primary | ICD-10-CM

## 2024-09-23 ENCOUNTER — TRANSCRIBE ORDERS (OUTPATIENT)
Dept: REGISTRATION | Facility: CLINIC | Age: 74
End: 2024-09-23

## 2024-09-23 ENCOUNTER — TELEMEDICINE (OUTPATIENT)
Dept: PSYCHIATRY | Facility: HOSPITAL | Age: 74
End: 2024-09-23
Attending: PSYCHIATRY & NEUROLOGY
Payer: MEDICARE

## 2024-09-23 ENCOUNTER — APPOINTMENT (OUTPATIENT)
Dept: LAB | Facility: CLINIC | Age: 74
End: 2024-09-23
Attending: INTERNAL MEDICINE
Payer: MEDICARE

## 2024-09-23 DIAGNOSIS — F33.2 SEVERE EPISODE OF RECURRENT MAJOR DEPRESSIVE DISORDER, WITHOUT PSYCHOTIC FEATURES (CMS/HCC): Primary | ICD-10-CM

## 2024-09-23 DIAGNOSIS — R19.8 OTHER SPECIFIED SYMPTOMS AND SIGNS INVOLVING THE DIGESTIVE SYSTEM AND ABDOMEN: ICD-10-CM

## 2024-09-23 DIAGNOSIS — R74.01 ELEVATION OF LEVELS OF LIVER TRANSAMINASE LEVELS: Primary | ICD-10-CM

## 2024-09-23 DIAGNOSIS — F41.1 GENERALIZED ANXIETY DISORDER: ICD-10-CM

## 2024-09-23 DIAGNOSIS — R74.01 ELEVATION OF LEVELS OF LIVER TRANSAMINASE LEVELS: ICD-10-CM

## 2024-09-23 LAB
ALBUMIN SERPL-MCNC: 4.3 G/DL (ref 3.5–5.7)
ALP SERPL-CCNC: 95 IU/L (ref 34–125)
ALT SERPL-CCNC: 81 IU/L (ref 7–52)
AST SERPL-CCNC: 36 IU/L (ref 13–39)
BILIRUB DIRECT SERPL-MCNC: 0.1 MG/DL
BILIRUB SERPL-MCNC: 0.5 MG/DL (ref 0.3–1.2)
PROT SERPL-MCNC: 6.7 G/DL (ref 6–8.2)
TSH SERPL DL<=0.05 MIU/L-ACNC: 0.98 MIU/L (ref 0.34–5.6)

## 2024-09-23 PROCEDURE — 86364 TISS TRNSGLTMNASE EA IG CLAS: CPT

## 2024-09-23 PROCEDURE — 86644 CMV ANTIBODY: CPT

## 2024-09-23 PROCEDURE — 82103 ALPHA-1-ANTITRYPSIN TOTAL: CPT

## 2024-09-23 PROCEDURE — 80076 HEPATIC FUNCTION PANEL: CPT

## 2024-09-23 PROCEDURE — 84443 ASSAY THYROID STIM HORMONE: CPT

## 2024-09-23 PROCEDURE — 86015 ACTIN ANTIBODY EACH: CPT

## 2024-09-23 PROCEDURE — 99214 OFFICE O/P EST MOD 30 MIN: CPT | Mod: 95 | Performed by: PSYCHIATRY & NEUROLOGY

## 2024-09-23 PROCEDURE — 84165 PROTEIN E-PHORESIS SERUM: CPT

## 2024-09-23 PROCEDURE — 90833 PSYTX W PT W E/M 30 MIN: CPT | Mod: 95 | Performed by: PSYCHIATRY & NEUROLOGY

## 2024-09-23 PROCEDURE — 86038 ANTINUCLEAR ANTIBODIES: CPT

## 2024-09-23 PROCEDURE — 36415 COLL VENOUS BLD VENIPUNCTURE: CPT

## 2024-09-23 PROCEDURE — 86645 CMV ANTIBODY IGM: CPT

## 2024-09-23 PROCEDURE — 82784 ASSAY IGA/IGD/IGG/IGM EACH: CPT

## 2024-09-23 RX ORDER — ARIPIPRAZOLE 2 MG/1
1 TABLET ORAL DAILY
Qty: 45 TABLET | Refills: 0 | Status: SHIPPED | OUTPATIENT
Start: 2024-09-23 | End: 2024-11-25 | Stop reason: SDUPTHER

## 2024-09-23 RX ORDER — DESVENLAFAXINE SUCCINATE 25 MG/1
50 TABLET, EXTENDED RELEASE ORAL DAILY
Qty: 180 TABLET | Refills: 0 | Status: SHIPPED | OUTPATIENT
Start: 2024-09-23 | End: 2024-11-05 | Stop reason: SDUPTHER
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
## 2024-09-24 ENCOUNTER — HOSPITAL ENCOUNTER (OUTPATIENT)
Dept: RADIOLOGY | Age: 74
Discharge: HOME | End: 2024-09-24
Attending: INTERNAL MEDICINE
Payer: MEDICARE

## 2024-09-24 DIAGNOSIS — R74.01 ELEVATION OF LEVELS OF LIVER TRANSAMINASE LEVELS: ICD-10-CM

## 2024-09-24 DIAGNOSIS — K80.20 CALCULUS OF GALLBLADDER WITHOUT CHOLECYSTITIS WITHOUT OBSTRUCTION: ICD-10-CM

## 2024-09-24 LAB
A1AT SERPL-MCNC: 152 MG/DL (ref 83–199)
ALBUMIN MFR UR ELPH: 67.4 %
ALBUMIN SERPL ELPH-MCNC: 4.52 G/DL (ref 3.2–4.9)
ALBUMIN/GLOB SERPL: 2.1 {RATIO} (ref 1.1–2.1)
ALPHA1 GLOB MFR SERPL ELPH: 2.5 %
ALPHA1 GLOB SERPL ELPH-MCNC: 0.17 G/DL (ref 0.08–0.23)
ALPHA2 GLOB MFR UR ELPH: 11.1 %
ALPHA2 GLOB SERPL ELPH-MCNC: 0.74 G/DL (ref 0.45–0.92)
B-GLOBULIN SERPL ELPH-MCNC: 0.64 G/DL (ref 0.5–1.03)
BETA1 GLOB MFR UR ELPH: 9.5 %
GAMMA GLOB MFR UR ELPH: 9.5 %
GAMMA GLOB SERPL ELPH-MCNC: 0.64 G/DL (ref 0.6–1.6)
IGA SERPL-MCNC: 279 MG/DL (ref 84.5–499)
PROT PATTERN SERPL ELPH-IMP: NORMAL
PROT SERPL-MCNC: 6.7 G/DL (ref 6–8.2)
SMOOTH MUSCLE AB SER QL IF: NEGATIVE
TTG IGA SER IA-ACNC: <0.2 ELIA U/ML

## 2024-09-24 RX ORDER — GADOBUTROL 604.72 MG/ML
0.1 INJECTION INTRAVENOUS ONCE
Status: COMPLETED | OUTPATIENT
Start: 2024-09-24 | End: 2024-09-24

## 2024-09-24 RX ADMIN — GADOBUTROL 5 ML: 604.72 INJECTION INTRAVENOUS at 10:28

## 2024-09-25 LAB
ANA SER QL IA: NEGATIVE
CMV IGG SERPL IA-ACNC: 3.79
CMV IGM SERPL IA-ACNC: 1.14

## 2024-09-27 ENCOUNTER — TELEPHONE (OUTPATIENT)
Dept: PSYCHIATRY | Facility: HOSPITAL | Age: 74
End: 2024-09-27
Payer: MEDICARE

## 2024-09-27 NOTE — TELEPHONE ENCOUNTER
Thank you for rescheduling with Dr. Saleem video visit 11/05 @ 2:00 PM and 12/03 @ 2:00 PM in-person in \[facility]\"" office [address].

## 2024-10-10 ENCOUNTER — TELEPHONE (OUTPATIENT)
Dept: PSYCHIATRY | Facility: HOSPITAL | Age: 74
End: 2024-10-10
Payer: MEDICARE

## 2024-10-10 NOTE — TELEPHONE ENCOUNTER
Please call [phone], Left voicemail informing appt with Dr. Saleem on 12/3 was cancelled due to provider being unavailable. Patient needs to reschedule.

## 2024-10-30 ENCOUNTER — TELEPHONE (OUTPATIENT)
Dept: OBSTETRICS AND GYNECOLOGY | Facility: CLINIC | Age: 74
End: 2024-10-30

## 2024-10-30 NOTE — TELEPHONE ENCOUNTER
Patient called today and wanted to cancel her surgery on 11/22/24.  She has come endosocpy issues that she needs to take care of but once that is over with, she will call us to schedule the LEEP.

## 2024-11-05 ENCOUNTER — APPOINTMENT (OUTPATIENT)
Dept: LAB | Facility: CLINIC | Age: 74
End: 2024-11-05
Attending: INTERNAL MEDICINE
Payer: MEDICARE

## 2024-11-05 ENCOUNTER — TELEMEDICINE (OUTPATIENT)
Dept: PSYCHIATRY | Facility: HOSPITAL | Age: 74
End: 2024-11-05
Attending: PSYCHIATRY & NEUROLOGY
Payer: MEDICARE

## 2024-11-05 DIAGNOSIS — F41.1 GENERALIZED ANXIETY DISORDER: ICD-10-CM

## 2024-11-05 DIAGNOSIS — R79.89 ELEVATED LFTS: ICD-10-CM

## 2024-11-05 DIAGNOSIS — E78.01 FAMILIAL HYPERCHOLESTEROLEMIA: ICD-10-CM

## 2024-11-05 DIAGNOSIS — F33.2 SEVERE EPISODE OF RECURRENT MAJOR DEPRESSIVE DISORDER, WITHOUT PSYCHOTIC FEATURES (CMS/HCC): Primary | ICD-10-CM

## 2024-11-05 LAB
CHOLEST SERPL-MCNC: 231 MG/DL
HDLC SERPL-MCNC: 101 MG/DL
HDLC SERPL: 2.3 {RATIO}
LDLC SERPL CALC-MCNC: 109 MG/DL
NONHDLC SERPL-MCNC: 130 MG/DL
TRIGL SERPL-MCNC: 107 MG/DL

## 2024-11-05 PROCEDURE — 80076 HEPATIC FUNCTION PANEL: CPT

## 2024-11-05 PROCEDURE — 82465 ASSAY BLD/SERUM CHOLESTEROL: CPT

## 2024-11-05 PROCEDURE — 99214 OFFICE O/P EST MOD 30 MIN: CPT | Mod: 95 | Performed by: PSYCHIATRY & NEUROLOGY

## 2024-11-05 PROCEDURE — 90833 PSYTX W PT W E/M 30 MIN: CPT | Mod: 95 | Performed by: PSYCHIATRY & NEUROLOGY

## 2024-11-05 PROCEDURE — 36415 COLL VENOUS BLD VENIPUNCTURE: CPT

## 2024-11-05 RX ORDER — CLONAZEPAM 1 MG/1
1 TABLET ORAL NIGHTLY
Qty: 30 TABLET | Refills: 0 | Status: SHIPPED | OUTPATIENT
Start: 2024-11-24 | End: 2024-11-25 | Stop reason: SDUPTHER

## 2024-11-05 RX ORDER — ARIPIPRAZOLE 2 MG/1
1 TABLET ORAL DAILY
Qty: 45 TABLET | Refills: 0 | Status: CANCELLED | OUTPATIENT
Start: 2024-11-05 | End: 2025-02-03

## 2024-11-05 RX ORDER — CLONAZEPAM 1 MG/1
1 TABLET ORAL NIGHTLY
Qty: 30 TABLET | Refills: 1 | Status: CANCELLED | OUTPATIENT
Start: 2024-11-05 | End: 2024-12-05

## 2024-11-05 RX ORDER — DESVENLAFAXINE SUCCINATE 25 MG/1
50 TABLET, EXTENDED RELEASE ORAL DAILY
Qty: 180 TABLET | Refills: 0 | Status: SHIPPED | OUTPATIENT
Start: 2024-11-05 | End: 2024-12-24 | Stop reason: SDUPTHER

## 2024-11-05 NOTE — PROGRESS NOTES
ALEC Kimbrough  Cardiology    [facility]    [facility]  [address]  [address] [place] [address]    TEL  [phone]  [facility]     11/07/24     Subjective     Stephanie Jeffers is a 74 y.o. female who is seen in the office today for cardiology follow-up.  Stephanie has a past medical history of familial hypercholesterolemia, elevated LP(a), coronary atherosclerosis, and orthostatic hypotension.  Stephanie last saw Dr. Price via telemedicine on May 10, 2024 at which time ezetimibe was initiated for additional LDL control.    Interval history obtained via thorough review of available relevant records and discussion with the patient.  Stephanie developed elevated liver enzymes on ezetimibe.  She saw ALEC White (gastroenterology, [facility]) for further evaluation who recommended additional testing.  Liver MRI showed pancreatic cysts and ultrasound had incidental finding of gallstones.  Her liver enzymes have trended down and her ALT is now mildly elevated.  Notes recommended avoidance of statins and ezetimibe in the future.    Stephanie presents today for follow-up and feels well in general.  She recently returned from a 3 week trip to [place] where she was unable to follow her usual diet.  Stephanie denies chest pain, palpitations, shortness of breath, dyspnea on exertion, orthopnea, PND, lower extremity edema, lightheadedness, dizziness, or syncope.  She has developed heartburn for which her gastroenterology team started omeprazole and has an endoscopy planned for November 20, 2024.  She remains compliant with Repatha.        No Known Allergies    Current Outpatient Medications   Medication Sig Dispense Refill    [START ON 11/24/2024] clonazePAM (KlonoPIN) 1 mg tablet Take 1 tablet (1 mg total) by mouth nightly. 30 tablet 0    desvenlafaxine succinate (PRISTIQ) 25 mg tablet extended release 24 hr Take 2 tablets (50 mg total) by mouth  daily. 180 tablet 0    estradioL (ESTRACE) 0.01 % (0.1 mg/gram) vaginal cream Insert 2 g into the vagina 2 (two) times a week (Mon, Thu).      evolocumab (REPATHA SURECLICK) 140 mg/mL pen INJECT 1 ML (140 MG TOTAL) UNDER THE SKIN EVERY 14 DAYS. 6 mL 1    omeprazole (PriLOSEC) 20 mg capsule Take 20 mg by mouth.      SUMAtriptan (IMITREX) 100 mg tablet Take 100 mg by mouth daily as needed for migraine. Only 1 dose in 24 hours      valACYclovir (VALTREX) 500 mg tablet Take 500 mg by mouth 2 (two) times a day.      ARIPiprazole (ABILIFY) 2 mg tablet Take 0.5 tablets (1 mg total) by mouth daily. Taking 1 mg (Patient not taking: Reported on 2024) 45 tablet 0     No current facility-administered medications for this visit.       Social History: She is .  She has 2 children.  Retired .  She is a never smoker.  Approximately 3 glasses of wine per week.  No recreational drugs.     Family History: She has a sister who has tetralogy of Fallot. Her brother has no documented coronary artery disease. Her mother  at a young age from complications of lung cancer.  She had hypercholesterolemia.  Her father lived to be 92.  He had a stroke late in life and also had hypercholesterolemia.  No family history of premature coronary artery disease, arrhythmias, cardiomyopathies, or sudden cardiac death.    Objective     Review of Systems   Cardiovascular:  Negative for chest pain, claudication, cyanosis, dyspnea on exertion, irregular heartbeat, leg swelling, near-syncope, orthopnea, palpitations, paroxysmal nocturnal dyspnea and syncope.   Respiratory:  Negative for shortness of breath and sleep disturbances due to breathing.    Neurological:  Negative for dizziness and light-headedness.   All other systems reviewed and are negative.      Vitals:    24 1111   BP: 94/66   Pulse: 83   Resp: 16   Temp: 36.3 °C (97.3 °F)   SpO2: 99%       Wt Readings from Last 5 Encounters:   24 50.5 kg  (111 lb 6 oz)   09/24/24 49.9 kg (110 lb)   08/16/24 49.9 kg (110 lb)   08/04/24 49.4 kg (109 lb)   01/12/24 48.1 kg (106 lb)       Physical Exam  Constitutional:       General: She is not in acute distress.  HENT:      Head: Normocephalic and atraumatic.      Right Ear: External ear normal.      Left Ear: External ear normal.      Nose: Nose normal.   Eyes:      Conjunctiva/sclera: Conjunctivae normal.   Neck:      Vascular: No carotid bruit or JVD.   Cardiovascular:      Rate and Rhythm: Normal rate and regular rhythm.      Pulses: Normal pulses.      Heart sounds: Normal heart sounds.   Pulmonary:      Effort: Pulmonary effort is normal.      Breath sounds: Normal breath sounds.   Abdominal:      General: Bowel sounds are normal.   Musculoskeletal:      Right lower leg: No edema.      Left lower leg: No edema.   Skin:     General: Skin is warm.      Capillary Refill: Capillary refill takes less than 2 seconds.   Neurological:      Mental Status: She is alert.          Cardiographics  1. Stress echo, 1/2017: No ischemia. Excellent exercise tolerance. Baseline echo- Normal wall motion and excursion. 1+ MR and TR. RVSP 13 mmHg.   2.  CAC, 7/2021: Composite 120 (left main 1, LAD 51, RCA 68).  75-90th percentile.  Mild-moderate atherosclerotic calcification of the aorta.  Otherwise unremarkable noncardiac findings.  3.  Zio, 4/2023: Sinus (73, ). Isolated PAC and PVC. Symptoms corresponded with sinus rhythm.   4.  Stres echo, 5/2023: No ischemia. Excellent exercise capacity. Baseline echo- Normal LV size, thickness, LVEF 60%. No WMAs. Normal RV size/function. Mild MR.       ECG:  From 8/4/2024- sinus rhythm, normal tracing.      Lab Review   Lab Results   Component Value Date    WBC 7.19 08/04/2024    HGB 13.2 08/04/2024    HCT 41.0 08/04/2024     08/04/2024    CHOL 231 (H) 11/05/2024    TRIG 107 11/05/2024     11/05/2024    LDLCALC 109 (H) 11/05/2024     09/11/2024    K 4.3 09/11/2024    CL  104 09/11/2024    CREATININE 0.7 09/11/2024    BUN 17 09/11/2024    CO2 30 09/11/2024    TSH 0.98 09/23/2024    INR 1.0 09/06/2022    HGBA1C 5.5 12/21/2023 1/2022-  , , HDL 75, LDL 87  High-sensitivity CRP 0.9  Sodium 139, potassium 4.3, creatinine 0.9  LFTs within normal limits  TSH within normal limits     6/2019- , TG 82, ,     Assessment/Plan   Problem List Items Addressed This Visit       Familial hypercholesterolemia - Jaymie Mccullough has a history of familial hypercholesterolemia with elevated LP(a).  She developed transaminitis on ezetimibe and statin therapy as previously cause elevated liver enzymes as well.  She remains on Repatha without significant side effect.  -- We will recheck her lipid panel and LFTs in approximately 6 weeks now that she has returned to her usual dietary habits.  If LDL remains above goal, we will consult her gastroenterology team regarding next steps.  We did briefly discuss both bempedoic acid and inclisiran today as future potential agents.         Relevant Orders    Lipid panel    Hepatic function panel    Elevated lipoprotein(a)    Elevated coronary artery calcium score      (2021)  Stress echocardiogram 5/2023: no evidence of ischemia.  She reports heartburn which is currently being evaluated by her gastroenterology team.  -- She will contact our office if she develops anginal symptoms  -- Aspirin was previously stopped due to easy bruising  -- Lipid management as discussed elsewhere.             Thank you for allowing me to participate in the care of this patient.  Please do not hesitate to reach out with any questions or concerns.    Disclaimer: This note was generated using speech recognition software.  A reasonable attempt has been made at proofreading.  Please disregard any obvious grammatical or typographical errors.      ALEC Ruvalcaba  11/07/24
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
## 2024-11-06 ENCOUNTER — TELEPHONE (OUTPATIENT)
Dept: SCHEDULING | Facility: CLINIC | Age: 74
End: 2024-11-06
Payer: MEDICARE

## 2024-11-06 LAB
ALBUMIN SERPL-MCNC: 4.2 G/DL (ref 3.5–5.7)
ALP SERPL-CCNC: 77 IU/L (ref 34–125)
ALT SERPL-CCNC: 63 IU/L (ref 7–52)
AST SERPL-CCNC: 34 IU/L (ref 13–39)
BILIRUB DIRECT SERPL-MCNC: 0.1 MG/DL
BILIRUB SERPL-MCNC: 0.4 MG/DL (ref 0.3–1.2)
PROT SERPL-MCNC: 6.9 G/DL (ref 6–8.2)

## 2024-11-06 NOTE — TELEPHONE ENCOUNTER
Pt called and asked for a call back from Marian regarding labs     Pt can be reached at [phone]

## 2024-11-06 NOTE — TELEPHONE ENCOUNTER
"Called and spoke with pt. She will discuss labs with RT tomorrow during her OV. She wanted to be sure the Hepatic fx panel was in her lab orders. I advised the orders are in the computer but it appears the lab may not have drawn them. Advised she reach out to the lab to inquire. She reports having difficulty getting a hold of the lab.     Called [facility] labs and spoke with \"Lillie\" to inquire if they guerita pt's Hepatic function panel. She states she will add that on to pt's labs.     Called pt back left  to notify of above.   Thank you.   "

## 2024-11-07 ENCOUNTER — OFFICE VISIT (OUTPATIENT)
Dept: CARDIOLOGY | Facility: CLINIC | Age: 74
End: 2024-11-07
Payer: MEDICARE

## 2024-11-07 VITALS
DIASTOLIC BLOOD PRESSURE: 66 MMHG | WEIGHT: 111.38 LBS | OXYGEN SATURATION: 99 % | TEMPERATURE: 97.3 F | HEART RATE: 83 BPM | BODY MASS INDEX: 19.12 KG/M2 | RESPIRATION RATE: 16 BRPM | SYSTOLIC BLOOD PRESSURE: 94 MMHG

## 2024-11-07 DIAGNOSIS — E78.01 FAMILIAL HYPERCHOLESTEROLEMIA: Primary | ICD-10-CM

## 2024-11-07 DIAGNOSIS — E78.41 ELEVATED LIPOPROTEIN(A): ICD-10-CM

## 2024-11-07 DIAGNOSIS — R93.1 ELEVATED CORONARY ARTERY CALCIUM SCORE: ICD-10-CM

## 2024-11-07 PROCEDURE — 99214 OFFICE O/P EST MOD 30 MIN: CPT

## 2024-11-07 RX ORDER — OMEPRAZOLE 20 MG/1
20 CAPSULE, DELAYED RELEASE ORAL
COMMUNITY
End: 2025-01-02

## 2024-11-07 ASSESSMENT — ENCOUNTER SYMPTOMS
PND: 0
PALPITATIONS: 0
ORTHOPNEA: 0
SHORTNESS OF BREATH: 0
IRREGULAR HEARTBEAT: 0
DYSPNEA ON EXERTION: 0
CLAUDICATION: 0
NEAR-SYNCOPE: 0
SYNCOPE: 0
LIGHT-HEADEDNESS: 0
SLEEP DISTURBANCES DUE TO BREATHING: 0
DIZZINESS: 0

## 2024-11-07 NOTE — PROGRESS NOTES
I was present in the office and provided direct supervision.   I have reviewed and agree with the diagnosis and treatment plan.    Antonio Price MD

## 2024-11-07 NOTE — ASSESSMENT & PLAN NOTE
(2021)  Stress echocardiogram 5/2023: no evidence of ischemia.  She reports heartburn which is currently being evaluated by her gastroenterology team.  -- She will contact our office if she develops anginal symptoms  -- Aspirin was previously stopped due to easy bruising  -- Lipid management as discussed elsewhere.

## 2024-11-07 NOTE — ASSESSMENT & PLAN NOTE
Sadai has a history of familial hypercholesterolemia with elevated LP(a).  She developed transaminitis on ezetimibe and statin therapy as previously cause elevated liver enzymes as well.  She remains on Repatha without significant side effect.  -- We will recheck her lipid panel and LFTs in approximately 6 weeks now that she has returned to her usual dietary habits.  If LDL remains above goal, we will consult her gastroenterology team regarding next steps.  We did briefly discuss both bempedoic acid and inclisiran today as future potential agents.

## 2024-11-15 ENCOUNTER — TELEPHONE (OUTPATIENT)
Dept: PSYCHIATRY | Facility: HOSPITAL | Age: 74
End: 2024-11-15
Payer: MEDICARE

## 2024-11-15 ENCOUNTER — TELEPHONE (OUTPATIENT)
Dept: OBSTETRICS AND GYNECOLOGY | Facility: CLINIC | Age: 74
End: 2024-11-15
Payer: MEDICARE

## 2024-11-15 NOTE — TELEPHONE ENCOUNTER
Stephanie called asking to speak with Dr. Saleem in regards to a mychart message that was sent to her. Said she is having a reaction to her meds, offered to connect to the nurse [place] who advised she will reach out to her shortly.

## 2024-11-15 NOTE — TELEPHONE ENCOUNTER
"Stephanie is requesting medication change due to chronic constipation.  Chart documents October visit with physician where Linzess was initiated.  Stephanie reports that on 11/11 dose was increased to max of 290 mcg.  When RN attempted to clarify if she was taking medication daily Stephanie stated \"I do not want to take a medication to fix a problem that another medication is causing.  I want off the Pristiq, Dr Saleem doesn't believe that it is the Pristiq but I know that it is.  I would like to start zoloft that I was on many years ago\".  RN explained that Dr Saleem is out of the office and will return Monday 11/18.  RN informed Stephanie that all information discussed would be documented and forwarded to Dr Saleem for review; any recommendations will be communicated back.  Stephanie acknowledged understanding and did not voice additional needs or concerns at time of call.      Stephanie is aware of how to contact office prior to next appointment with any issues, after hours resources etc, patient instructed to call 911 or go to nearest ED if changes in physical or mental well being occurs.Total Time: 7        "

## 2024-11-15 NOTE — TELEPHONE ENCOUNTER
Patient cx her surgerly on 11/22 with CAB due to having endoscopy testing to do.  Will call us to reschedule once she is done.

## 2024-11-18 ENCOUNTER — TELEPHONE (OUTPATIENT)
Dept: PSYCHIATRY | Facility: HOSPITAL | Age: 74
End: 2024-11-18
Payer: MEDICARE

## 2024-11-18 NOTE — TELEPHONE ENCOUNTER
Call to Stephanie to inform of :  Please let her know I suspect she will have the same issues with Zoloft. She will need an appointment to initiate a cross titration. Can patient access add her to the wait list please?     Stephanie states she has already heard back from Dr Saleem and thanked RN for call.   Total Time: 4

## 2024-11-25 ENCOUNTER — TELEMEDICINE (OUTPATIENT)
Dept: PSYCHIATRY | Facility: HOSPITAL | Age: 74
End: 2024-11-25
Attending: PSYCHIATRY & NEUROLOGY
Payer: MEDICARE

## 2024-11-25 DIAGNOSIS — F41.1 GENERALIZED ANXIETY DISORDER: ICD-10-CM

## 2024-11-25 DIAGNOSIS — F33.2 SEVERE EPISODE OF RECURRENT MAJOR DEPRESSIVE DISORDER, WITHOUT PSYCHOTIC FEATURES (CMS/HCC): Primary | ICD-10-CM

## 2024-11-25 DIAGNOSIS — F41.0 PANIC DISORDER: ICD-10-CM

## 2024-11-25 PROCEDURE — 90833 PSYTX W PT W E/M 30 MIN: CPT | Mod: 95 | Performed by: PSYCHIATRY & NEUROLOGY

## 2024-11-25 PROCEDURE — 99214 OFFICE O/P EST MOD 30 MIN: CPT | Mod: 95 | Performed by: PSYCHIATRY & NEUROLOGY

## 2024-11-25 RX ORDER — CLONAZEPAM 1 MG/1
1 TABLET ORAL NIGHTLY
Qty: 30 TABLET | Refills: 0 | Status: SHIPPED | OUTPATIENT
Start: 2024-11-25 | End: 2024-12-24

## 2024-11-25 RX ORDER — DESVENLAFAXINE SUCCINATE 25 MG/1
50 TABLET, EXTENDED RELEASE ORAL DAILY
Qty: 180 TABLET | Refills: 0 | Status: CANCELLED | OUTPATIENT
Start: 2024-11-25 | End: 2025-02-23

## 2024-11-25 RX ORDER — ARIPIPRAZOLE 2 MG/1
1 TABLET ORAL DAILY
Qty: 45 TABLET | Refills: 0 | Status: CANCELLED | OUTPATIENT
Start: 2024-11-25 | End: 2025-02-23

## 2024-11-25 RX ORDER — ARIPIPRAZOLE 2 MG/1
2 TABLET ORAL DAILY
Qty: 90 TABLET | Refills: 0 | Status: SHIPPED | OUTPATIENT
Start: 2024-11-25 | End: 2025-02-13 | Stop reason: SDUPTHER
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
## 2024-12-05 ENCOUNTER — TELEPHONE (OUTPATIENT)
Dept: OBSTETRICS AND GYNECOLOGY | Facility: CLINIC | Age: 74
End: 2024-12-05

## 2024-12-10 ENCOUNTER — DOCUMENTATION (OUTPATIENT)
Dept: CARDIOLOGY | Facility: CLINIC | Age: 74
End: 2024-12-10
Payer: MEDICARE

## 2024-12-10 NOTE — PROGRESS NOTES
Antonio Price MD  Cardiology    [facility]    [facility]  [address] [facility] [address]  [address] [place] [address]    TEL  [phone]  [facility]     12/10/24     Re: Stephanie Jeffers ([id number])      To Whom It May Concern:      Patient is at acceptable cardiac risk for planned gynecologic procedure.  No additional cardiac testing required before the procedure.    Please do not hesitate to contact my office with any questions.         Sincerely,         ________________  Antonio Price MD

## 2024-12-24 ENCOUNTER — TELEMEDICINE (OUTPATIENT)
Dept: PSYCHIATRY | Facility: HOSPITAL | Age: 74
End: 2024-12-24
Attending: PSYCHIATRY & NEUROLOGY
Payer: MEDICARE

## 2024-12-24 DIAGNOSIS — F41.1 GENERALIZED ANXIETY DISORDER: ICD-10-CM

## 2024-12-24 DIAGNOSIS — F41.0 PANIC DISORDER: ICD-10-CM

## 2024-12-24 DIAGNOSIS — F33.2 SEVERE EPISODE OF RECURRENT MAJOR DEPRESSIVE DISORDER, WITHOUT PSYCHOTIC FEATURES (CMS/HCC): Primary | ICD-10-CM

## 2024-12-24 PROCEDURE — 99214 OFFICE O/P EST MOD 30 MIN: CPT | Mod: 95 | Performed by: PSYCHIATRY & NEUROLOGY

## 2024-12-24 PROCEDURE — 90833 PSYTX W PT W E/M 30 MIN: CPT | Mod: 95 | Performed by: PSYCHIATRY & NEUROLOGY

## 2024-12-24 RX ORDER — DESVENLAFAXINE SUCCINATE 25 MG/1
50 TABLET, EXTENDED RELEASE ORAL DAILY
Qty: 180 TABLET | Refills: 0 | Status: CANCELLED | OUTPATIENT
Start: 2024-12-24 | End: 2025-03-24

## 2024-12-24 RX ORDER — CLONAZEPAM 0.5 MG/1
0.75 TABLET ORAL NIGHTLY
Qty: 45 TABLET | Refills: 0 | Status: SHIPPED | OUTPATIENT
Start: 2024-12-24 | End: 2025-01-13 | Stop reason: SDUPTHER

## 2024-12-24 RX ORDER — CLONAZEPAM 1 MG/1
1 TABLET ORAL NIGHTLY
Qty: 30 TABLET | Refills: 0 | Status: CANCELLED | OUTPATIENT
Start: 2024-12-24 | End: 2025-01-23

## 2024-12-24 RX ORDER — DESVENLAFAXINE SUCCINATE 25 MG/1
50 TABLET, EXTENDED RELEASE ORAL DAILY
Qty: 180 TABLET | Refills: 0 | Status: SHIPPED | OUTPATIENT
Start: 2024-12-24 | End: 2025-01-23 | Stop reason: ALTCHOICE
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
## 2024-12-27 ENCOUNTER — TELEPHONE (OUTPATIENT)
Dept: RHEUMATOLOGY | Facility: CLINIC | Age: 74
End: 2024-12-27
Payer: MEDICARE

## 2024-12-27 DIAGNOSIS — M81.0 AGE-RELATED OSTEOPOROSIS WITHOUT CURRENT PATHOLOGICAL FRACTURE: Primary | ICD-10-CM

## 2024-12-27 NOTE — TELEPHONE ENCOUNTER
Called pt and let her know she can do all of her tests at the same time since they are all [facility].  Just advised her to let registration and the phlebotomist know that way it does not get missed/skipped over.

## 2024-12-30 ENCOUNTER — APPOINTMENT (OUTPATIENT)
Dept: LAB | Facility: CLINIC | Age: 74
End: 2024-12-30
Payer: MEDICARE

## 2024-12-30 DIAGNOSIS — M81.0 AGE-RELATED OSTEOPOROSIS WITHOUT CURRENT PATHOLOGICAL FRACTURE: ICD-10-CM

## 2024-12-30 DIAGNOSIS — E78.01 FAMILIAL HYPERCHOLESTEROLEMIA: ICD-10-CM

## 2024-12-30 LAB
25(OH)D3 SERPL-MCNC: 27 NG/ML (ref 30–100)
ALBUMIN SERPL-MCNC: 4.5 G/DL (ref 3.5–5.7)
ALBUMIN SERPL-MCNC: 4.5 G/DL (ref 3.5–5.7)
ALP SERPL-CCNC: 51 IU/L (ref 34–125)
ALP SERPL-CCNC: 51 IU/L (ref 34–125)
ALT SERPL-CCNC: 18 IU/L (ref 7–52)
ALT SERPL-CCNC: 18 IU/L (ref 7–52)
ANION GAP SERPL CALC-SCNC: 9 MEQ/L (ref 3–15)
AST SERPL-CCNC: 24 IU/L (ref 13–39)
AST SERPL-CCNC: 24 IU/L (ref 13–39)
BILIRUB DIRECT SERPL-MCNC: 0.1 MG/DL
BILIRUB SERPL-MCNC: 0.5 MG/DL (ref 0.3–1.2)
BILIRUB SERPL-MCNC: 0.5 MG/DL (ref 0.3–1.2)
BUN SERPL-MCNC: 20 MG/DL (ref 7–25)
CALCIUM SERPL-MCNC: 9.9 MG/DL (ref 8.6–10.3)
CHLORIDE SERPL-SCNC: 103 MEQ/L (ref 98–107)
CHOLEST SERPL-MCNC: 234 MG/DL
CO2 SERPL-SCNC: 28 MEQ/L (ref 21–31)
CREAT SERPL-MCNC: 0.9 MG/DL (ref 0.6–1.2)
EGFRCR SERPLBLD CKD-EPI 2021: >60 ML/MIN/1.73M*2
GLUCOSE SERPL-MCNC: 104 MG/DL (ref 70–99)
HDLC SERPL-MCNC: 109 MG/DL
HDLC SERPL: 2.1 {RATIO}
LDLC SERPL CALC-MCNC: 97 MG/DL
NONHDLC SERPL-MCNC: 125 MG/DL
POTASSIUM SERPL-SCNC: 4.8 MEQ/L (ref 3.5–5.1)
PROT SERPL-MCNC: 7.3 G/DL (ref 6–8.2)
PROT SERPL-MCNC: 7.3 G/DL (ref 6–8.2)
SODIUM SERPL-SCNC: 140 MEQ/L (ref 136–145)
TRIGL SERPL-MCNC: 139 MG/DL

## 2024-12-30 PROCEDURE — 80061 LIPID PANEL: CPT

## 2024-12-30 PROCEDURE — 82306 VITAMIN D 25 HYDROXY: CPT

## 2024-12-30 PROCEDURE — 80053 COMPREHEN METABOLIC PANEL: CPT

## 2024-12-30 PROCEDURE — 36415 COLL VENOUS BLD VENIPUNCTURE: CPT

## 2025-01-02 ENCOUNTER — OFFICE VISIT (OUTPATIENT)
Dept: RHEUMATOLOGY | Facility: CLINIC | Age: 75
End: 2025-01-02
Payer: MEDICARE

## 2025-01-02 VITALS
WEIGHT: 108.2 LBS | HEIGHT: 63 IN | DIASTOLIC BLOOD PRESSURE: 74 MMHG | SYSTOLIC BLOOD PRESSURE: 112 MMHG | BODY MASS INDEX: 19.17 KG/M2 | OXYGEN SATURATION: 100 % | HEART RATE: 103 BPM

## 2025-01-02 DIAGNOSIS — M47.816 LUMBAR SPONDYLOSIS: ICD-10-CM

## 2025-01-02 DIAGNOSIS — R79.89 LOW VITAMIN D LEVEL: ICD-10-CM

## 2025-01-02 DIAGNOSIS — M81.0 AGE-RELATED OSTEOPOROSIS WITHOUT CURRENT PATHOLOGICAL FRACTURE: Primary | ICD-10-CM

## 2025-01-02 PROCEDURE — 99214 OFFICE O/P EST MOD 30 MIN: CPT | Performed by: INTERNAL MEDICINE

## 2025-01-02 RX ORDER — CYCLOSPORINE 0.5 MG/ML
1 EMULSION OPHTHALMIC 2 TIMES DAILY
COMMUNITY
Start: 2024-12-29

## 2025-01-02 ASSESSMENT — ENCOUNTER SYMPTOMS
ARTHRALGIAS: 0
JOINT SWELLING: 0
BLOOD IN STOOL: 0
FREQUENCY: 0
PHOTOPHOBIA: 0
ABDOMINAL PAIN: 0
DYSURIA: 0
NERVOUS/ANXIOUS: 0
DIARRHEA: 0
CONFUSION: 0
BRUISES/BLEEDS EASILY: 0
UNEXPECTED WEIGHT CHANGE: 0
AGITATION: 0
COUGH: 0
MYALGIAS: 0
NUMBNESS: 0
NECK PAIN: 0
HEMATURIA: 0
FEVER: 0
HEADACHES: 0
FATIGUE: 0
BACK PAIN: 1
SHORTNESS OF BREATH: 0
WEAKNESS: 0

## 2025-01-02 NOTE — PROGRESS NOTES
Rheumatology                       Initial visit                Reason for visit:   Chief Complaint   Patient presents with    Osteoporosis       HPI     Stephanie Jeffers is a 74 y.o. female here for follow up of Osteoporosis       Patient was initially seen by Dr. Soria on 12/7/2023  She is coming back for a follow up after a year   Prior to that she was seeing Dr. Lebron at [place]  Was diagnosed with osteoporosis likely in 2019  Started on IV Reclast probably in 2020, 2nd infusion 5/13/21, 3rd infusion 5/16/22. Patient reports getting 4th infusion in May 2023 (had a total of 4 doses of Reclast)  Was instructed by Dr. Soria/Dr. Lebron  to have  a drug holiday     Denies any recent fractures    Denies smoking history or excessive alcohol intake  Denies any previous use of glucocorticoids  Admits to physical exercise (walking)  Denies any major dental issues. Gets regular dental check ups   Denies any history of esophageal disorders, bariatric surgery, chronic kidney disease  Had kidney stones years ago  Currently on daily vitamin D (1000 IU)  No family history of osteoporosis    Chronic low back pain. No radiation   Sees Spine specialist   Had MRI L spine on 4/11/24   Had ablation without ay improvement   Sees chiropractor now which helps some       DEXA scan/BMD over years :    Date                    Lumbar spine              Left femur/hip              Right femur/hip         Forearm     11/13/23                     -2.0                              -1.9                              -2.2       08/26/21                     -2.1                              -2.2                              -1.9    06/10/19                     -2.5                               -2.2                             -2.1    06/06/17                     -2.3                               -2.2                             -1.9       Results for orders placed during the hospital encounter of 11/13/23    DEXA BONE  DENSITY    Narrative  CLINICAL HISTORY: Screening for osteoporosis, postmenopausal. History of  osteoporosis, assess response to Reclast therapy. M 81.0    COMMENT:    Scans of the lumbar spine and both hips were obtained and comparison  was made to a prior study dated 8/26/2021. The bone mineral density was  calculated for all the levels scanned.  The patient's bone mineral density was  referenced to both young normal patients (T-score) as well as age matched  patients (Z-score).  For the evaluation of osteoporosis, it is recommended that  the patient's bone mineral density be compared to young normal patients or the  T-score.    Current World Health Organization criteria for the diagnosis of osteoporosis are  as follows:  1.  Up to 1 standard deviation below normal compared to young normal patients -  normal.  2.  Between 1 and 2.5 standard deviations below normal compared to young normal  patients - low bone mass or osteopenia.  3.  Greater than or equal to 2.5 standard deviations below normal compared to  young normal patients - osteoporosis.      The DEXA was performed on [employer] equipment.    AP lumbar spine T-score       L1-L4                         -2.0  Corresponding bone mineral density:       0.952 g/cm2.  There has been no significant change in the lumbar spine since the previous  study.    AP left hip lowest T-score:    Femoral neck      -1.9  Corresponding bone mineral density:       0.772 g/cm2.  There has been no significant change in the total hip since the previous study.    AP right hip lowest T-score:  Femoral neck      -2.2  Corresponding bone mineral density:       0.736 g/cm2.  There has been 4.8% worsening in the total hip since the previous study.    The WHO Fracture Risk Assessment tool model allows estimation of 10 year  probability of hip fracture and major osteoporotic fracture with clinical risk  factors alone when BMD is not known.    Application of FRAX in the [place]:  * FRAX is intended for postmenopausal women and men age 50 and older; it is not  intended for use in younger adults or children.  * The FRAX tool has not been validated in patients currently or previously  treated with pharmacotherapy for osteoporosis.  In such patients, clinical  judgment must be exercised in interpreting FRAX scores.  *  In the absence of femoral neck BMD, total hip BMD must be substituted;  however, use of BMD for non-hip sites in the algorithm is not recommended  because such use has not been validated.    The therapeutic thresholds are for clinical guidance only and are not rules.  All treatment decisions require clinical judgment and consideration of  individual patient factors, including patient preferences, comorbidities, risk  factors not captured in the FRAX model (e.g. frailty, falls), recent decline in  bone density and other sources of possible under-or-over estimation of fracture  risks by FRAX.  The therapeutic thresholds do not preclude clinicians or  patients from considering intervention strategies for those who do not have  osteoporosis by BMD (WHO diagnostic criterion of T-score less than or equal to  -2.5), do not meet the cut points after FRAX, or not at high enough risk of  fracture despite low BMD.  Conversely, these recommendations should not mandate  treatment, particularly in patients with osteopenia.  Decisions to treat must  still be made on the case-by-case basis.    10-Year Probability of Fracture:  Major Osteoporotic: 11.9%  Hip: 3.6%    Reference:  FRAX: The World Health Organization Fracture Risk Assessment Tool.  www.nof.org    Impression  IMPRESSION: Low bone density (osteopenia).    This service rendered by Jewell Kwok PA-C.    I certify that I have personally reviewed this examination and agree with Jewell Kwok's report.  Dave Valdivia M.D.      Results for orders placed during the hospital encounter of 08/26/21    DEXA BONE  DENSITY    Narrative  CLINICAL HISTORY: Postmenopausal, history of osteoporosis treated with Reclast,  M 81.0    COMMENT:    Scans of the lumbar spine and both hips were obtained and comparison  was made to a prior study dated 6/10/2019. The bone mineral density was  calculated for all the levels scanned.  The patient's bone mineral density was  referenced to both young normal patients (T-score) as well as age matched  patients (Z-score).  For the evaluation of osteoporosis, it is recommended that  the patient's bone mineral density be compared to young normal patients or the  T-score.    Current World Health Organization criteria for the diagnosis of osteoporosis are  as follows:  1.  Up to 1 standard deviation below normal compared to young normal patients -  normal.  2.  Between 1 and 2.5 standard deviations below normal compared to young normal  patients - low bone mass or osteopenia.  3.  Greater than or equal to 2.5 standard deviations below normal compared to  young normal patients - osteoporosis.    The DEXA was performed on [employer] equipment.    AP lumbar spine T-score       L1-L4                         -2.1  Corresponding bone mineral density:       0.933 g/cm2.  There has been 5.4% improvement since the previous study.    AP left hip lowest T-score:    Femoral neck      -2.2  Corresponding bone mineral density:       0.737 g/cm2.  There has been no significant change of the total hip since the previous study.    AP right hip lowest T-score:  Femoral neck      -1.9  Corresponding bone mineral density:       0.768 g/cm2.  There has been no significant change of the total hip since the previous study.    The WHO Fracture Risk Assessment tool model allows estimation of 10 year  probability of hip fracture and major osteoporotic fracture with clinical risk  factors alone when BMD is not known.    Application of FRAX in the [place]:  * FRAX is intended for postmenopausal women and men  age 50 and older; it is not  intended for use in younger adults or children.  * The FRAX tool has not been validated in patients currently or previously  treated with pharmacotherapy for osteoporosis.  In such patients, clinical  judgment must be exercised in interpreting FRAX scores.  *  In the absence of femoral neck BMD, total hip BMD must be substituted;  however, use of BMD for non-hip sites in the algorithm is not recommended  because such use has not been validated.    The therapeutic thresholds are for clinical guidance only and are not rules.  All treatment decisions require clinical judgment and consideration of  individual patient factors, including patient preferences, comorbidities, risk  factors not captured in the FRAX model (e.g. frailty, falls), recent decline in  bone density and other sources of possible under-or-over estimation of fracture  risks by FRAX.  The therapeutic thresholds do not preclude clinicians or  patients from considering intervention strategies for those who do not have  osteoporosis by BMD (WHO diagnostic criterion of T-score less than or equal to  -2.5), do not meet the cut points after FRAX, or not at high enough risk of  fracture despite low BMD.  Conversely, these recommendations should not mandate  treatment, particularly in patients with osteopenia.  Decisions to treat must  still be made on the case-by-case basis.    10-Year Probability of Fracture:  Major Osteoporotic: 12.0%  Hip: 3.2%    Reference:  FRAX: The World Health Organization Fracture Risk Assessment Tool.  www.nof.org      --    Impression  Low bone density (osteopenia).    This service rendered by ALEC Gupta.    I certify that I have personally reviewed this examination and agree with Rebeca Blood's report.  Hernando Son M.D.    In accordance with [place] Act [place],  the patient will receive a letter notifying them  to follow up with their physician.      Results for orders placed during the hospital  encounter of 06/10/19    DEXA BONE DENSITY    Narrative  CLINICAL HISTORY: Postmenopausal, history of osteoporosis, response to Actonel  therapy, M 85.89    COMMENT:    Scans of the lumbar spine and both hips were obtained and comparison  was made to a prior study dated 6/6/2017.  The bone mineral density was  calculated for all the levels scanned.  The patient's bone mineral density was  referenced to both young normal patients (T-score) as well as age matched  patients (Z-score).  For the evaluation of osteoporosis, it is recommended that  the patient's bone mineral density be compared to young normal patients or the  T-score.    Current World Health Organization criteria for the diagnosis of osteoporosis are  as follows:  1.  Up to 1 standard deviation below normal compared to young normal patients -  normal.  2.  Between 1 and 2.5 standard deviations below normal compared to young normal  patients - low bone mass or osteopenia.  3.  Greater than or equal to 2.5 standard deviations below normal compared to  young normal patients - osteoporosis.    The DEXA was performed on [employer] equipment.    AP lumbar spine T-score       L1-L4                         -2.5  Corresponding bone mineral density:       0.885 g/cm2.  There has been no significant change since the previous study.    AP left hip lowest T-score:    Femoral neck      -2.2  Corresponding bone mineral density:       0.736 g/cm2.  There has been no significant change of the total hip since the previous study.    AP right hip lowest T-score:  Femoral neck      -2.1  Corresponding bone mineral density:       0.739 g/cm2.  There has been no significant change of the total hip since the previous study.      --    Impression  Osteoporosis.    This service rendered by Iraida Yuen PA-C.    The DEXA was reviewed and final interpretation was performed by Federico Alejo M.D..   No results found for this or any previous visit.   Results for orders  placed during the hospital encounter of 04/11/24    MRI LUMBAR SPINE WITHOUT CONTRAST    Narrative  CLINICAL HISTORY: M51.36: Other intervertebral disc degeneration, lumbar region      PRIOR STUDY: MR lumbar spine dated June 1, 2022    TECHNIQUE: Noncontrast MR lumbar spine    COMMENT:  There is a normal lumbar lordosis. The vertebral bodies are maintained  in height and alignment. Mixed endplate changes noted at L2-3 and L5-S1.  Postprocedural changes noted at L4, L5 and S1 from recent procedure. There is  disc desiccation and narrowing noted worst at L5-S1. The conus medullaris ends  at L1-2.    Unless otherwise noted there is no significant disc herniation, central or  foraminal narrowing.    L1-2 shows disc ossified complex and facet hypertrophy.    L2-3 shows a disc osteophyte complex and facet hypertrophy.    L3-4 shows a disc osteophyte complex and facet hypertrophy.    L4-5 shows a disc osteophyte complex and facet hypertrophy.    L5-S1 shows a disc osteophyte complex which may contact the bilateral  extraforaminal L5 nerve roots and facet hypertrophy contributing to mild  bilateral foraminal narrowing.    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    Impression  IMPRESSION: Postprocedural changes and multilevel spondylosis as described above  in detail. No new disc herniation or significant central canal narrowing.   No results found for this or any previous visit.   No results found for this or any previous visit.    No results found for this or any previous visit.   No results found for this or any previous visit.   No results found for this or any previous visit.    No results found for this or any previous visit.   No results found for this or any previous visit.   No results found for this or any previous visit.   No results found for this or any previous visit.   No results found for this or any previous visit.      Past Medical History:   Diagnosis Date    Abnormal Pap smear of cervix      Anxiety     Herpes     Hypotension     IPMN (intraductal papillary mucinous neoplasm) 05/31/2022    Kidney stones     Lipid disorder     Migraines     Osteopenia     Screening for breast cancer 03/04/2024    Birads 2 - Benign ([facility])    Screening for cervical cancer 08/18/2023    ASCUS/ HPV+       Past Surgical History   Procedure Laterality Date    Cataract extraction Left 11/19/2018    Cataract extraction Right 01/16/2019       Social History     Tobacco Use    Smoking status: Never    Smokeless tobacco: Never   Vaping Use    Vaping status: Never Used   Substance Use Topics    Alcohol use: Not Currently     Alcohol/week: 1.0 standard drink of alcohol     Types: 1 Glasses of wine per week     Comment: occasioal    Drug use: Not Currently     Types: Cocaine     Comment: 35 years ago       Family History   Problem Relation Name Age of Onset    Hyperlipidemia Biological Father      Hypertension Biological Father      Diabetes Biological Father      Hyperlipidemia Biological Mother      Lung cancer Biological Mother      Tetralogy of Fallot  Biological Sister      Breast cancer Cousin      Colon cancer Neg Hx      Ovarian cancer Neg Hx      Uterine cancer Neg Hx      Cervical cancer Neg Hx         Allergies:  Patient has no known allergies.    Current Outpatient Medications   Medication Sig Dispense Refill    ARIPiprazole (ABILIFY) 2 mg tablet Take 1 tablet (2 mg total) by mouth daily. 90 tablet 0    clonazePAM (KlonoPIN) 0.5 mg tablet Take 1.5 tablets (0.75 mg total) by mouth nightly. 45 tablet 0    cycloSPORINE (RESTASIS) 0.05 % ophthalmic emulsion Administer 1 drop into both eyes 2 (two) times a day.      desvenlafaxine succinate (PRISTIQ) 25 mg tablet extended release 24 hr Take 2 tablets (50 mg total) by mouth daily. 180 tablet 0    estradioL (ESTRACE) 0.01 % (0.1 mg/gram) vaginal cream Insert 2 g into the vagina 2 (two) times a week (Mon, Thu).      evolocumab (REPATHA SURECLICK) 140 mg/mL pen  INJECT 1 ML (140 MG TOTAL) UNDER THE SKIN EVERY 14 DAYS. 6 mL 1    linaCLOtide (LINZESS) 290 mcg capsule Take 290 mcg by mouth daily before breakfast.      SUMAtriptan (IMITREX) 100 mg tablet Take 100 mg by mouth daily as needed for migraine. Only 1 dose in 24 hours      valACYclovir (VALTREX) 500 mg tablet Take 500 mg by mouth 2 (two) times a day.      omeprazole (PriLOSEC) 20 mg capsule Take 20 mg by mouth. (Patient not taking: Reported on 1/2/2025)       No current facility-administered medications for this visit.       Review of Systems   Constitutional:  Negative for fatigue, fever and unexpected weight change.   HENT:  Negative for congestion, mouth sores and nosebleeds.    Eyes:  Negative for photophobia and visual disturbance.   Respiratory:  Negative for cough and shortness of breath.    Cardiovascular:  Negative for chest pain and leg swelling.   Gastrointestinal:  Negative for abdominal pain, blood in stool and diarrhea.   Genitourinary:  Negative for dysuria, frequency and hematuria.   Musculoskeletal:  Positive for back pain. Negative for arthralgias, joint swelling, myalgias and neck pain.   Skin:  Negative for rash.   Neurological:  Negative for weakness, numbness and headaches.   Hematological:  Does not bruise/bleed easily.   Psychiatric/Behavioral:  Negative for agitation and confusion. The patient is not nervous/anxious.         Physical Exam  Constitutional:       Appearance: Normal appearance. She is normal weight.   HENT:      Head: Atraumatic.      Mouth/Throat:      Mouth: Mucous membranes are moist.      Pharynx: Oropharynx is clear.   Eyes:      Conjunctiva/sclera: Conjunctivae normal.   Cardiovascular:      Rate and Rhythm: Normal rate and regular rhythm.      Pulses: Normal pulses.      Heart sounds: Normal heart sounds.   Pulmonary:      Effort: Pulmonary effort is normal.      Breath sounds: Normal breath sounds. No wheezing.   Abdominal:      General: Bowel sounds are normal.       Palpations: Abdomen is soft.      Tenderness: There is no abdominal tenderness.   Musculoskeletal:         General: No swelling or tenderness. Normal range of motion.      Cervical back: Normal range of motion and neck supple.      Right lower leg: No edema.      Left lower leg: No edema.   Skin:     General: Skin is warm.      Findings: No rash.   Neurological:      General: No focal deficit present.      Mental Status: She is alert and oriented to person, place, and time.      Motor: No weakness.   Psychiatric:         Mood and Affect: Mood normal.         Behavior: Behavior normal.          Vitals:    01/02/25 0842   BP: 112/74   Pulse: (!) 103   SpO2: 100%       Wt Readings from Last 3 Encounters:   01/02/25 49.1 kg (108 lb 3.2 oz)   11/07/24 50.5 kg (111 lb 6 oz)   09/24/24 49.9 kg (110 lb)       Body mass index is 19.17 kg/m².      Pertinent labs and imaging results have been reviewed    Auto-immune panel  Lab Results   Component Value Date    TANYA Negative 09/23/2024    TANYA Negative 08/10/2023    RF  10/12/2015     <20.0  RESULTS GREATER THAN 150 IU/ML  ARE A SIGNIFICANT LEVEL FOR R.A.,  SPECIFICITY 97%.      SSAAB 44 08/10/2023    SSAAB 50 10/12/2015    SSBAB 5 08/10/2023    SSBAB 32 10/12/2015        Hematology  Lab Results   Component Value Date    WBC 7.19 08/04/2024    HGB 13.2 08/04/2024    HCT 41.0 08/04/2024     08/04/2024    INR 1.0 09/06/2022       Chemistries  Lab Results   Component Value Date     12/30/2024    K 4.8 12/30/2024     12/30/2024    CREATININE 0.9 12/30/2024    BUN 20 12/30/2024    CO2 28 12/30/2024    GLUCOSE 104 (H) 12/30/2024    CALCIUM 9.9 12/30/2024    ALT 18 12/30/2024    ALT 18 12/30/2024    AST 24 12/30/2024    AST 24 12/30/2024       Cholesterol  Lab Results   Component Value Date    CHOL 234 (H) 12/30/2024    TRIG 139 12/30/2024     12/30/2024    LDLCALC 97 12/30/2024    NONHDLCALC 125 12/30/2024       Endocrine  Lab Results   Component Value Date     TSH 0.98 09/23/2024    FREET4 1.03 12/21/2023    HGBA1C 5.5 12/21/2023         Assessment/Plan     Stephanie Jeffers is a 74 y.o. female with Past Medical History of HLD, OA/DJD, anxiety is here for management of Osteoporosis     1. Age-related osteoporosis without current pathological fracture (Primary)  Osteoporosis diagnosed in 2019   Has had 4 doses of Reclast so far   Last Reclast was in May 2023   Last DEXA scan was in November 2023  Her previous rheumatologist wanted her to take a drug holiday    No recent fractures  She will get a repeat DEXA scan in November 2025  Had a lengthy discussion with patient that based on that we can evaluate if she needs to get back on treatment     Recommend taking daily calcium approximately 1200 mg (either from dietary intake or through supplements) and daily vitamin D of at least 1000 to 2000 international units  Aim for at least 4 servings of high-calcium foods per day (milk, cheese, yogurt, nut milks, leafy greens, nuts, and beans)  Continue daily walking, try to add in light yoga and weightbearing exercises for at least 30 minutes for at least 5 days/week and incorporate muscle-strengthening and posture exercises two to three days a week    - DEXA BONE DENSITY; Future    2. Low vitamin D level  Recent labs showed slightly low vitamin D level of 27  Instructed patient to increase vitamin D supplements to 2000 international units daily    3. Lumbar spondylosis  Chronic low back pain secondary to lumbar spondylosis  Has had multiple interventions in the past without much relief  Currently sees chiropractor which seems to be helping her pain                 Crissy Stevenson MD  1/2/2025

## 2025-01-07 ENCOUNTER — CONSULT (OUTPATIENT)
Dept: OBSTETRICS AND GYNECOLOGY | Facility: CLINIC | Age: 75
End: 2025-01-07
Payer: MEDICARE

## 2025-01-07 ENCOUNTER — TELEPHONE (OUTPATIENT)
Dept: OBSTETRICS AND GYNECOLOGY | Facility: CLINIC | Age: 75
End: 2025-01-07

## 2025-01-07 ENCOUNTER — APPOINTMENT (OUTPATIENT)
Dept: LAB | Facility: HOSPITAL | Age: 75
End: 2025-01-07
Attending: OBSTETRICS & GYNECOLOGY
Payer: MEDICARE

## 2025-01-07 VITALS
BODY MASS INDEX: 18.44 KG/M2 | DIASTOLIC BLOOD PRESSURE: 64 MMHG | SYSTOLIC BLOOD PRESSURE: 110 MMHG | WEIGHT: 108 LBS | HEIGHT: 64 IN

## 2025-01-07 DIAGNOSIS — B97.7 HIGH RISK HPV INFECTION: ICD-10-CM

## 2025-01-07 DIAGNOSIS — Z01.818 PRE-OP TESTING: ICD-10-CM

## 2025-01-07 DIAGNOSIS — R87.612 LGSIL ON PAP SMEAR OF CERVIX: ICD-10-CM

## 2025-01-07 DIAGNOSIS — Z01.818 PRE-OP TESTING: Primary | ICD-10-CM

## 2025-01-07 LAB
ABO + RH BLD: NORMAL
ANION GAP SERPL CALC-SCNC: 8 MEQ/L (ref 3–15)
BLD GP AB SCN SERPL QL: NEGATIVE
BLOOD BANK CMNT PATIENT-IMP: NORMAL
BUN SERPL-MCNC: 23 MG/DL (ref 7–25)
CALCIUM SERPL-MCNC: 9.5 MG/DL (ref 8.6–10.3)
CHLORIDE SERPL-SCNC: 102 MEQ/L (ref 98–107)
CO2 SERPL-SCNC: 27 MEQ/L (ref 21–31)
CREAT SERPL-MCNC: 0.8 MG/DL (ref 0.6–1.2)
D AG BLD QL: POSITIVE
EGFRCR SERPLBLD CKD-EPI 2021: >60 ML/MIN/1.73M*2
ERYTHROCYTE [DISTWIDTH] IN BLOOD BY AUTOMATED COUNT: 13.9 % (ref 11.7–14.4)
GLUCOSE SERPL-MCNC: 96 MG/DL (ref 70–99)
HCT VFR BLD AUTO: 44.6 % (ref 35–45)
HGB BLD-MCNC: 13.7 G/DL (ref 11.8–15.7)
LABORATORY COMMENT REPORT: NORMAL
MCH RBC QN AUTO: 26.4 PG (ref 28–33.2)
MCHC RBC AUTO-ENTMCNC: 30.7 G/DL (ref 32.2–35.5)
MCV RBC AUTO: 86.1 FL (ref 83–98)
PLATELET # BLD AUTO: 278 K/UL (ref 150–369)
PMV BLD AUTO: 10.2 FL (ref 9.4–12.3)
POTASSIUM SERPL-SCNC: 4.3 MEQ/L (ref 3.5–5.1)
RBC # BLD AUTO: 5.18 M/UL (ref 3.93–5.22)
SODIUM SERPL-SCNC: 137 MEQ/L (ref 136–145)
SPECIMEN EXP DATE BLD: NORMAL
WBC # BLD AUTO: 7.63 K/UL (ref 3.8–10.5)

## 2025-01-07 PROCEDURE — 85027 COMPLETE CBC AUTOMATED: CPT

## 2025-01-07 PROCEDURE — 36415 COLL VENOUS BLD VENIPUNCTURE: CPT

## 2025-01-07 PROCEDURE — 99214 OFFICE O/P EST MOD 30 MIN: CPT | Performed by: OBSTETRICS & GYNECOLOGY

## 2025-01-07 PROCEDURE — 86900 BLOOD TYPING SEROLOGIC ABO: CPT

## 2025-01-07 PROCEDURE — 80048 BASIC METABOLIC PNL TOTAL CA: CPT

## 2025-01-07 PROCEDURE — 200200 PR NO CHARGE: Performed by: OBSTETRICS & GYNECOLOGY

## 2025-01-07 NOTE — TELEPHONE ENCOUNTER
Patient has been cleared for surgery by Antonio Price/Cardiology.   Visit Information Date & Time Provider Department Dept. Phone Encounter #  
 12/7/2017  1:00 PM John Galvan, [address] [facility] [phone] [id number] Follow-up Instructions Return in about 6 months (around 6/7/2018). Your Appointments 3/5/2018  3:00 PM  
ROUTINE CARE with Renee Guzmán MD  
[facility] [address]) Appt Note: routine follow up  
 [address] [place] [phone] [address] Upcoming Health Maintenance Date Due  
 EYE EXAM RETINAL OR DILATED Q1 2/21/2018 HEMOGLOBIN A1C Q6M 6/5/2018 FOOT EXAM Q1 9/12/2018 MICROALBUMIN Q1 9/12/2018 LIPID PANEL Q1 9/12/2018 MEDICARE YEARLY EXAM 9/13/2018 BREAST CANCER SCRN MAMMOGRAM 1/26/2019 GLAUCOMA SCREENING Q2Y 2/21/2019 COLONOSCOPY 5/26/2021 DTaP/Tdap/Td series (2 - Td) 9/29/2025 Allergies as of 12/7/2017  Review Complete On: 12/7/2017 By: John Galvan MD  
  
 Severity Noted Reaction Type Reactions Demerol [Meperidine]  03/12/2010    Unknown (comments) Pt unsure of reaction Current Immunizations  Reviewed on 12/5/2017 Name Date Influenza High Dose Vaccine PF 9/12/2017 Influenza Vaccine 11/17/2014, 11/11/2013 Influenza Vaccine Dirk Sperry) 9/29/2015 Influenza Vaccine (Quad) PF 8/30/2016 Influenza Vaccine Split 11/16/2012, 9/26/2011, 10/15/2010 Pneumococcal Polysaccharide (PPSV-23) 9/12/2017 Pneumococcal Vaccine (Pcv) 11/20/2006 Tdap 9/29/2015 Not reviewed this visit You Were Diagnosed With   
  
 Codes Comments Coronary artery disease involving native coronary artery of native heart without angina pectoris    -  Primary ICD-10-CM: I25.10 ICD-9-CM: 414.01 Mixed hyperlipidemia     ICD-10-CM: E78.2 ICD-9-CM: 272.2  S/P PTCA (percutaneous transluminal coronary angioplasty)     ICD-10-CM: Z98.61 ICD-9-CM: V45.82 Vitals BP Pulse Resp Height(growth percentile) Weight(growth percentile) SpO2  
 120/70 (BP 1 Location: Left arm, BP Patient Position: Sitting) 96 14 5' 4\" (1.626 m) 168 lb 11.2 oz (76.5 kg) 97% BMI OB Status Smoking Status 28.96 kg/m2 Hysterectomy Never Smoker Vitals History BMI and BSA Data Body Mass Index Body Surface Area  
 28.96 kg/m 2 1.86 m 2 Preferred Pharmacy Pharmacy Name Phone [facility] [id number] Анна Rubi, [address] [phone] Your Updated Medication List  
  
   
This list is accurate as of: 12/7/17  1:25 PM.  Always use your most recent med list.  
  
  
  
  
 ALPRAZolam 1 mg tablet Commonly known as:  XANAX  
TAKE 1/2 TABLET EVERY DAY AS NEEDED FOR ANXIETY AND TAKE 1 TABLET AT BEDTIME AS NEEDED FOR SLEEP  
  
 amitriptyline 10 mg tablet Commonly known as:  ELAVIL TAKE 1 TABLET NIGHTLY  
  
 aspirin, buffered 81 mg Tab Take 81 mg by mouth daily. butalbital-acetaminophen-caff -40 mg per capsule Commonly known as:  FIORICET  
take 1 capsule by mouth twice a day if needed for headache pain (MAX OF 2 CAPSULES A DAY)  
  
 fluticasone 50 mcg/actuation nasal spray Commonly known as:  Daisy Columbiana INSTILL 2 SPRAYS INTO BOTH NOSTRILS DAILY AS NEEDED FOR RHINITIS. furosemide 20 mg tablet Commonly known as:  LASIX TAKE 1 TABLET EVERY DAY AS NEEDED  
  
 glipiZIDE SR 5 mg CR tablet Commonly known as:  GLUCOTROL XL  
TAKE 1 TABLET EVERY DAY  
  
 ibuprofen 800 mg tablet Commonly known as:  MOTRIN Insulin Needles (Disposable) 31 gauge x 5/16\" Ndle Use with lantus pen JANUVIA 100 mg tablet Generic drug:  SITagliptin TAKE 1 TABLET EVERY DAY FOR DIABETES  
  
 LANTUS SOLOSTAR 100 unit/mL (3 mL) Inpn Generic drug:  insulin glargine 10 Units. levothyroxine 25 mcg tablet Commonly known as:  SYNTHROID Take 1.5 Tabs by mouth daily. lisinopril 2.5 mg tablet Commonly known as:  PRINIVIL, ZESTRIL  
TAKE 1 TABLET EVERY DAY  
  
 metFORMIN  mg tablet Commonly known as:  GLUCOPHAGE XR  
TAKE 1 TABLET TWICE DAILY WITH MEALS. RESUME FROM Sunday 11/19/2017. metoprolol succinate 25 mg XL tablet Commonly known as:  TOPROL-XL  
TAKE 1 TABLET EVERY DAY  
  
 metroNIDAZOLE 0.75 % topical cream  
Commonly known as:  METROCREAM  
APPLY CREAM TWO TIMES A DAY TO FACE  
  
 pravastatin 40 mg tablet Commonly known as:  PRAVACHOL  
TAKE 1 TABLET EVERY NIGHT  
  
 ranolazine  mg SR tablet Commonly known as:  RANEXA Take 1 Tab by mouth two (2) times a day. We Performed the Following AMB POC EKG ROUTINE W/ 12 LEADS, INTER & REP [23527 CPT(R)] Follow-up Instructions Return in about 6 months (around 6/7/2018). To-Do List   
 01/08/2018 1:00 PM  
  Appointment with [address] [facility] 1 at [facility] ([phone]) Shower or bathe using soap and water. Do not use deodorant, powder, perfumes, or lotion the day of your exam.  If your prior mammograms were not performed at [facility] 6 please bring films with you or forward prior images 2 days before your procedure. Check in at registration 15min before your appointment time unless you were instructed to do otherwise. A script is not necessary, but if you have one, please bring it on the day of the mammogram or have it faxed to the department. [facility] [phone] [facility] [place]  [phone] [facility] [address] [place]  [phone] [phone] [facility] 5237 [facility] [phone] Introducing [facility]! Jessica Jerez introduces [employer] patient portal. Now you can access parts of your medical record, email your doctor's office, and request medication refills online. 1. In your internet browser, go to [email or web address] 2. Click on the First Time User? Click Here link in the Sign In box.  You will see the New Member Sign Up page. 3. Enter your [email or web address] Access Code exactly as it appears below. You will not need to use this code after youve completed the sign-up process. If you do not sign up before the expiration date, you must request a new code. · [email or web address] Access Code: [email or web address] Expires: 12/11/2017  8:08 AM 
 
4. Enter the last four digits of your Social Security Number (xxxx) and Date of Birth (mm/dd/yyyy) as indicated and click Submit. You will be taken to the next sign-up page. 5. Create a [email or web address] ID. This will be your [email or web address] login ID and cannot be changed, so think of one that is secure and easy to remember. 6. Create a [email or web address] password. You can change your password at any time. 7. Enter your Password Reset Question and Answer. This can be used at a later time if you forget your password. 8. Enter your e-mail address. You will receive e-mail notification when new information is available in [address]. 9. Click Sign Up. You can now view and download portions of your medical record. 10. Click the Download Summary menu link to download a portable copy of your medical information. If you have questions, please visit the Frequently Asked Questions section of the [email or web address] website. Remember, [email or web address] is NOT to be used for urgent needs. For medical emergencies, dial 911. Now available from your iPhone and Android! Please provide this summary of care documentation to your next provider. Your primary care clinician is listed as Carolynn Woods. If you have any questions after today's visit, please call [phone].

## 2025-01-07 NOTE — PROGRESS NOTES
Patient ID: Stephanie Jeffers   : 1950 74 y.o.  MRN: [id number]   Visit Date: 2025    Subjective   Stephanie Jeffers is presenting today for Pre-op Exam      HPI    Stephanie Jeffers is a 74 y.o.  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 Pap in  was ASCUS HR HPV non 16/18/45.     I spoke with Stephanie and she has elected to proceed with LEEP in stead of colposcopy. She had a very bad experience with a colposcopy in the past and would prefer to be under anesthesia.       Past Medical History:  has a past medical history of Abnormal Pap smear of cervix, Anxiety, Herpes, Hypotension, IPMN (intraductal papillary mucinous neoplasm) (2022), Kidney stones, Lipid disorder, Migraines, Osteopenia, Screening for breast cancer (2024), and Screening for cervical cancer (2023).     Past Surgical History:  has a past surgical history that includes Cataract extraction (Left, 2018) and Cataract extraction (Right, 2019).    Obstetric History:   OB History    Para Term  AB Living   2 2 2     2   SAB IAB Ectopic Multiple Live Births                  # Outcome Date GA Lbr Hardik/2nd Weight Sex Type Anes PTL Lv   2 Term            1 Term               Obstetric Comments   LMP-post menopausal       Medications:   Current Outpatient Medications:     ARIPiprazole (ABILIFY) 2 mg tablet, Take 1 tablet (2 mg total) by mouth daily., Disp: 90 tablet, Rfl: 0    clonazePAM (KlonoPIN) 0.5 mg tablet, Take 1.5 tablets (0.75 mg total) by mouth nightly., Disp: 45 tablet, Rfl: 0    cycloSPORINE (RESTASIS) 0.05 % ophthalmic emulsion, Administer 1 drop into both eyes 2 (two) times a day., Disp: , Rfl:     desvenlafaxine succinate (PRISTIQ) 25 mg tablet extended release 24 hr, Take 2 tablets (50 mg total) by mouth daily., Disp: 180 tablet, Rfl: 0    estradioL (ESTRACE) 0.01 % (0.1 mg/gram) vaginal cream, Insert 2 g into the vagina 2 (two) times a week (Mon, Thu).,  "Disp: , Rfl:     evolocumab (REPATHA SURECLICK) 140 mg/mL pen, INJECT 1 ML (140 MG TOTAL) UNDER THE SKIN EVERY 14 DAYS., Disp: 6 mL, Rfl: 1    linaCLOtide (LINZESS) 290 mcg capsule, Take 290 mcg by mouth daily before breakfast., Disp: , Rfl:     SUMAtriptan (IMITREX) 100 mg tablet, Take 100 mg by mouth daily as needed for migraine. Only 1 dose in 24 hours, Disp: , Rfl:     valACYclovir (VALTREX) 500 mg tablet, Take 500 mg by mouth 2 (two) times a day., Disp: , Rfl:       Allergies: has No Known Allergies.       Vital Signs for this encounter: Visit Vitals  /64 (BP Location: Right upper arm, Patient Position: Sitting)   Ht 1.613 m (5' 3.5\")   Wt 49 kg (108 lb)   BMI 18.83 kg/m²       OBGyn Exam  General Appearance: Alert, cooperative, no acute distress  Head: Normocephalic, without obvious abnormality  Respiratory: CTAB  Cardiovascular: RRR  Abdomen: Deferred  Genitalia: Deferred  Extremities: no edema      Impression/Plan:    Stephanie Jeffers is a 74 y.o. is presenting today for Pre-op Exam      1. Patient is undergoing a LEEP and ECC  for LSIL +HR HPV .   2.Procedure reviewed. Risks/benefits/alternatives discussed. All questions answered to best of my ability.   3. Written consent obtained.    4. Hospital stay and recovery expectations reviewed.   5. Lab Slip Given for Pre-op Labs    Ashley Aguilar MD  "

## 2025-01-07 NOTE — H&P
Obstetrics and Gynecology   Pre-OP   History and Physical    HPI   Stephanie Jeffers is a 74 y.o.  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 Pap in  was ASCUS HR HPV non 16/18/45.      I spoke with Stephanie and she has elected to proceed with LEEP in stead of colposcopy. She had a very bad experience with a colposcopy in the past and would prefer to be under anesthesia.         OB History:   OB History    Para Term  AB Living   2 2 2 0 0 2   SAB IAB Ectopic Multiple Live Births   0 0 0 0 0      # Outcome Date GA Lbr Hardik/2nd Weight Sex Type Anes PTL Lv   2 Term            1 Term               Obstetric Comments   LMP-post menopausal       Medical History:   Past Medical History:   Diagnosis Date    Abnormal Pap smear of cervix     Anxiety     Herpes     Hypotension     IPMN (intraductal papillary mucinous neoplasm) 2022    Kidney stones     Lipid disorder     Migraines     Osteopenia     Screening for breast cancer 2024    Birads 2 - Benign ([facility])    Screening for cervical cancer 2023    ASCUS/ HPV+       Surgical History:   Past Surgical History   Procedure Laterality Date    Cataract extraction Left 2018    Cataract extraction Right 2019       Allergies: Patient has no known allergies.    Medications:   Prior to Admission medications    Medication Sig Start Date End Date Taking? Authorizing Provider   ARIPiprazole (ABILIFY) 2 mg tablet Take 1 tablet (2 mg total) by mouth daily. 24  Paresh Saleem,    clonazePAM (KlonoPIN) 0.5 mg tablet Take 1.5 tablets (0.75 mg total) by mouth nightly. 24  Paresh Saleem DO   cycloSPORINE (RESTASIS) 0.05 % ophthalmic emulsion Administer 1 drop into both eyes 2 (two) times a day. 24   Provider, Demetrice Kimball MD   desvenlafaxine succinate (PRISTIQ) 25 mg tablet extended release 24 hr Take 2 tablets (50 mg total) by mouth daily. 12/24/24 3/24/25  Stiven  "DO Paresh   estradioL (ESTRACE) 0.01 % (0.1 mg/gram) vaginal cream Insert 2 g into the vagina 2 (two) times a week (Mon, Thu).    ProviderDemetrice MD   evolocumab (REPATHA SURECLICK) 140 mg/mL pen INJECT 1 ML (140 MG TOTAL) UNDER THE SKIN EVERY 14 DAYS. 7/26/24   Joanna Pappas CRNP   linaCLOtide (LINZESS) 290 mcg capsule Take 290 mcg by mouth daily before breakfast.    ProviderDemetrice MD   SUMAtriptan (IMITREX) 100 mg tablet Take 100 mg by mouth daily as needed for migraine. Only 1 dose in 24 hours    ProviderJigar MD   valACYclovir (VALTREX) 500 mg tablet Take 500 mg by mouth 2 (two) times a day.    Provider, Demetrice Kimball MD       Family History:   Family History   Problem Relation Name Age of Onset    Hyperlipidemia Biological Father      Hypertension Biological Father      Diabetes Biological Father      Hyperlipidemia Biological Mother      Lung cancer Biological Mother      Tetralogy of Fallot  Biological Sister      Breast cancer Cousin      Colon cancer Neg Hx      Ovarian cancer Neg Hx      Uterine cancer Neg Hx      Cervical cancer Neg Hx         Social History:   Social History     Social History Narrative    Two daughters - one has two children, younger daughter is an actress     She is a retired      Lives at [place]       Review of Systems:  All other systems reviewed and negative except as noted in the HPI.    Vital Signs:  No data found.      OBGyn Exam   General Appearance: Alert, cooperative, no acute distress  Head: Normocephalic, without obvious abnormality  Respiratory: CTAB  Cardiovascular: RRR  Abdomen: Deferred  Genitalia: Deferred  Extremities: no edema     Labs    No results found for: \"ABO\", \"LABRH\", \"LABANTI\"    CBC Results         08/04/24 12/21/23 09/05/23     1127 1306 1207    WBC 7.19 6.90 7.89    RBC 4.82 5.03 4.97    HGB 13.2 13.6 13.4    HCT 41.0 43.5 43.2    MCV 85.1 86.5 86.9    MCH 27.4 27.0 27.0    MCHC 32.2 31.3 31.0 "     242 228            BMP Results         12/30/24 09/11/24 08/04/24     0854 1021 1127     139 138    K 4.8 4.3 3.9    Cl 103 104 102    CO2 28 30 30    Glucose 104 97 113    BUN 20 17 17    Creatinine 0.9 0.7 0.7    Calcium 9.9 9.5 9.1    Anion Gap 9 5 6    EGFR >60.0 >60.0 >60.0           Comment for K at 1127 on 08/04/24: Results obtained on plasma. Plasma Potassium values may be up to 0.4 mEQ/L less than serum values. The differences may be greater for patients with high platelet or white cell counts.    Comment for EGFR at 0854 on 12/30/24: Calculation based on the Chronic Kidney Disease Epidemiology Collaboration (CKD-EPI) equation refit without adjustment for race.    Comment for EGFR at 1021 on 09/11/24: Calculation based on the Chronic Kidney Disease Epidemiology Collaboration (CKD-EPI) equation refit without adjustment for race.    Comment for EGFR at 1127 on 08/04/24: Calculation based on the Chronic Kidney Disease Epidemiology Collaboration (CKD-EPI) equation refit without adjustment for race.            No orders to display         Assessment/Plan     1. Admit to service of [facility] (Dr. Aguilar)  2. NPO  3. SCDs to lower extremities  3. Labs: n/a  4. Antibiotics:    Not Indicated  5. Pre-OP Medications:   Nothing.      Ashley Aguilar MD

## 2025-01-08 ENCOUNTER — TELEPHONE (OUTPATIENT)
Dept: PSYCHIATRY | Facility: HOSPITAL | Age: 75
End: 2025-01-08
Payer: MEDICARE
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
## 2025-01-08 NOTE — TELEPHONE ENCOUNTER
Prepped Emergency Contact THIERRY and pushed to patients mychart to sign, please call [phone] if this information needs to be updated.

## 2025-01-09 ENCOUNTER — PREP FOR CASE (OUTPATIENT)
Dept: OBSTETRICS AND GYNECOLOGY | Facility: CLINIC | Age: 75
End: 2025-01-09
Payer: MEDICARE

## 2025-01-09 ENCOUNTER — TELEPHONE (OUTPATIENT)
Dept: OBSTETRICS AND GYNECOLOGY | Facility: CLINIC | Age: 75
End: 2025-01-09
Payer: MEDICARE

## 2025-01-09 ENCOUNTER — TELEMEDICINE (OUTPATIENT)
Dept: PSYCHIATRY | Facility: HOSPITAL | Age: 75
End: 2025-01-09
Attending: PSYCHIATRY & NEUROLOGY
Payer: MEDICARE

## 2025-01-09 DIAGNOSIS — F41.1 GENERALIZED ANXIETY DISORDER: ICD-10-CM

## 2025-01-09 DIAGNOSIS — F41.0 PANIC DISORDER: ICD-10-CM

## 2025-01-09 DIAGNOSIS — F33.2 SEVERE EPISODE OF RECURRENT MAJOR DEPRESSIVE DISORDER, WITHOUT PSYCHOTIC FEATURES (CMS/HCC): Primary | ICD-10-CM

## 2025-01-09 PROCEDURE — 99214 OFFICE O/P EST MOD 30 MIN: CPT | Mod: 95 | Performed by: PSYCHIATRY & NEUROLOGY

## 2025-01-09 PROCEDURE — 90833 PSYTX W PT W E/M 30 MIN: CPT | Mod: 95 | Performed by: PSYCHIATRY & NEUROLOGY

## 2025-01-09 RX ORDER — SODIUM CHLORIDE, SODIUM LACTATE, POTASSIUM CHLORIDE, CALCIUM CHLORIDE 600; 310; 30; 20 MG/100ML; MG/100ML; MG/100ML; MG/100ML
INJECTION, SOLUTION INTRAVENOUS CONTINUOUS
Status: CANCELLED | OUTPATIENT
Start: 2025-01-09 | End: 2025-01-10

## 2025-01-09 RX ORDER — ESCITALOPRAM OXALATE 5 MG/1
5 TABLET ORAL DAILY
Qty: 30 TABLET | Refills: 1 | Status: SHIPPED | OUTPATIENT
Start: 2025-01-09 | End: 2025-01-13

## 2025-01-09 NOTE — TELEPHONE ENCOUNTER
Patient called her psychiatrist regarding when to stop her Pristiq.  Her recommendation is to lower the dose, but to not stop it two weeks prior to surgery.  Understands it might cause bleeding, but so do all psych meds.  Should she postpone surgery?  Has questions.  Also stated her Repathy is fine to stop prior to surgery.

## 2025-01-10 ENCOUNTER — TELEPHONE (OUTPATIENT)
Dept: OBSTETRICS AND GYNECOLOGY | Facility: CLINIC | Age: 75
End: 2025-01-10
Payer: MEDICARE

## 2025-01-10 ENCOUNTER — TELEPHONE (OUTPATIENT)
Dept: PSYCHIATRY | Facility: HOSPITAL | Age: 75
End: 2025-01-10
Payer: MEDICARE

## 2025-01-10 NOTE — TELEPHONE ENCOUNTER
Spoke to patient just now.  She will be off of Pristiq and on Lexapro by the procedure.  She said her doctor advised it is fine to take up until the surgery.  Per anesthesia, it is no concern for them for her to take the med prior to surgery.  Sent a message to Dr. Aguilar too.

## 2025-01-13 ENCOUNTER — TELEMEDICINE (OUTPATIENT)
Dept: PSYCHIATRY | Facility: HOSPITAL | Age: 75
End: 2025-01-13
Attending: PSYCHIATRY & NEUROLOGY
Payer: MEDICARE

## 2025-01-13 ENCOUNTER — TELEPHONE (OUTPATIENT)
Dept: PSYCHIATRY | Facility: HOSPITAL | Age: 75
End: 2025-01-13

## 2025-01-13 DIAGNOSIS — F41.0 PANIC DISORDER: ICD-10-CM

## 2025-01-13 DIAGNOSIS — F33.2 SEVERE EPISODE OF RECURRENT MAJOR DEPRESSIVE DISORDER, WITHOUT PSYCHOTIC FEATURES (CMS/HCC): Primary | ICD-10-CM

## 2025-01-13 DIAGNOSIS — F41.1 GENERALIZED ANXIETY DISORDER: ICD-10-CM

## 2025-01-13 PROCEDURE — 90833 PSYTX W PT W E/M 30 MIN: CPT | Mod: 95 | Performed by: PSYCHIATRY & NEUROLOGY

## 2025-01-13 PROCEDURE — 99214 OFFICE O/P EST MOD 30 MIN: CPT | Mod: 95 | Performed by: PSYCHIATRY & NEUROLOGY

## 2025-01-13 RX ORDER — ESCITALOPRAM OXALATE 10 MG/1
10 TABLET ORAL DAILY
Qty: 90 TABLET | Refills: 0 | Status: SHIPPED | OUTPATIENT
Start: 2025-01-13 | End: 2025-03-17 | Stop reason: SDUPTHER

## 2025-01-13 RX ORDER — CLONAZEPAM 0.5 MG/1
0.75 TABLET ORAL NIGHTLY
Qty: 45 TABLET | Refills: 0 | Status: SHIPPED | OUTPATIENT
Start: 2025-01-23 | End: 2025-02-13 | Stop reason: SDUPTHER

## 2025-01-13 NOTE — Clinical Note
[facility] PSYCH ONLY TREATMENT PLAN 1/13/25   Effective from: 1/13/2025  Effective to: 7/12/2025    Plan ID: [id number]            Participants     You: Stephanie    Your team: Paresh Saleem DO (Consulting Physician)       Problem: Medication Management for Severe episode of recurrent major depressive disorder, without psychotic features (CMS/HCC)  (primary encounter diagnosis) Generalized anxiety disorder Panic disorder      Dates: Start:  01/13/25       Description: Follow up needed every 2-26 Weeks.                 Disciplines: Interdisciplinary    Goal: Alleviate symptoms and return to previous level of functioning     Dates: Start:  01/13/25    Expected End:  07/09/25       Disciplines: Interdisciplinary    Intervention: Medications as prescribed with continue monitoring     Dates: Start:  01/13/25                   Goal: Maintain current level of function with medication adherance and compliance     Dates: Start:  01/13/25    Expected End:  07/09/25       Disciplines: Interdisciplinary    Intervention: Medications as prescribed with continue monitoring     Dates: Start:  01/13/25                      Patient Strengths & Barriers     None

## 2025-01-13 NOTE — TELEPHONE ENCOUNTER
Called and spoke with Stephanie, offered IOP Eval with Melvi on 1/15 at 7:15am Telemed. She accepted.
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
## 2025-01-13 NOTE — Clinical Note
She is interested in starting DBT IOP asap if we have availability this week. She does have a few prescheduled doc appointments including a LEEP surgery on 1/24/25. How soon can we get her an intake for DBT IOP? She will be available by phone after our session today.

## 2025-01-15 ENCOUNTER — DOCUMENTATION (OUTPATIENT)
Dept: PSYCHIATRY | Facility: HOSPITAL | Age: 75
End: 2025-01-15
Payer: MEDICARE

## 2025-01-15 ENCOUNTER — TELEMEDICINE (OUTPATIENT)
Dept: PSYCHIATRY | Facility: HOSPITAL | Age: 75
End: 2025-01-15
Attending: SOCIAL WORKER
Payer: MEDICARE

## 2025-01-15 DIAGNOSIS — F33.2 SEVERE EPISODE OF RECURRENT MAJOR DEPRESSIVE DISORDER, WITHOUT PSYCHOTIC FEATURES (CMS/HCC): Primary | ICD-10-CM

## 2025-01-15 DIAGNOSIS — F41.0 PANIC DISORDER: ICD-10-CM

## 2025-01-15 DIAGNOSIS — F41.1 GENERALIZED ANXIETY DISORDER: ICD-10-CM

## 2025-01-15 PROCEDURE — 90791 PSYCH DIAGNOSTIC EVALUATION: CPT | Mod: 95 | Performed by: SOCIAL WORKER

## 2025-01-15 ASSESSMENT — COGNITIVE AND FUNCTIONAL STATUS - GENERAL
DELUSIONS: NONE OR AGE APPROPRIATE
THOUGHT_CONTENT: APPROPRIATE
APPEARANCE: WELL GROOMED
AROUSAL LEVEL: ALERT
INSIGHT: IMPAIRED, MODERATELY
APPETITE: DECREASED
CONCENTRATION: WNL
IMPULSE CONTROL: INTACT
RECENT MEMORY: WNL
MOOD: DEPRESSED;ANXIOUS;MOTIVATED
AFFECT: FULL RANGE
ORIENTATION: FULLY ORIENTED
REMOTE MEMORY: WNL
EYE_CONTACT: WNL
ATTENTION: WNL
THOUGHT_PROCESS: WORRY
LIBIDO: NON-CONTRIBUTORY
PSYCHOMOTOR FUNCTIONING: WNL
PERCEPTUAL FUNCTION: NORMAL
SLEEP_WAKE_CYCLE: NO CHANGE
EST. PREMORBID INTELLIGENCE: NO CHANGE
SPEECH: REGULAR
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
## 2025-01-15 NOTE — PROGRESS NOTES
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 specialists, Trauma, follow up provided by [facility].  Assess and address as needed, Pain, f/u with PCP/chiropractor, and Nutritional, continue to work with PCP    Plan:    Patient to F/U with DBT IOP 3 days a week, 3 hours per day, to adopt skill translation to all relevant contexts.  Patient to F/U with treatment goals as outlined in treatment plan.  Patient to F/U with local ED or call 911 should SI/HI arise.  Visit Diagnosis:    ICD-10-CM ICD-9-CM   1. Severe episode of recurrent major depressive disorder, without psychotic features (CMS/HCC)  F33.2 296.33   2. Generalized anxiety disorder  F41.1 300.02   3. Panic disorder  F41.0 300.01       Time  Start Time: 0718  End Time: 0848  Total Time: 90  Melvi Hernandez LCSW @ 9:11 AM
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
## 2025-01-17 ENCOUNTER — NURSE ONLY (OUTPATIENT)
Dept: PSYCHIATRY | Facility: HOSPITAL | Age: 75
End: 2025-01-17
Payer: MEDICARE

## 2025-01-17 ENCOUNTER — IOP (OUTPATIENT)
Dept: PSYCHIATRY | Facility: HOSPITAL | Age: 75
End: 2025-01-17
Attending: SOCIAL WORKER
Payer: MEDICARE

## 2025-01-17 VITALS
DIASTOLIC BLOOD PRESSURE: 71 MMHG | HEART RATE: 74 BPM | WEIGHT: 109.4 LBS | RESPIRATION RATE: 16 BRPM | OXYGEN SATURATION: 98 % | HEIGHT: 63 IN | SYSTOLIC BLOOD PRESSURE: 114 MMHG | BODY MASS INDEX: 19.38 KG/M2 | TEMPERATURE: 97.8 F

## 2025-01-17 DIAGNOSIS — F41.0 PANIC DISORDER: ICD-10-CM

## 2025-01-17 DIAGNOSIS — F41.1 GENERALIZED ANXIETY DISORDER: ICD-10-CM

## 2025-01-17 DIAGNOSIS — F33.2 SEVERE EPISODE OF RECURRENT MAJOR DEPRESSIVE DISORDER, WITHOUT PSYCHOTIC FEATURES (CMS/HCC): Primary | ICD-10-CM

## 2025-01-17 PROCEDURE — G0410 GRP PSYCH PARTIAL HOSP 45-50: HCPCS | Performed by: COUNSELOR

## 2025-01-17 ASSESSMENT — ENCOUNTER SYMPTOMS
CONSTITUTIONAL NEGATIVE: 1
RESPIRATORY NEGATIVE: 1
AGITATION: 1
DYSPHORIC MOOD: 1
NEUROLOGICAL NEGATIVE: 1
ALLERGIC/IMMUNOLOGIC NEGATIVE: 1
CARDIOVASCULAR NEGATIVE: 1
HEMATOLOGIC/LYMPHATIC NEGATIVE: 1
MUSCULOSKELETAL NEGATIVE: 1
NERVOUS/ANXIOUS: 1
EYES NEGATIVE: 1
ENDOCRINE NEGATIVE: 1

## 2025-01-17 ASSESSMENT — PAIN SCALES - GENERAL: PAINLEVEL_OUTOF10: 0-NO PAIN

## 2025-01-17 NOTE — GROUP NOTE
[facility] DBT IOP New Skill Acquisition/Wrap Up Group  Date:  1/17/2025  Start Time:  11:00 AM  End Time:  12:00 PM  Number of Attendees: 6    Stephanie Jeffers, YOB: 1950,  was an active group participant.    Group Focus: Goal of group was introduce skill of the day and confirm homework assignment due for next group.  About the Group:  The topic of the day was introduced, guided by Distress Tolerance Handout 7 and 9 from [employer]’s  “DBT Skills Training - Handouts and Worksheets” Second Edition.  Each participant read off one skill from the ACCEPTS acronym and provided an example of how they have or would utilize the skill  (Handout 7). Topics discussed were using Activities, Contributions, Comparison, Emotions, Pushing Away, Thoughts and Sensations to distract in an effort to tolerate a crisis urge.   Group members then read aloud each factor in the IMPROVE skill (Handout 9).  After each skill, participants were encouraged to share ho they may use the skill in their own lived experience to the group. Areas discussed to Improve the Moment were Imagery, Meaning, Prayer, Relaxing, One Thing in the Moment, Vacation and Encouragement and Rethinking.   Group concluded with a review of Homework and Diary Card expectations while enrolled in DBT IOP at [facility].  Group members were oriented to the assigned homework for the following group and opportunities to have questions answered was provided.      Assessment/observation of group participation/presentation: Stephanie was attentive and engaged in skills demos on ACCEPTS and IMPROVE skills.  She had no questions on the assigned homework.     Visit Diagnosis:     ICD-10-CM ICD-9-CM   1. Severe episode of recurrent major depressive disorder, without psychotic features (CMS/HCC)  F33.2 296.33   2. Generalized anxiety disorder  F41.1 300.02   3. Panic disorder  F41.0 300.01       Plan: Continue DBT IOP to adopt skill translation to all relevant contexts.  Pt to  F/U with treatment goals as outlined in treatment plan.  Pt to F/U with local ED/call 911 should SI/HI arises.        Ruby Stacy, LPC

## 2025-01-17 NOTE — PROGRESS NOTES
"IOP NURSING ASSESSMENT        History Reviewed: Yes, patient histories updated.    Outpatient Encounter Medications as of 1/17/2025   Medication Sig    ARIPiprazole (ABILIFY) 2 mg tablet Take 1 tablet (2 mg total) by mouth daily.    [START ON 1/23/2025] clonazePAM (KlonoPIN) 0.5 mg tablet Take 1.5 tablets (0.75 mg total) by mouth nightly.    cycloSPORINE (RESTASIS) 0.05 % ophthalmic emulsion Administer 1 drop into both eyes 2 (two) times a day.    escitalopram (LEXAPRO) 10 mg tablet Take 1 tablet (10 mg total) by mouth daily.    estradioL (ESTRACE) 0.01 % (0.1 mg/gram) vaginal cream Insert 2 g into the vagina 2 (two) times a week (Mon, Thu).    evolocumab (REPATHA SURECLICK) 140 mg/mL pen Inject 1 mL (140 mg total) under the skin every 14 (fourteen) days.    linaCLOtide (LINZESS) 290 mcg capsule Take 290 mcg by mouth daily before breakfast.    SUMAtriptan (IMITREX) 100 mg tablet Take 100 mg by mouth daily as needed for migraine. Only 1 dose in 24 hours    valACYclovir (VALTREX) 500 mg tablet Take 500 mg by mouth 2 (two) times a day.    desvenlafaxine succinate (PRISTIQ) 25 mg tablet extended release 24 hr Take 2 tablets (50 mg total) by mouth daily. (Patient not taking: Reported on 1/17/2025)       Review of Systems   Constitutional: Negative.    HENT: Negative.     Eyes: Negative.    Respiratory: Negative.     Cardiovascular: Negative.    Endocrine: Negative.    Genitourinary:         Has upcoming procedure scheduled to address abn pap   Musculoskeletal: Negative.    Skin: Negative.    Allergic/Immunologic: Negative.    Neurological: Negative.    Hematological: Negative.    Psychiatric/Behavioral:  Positive for agitation and dysphoric mood. The patient is nervous/anxious.        Vitals:    01/17/25 1211   BP: 114/71   BP Location: Left upper arm   Patient Position: Sitting   Pulse: 74   Resp: 16   Temp: 36.6 °C (97.8 °F)   SpO2: 98%   Weight: 49.6 kg (109 lb 6.4 oz)   Height: 1.6 m (5' 3\")   PainSc: 0-No pain "       Physical Exam    Labs Reviewed  Managed by PCP    Does the patient worry about falling?  no  Has the patient fallen in the last 3 months?  no    Screenings done this visit:                    Metabolic Monitoring Log Completed/Updated:  Yes    Assessment/Plan: Follow-up with IOP as scheduled     Time  Start Time: 1200  End Time: 1215  Total Time: 15    Rachael Do RN @ 12:33 PM

## 2025-01-17 NOTE — GROUP NOTE
[facility] DBT IOP Homework Review Group  Date:  1/17/2025  Start Time:  10:00 AM  End Time:  11:00 AM  Number of Attendees: 6    Stephanie Jeffers, YOB: 1950,  was quiet but attentive.    Group Focus:  Goal of group was review homework from previous session and reinforce/review skills learned and how patients applied the skills to multiple contexts in their lives    Group Focus: Goal of group was review homework from previous session and reinforce/review skills learned and how patients applied the skills to multiple contexts in their lives        Assessment/observation of group participation/presentation: Stephanie was quiet listening it peers homework examples.  She engaged when promoted by LPC on how she has used this skill for herself.     Visit Diagnosis:     ICD-10-CM ICD-9-CM   1. Severe episode of recurrent major depressive disorder, without psychotic features (CMS/HCC)  F33.2 296.33   2. Generalized anxiety disorder  F41.1 300.02   3. Panic disorder  F41.0 300.01       Plan: Continue DBT IOP to adopt skill translation to all relevant contexts.  Pt to F/U with treatment goals as outlined in treatment plan.  Pt to F/U with local ED/call 911 should SI/HI arises.        Ruby Stacy, MATTHEW

## 2025-01-17 NOTE — GROUP NOTE
[facility] DBT IOP Check In Group  Date:  1/17/2025  Start Time:   9:00 AM  End Time:  10:00 AM  Number of Attendees: 6    Stephanie Jeffers, YOB: 1950,  was an active group participant.    Group Focus: Goal of group was to welcome new and returning IOP members, review group code of conduct, set expectations for participation in DBT IOP, and assess for safety.    About the Group: Clinician reviewed [facility] Group Code of Conduct and answered any questions that arose.  Clinician welcomed new members to the group and facilitated brief introductions of group members to one another.  Topic of the curriculum was “Distraction and IMPROVE”. Group participated in a Mindfulness Practice of Instinctual Counting to practice their sense of awareness.   Diary cards were reviewed following the mindfulness exercise, with a focus on safety related behaviors such as SI, HI and SIB and symptoms of mood dysregulation such as Sadness, Anxiety and Anger.      DBT Diary Card:  Day of the week  What day of the week are you completing this for?: Thursday  Emotions (0-5)  Sadness: 4  Anger: 3  Anxiety: 3  Guilt: 3  Shame: 3  Madonna: 0  Other: N/A  Action (Y/N)  Took medication as prescribed: Yes  Suicidal Plans/Intent: No  Self-Harm: No  Substance Use: No  Isolated: Yes  Other: N/A  Distress Tolerance Skills: Self-Soothe, Radical Acceptance, Willingness, Mindfulness of Thoughts  Emotion Regulation Skills: Mindfulness of Emotions, Problem Solving, Build Mastery, [place] Ahead, Allow Emotions    Assessment/observation of group participation/presentation: Stephanie joined DBT IOP today.  She was engaged, participated in mindfulness and shared her diary card.     Visit Diagnosis:     ICD-10-CM ICD-9-CM   1. Severe episode of recurrent major depressive disorder, without psychotic features (CMS/HCC)  F33.2 296.33   2. Generalized anxiety disorder  F41.1 300.02   3. Panic disorder  F41.0 300.01       Plan: Continue DBT IOP to adopt skill translation  to all relevant contexts.  Pt to F/U with treatment goals as outlined in treatment plan.  Pt to F/U with local ED/call 911 should SI/HI arises.        Ruby Stacy, LPC

## 2025-01-20 ENCOUNTER — IOP (OUTPATIENT)
Dept: PSYCHIATRY | Facility: HOSPITAL | Age: 75
End: 2025-01-20
Attending: SOCIAL WORKER
Payer: MEDICARE

## 2025-01-20 DIAGNOSIS — F41.0 PANIC DISORDER: ICD-10-CM

## 2025-01-20 DIAGNOSIS — F41.1 GENERALIZED ANXIETY DISORDER: ICD-10-CM

## 2025-01-20 DIAGNOSIS — F33.2 SEVERE EPISODE OF RECURRENT MAJOR DEPRESSIVE DISORDER, WITHOUT PSYCHOTIC FEATURES (CMS/HCC): Primary | ICD-10-CM

## 2025-01-20 PROCEDURE — G0410 GRP PSYCH PARTIAL HOSP 45-50: HCPCS | Performed by: COUNSELOR

## 2025-01-20 NOTE — GROUP NOTE
"Preventive Care Visit  [facility] KELTON Sarah PA-C, Family Medicine  Sep 10, 2024      Assessment & Plan       ICD-10-CM    1. Encounter for routine adult health examination without abnormal findings  Z00.00 Lipid panel reflex to direct LDL Non-fasting     Glucose     Hemoglobin     Creatinine      2. Family history of hypothyroidism  Z83.49 TSH WITH FREE T4 REFLEX           Screenings/preventative measures discussed         Nicotine/Tobacco Cessation  She reports that she has been smoking cigarettes. She has a 4.2 pack-year smoking history. She has never used smokeless tobacco.    BMI  Estimated body mass index is 39.42 kg/m  as calculated from the following:    Height as of this encounter: 1.588 m (5' 2.5\").    Weight as of this encounter: 99.3 kg (219 lb).     Counseling  Appropriate preventive services were addressed with this patient via screening, questionnaire, or discussion as appropriate for fall prevention, nutrition, physical activity, Tobacco-use cessation, social engagement, weight loss and cognition.  Checklist reviewing preventive services available has been given to the patient.  Reviewed patient's diet, addressing concerns and/or questions.   She is at risk for lack of exercise and has been provided with information to increase physical activity for the benefit of her well-being.     Return in about 1 year (around 9/10/2025) for your annual physical, fasting labs, with Laura, in person.       Erendira Caldwell is a 59 year old, presenting for the following:  Physical        9/10/2024     6:54 AM   Additional Questions   Roomed by Inocencia   Accompanied by Self         9/10/2024     6:54 AM   Patient Reported Additional Medications   Patient reports taking the following new medications NA        Health Care Directive  Patient has a Health Care Directive on file  Discussed advance care planning with patient.    HPI    Patient arrived for Annual Physical, not due for " [facility] DBT IOP Check In Group  Date:  1/20/2025  Start Time:   9:00 AM  End Time:   9:45 AM  Number of Attendees: 3    Stephanie Jeffers, YOB: 1950,  participated when called upon by the .    Group Focus: Goal of group was to welcome new and returning IOP members, review group code of conduct, set expectations for participation in DBT IOP, and assess for safety.    About the Group: Clinician reviewed [facility] Group Code of Conduct and answered any questions that arose.  Clinician welcomed new members to the group and facilitated brief introductions of group members to one another.  Topic of the curriculum was “Reality Acceptance Skills”. This group is designed to orient participants to the Distress Tolerance Skills of Radical Acceptance and Turning The Mind.  Facilitator led group in a mindfulness observation practice called “[place] Kindness” to center patients to the start of group.   Diary cards were reviewed following the meditation, with a focus on safety related behaviors such as SI, HI and SIB and symptoms of mood dysregulation such as Sadness, Anxiety and Anger.       DBT Diary Card:  Day of the week  What day of the week are you completing this for?: Sunday (did card in group- did not complete at home)  Emotions (0-5)  Sadness: 3  Anger: 2  Anxiety: 2  Guilt: 1  Shame: 1  Madonna: 2  Other: N/A  Action (Y/N)  Took medication as prescribed: Yes  Suicidal Plans/Intent: No  Self-Harm: No  Substance Use: No  Isolated: No  Other: N/A  Mindfulness Skills  Mindfulness Skills: One-Mindful  Distress Tolerance Skills: Self-Soothe, ACCEPTS    Assessment/observation of group participation/presentation: Stephanie shared having not done her diary card for Friday or Saturday stating she was unawares.  LPC reminded pt she was given this information to do daily mood monitoring before and at the start of care in DBT and how could she problem solve remembering to do so.  She did reflect on her Sunday experience  PAP.  Patient is fasting for lab work.   Pt also has concerns with intermittent sudden diarrhea.         9/10/2024   General Health   How would you rate your overall physical health? Good   Feel stress (tense, anxious, or unable to sleep) Not at all            9/10/2024   Nutrition   Three or more servings of calcium each day? Yes   Diet: Regular (no restrictions)   How many servings of fruit and vegetables per day? (!) 2-3   How many sweetened beverages each day? 0-1            9/10/2024   Exercise   Days per week of moderate/strenous exercise 3 days   Average minutes spent exercising at this level 20 min            9/10/2024   Social Factors   Frequency of gathering with friends or relatives Three times a week   Worry food won't last until get money to buy more No   Food not last or not have enough money for food? No   Do you have housing? (Housing is defined as stable permanent housing and does not include staying ouside in a car, in a tent, in an abandoned building, in an overnight shelter, or couch-surfing.) Yes   Are you worried about losing your housing? No   Lack of transportation? No   Unable to get utilities (heat,electricity)? No            9/10/2024   Fall Risk   Fallen 2 or more times in the past year? No   Trouble with walking or balance? No             9/10/2024   Dental   Dentist two times every year? Yes            9/10/2024   TB Screening   Were you born outside of the [place]? No                  9/10/2024   Substance Use   Alcohol more than 3/day or more than 7/wk No   Do you use any other substances recreationally? No        Social History     Tobacco Use    Smoking status: Light Smoker     Current packs/day: 0.20     Average packs/day: 0.2 packs/day for 21.0 years (4.2 ttl pk-yrs)     Types: Cigarettes    Smokeless tobacco: Never    Tobacco comments:     trying to quit/cut down   Vaping Use    Vaping status: Never Used   Substance Use Topics    Alcohol use: No     Alcohol/week: 0.0 standard drinks  in group, having completed it upon her arrival.     Visit Diagnosis:     ICD-10-CM ICD-9-CM   1. Severe episode of recurrent major depressive disorder, without psychotic features (CMS/HCC)  F33.2 296.33   2. Generalized anxiety disorder  F41.1 300.02   3. Panic disorder  F41.0 300.01       Plan: Continue DBT IOP to adopt skill translation to all relevant contexts.  Pt to F/U with treatment goals as outlined in treatment plan.  Pt to F/U with local ED/call 911 should SI/HI arises.        Ruby Stacy, LPC         "of alcohol    Drug use: No           7/29/2024   LAST FHS-7 RESULTS   1st degree relative breast or ovarian cancer No   Any relative bilateral breast cancer No   Any male have breast cancer No   Any ONE woman have BOTH breast AND ovarian cancer No   Any woman with breast cancer before 50yrs No   2 or more relatives with breast AND/OR ovarian cancer No   2 or more relatives with breast AND/OR bowel cancer No           Mammogram Screening - Mammogram every 1-2 years updated in Health Maintenance based on mutual decision making        9/10/2024   STI Screening   New sexual partner(s) since last STI/HIV test? No        History of abnormal Pap smear: No - age 30- 64 PAP with HPV every 5 years recommended        Latest Ref Rng & Units 7/1/2022     8:03 AM 8/4/2017    12:17 PM 8/4/2017    12:15 PM   PAP / HPV   PAP  Negative for Intraepithelial Lesion or Malignancy (NILM)      PAP (Historical)   NIL     HPV 16 DNA Negative Negative   Negative    HPV 18 DNA Negative Negative   Negative    Other HR HPV Negative Negative   Negative      ASCVD Risk   The 10-year ASCVD risk score (Ralph SANCHEZ, et al., 2019) is: 6.7%    Values used to calculate the score:      Age: 59 years      Sex: Female      Is Non- : No      Diabetic: No      Tobacco smoker: Yes      Systolic Blood Pressure: 128 mmHg      Is BP treated: No      HDL Cholesterol: 66 mg/dL      Total Cholesterol: 203 mg/dL       Reviewed and updated as needed this visit by Provider      Problems                 Review of Systems  Constitutional, neuro, ENT, endocrine, pulmonary, cardiac, gastrointestinal, genitourinary, musculoskeletal, integument and psychiatric systems are negative, except as otherwise noted.     Objective    Exam  /78 (BP Location: Right arm, Patient Position: Chair, Cuff Size: Adult Large)   Pulse 63   Temp 97.1  F (36.2  C) (Tympanic)   Resp 16   Ht 1.588 m (5' 2.5\")   Wt 99.3 kg (219 lb)   LMP  (LMP Unknown)   " "SpO2 98%   BMI 39.42 kg/m     Estimated body mass index is 39.42 kg/m  as calculated from the following:    Height as of this encounter: 1.588 m (5' 2.5\").    Weight as of this encounter: 99.3 kg (219 lb).    Physical Exam  GENERAL: alert and no distress  EYES: Eyes grossly normal to inspection  HENT: ear canals and TM's normal, nose and mouth without ulcers or lesions  NECK: no adenopathy, no asymmetry, masses, or scars  RESP: lungs clear to auscultation - no rales, rhonchi or wheezes  CV: regular rates and rhythm, normal S1 S2, no S3 or S4, and no murmur, click or rub  ABDOMEN: soft, nontender, no hepatosplenomegaly, no masses and bowel sounds normal  MS: no gross musculoskeletal defects noted, no edema  SKIN: no suspicious lesions or rashes  NEURO: Normal strength and tone, mentation intact and speech normal  PSYCH: mentation appears normal, affect normal/bright        Signed Electronically by: Laura Sarah PA-C    "

## 2025-01-20 NOTE — GROUP NOTE
[facility] DBT IOP Homework Review Group  Date:  1/20/2025  Start Time:   9:45 AM  End Time:  10:30 AM  Number of Attendees: 3    Stephanie Jeffers, YOB: 1950,  participated when called upon by the .    Group Focus:  Goal of group was review homework from previous session and reinforce/review skills learned and how patients applied the skills to multiple contexts in their lives    Group Focus: Goal of group was review homework from previous session and reinforce/review skills learned and how patients applied the skills to multiple contexts in their lives        Assessment/observation of group participation/presentation: Stephanie partially completed her homework on the IMPROVE skills and shared how she effectively utilized the skill when a friend slipped on ice.     Visit Diagnosis:     ICD-10-CM ICD-9-CM   1. Severe episode of recurrent major depressive disorder, without psychotic features (CMS/HCC)  F33.2 296.33   2. Generalized anxiety disorder  F41.1 300.02   3. Panic disorder  F41.0 300.01       Plan: Continue DBT IOP to adopt skill translation to all relevant contexts.  Pt to F/U with treatment goals as outlined in treatment plan.  Pt to F/U with local ED/call 911 should SI/HI arises.        Ruby Stacy, LPC

## 2025-01-20 NOTE — GROUP NOTE
[facility] DBT IOP New Skill Acquisition/Wrap Up Group  Date:  1/20/2025  Start Time:  10:30 AM  End Time:  11:15 AM  Number of Attendees: 3    Stephanie Jeffers, YOB: 1950,  was an active group participant.    Group Focus: Goal of group was introduce skill of the day and confirm homework assignment due for next group.  About the Group:  The topic of the day was introduced, guided by Distress Tolerance Handouts 10, 11, 11A, 11B and 12 from [employer]  “DBT Skills Training - Handouts and Worksheets” Second Edition.  Facilitator educated the group on the skills included in the Distress Tolerance Module of DBT and anchored this group on Radical Acceptance and Turning The Mind.  Discussion was facilitated on the Radical Acceptance, taken from Distress Tolerance Handouts 11 and 11A from the aforementioned text.  Facilitator followed up by having group members identify areas of their life they are struggling to accept and applying Distress Tolerance Handout 11B to practice Radical Acceptance Step By Step.  Facilitator introduced the skill Turning the Mind, Distress Tolerance Handout 12.  Group members worked with Facilitator to apply the skill to areas they identified as a struggle to accept the reality.  Group concluded with a review of Homework and Diary Card expectations while enrolled in DBT IOP at [facility].  Group members were oriented to the assigned homework for the following group and opportunities to have questions answered was provided.      Assessment/observation of group participation/presentation: Stephanie was engaged in adopting skills of reality acceptance.  She had no questions on the homework and was reminded of expectations on mood monitoring and homework completion.    Visit Diagnosis:     ICD-10-CM ICD-9-CM   1. Severe episode of recurrent major depressive disorder, without psychotic features (CMS/HCC)  F33.2 296.33   2. Generalized anxiety disorder  F41.1 300.02   3. Panic disorder  F41.0  300.01       Plan: Continue DBT IOP to adopt skill translation to all relevant contexts.  Pt to F/U with treatment goals as outlined in treatment plan.  Pt to F/U with local ED/call 911 should SI/HI arises.        Ruby Stacy, LPC

## 2025-01-22 ENCOUNTER — PRE-ADMISSION TESTING (OUTPATIENT)
Dept: PREADMISSION TESTING | Age: 75
End: 2025-01-22
Payer: MEDICARE

## 2025-01-22 ENCOUNTER — IOP (OUTPATIENT)
Dept: PSYCHIATRY | Facility: HOSPITAL | Age: 75
End: 2025-01-22
Attending: SOCIAL WORKER
Payer: MEDICARE

## 2025-01-22 DIAGNOSIS — F33.2 SEVERE EPISODE OF RECURRENT MAJOR DEPRESSIVE DISORDER, WITHOUT PSYCHOTIC FEATURES (CMS/HCC): Primary | ICD-10-CM

## 2025-01-22 DIAGNOSIS — F41.0 PANIC DISORDER: ICD-10-CM

## 2025-01-22 DIAGNOSIS — F41.1 GENERALIZED ANXIETY DISORDER: ICD-10-CM

## 2025-01-22 PROCEDURE — G0410 GRP PSYCH PARTIAL HOSP 45-50: HCPCS | Performed by: COUNSELOR

## 2025-01-22 NOTE — GROUP NOTE
[facility] DBT IOP Check In Group  Date:  1/22/2025  Start Time:   9:00 AM  End Time:  10:00 AM  Number of Attendees: 6    Stephanie Jeffers, YOB: 1950,  was a passive group participant.    Group Focus: Goal of group was to welcome new and returning IOP members, review group code of conduct, set expectations for participation in DBT IOP, and assess for safety.    About the Group: Clinician reviewed [facility] Group Code of Conduct and answered any questions that arose.  Clinician welcomed new members to the group and facilitated brief introductions of group members to one another.  Topic of the curriculum was “Willingness, Half Smile, Willing Hands and Mindfulness of Current Thoughts”. This group is designed to orient participants to the Distress Tolerance Skills of Willingness, Half Smile, Willing Hands and Mindfulness of Current Thoughts.  Facilitator led group in a mindfulness observation practice of participating in writing the alphabet backwards with their non dominant hand and observing with non-attachment on emotions, thoughts and urges that arose.  Group members were encouraged to feel and not act on these experiences and effectively participate in the exercise.    Diary cards were reviewed following the mindfulness exercise, with a focus on safety related behaviors such as SI, HI and SIB and symptoms of mood dysregulation such as Sadness, Anxiety and Anger.      DBT Diary Card:  Day of the week  What day of the week are you completing this for?: Tuesday  Emotions (0-5)  Sadness: 2  Anger: 2  Anxiety: 2  Guilt: 1  Shame: 1  Madonna: 1  Other: N/A  Action (Y/N)  Took medication as prescribed: Yes  Suicidal Plans/Intent: No  Self-Harm: No  Substance Use: No  Isolated: Yes  Other: N/A  Mindfulness Skills  Mindfulness Skills: Observe, Describe  Distress Tolerance Skills: STOP, Radical Acceptance, Willingness  Emotion Regulation Skills: Opposite Action  Interpersonal Effectiveness Skills: Modulate  "Intensity    Assessment/observation of group participation/presentation: Stephanie struggled to complete mindfulness practice of writing the alphabet backwards, sharing thoughts of thinking it was a \"dementia test\" and beign stopped by negative self talk.  She shared her diary card, having tracked her mood the prior days as requested.     Visit Diagnosis:     ICD-10-CM ICD-9-CM   1. Severe episode of recurrent major depressive disorder, without psychotic features (CMS/HCC)  F33.2 296.33   2. Generalized anxiety disorder  F41.1 300.02   3. Panic disorder  F41.0 300.01       Plan: Continue DBT IOP to adopt skill translation to all relevant contexts.  Pt to F/U with treatment goals as outlined in treatment plan.  Pt to F/U with local ED/call 911 should SI/HI arises.        Ruby Stacy, LPC        "

## 2025-01-22 NOTE — GROUP NOTE
[facility] DBT IOP Homework Review Group  Date:  1/22/2025  Start Time:  10:00 AM  End Time:  10:45 AM  Number of Attendees: 6    Stephanie Jeffers, YOB: 1950,  was quiet but attentive.    Group Focus:  Goal of group was review homework from previous session and reinforce/review skills learned and how patients applied the skills to multiple contexts in their lives    Group Focus: Goal of group was review homework from previous session and reinforce/review skills learned and how patients applied the skills to multiple contexts in their lives        Assessment/observation of group participation/presentation: Stephanie was quiet, she endorsed completing her homework and followed discussions on a peer sharing their work in group.     Visit Diagnosis:     ICD-10-CM ICD-9-CM   1. Severe episode of recurrent major depressive disorder, without psychotic features (CMS/HCC)  F33.2 296.33   2. Generalized anxiety disorder  F41.1 300.02   3. Panic disorder  F41.0 300.01       Plan: Continue DBT IOP to adopt skill translation to all relevant contexts.  Pt to F/U with treatment goals as outlined in treatment plan.  Pt to F/U with local ED/call 911 should SI/HI arises.        Ruby Stacy, LPC

## 2025-01-22 NOTE — GROUP NOTE
[facility] DBT IOP New Skill Acquisition/Wrap Up Group  Date:  1/22/2025  Start Time:  10:45 AM  End Time:  11:35 AM  Number of Attendees: 6    Stephanie Jeffers, YOB: 1950,  was an active group participant.    Group Focus: Goal of group was introduce skill of the day and confirm homework assignment due for next group.  About the Group:  The topic of the day was introduced, guided by Distress Tolerance Handouts 13, 14, 14A, 15 and 15A from [employer]  “DBT Skills Training - Handouts and Worksheets” Second Edition.  Facilitator educated the group on the skills included in the Distress Tolerance Module of DBT and anchored this group on Willingness, Half Smile, Willing Hands and Mindfulness of Current Thoughts.  Discussion was facilitated on willingness and the barriers participants may face with willfulness.  Facilitator followed up by engaging group members in skill demonstrations of Half Smile and Willing Hands in order to accept reality with their bodies.   Group reviewed handouts on Mindfulness of Current Thoughts and did a step-by-step practice of the skills using mindfulness skills of either observe or describe.   Group concluded with a review of Homework and Diary Card expectations while enrolled in DBT IOP at [facility].  Group members were oriented to the assigned homework for the following group and opportunities to have questions answered was provided.      Assessment/observation of group participation/presentation: Stephanie was engaged and attentive learning willingness and mindfulness of thoughts.  She asked topical questions on willfulness. She had no questions on the homework.     Visit Diagnosis:     ICD-10-CM ICD-9-CM   1. Severe episode of recurrent major depressive disorder, without psychotic features (CMS/[facility])  F33.2 296.33   2. Generalized anxiety disorder  F41.1 300.02   3. Panic disorder  F41.0 300.01       Plan: Continue DBT IOP to adopt skill translation to all relevant  contexts.  Pt to F/U with treatment goals as outlined in treatment plan.  Pt to F/U with local ED/call 911 should SI/HI arises.        Ruby Stacy, LPC

## 2025-01-23 ENCOUNTER — PRE-ADMISSION TESTING (OUTPATIENT)
Dept: PREADMISSION TESTING | Age: 75
End: 2025-01-23
Payer: MEDICARE

## 2025-01-23 ENCOUNTER — ANESTHESIA EVENT (OUTPATIENT)
Dept: OPERATING ROOM | Facility: HOSPITAL | Age: 75
Setting detail: HOSPITAL OUTPATIENT SURGERY
End: 2025-01-23
Payer: MEDICARE

## 2025-01-23 VITALS — BODY MASS INDEX: 19.14 KG/M2 | WEIGHT: 108 LBS | HEIGHT: 63 IN

## 2025-01-23 RX ORDER — LOTILANER OPHTHALMIC SOLUTION 2.5 MG/ML
1 SOLUTION/ DROPS OPHTHALMIC 2 TIMES DAILY
COMMUNITY
Start: 2025-01-20

## 2025-01-23 ASSESSMENT — PAIN SCALES - GENERAL: PAINLEVEL_OUTOF10: 0-NO PAIN

## 2025-01-23 NOTE — PRE-PROCEDURE INSTRUCTIONS
[facility]  [address] [place] [address]    1.       Admissions will call you with your arrival time on  January 23, 2025 (day prior to surgery) between 2pm-4pm.  For questions about your arrival time, please call [phone].    2.       On the day of your procedure please report to the [facility] in the [place]. Please arrive through the [place] [facility].  If you are parking in the [facility]age, come to the ground floor of the garage and follow signs to the [facility].  After being screened, please report to either the Outpatient Registration for Pre-Admission Testing or to the Surgery Registration Desk.    3. Please follow the following fasting guidelines:     No solid food EIGHT HOURS prior to arrival time on day of surgery.  6 ounces of clear liquids, meaning water or PLAIN black coffee WITHOUT any milk, cream, sugar, or sweetener are permitted up to TWO HOURS prior to arrival at the hospital.    4. Please take ONLY the following medications with a sip of water on the morning of your procedure: (populate names and/or NONE) Abilify, Lexapro, Linzess.    No NSAIDS, Aspirin, Advil, Aleve, Motrin, Ibuprofen, Herbal Supplements or Vitamins until after surgery. Tylenol is ok to take.      5. Other Instructions: You may brush your teeth the morning of the procedure. Rinse and spit, do not swallow.  Bring a list of your medications with dosages.  Use surgical wash as directed.     6. If you develop a cold, cough, fever, rash, or other symptom prior to the day of the procedure, please report it to your physician immediately.    7. If you need to cancel the procedure for any reason, please contact your physician.    8. Make arrangements to have safe transportation home accompanied by a responsible adult. If you have not arranged safe transportation home, your surgery will be cancelled. Safe transportation may include private vehicle, ride-share service, taxi and  public transportation when accompanied by a responsible adult who will assist you home. A responsible adult is someone known to you and does not include the taxi, ride-share or public transit drive transporting you.    9.  If it is medically necessary for you to have a longer stay, you will be informed as soon as the decision is made.    10. Only bring essential items to the hospital.  Do not wear or bring anything of value to the hospital including jewelry of any kind, money, or wallet. Do not wear make-up or contact lenses.  DO NOT BRING MEDICATIONS FROM HOME unless instructed to do so. DO bring your hearing aids, glasses, and a case    11. No lotion, creams, powders, or oils on skin the morning of procedure     12. Dress in comfortable clothes.    13.  If instructed, please bring a copy of your Advanced Directive (Living Will/Durable Power of ) on the day of your procedure.     14. Ensuring your safety at all times is a very important part of our [facility] Culture of Safety. After having surgery and sedation, you are at risk for falling and balance issues. Although you may feel awake, the effects of the medication can last up to 24 hours after anesthesia. If you need to use the bathroom during your recovery period, nursing staff will escort you there and stay with you to ensure your safety.    15. Refrain from drinking alcohol and smoking cigarettes for 24 hours prior to surgery.    16. Shower with antibacterial soap (Dial) the night before and morning of your procedure.  If your procedure indicates the need for CHG antiseptic wash (Bactoshield or Hibiclens), please use this instead and follow instructions as discussed at the time of your Pre-Admission Testing phone interview or visit.    Above instructions reviewed with patient and patient acknowledges understanding.    Form explained by: Birgit Avelar RN

## 2025-01-24 ENCOUNTER — ANESTHESIA (OUTPATIENT)
Dept: OPERATING ROOM | Facility: HOSPITAL | Age: 75
Setting detail: HOSPITAL OUTPATIENT SURGERY
End: 2025-01-24
Payer: MEDICARE

## 2025-01-24 ENCOUNTER — HOSPITAL ENCOUNTER (OUTPATIENT)
Facility: HOSPITAL | Age: 75
Setting detail: HOSPITAL OUTPATIENT SURGERY
Discharge: HOME | End: 2025-01-24
Attending: OBSTETRICS & GYNECOLOGY | Admitting: OBSTETRICS & GYNECOLOGY
Payer: MEDICARE

## 2025-01-24 VITALS
OXYGEN SATURATION: 100 % | DIASTOLIC BLOOD PRESSURE: 53 MMHG | RESPIRATION RATE: 16 BRPM | TEMPERATURE: 97.6 F | SYSTOLIC BLOOD PRESSURE: 117 MMHG | HEART RATE: 66 BPM

## 2025-01-24 DIAGNOSIS — R87.612 PAP SMEAR ABNORMALITY OF CERVIX WITH LGSIL: ICD-10-CM

## 2025-01-24 LAB
ABO + RH BLD: NORMAL
D AG BLD QL: POSITIVE

## 2025-01-24 PROCEDURE — 63600000 HC DRUGS/DETAIL CODE: Performed by: NURSE ANESTHETIST, CERTIFIED REGISTERED

## 2025-01-24 PROCEDURE — 36415 COLL VENOUS BLD VENIPUNCTURE: CPT | Performed by: OBSTETRICS & GYNECOLOGY

## 2025-01-24 PROCEDURE — 27200000 HC STERILE SUPPLY: Performed by: OBSTETRICS & GYNECOLOGY

## 2025-01-24 PROCEDURE — 25800000 HC PHARMACY IV SOLUTIONS: Performed by: NURSE ANESTHETIST, CERTIFIED REGISTERED

## 2025-01-24 PROCEDURE — 71000001 HC PACU PHASE 1 INITIAL 30MIN: Performed by: OBSTETRICS & GYNECOLOGY

## 2025-01-24 PROCEDURE — 71000002 HC PACU PHASE 2 INITIAL 30MIN: Performed by: OBSTETRICS & GYNECOLOGY

## 2025-01-24 PROCEDURE — 88360 TUMOR IMMUNOHISTOCHEM/MANUAL: CPT | Performed by: OBSTETRICS & GYNECOLOGY

## 2025-01-24 PROCEDURE — 36000002 HC OR LEVEL 2 INITIAL 30MIN: Performed by: OBSTETRICS & GYNECOLOGY

## 2025-01-24 PROCEDURE — 88305 TISSUE EXAM BY PATHOLOGIST: CPT | Performed by: OBSTETRICS & GYNECOLOGY

## 2025-01-24 PROCEDURE — 37000002 HC ANESTHESIA MAC: Performed by: OBSTETRICS & GYNECOLOGY

## 2025-01-24 PROCEDURE — 25000000 HC PHARMACY GENERAL: Performed by: NURSE ANESTHETIST, CERTIFIED REGISTERED

## 2025-01-24 PROCEDURE — 57522 CONIZATION OF CERVIX: CPT | Performed by: OBSTETRICS & GYNECOLOGY

## 2025-01-24 PROCEDURE — 71000012 HC PACU PHASE 2 EA ADDL MIN: Performed by: OBSTETRICS & GYNECOLOGY

## 2025-01-24 PROCEDURE — 0UBC7ZX EXCISION OF CERVIX, VIA NATURAL OR ARTIFICIAL OPENING, DIAGNOSTIC: ICD-10-PCS | Performed by: OBSTETRICS & GYNECOLOGY

## 2025-01-24 PROCEDURE — 25000000 HC PHARMACY GENERAL: Performed by: OBSTETRICS & GYNECOLOGY

## 2025-01-24 PROCEDURE — 36000012 HC OR LEVEL 2 EA ADDL MIN: Performed by: OBSTETRICS & GYNECOLOGY

## 2025-01-24 PROCEDURE — 63600000 HC DRUGS/DETAIL CODE: Mod: JZ | Performed by: NURSE ANESTHETIST, CERTIFIED REGISTERED

## 2025-01-24 PROCEDURE — 71000011 HC PACU PHASE 1 EA ADDL MIN: Performed by: OBSTETRICS & GYNECOLOGY

## 2025-01-24 RX ORDER — SODIUM CHLORIDE 9 MG/ML
INJECTION, SOLUTION INTRAVENOUS CONTINUOUS PRN
Status: DISCONTINUED | OUTPATIENT
Start: 2025-01-24 | End: 2025-01-24 | Stop reason: SURG

## 2025-01-24 RX ORDER — KETOROLAC TROMETHAMINE 15 MG/ML
INJECTION, SOLUTION INTRAMUSCULAR; INTRAVENOUS AS NEEDED
Status: DISCONTINUED | OUTPATIENT
Start: 2025-01-24 | End: 2025-01-24 | Stop reason: SURG

## 2025-01-24 RX ORDER — ACETAMINOPHEN 325 MG/1
650 TABLET ORAL EVERY 4 HOURS PRN
Status: CANCELLED | OUTPATIENT
Start: 2025-01-24

## 2025-01-24 RX ORDER — IBUPROFEN 600 MG/1
600 TABLET ORAL EVERY 6 HOURS PRN
Qty: 120 TABLET | Refills: 0 | Status: SHIPPED | OUTPATIENT
Start: 2025-01-24 | End: 2025-03-17

## 2025-01-24 RX ORDER — MIDAZOLAM HYDROCHLORIDE 2 MG/2ML
INJECTION, SOLUTION INTRAMUSCULAR; INTRAVENOUS AS NEEDED
Status: DISCONTINUED | OUTPATIENT
Start: 2025-01-24 | End: 2025-01-24 | Stop reason: SURG

## 2025-01-24 RX ORDER — DEXTROSE 40 %
15-30 GEL (GRAM) ORAL AS NEEDED
Status: CANCELLED | OUTPATIENT
Start: 2025-01-24

## 2025-01-24 RX ORDER — OXYCODONE HYDROCHLORIDE 5 MG/1
5 TABLET ORAL EVERY 4 HOURS PRN
Status: CANCELLED | OUTPATIENT
Start: 2025-01-24

## 2025-01-24 RX ORDER — IBUPROFEN 200 MG
16-32 TABLET ORAL AS NEEDED
Status: CANCELLED | OUTPATIENT
Start: 2025-01-24

## 2025-01-24 RX ORDER — DEXTROSE 50 % IN WATER (D50W) INTRAVENOUS SYRINGE
25 AS NEEDED
Status: CANCELLED | OUTPATIENT
Start: 2025-01-24

## 2025-01-24 RX ORDER — PROPOFOL 10 MG/ML
INJECTION, EMULSION INTRAVENOUS CONTINUOUS PRN
Status: DISCONTINUED | OUTPATIENT
Start: 2025-01-24 | End: 2025-01-24 | Stop reason: SURG

## 2025-01-24 RX ORDER — ONDANSETRON HYDROCHLORIDE 2 MG/ML
INJECTION, SOLUTION INTRAVENOUS AS NEEDED
Status: DISCONTINUED | OUTPATIENT
Start: 2025-01-24 | End: 2025-01-24 | Stop reason: SURG

## 2025-01-24 RX ORDER — DOCUSATE SODIUM 100 MG/1
100 CAPSULE, LIQUID FILLED ORAL 2 TIMES DAILY
Qty: 60 CAPSULE | Refills: 0 | Status: SHIPPED | OUTPATIENT
Start: 2025-01-24 | End: 2025-02-13

## 2025-01-24 RX ORDER — DEXMEDETOMIDINE HYDROCHLORIDE 4 UG/ML
INJECTION, SOLUTION INTRAVENOUS AS NEEDED
Status: DISCONTINUED | OUTPATIENT
Start: 2025-01-24 | End: 2025-01-24 | Stop reason: SURG

## 2025-01-24 RX ORDER — FENTANYL CITRATE 50 UG/ML
INJECTION, SOLUTION INTRAMUSCULAR; INTRAVENOUS AS NEEDED
Status: DISCONTINUED | OUTPATIENT
Start: 2025-01-24 | End: 2025-01-24 | Stop reason: SURG

## 2025-01-24 RX ORDER — SODIUM CHLORIDE, SODIUM LACTATE, POTASSIUM CHLORIDE, CALCIUM CHLORIDE 600; 310; 30; 20 MG/100ML; MG/100ML; MG/100ML; MG/100ML
INJECTION, SOLUTION INTRAVENOUS CONTINUOUS
Status: DISCONTINUED | OUTPATIENT
Start: 2025-01-24 | End: 2025-01-24 | Stop reason: HOSPADM

## 2025-01-24 RX ORDER — EPHEDRINE SULFATE 50 MG/ML
INJECTION, SOLUTION INTRAVENOUS AS NEEDED
Status: DISCONTINUED | OUTPATIENT
Start: 2025-01-24 | End: 2025-01-24 | Stop reason: SURG

## 2025-01-24 RX ORDER — ACETAMINOPHEN 500 MG
1000 TABLET ORAL EVERY 6 HOURS PRN
Qty: 240 TABLET | Refills: 0 | Status: SHIPPED | OUTPATIENT
Start: 2025-01-24 | End: 2025-02-13

## 2025-01-24 RX ORDER — LIDOCAINE HYDROCHLORIDE 10 MG/ML
INJECTION, SOLUTION EPIDURAL; INFILTRATION; INTRACAUDAL; PERINEURAL AS NEEDED
Status: DISCONTINUED | OUTPATIENT
Start: 2025-01-24 | End: 2025-01-24 | Stop reason: SURG

## 2025-01-24 RX ORDER — LIDOCAINE HYDROCHLORIDE AND EPINEPHRINE 10; 10 UG/ML; MG/ML
INJECTION, SOLUTION INFILTRATION; PERINEURAL
Status: DISCONTINUED | OUTPATIENT
Start: 2025-01-24 | End: 2025-01-24 | Stop reason: HOSPADM

## 2025-01-24 RX ORDER — ONDANSETRON 4 MG/1
4 TABLET, ORALLY DISINTEGRATING ORAL EVERY 8 HOURS PRN
Status: CANCELLED | OUTPATIENT
Start: 2025-01-24

## 2025-01-24 RX ORDER — ESTRADIOL 0.1 MG/G
2 CREAM VAGINAL 2 TIMES WEEKLY
Qty: 16 G | Refills: 0 | Status: SHIPPED | OUTPATIENT
Start: 2025-01-27 | End: 2025-02-26

## 2025-01-24 RX ORDER — PHENYLEPHRINE HCL IN 0.9% NACL 1 MG/10 ML
SYRINGE (ML) INTRAVENOUS AS NEEDED
Status: DISCONTINUED | OUTPATIENT
Start: 2025-01-24 | End: 2025-01-24 | Stop reason: SURG

## 2025-01-24 RX ADMIN — LIDOCAINE HYDROCHLORIDE 3 ML: 10 INJECTION, SOLUTION EPIDURAL; INFILTRATION; INTRACAUDAL; PERINEURAL at 14:11

## 2025-01-24 RX ADMIN — MIDAZOLAM HYDROCHLORIDE 2 MG: 1 INJECTION, SOLUTION INTRAMUSCULAR; INTRAVENOUS at 14:03

## 2025-01-24 RX ADMIN — FENTANYL CITRATE 25 MCG: 50 INJECTION INTRAMUSCULAR; INTRAVENOUS at 14:04

## 2025-01-24 RX ADMIN — FENTANYL CITRATE 25 MCG: 50 INJECTION INTRAMUSCULAR; INTRAVENOUS at 14:11

## 2025-01-24 RX ADMIN — DEXMEDETOMIDINE HYDROCHLORIDE 12 MCG: 4 INJECTION, SOLUTION INTRAVENOUS at 14:08

## 2025-01-24 RX ADMIN — Medication 50 MCG: at 14:29

## 2025-01-24 RX ADMIN — KETOROLAC TROMETHAMINE 15 MG: 15 INJECTION, SOLUTION INTRAMUSCULAR; INTRAVENOUS at 14:46

## 2025-01-24 RX ADMIN — ONDANSETRON 4 MG: 2 INJECTION INTRAMUSCULAR; INTRAVENOUS at 14:27

## 2025-01-24 RX ADMIN — SODIUM CHLORIDE: 0.9 INJECTION, SOLUTION INTRAVENOUS at 14:05

## 2025-01-24 RX ADMIN — EPHEDRINE SULFATE 10 MG: 50 INJECTION, SOLUTION INTRAVENOUS at 14:33

## 2025-01-24 RX ADMIN — FENTANYL CITRATE 25 MCG: 50 INJECTION INTRAMUSCULAR; INTRAVENOUS at 14:07

## 2025-01-24 RX ADMIN — Medication 100 MCG: at 14:19

## 2025-01-24 RX ADMIN — PROPOFOL 150 MCG/KG/MIN: 10 INJECTION, EMULSION INTRAVENOUS at 14:11

## 2025-01-24 ASSESSMENT — ENCOUNTER SYMPTOMS
HEADACHES: 1
DEPRESSION: 1

## 2025-01-24 NOTE — ANESTHESIA POSTPROCEDURE EVALUATION
Patient: Stephanie Jeffers    Procedure Summary       Date: 01/24/25 Room / Location: [facility] P[facility] OR 09 / [facility] OR P[facility]    Anesthesia Start: 1407 Anesthesia Stop: 1502    Procedure: LEEP and ECC (Cervix) Diagnosis:       Pap smear abnormality of cervix with LGSIL      (LSIL POSITIVE R87.612)    Surgeons: Ashley Aguilar MD Responsible Provider: Nadine Verduzco MD    Anesthesia Type: MAC ASA Status: 2            Anesthesia Type: MAC  PACU Vitals  1/24/2025 1451 - 1/24/2025 1508        1/24/2025  1503             BP: 93/54    Temp: 35.7 °C (96.2 °F)    Pulse: 75              Anesthesia Post Evaluation    Pain management: adequate  Patient participation: complete - patient participated  Level of consciousness: awake and alert  Cardiovascular status: acceptable  Airway Patency: adequate  Respiratory status: acceptable  Hydration status: acceptable  Anesthetic complications: no

## 2025-01-24 NOTE — ANESTHESIOLOGIST PRE-PROCEDURE ATTESTATION
Pre-Procedure Patient Identification:  I am the Primary Anesthesiologist and have identified the patient on 01/24/25 at 1:50 PM.   I have confirmed the procedure(s) will be performed by the following surgeon/proceduralist Ashley Aguilar MD.

## 2025-01-24 NOTE — ANESTHESIA POSTPROCEDURE EVALUATION
Patient: Stephanie Jeffers    Procedure Summary       Date: 01/24/25 Room / Location: [facility] PAV OR 09 / [facility] OR P[facility]    Anesthesia Start: 1407 Anesthesia Stop:     Procedure: LEEP and ECC (Cervix) Diagnosis:       Pap smear abnormality of cervix with LGSIL      (LSIL POSITIVE R87.612)    Surgeons: Ashley Aguilar MD Responsible Provider: Nadine Verduzco MD    Anesthesia Type: MAC ASA Status: 2            Anesthesia Type: MAC  PACU Vitals    No data found in the last 10 encounters.           Anesthesia Post Evaluation    Pain management: adequate  Patient participation: complete - patient participated  Level of consciousness: awake and alert  Cardiovascular status: acceptable  Airway Patency: adequate  Respiratory status: acceptable  Hydration status: acceptable  Anesthetic complications: no

## 2025-01-24 NOTE — DISCHARGE INSTRUCTIONS
LEEP or [facility] Biopsy  Discharge Instructions    What to Expect    It is normal to experience some staining/spotting after this procedure.  Sometimes the discharge may resemble brown/black debris.  This is normal.    You may experience some cramping after the procedure but this usually does not last for more than a few days.  You may take Tylenol or Ibuprofen (Motrin, Advil) as directed for pain.      Activity      Please do not get in a swimming pool, hot tub or tub bath for at least 2 weeks after your procedure.      Please delay sexual intercourse and use of tampons until your first visit following the procedure.  This is at least 2 weeks from the time of procedure.    You may resume activities such as work, light housework, grocery shopping 1-2 days after your procedure. Please delay any strenuous activities such as heavy exercise and housework for an additional 1-2 days.    PLEASE CALL THE OFFICE -[phone] SCHEDULE A FOLLOW-UP EXAM IN 2 WEEKS FROM TIME OF PROCEDURE.    PLEASE CALL THE OFFICE IF YOU EXPERIENCE ANY OF THE FOLLOWING:  - Heavy bleeding.  This is if you are using about 1 pad per hour.  - Fever greater than 100.4  - Foul smelling vaginal discharge   - Worsening pain or any other concerns

## 2025-01-24 NOTE — OP NOTE
Procedure Note    Name: Stephanie Jeffers   MRN [id number]   YOB: 1950       Date of Procedure: 1/24/2025       Primary Surgeon: Ashley Aguilar MD.       Assistant Surgeon: None      Anesthesia: MAC.       Procedure: LEEP and ECC      Preoperative diagnoses: LSIL / + HRHPV      Postoperative diagnosis: Same      Position of patient: Dorsal Lithotomy.       Preparation of patient: Betadine Vaginal Prep      Estimated blood loss: 10cc      Fluid Deficit: n/a      Drains: Straight catheterization prior to procedure.       Specimen to lab: LEEP Cervical Biopsy tagged at 12:00, ECC      Procedure Note:     Stephanie Jeffers is a 74 y.o. female who has a history of a ASCUS / +HR HPV pap in 2023 and then a LSIL / +HR HPV pap in 2024. Offered her a colposcopy as follow  in office but she has had a horrible experience with a colposcopy in the past. She elected to proceed with LEEP under anesthesia instead. Procedure reviewed. Risks/benefits/alternatives discussed. All questions answered to best of my ability. Written consent obtained.      The patient presented to [facility] on 01/24/25. The patient and surgeon were mutually identified. Consents were re-confirmed.    The patient was taken back to the operating room where she was placed under MAC anesthesia.  She was placed in the dorsal lithotomy position. She was prepped and draped in the usual sterile fashion.  Hard timeout was performed. The bladder was straight catheterized prior to procedure.     A coated speculum was placed in the vagina and suction evacuation attached. The cervix was elevated with the coated tenaculum. A paracervical block was performed with 5cc of 1% lidocaine with epinephrine injected at 4:00 and 5cc of 1% lidocaine with epinephrine injected at 8:00. Given the dimensions of the cervix, decision was made to use a yellow loop electrode. The loop was then activated and a LEEP biopsy was performed from the bottom to top of the  cervix. The anterior aspect of the biopsy was grasped with a Leigh clamp and removed from the patient. The specimen was tagged at 12:00 and sent to pathology for final review. Roller ball cautery was used to creat hemostasis in the LEEP bed. The cervical canal was stenotic. I was able to dilate it with a dilator to be able to obtain the ECC. A Kevorkian Curette was used to obtain an ECC above or LEEP biopsy site. This was passed off and sent to pathology for final review. There was some oozing of the LEEP bed and hemostasis was achieved with further roller ball cautery.     The coated tenaculum was removed and tenaculum sites were hemostatic. At the endo of the procedure all sponges, needles, and instruments were removed from the cervix and vagina. Counts were noted to be correct.  The patient tolerated the procedure well.  She was awake in the operating room before being transferred recovery in stable condition.        Ashley Aguilar MD

## 2025-01-24 NOTE — OR SURGEON
Pre-Procedure patient identification:  I am the primary operating surgeon/proceduralist and I have reviewed the applicable pathology reports and radiology studies for this procedure. I have identified the patient on 01/24/25 at 1:54 PM Ashley Aguilar MD  Phone Number: [phone]

## 2025-01-24 NOTE — ANESTHESIA PREPROCEDURE EVALUATION
Relevant Problems   NEUROLOGY   (+) Depression   (+) Generalized anxiety disorder   (+) Severe episode of recurrent major depressive disorder, without psychotic features (CMS/HCC)      Other   (+) Recurrent UTI       Anesthesia ROS/MED HX    Anesthesia History    Previous anesthetics  No family history of anesthetic complications  No history of anesthetic complications  Neuro/Psych    Headaches   Depression   Anxiety  Cardiovascular   dyslipidemia   ECG reviewed  Hematological no anemia  GI/Hepatic  No GERD  Renal Disease  No end-stage renal disease  Endo/Other  No diabetes       Patient Active Problem List   Diagnosis    Age-related cataract of both eyes    Generalized anxiety disorder    Migraine    Orthostatic hypotension    Osteopenia    Severe episode of recurrent major depressive disorder, without psychotic features (CMS/HCC)    Urinary frequency    Familial hypercholesterolemia    Elevated lipoprotein(a)    Elevated coronary artery calcium score    Acute left-sided low back pain without sciatica    Recurrent UTI    IPMN (intraductal papillary mucinous neoplasm)    General medical exam    Mood disorder (CMS/HCC)    Underweight    Insomnia    Dizziness    Poor appetite    Depression    Heart rate fast    Age-related osteoporosis without current pathological fracture       Past Medical History:   Diagnosis Date    Abnormal Pap smear of cervix     Anxiety     COVID 2023    COVID-19 vaccine series started     Degenerative arthritis     back    Depression     Dry eyes     Herpes     Hypotension     IPMN (intraductal papillary mucinous neoplasm) 05/31/2022    Kidney stones     Lipid disorder     Migraines     Osteopenia     Screening for breast cancer 03/04/2024    Birads 2 - Benign ([facility])    Screening for cervical cancer 08/18/2023    ASCUS/ HPV+       Past Surgical History   Procedure Laterality Date    Cataract extraction Left 11/19/2018    Cataract extraction Right 01/16/2019    Cosmetic  surgery      Face    Lumbar epidural injection         Current Facility-Administered Medications   Medication Dose Route Frequency    lactated ringer's   intravenous Continuous       Prior to Admission medications    Medication Sig Start Date End Date Taking? Authorizing Provider   ARIPiprazole (ABILIFY) 2 mg tablet Take 1 tablet (2 mg total) by mouth daily. 11/25/24 2/23/25 Yes Paresh Saleem DO   clonazePAM (KlonoPIN) 0.5 mg tablet Take 1.5 tablets (0.75 mg total) by mouth nightly. 1/23/25 2/22/25 Yes Paresh Saleem DO   cycloSPORINE (RESTASIS) 0.05 % ophthalmic emulsion Administer 1 drop into both eyes 2 (two) times a day. 12/29/24  Yes Demetrice Tapia MD   escitalopram (LEXAPRO) 10 mg tablet Take 1 tablet (10 mg total) by mouth daily. 1/13/25 4/13/25 Yes Paresh Saleem DO   linaCLOtide (LINZESS) 290 mcg capsule Take 290 mcg by mouth daily before breakfast.   Yes ProviderDemetrice MD   XDEMVY 0.25 % drops Administer 1 drop into both eyes 2 (two) times a day. 1/20/25  Yes Demetrice Tapia MD   estradioL (ESTRACE) 0.01 % (0.1 mg/gram) vaginal cream Insert 2 g into the vagina 2 (two) times a week (Mon, Thu).    ProviderDemetrice MD   evolocumab (REPATHA SURECLICK) 140 mg/mL pen Inject 1 mL (140 mg total) under the skin every 14 (fourteen) days. 1/15/25   Marian Jones CRNP   SUMAtriptan (IMITREX) 100 mg tablet Take 100 mg by mouth daily as needed for migraine. Only 1 dose in 24 hours    Jigar Tapia MD   valACYclovir (VALTREX) 500 mg tablet Take 500 mg by mouth as needed.    ProviderDemetrice MD       CBC Results         01/07/25 08/04/24 12/21/23     1331 1127 1306    WBC 7.63 7.19 6.90    RBC 5.18 4.82 5.03    HGB 13.7 13.2 13.6    HCT 44.6 41.0 43.5    MCV 86.1 85.1 86.5    MCH 26.4 27.4 27.0    MCHC 30.7 32.2 31.3     232 242            BMP Results         01/07/25 12/30/24 09/11/24     1331 0854 1021     140 139    K 4.3 4.8 4.3    Cl  "102 103 104    CO2 27 28 30    Glucose 96 104 97    BUN 23 20 17    Creatinine 0.8 0.9 0.7    Calcium 9.5 9.9 9.5    Anion Gap 8 9 5    EGFR >60.0 >60.0 >60.0           Comment for EGFR at 1331 on 01/07/25: Calculation based on the Chronic Kidney Disease Epidemiology Collaboration (CKD-EPI) equation refit without adjustment for race.    Comment for EGFR at 0854 on 12/30/24: Calculation based on the Chronic Kidney Disease Epidemiology Collaboration (CKD-EPI) equation refit without adjustment for race.    Comment for EGFR at 1021 on 09/11/24: Calculation based on the Chronic Kidney Disease Epidemiology Collaboration (CKD-EPI) equation refit without adjustment for race.            No results found for: \"HCGPREGUR\", \"PREGSERUM\", \"HCG\", \"HCGQUANT\"          No orders to display      Physical Exam    Airway   Mallampati: II   TM distance: >3 FB   Neck ROM: full  Cardiovascular - normal   Rhythm: regular   Rate: normalPulmonary - normal   clear to auscultation  Dental - normal        Anesthesia Plan    Plan: MAC    Technique: MAC     Lines and Monitors: PIV     Airway: natural airway / supplemental oxygen    2 ASA  Anesthetic plan and risks discussed with: patient  Induction:    intravenous   Postop Plan:   Patient Disposition: phase II then home   Pain Management: IV analgesics  Comments:    Plan: Plan for MAC with general anesthesia as a back-up.  Risks of anesthesia discussed with patient including but not limited to serious reactions to anesthesia, allergic reactions to anesthesia, dental injury, soft tissue injury, position-related injuries, aspiration, MI, stroke, seizure, and death. Patient agrees with the plan and would like to proceed.        "

## 2025-01-27 ENCOUNTER — IOP (OUTPATIENT)
Dept: PSYCHIATRY | Facility: HOSPITAL | Age: 75
End: 2025-01-27
Attending: SOCIAL WORKER
Payer: MEDICARE

## 2025-01-27 DIAGNOSIS — F41.1 GENERALIZED ANXIETY DISORDER: ICD-10-CM

## 2025-01-27 DIAGNOSIS — F41.0 PANIC DISORDER: ICD-10-CM

## 2025-01-27 DIAGNOSIS — F33.2 SEVERE EPISODE OF RECURRENT MAJOR DEPRESSIVE DISORDER, WITHOUT PSYCHOTIC FEATURES (CMS/HCC): Primary | ICD-10-CM

## 2025-01-27 PROCEDURE — G0410 GRP PSYCH PARTIAL HOSP 45-50: HCPCS | Performed by: COUNSELOR
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
## 2025-01-27 NOTE — GROUP NOTE
[facility] DBT IOP Homework Review Group  Date:  1/27/2025  Start Time:  10:00 AM  End Time:  11:00 AM  Number of Attendees: 5    Stephanie Jeffers, YOB: 1950,  was an active group participant.    Group Focus:  Goal of group was review homework from previous session and reinforce/review skills learned and how patients applied the skills to multiple contexts in their lives    Group Focus: Goal of group was review homework from previous session and reinforce/review skills learned and how patients applied the skills to multiple contexts in their lives        Assessment/observation of group participation/presentation: Stephanie shared her homework with group on myths and was attentive to peer sharing what their emotions for communicating to them.     Visit Diagnosis:     ICD-10-CM ICD-9-CM   1. Severe episode of recurrent major depressive disorder, without psychotic features (CMS/HCC)  F33.2 296.33   2. Generalized anxiety disorder  F41.1 300.02   3. Panic disorder  F41.0 300.01       Plan: Continue DBT IOP to adopt skill translation to all relevant contexts.  Pt to F/U with treatment goals as outlined in treatment plan.  Pt to F/U with local ED/call 911 should SI/HI arises.        Ruby Stacy, LPC

## 2025-01-27 NOTE — GROUP NOTE
[facility] DBT IOP New Skill Acquisition/Wrap Up Group  Date:  1/27/2025  Start Time:  11:00 AM  End Time:  12:00 PM  Number of Attendees: 6    Stephanie Jeffers, YOB: 1950,  was an active group participant.    Group Focus: Goal of group was introduce skill of the day and confirm homework assignment due for next group.  About the Group:  The topic of the day was introduced, guided by Emotion Regulation Handout 6 from [employer]’s  “DBT Skills Training - Handouts and Worksheets” Second Edition.  Facilitator engaged group members in reading aloud the words, prompting events, interpretations, biological changes, expressions, actions and after effects of the following emotions: Anger, Disgust, Envy, Fear, Happiness, Jealousy, Love, Sadness, Shame and Guilt.  Group participants were encouraged to use examples from their lived experience to engage in reciprocal dialogue on each emotion. Group was led in an art activity of “My Feelings Wheel” where group participants used the art materials provided to fill in each emotion pie piece with a description of their own experience of the feeling using words, thoughts, images, colors, doodles etc.  Group concluded with a review of Homework and Diary Card expectations while enrolled in DBT IOP at [facility].  Group members were oriented to the assigned homework for the following group and opportunities to have questions answered was provided.      Assessment/observation of group participation/presentation: Stephanie was active in group and participated in creating her feelings wheel in group with peers. She had no questions on the homework assignment.     Visit Diagnosis:     ICD-10-CM ICD-9-CM   1. Severe episode of recurrent major depressive disorder, without psychotic features (CMS/HCC)  F33.2 296.33   2. Generalized anxiety disorder  F41.1 300.02   3. Panic disorder  F41.0 300.01       Plan: Continue DBT IOP to adopt skill translation to all relevant contexts.  Pt to  F/U with treatment goals as outlined in treatment plan.  Pt to F/U with local ED/call 911 should SI/HI arises.        Ruby Stacy, LPC

## 2025-01-27 NOTE — GROUP NOTE
[facility] DBT IOP Check In Group  Date:  1/27/2025  Start Time:   9:00 AM  End Time:  10:00 AM  Number of Attendees: 5    Stephanie Jeffers, YOB: 1950,  was an active group participant.    Group Focus: Goal of group was to welcome new and returning IOP members, review group code of conduct, set expectations for participation in DBT IOP, and assess for safety.    About the Group: Clinician reviewed [facility] Group Code of Conduct and answered any questions that arose.  Clinician welcomed new members to the group and facilitated brief introductions of group members to one another.  Topic of the curriculum was “Describing Emotions”. Facilitator led group in a describe mindfulness practice of grounding using an art activity anchored in visualization.  Highlighted the utility of mindfulness in experiencing pleasant feelings.   Diary cards were reviewed following the mindfulness exercise, with a focus on safety related behaviors such as SI, HI and SIB and symptoms of mood dysregulation such as Sadness, Anxiety and Anger.      DBT Diary Card:  Day of the week  What day of the week are you completing this for?: Sunday  Emotions (0-5)  Sadness: 2  Anger: 0  Anxiety: 1  Guilt: 0  Shame: 0  Madonna: 5  Other: N/A  Action (Y/N)  Took medication as prescribed: Yes  Suicidal Plans/Intent: No  Self-Harm: No  Substance Use: No  Isolated: No  Other: N/A  Mindfulness Skills  Mindfulness Skills: Wise Mind, Observe, Participate, Non-Judgemental, Effective  Distress Tolerance Skills: STOP, ACCEPTS, Self-Soothe, Radical Acceptance, Mindfulness of Thoughts  Emotion Regulation Skills: Mindfulness of Emotions, Check the Facts, Opposite Action, Allow Emotions  Interpersonal Effectiveness Skills: Modulate Intensity, Self-Validation, Validation of Others    Assessment/observation of group participation/presentation: Stephanie shared about successful surgery and also increased anjali anxiety with a sore throat that she went to  for.  She did  mindfulness with good effect.     Visit Diagnosis:     ICD-10-CM ICD-9-CM   1. Severe episode of recurrent major depressive disorder, without psychotic features (CMS/HCC)  F33.2 296.33   2. Generalized anxiety disorder  F41.1 300.02   3. Panic disorder  F41.0 300.01       Plan: Continue DBT IOP to adopt skill translation to all relevant contexts.  Pt to F/U with treatment goals as outlined in treatment plan.  Pt to F/U with local ED/call 911 should SI/HI arises.        Ruby Stacy, LPC

## 2025-01-28 ENCOUNTER — DOCUMENTATION (OUTPATIENT)
Dept: PSYCHIATRY | Facility: HOSPITAL | Age: 75
End: 2025-01-28

## 2025-01-28 ENCOUNTER — TELEMEDICINE (OUTPATIENT)
Dept: PSYCHIATRY | Facility: HOSPITAL | Age: 75
End: 2025-01-28
Attending: PSYCHIATRY & NEUROLOGY
Payer: MEDICARE

## 2025-01-28 ENCOUNTER — TELEMEDICINE (OUTPATIENT)
Dept: PSYCHIATRY | Facility: HOSPITAL | Age: 75
End: 2025-01-28
Attending: COUNSELOR
Payer: MEDICARE

## 2025-01-28 DIAGNOSIS — F33.2 SEVERE EPISODE OF RECURRENT MAJOR DEPRESSIVE DISORDER, WITHOUT PSYCHOTIC FEATURES (CMS/HCC): Primary | ICD-10-CM

## 2025-01-28 DIAGNOSIS — F41.1 GENERALIZED ANXIETY DISORDER: ICD-10-CM

## 2025-01-28 DIAGNOSIS — F41.0 PANIC DISORDER: ICD-10-CM

## 2025-01-28 PROCEDURE — 90832 PSYTX W PT 30 MINUTES: CPT | Mod: 95 | Performed by: COUNSELOR

## 2025-01-28 PROCEDURE — 99214 OFFICE O/P EST MOD 30 MIN: CPT | Mod: 95 | Performed by: PSYCHIATRY & NEUROLOGY

## 2025-01-28 PROCEDURE — 90833 PSYTX W PT W E/M 30 MIN: CPT | Mod: 95 | Performed by: PSYCHIATRY & NEUROLOGY

## 2025-01-28 ASSESSMENT — COGNITIVE AND FUNCTIONAL STATUS - GENERAL
EST. PREMORBID INTELLIGENCE: NO CHANGE
SLEEP_WAKE_CYCLE: NO CHANGE
APPEARANCE: WELL GROOMED
EYE_CONTACT: WNL
PSYCHOMOTOR FUNCTIONING: WNL
ATTENTION: WNL
THOUGHT_CONTENT: RUMINATIONS
DELUSIONS: NONE OR AGE APPROPRIATE
ORIENTATION: FULLY ORIENTED
THOUGHT_PROCESS: WORRY;RUMINATIONS
IMPULSE CONTROL: IMPAIRED, MINIMALLY
LIBIDO: NON-CONTRIBUTORY
RECENT MEMORY: WNL
CONCENTRATION: WNL
APPETITE: NO CHANGE
PERCEPTUAL FUNCTION: NORMAL
AFFECT: FULL RANGE;TENSE
SPEECH: VERBOSE;REGULAR
INSIGHT: IMPAIRED, MODERATELY
AROUSAL LEVEL: ALERT
REMOTE MEMORY: WNL

## 2025-01-28 NOTE — PROGRESS NOTES
Stephanie Jeffers is a 74 y.o. female who presents for Follow-up.     Request for Consent  Patient provided consent to treat via telehealth technology.Patient understands the telehealth visit will be billed to their insurance or patient directly.  Patient was informed only the patient and the clinician are permitted on the telehealth visit, visits are not recorded by the clinician, and the patient is not permitted to record the visit. Patient was provided information on how to access HIPAA compliant platform. Clinician confirmed identification of patient by name and birthdate, provider name, location of patient in [place], and callback number in case disconnected. Patient informed of the right to choose the form of care delivery, including the right to refuse a telehealth visit.     Patient Response to Request for Consent: Yes  Telehealth Platform utilized: Epic Video Client  Visit Type performed: Audio and Video  The patient is at home: Yes  The patient is in [place]:   yes  Those who participated in the encounter: Patient and Provider  Patient Call back number: [phone]      Presenting Problem:  Progress in DBT, Skills Translation, Aftercare    Mental Status Exam:  Arousal Level: Alert  Appearance: Well Groomed  Speech: Verbose, Regular  Psychomotor Functioning: WNL  Eye Contact: WNL  Est. Premorbid Intelligence: No change  Orientation: Fully oriented  Attention: WNL  Concentration: WNL  Recent Memory: WNL  Remote Memory: WNL  Thought Content: Ruminations  Thought Process: Worry, Ruminations  Insight: Impaired, moderately  Perceptual Function: Normal  Delusions: None or age appropriate  Sleeping: No Change  Appetite: No Change  Libido: Non-Contributory  Affect: Full Range, Tense  Mood: Depressed, Anxious, Hopeless, Motivated     [place] Suicide Severity Rating Scale: Not indicated          Assessment:   MATTHEW and Stephanie met over EVV to discuss progress in DBT IOP. Pt explained she is beginning to  acclimate to program structure, having been surprised at the absence of processing time and focus on how she is feeling over the story of why. She indicated she was expecting time to join with patients in group, sharing their stories and how she is feeling disconnected from peers. LPC offered validation about the difference in skills training from traditional group therapy programs.  Stephanie said she remains willing to complete the IOP and get what she can from the experience.  LPC offered encouragement about getting used to the change in expectations.  Stephanie shared she started with a new therapist yesterday who takes medicare and will see her again tomorrow to complete a history taking.  This clinician is an RN, trained as a medical professional but has a private practice doing behavioral health therapy.  Stephanie states she got the recommendation from facebook.  She also indicated she is going through another medication change and her fears of side effects.  LPC dicussed willingness to allow the alex effects to subside in lieu of rapid changes in medications that can worsen these effects.  Pt would like to do OP psychotherapy groups and IT after DBT IOP and LPC will open an OP chart during the treatment plan update in two weeks.  Stephanie indicated she completed her homework on emotion after her initial visit with their new therapist after feeling re traumatized and LPC provided validation via cheerleading in being skillful. Pt to sign THIERRY for therapist tomorrow.   Suicide Risk Assessment Not indicated for this patient.  Plan:    Patient to F/U with DBT IOP 3 days a week, 3 hours per day, to adopt skill translation to all relevant contexts.    Patient to F/U with treatment goals as outlined in treatment plan.  Patient to F/U with local ED or call 911 should SI/HI arise.        Visit Diagnosis:    ICD-10-CM ICD-9-CM   1. Severe episode of recurrent major depressive disorder, without psychotic features (CMS/HCC)  F33.2  296.33   2. Generalized anxiety disorder  F41.1 300.02   3. Panic disorder  F41.0 300.01       Time  Start Time: 1300  End Time: 1325  Total Time: 25  Ruby Stacy LPC @ 1:52 PM

## 2025-01-29 ENCOUNTER — IOP (OUTPATIENT)
Dept: PSYCHIATRY | Facility: HOSPITAL | Age: 75
End: 2025-01-29
Attending: SOCIAL WORKER
Payer: MEDICARE

## 2025-01-29 DIAGNOSIS — F41.0 PANIC DISORDER: ICD-10-CM

## 2025-01-29 DIAGNOSIS — F33.2 SEVERE EPISODE OF RECURRENT MAJOR DEPRESSIVE DISORDER, WITHOUT PSYCHOTIC FEATURES (CMS/HCC): Primary | ICD-10-CM

## 2025-01-29 DIAGNOSIS — F41.1 GENERALIZED ANXIETY DISORDER: ICD-10-CM

## 2025-01-29 LAB
CASE RPRT: NORMAL
CLINICAL INFO: NORMAL
IMMUNE STAIN STUDY: NORMAL
PATH REPORT.FINAL DX SPEC: NORMAL
PATH REPORT.GROSS SPEC: NORMAL

## 2025-01-29 PROCEDURE — G0410 GRP PSYCH PARTIAL HOSP 45-50: HCPCS | Performed by: COUNSELOR

## 2025-01-29 NOTE — GROUP NOTE
"[facility] DBT IOP Check In Group  Date:  1/29/2025  Start Time:   9:00 AM  End Time:  10:00 AM  Number of Attendees: 5    Stephanie Jeffers, YOB: 1950,  participated when called upon by the .    Group Focus: Goal of group was to welcome new and returning IOP members, review group code of conduct, set expectations for participation in DBT IOP, and assess for safety.    About the Group: Clinician reviewed [facility] Group Code of Conduct and answered any questions that arose.  Clinician welcomed new members to the group and facilitated brief introductions of group members to one another.  Topic of the curriculum was “Checking The Facts”. Facilitator led group in a mindfulness practice of observation using music.  Highlighted urge surfing while engaged in the activity.   Diary cards were reviewed following the mindfulness exercise, with a focus on safety related behaviors such as SI, HI and SIB and symptoms of mood dysregulation such as Sadness, Anxiety and Anger.      DBT Diary Card:  Day of the week  What day of the week are you completing this for?: Tuesday (DID Not do diary card)  Emotions (0-5)  Sadness: 2  Anger: 0  Anxiety: 2  Guilt: 0  Shame: 0  Madonna: 1  Other: N/A  Action (Y/N)  Took medication as prescribed: Yes  Suicidal Plans/Intent: No  Self-Harm: No  Substance Use: No  Isolated: No  Other: N/A  Mindfulness Skills  Mindfulness Skills: Wise Mind, Non-Judgemental  Distress Tolerance Skills: STOP, IMPROVE, Mindfulness of Thoughts  Emotion Regulation Skills: Mindfulness of Emotions, [place] A[place]  Interpersonal Effectiveness Skills: Modulate Intensity  Description: Did not complete Diary card outside of care- able to problem solve    Assessment/observation of group participation/presentation: Stephanie participated in mindfulness, noting her thoughts being negative and distracting, impacting her engagement.  She reported not completing her diary card, as she had \"forgotten\" and shred what she could " recall of her experience yesterday. She was amenable to problem solve keeping her diary card with her homework.     Visit Diagnosis:     ICD-10-CM ICD-9-CM   1. Severe episode of recurrent major depressive disorder, without psychotic features (CMS/HCC)  F33.2 296.33   2. Generalized anxiety disorder  F41.1 300.02   3. Panic disorder  F41.0 300.01       Plan: Continue DBT IOP to adopt skill translation to all relevant contexts.  Pt to F/U with treatment goals as outlined in treatment plan.  Pt to F/U with local ED/call 911 should SI/HI arises.        Ruby Stacy, LPC

## 2025-01-29 NOTE — GROUP NOTE
[facility] DBT IOP New Skill Acquisition/Wrap Up Group  Date:  1/29/2025  Start Time:  11:00 AM  End Time:  12:00 PM  Number of Attendees: 6    Stephanie Jeffers, YOB: 1950,  was an active group participant.    Group Focus: Goal of group was introduce skill of the day and confirm homework assignment due for next group.  About the Group:  Topic of the day was introduced, guided by Emotion Regulation Handouts 7, 8 and 8A from [employer]  “DBT Skills Training - Handouts and Worksheets” Second Edition.  Facilitator educated group members on the skills used to change emotional responses (Handout 7).  Illustrated for group how events trigger automatic thoughts which influence our emotions.  Additionally, events can trigger emotions that influence our thoughts.  Mapped Check the facts skill using step by step instruction from handout 8 using group participant real world examples. Facilitator guided discussion on Handout 8A of other examples of how emotions can fit the facts and encouraged group dialogue on other examples for emotions such as fear, anger, disgust, envy, jealousy, love, sadness, shame and guilt.   Group concluded with a review of Homework and Diary Card expectations while enrolled in DBT IOP at [facility].  Group members were oriented to the assigned homework for the following group and opportunities to have questions answered was provided.      Assessment/observation of group participation/presentation: Stephanie was attentive in checking the facts using the homework from Monday and had no questions on the homework.     Visit Diagnosis:     ICD-10-CM ICD-9-CM   1. Severe episode of recurrent major depressive disorder, without psychotic features (CMS/HCC)  F33.2 296.33   2. Generalized anxiety disorder  F41.1 300.02   3. Panic disorder  F41.0 300.01       Plan: Continue DBT IOP to adopt skill translation to all relevant contexts.  Pt to F/U with treatment goals as outlined in treatment plan.  Pt to  F/U with local ED/call 911 should SI/HI arises.        Ruby Stacy, LPC

## 2025-01-29 NOTE — GROUP NOTE
[facility] DBT IOP Homework Review Group  Date:  1/29/2025  Start Time:  10:00 AM  End Time:  11:00 AM  Number of Attendees: 5    Stephanie Jeffers, YOB: 1950,  was quiet but attentive.    Group Focus:  Goal of group was review homework from previous session and reinforce/review skills learned and how patients applied the skills to multiple contexts in their lives    Group Focus: Goal of group was review homework from previous session and reinforce/review skills learned and how patients applied the skills to multiple contexts in their lives        Assessment/observation of group participation/presentation: Stephanie was quiet at times as a peer demo ed their homework in group and was able to share appropriate coaching feedback.     Visit Diagnosis:     ICD-10-CM ICD-9-CM   1. Severe episode of recurrent major depressive disorder, without psychotic features (CMS/HCC)  F33.2 296.33   2. Generalized anxiety disorder  F41.1 300.02   3. Panic disorder  F41.0 300.01       Plan: Continue DBT IOP to adopt skill translation to all relevant contexts.  Pt to F/U with treatment goals as outlined in treatment plan.  Pt to F/U with local ED/call 911 should SI/HI arises.        Ruby Stacy, LPC

## 2025-01-31 ENCOUNTER — IOP (OUTPATIENT)
Dept: PSYCHIATRY | Facility: HOSPITAL | Age: 75
End: 2025-01-31
Attending: SOCIAL WORKER
Payer: MEDICARE

## 2025-01-31 DIAGNOSIS — F41.0 PANIC DISORDER: ICD-10-CM

## 2025-01-31 DIAGNOSIS — F33.2 SEVERE EPISODE OF RECURRENT MAJOR DEPRESSIVE DISORDER, WITHOUT PSYCHOTIC FEATURES (CMS/HCC): Primary | ICD-10-CM

## 2025-01-31 DIAGNOSIS — F41.1 GENERALIZED ANXIETY DISORDER: ICD-10-CM

## 2025-01-31 PROCEDURE — G0410 GRP PSYCH PARTIAL HOSP 45-50: HCPCS | Performed by: COUNSELOR

## 2025-01-31 NOTE — GROUP NOTE
[facility] DBT IOP Check In Group  Date:  1/31/2025  Start Time:   9:00 AM  End Time:  10:00 AM  Number of Attendees: 5    Stephanie Jeffers, YOB: 1950,  was an active group participant.    Group Focus: Goal of group was to welcome new and returning IOP members, review group code of conduct, set expectations for participation in DBT IOP, and assess for safety.    About the Group: Clinician reviewed [facility] Group Code of Conduct and answered any questions that arose.  Clinician welcomed new members to the group and facilitated brief introductions of group members to one another.  Topic of the curriculum was “Opposite Action and Problem Solving”. This group is designed to further establish problem solving skills using emotion regulation.  Facilitator led group in a mindfulness participation practice called “Healthy Hands Qi Gong” to explore movement as a mindful practice.   Diary cards were reviewed following the meditation, with a focus on safety related behaviors such as SI, HI and SIB and symptoms of mood dysregulation such as Depression, Anxiety and Anger.      DBT Diary Card:  Day of the week  What day of the week are you completing this for?: Thursday  Emotions (0-5)  Sadness: 2  Anger: 3  Anxiety: 2  Guilt: 0  Shame: 0  Madonna: 1  Other: N/A  Action (Y/N)  Took medication as prescribed: Yes  Suicidal Plans/Intent: No  Self-Harm: No  Substance Use: No  Isolated: No  Other: N/A  Mindfulness Skills  Mindfulness Skills: Wise Mind, Observe, Describe  Distress Tolerance Skills: STOP, Self-Soothe, Radical Acceptance, Willingness, Mindfulness of Thoughts  Emotion Regulation Skills: Mindfulness of Emotions, Check the Facts, Problem Solving, [place] Ahead, Allow Emotions  Interpersonal Effectiveness Skills: Modulate Intensity, Self-Validation    Assessment/observation of group participation/presentation: Stephanie engaged in mindfulness and was the first to share her diary card with group.    Visit Diagnosis:     ICD-10-CM  ICD-9-CM   1. Severe episode of recurrent major depressive disorder, without psychotic features (CMS/HCC)  F33.2 296.33   2. Generalized anxiety disorder  F41.1 300.02   3. Panic disorder  F41.0 300.01       Plan: Continue DBT IOP to adopt skill translation to all relevant contexts.  Pt to F/U with treatment goals as outlined in treatment plan.  Pt to F/U with local ED/call 911 should SI/HI arises.        Ruby Stacy, LPC

## 2025-01-31 NOTE — GROUP NOTE
[facility] DBT IOP New Skill Acquisition/Wrap Up Group  Date:  1/31/2025  Start Time:  11:00 AM  End Time:  12:00 PM  Number of Attendees: 5    Stephanie Jeffers, YOB: 1950,  was an active group participant.    Group Focus: Goal of group was introduce skill of the day and confirm homework assignment due for next group.  About the Group:  The topic of the day was introduced, guided by Emotion Regulation Handouts 9, 10, 11 and 12 from [employer]  “DBT Skills Training - Handouts and Worksheets” Second Edition.  Facilitator led discussion on how to choose between opposite action and problem-solving skills to utilize when working to change and emotion (Handout 9).  Opposite Action was defined and a the practice introduced step by step using Handout 10.  Educated group on the application of the skill on emotions such as fear, anger, disgust, envy, jealousy, love, sadness, shame and guilt (Handout 11).  Facilitator encouraged group participation on identifying other examples of opposite actions for each emotion.  Defined Problem Solving as a skill and worked with participants in group to use a real-world example of a problem and how to apply the skill (Handout 12).  Group concluded with a review of Homework and Diary Card expectations while enrolled in DBT IOP at [facility].  Group members were oriented to the assigned homework for the following group and opportunities to have questions answered was provided.      Assessment/observation of group participation/presentation: Stephanie was attentive and engaged, providing supportive coaching with peers moving through the decision to use opposite action or problem solving step by step.  She had no question on the home work. Stephanie requested to demo her homework on Monday and LPC encouraged her to remind LPC and and encouraged stephanie to take up space in group.     Visit Diagnosis:     ICD-10-CM ICD-9-CM   1. Severe episode of recurrent major depressive disorder,  without psychotic features (CMS/HCC)  F33.2 296.33   2. Generalized anxiety disorder  F41.1 300.02   3. Panic disorder  F41.0 300.01       Plan: Continue DBT IOP to adopt skill translation to all relevant contexts.  Pt to F/U with treatment goals as outlined in treatment plan.  Pt to F/U with local ED/call 911 should SI/HI arises.        Ruby Stacy, LPC

## 2025-01-31 NOTE — GROUP NOTE
[facility] DBT IOP Homework Review Group  Date:  1/31/2025  Start Time:  10:00 AM  End Time:  11:00 AM  Number of Attendees: 5    Stephanie Jeffers, YOB: 1950,  was an active group participant.    Group Focus:  Goal of group was review homework from previous session and reinforce/review skills learned and how patients applied the skills to multiple contexts in their lives    Group Focus: Goal of group was review homework from previous session and reinforce/review skills learned and how patients applied the skills to multiple contexts in their lives        Assessment/observation of group participation/presentation: Stephanie was attentive in group following along homework review on checking the facts that was illustrated by a peer and participated in coaching throughout the exercise.     Visit Diagnosis:     ICD-10-CM ICD-9-CM   1. Severe episode of recurrent major depressive disorder, without psychotic features (CMS/HCC)  F33.2 296.33   2. Generalized anxiety disorder  F41.1 300.02   3. Panic disorder  F41.0 300.01       Plan: Continue DBT IOP to adopt skill translation to all relevant contexts.  Pt to F/U with treatment goals as outlined in treatment plan.  Pt to F/U with local ED/call 911 should SI/HI arises.        Ruby Stacy, LPC

## 2025-02-03 ENCOUNTER — TELEPHONE (OUTPATIENT)
Dept: PSYCHIATRY | Facility: HOSPITAL | Age: 75
End: 2025-02-03
Payer: MEDICARE

## 2025-02-03 ENCOUNTER — OFFICE VISIT (OUTPATIENT)
Dept: OBSTETRICS AND GYNECOLOGY | Facility: CLINIC | Age: 75
End: 2025-02-03
Payer: MEDICARE

## 2025-02-03 ENCOUNTER — IOP (OUTPATIENT)
Dept: PSYCHIATRY | Facility: HOSPITAL | Age: 75
End: 2025-02-03
Attending: SOCIAL WORKER
Payer: MEDICARE

## 2025-02-03 VITALS
HEIGHT: 64 IN | DIASTOLIC BLOOD PRESSURE: 60 MMHG | BODY MASS INDEX: 18.27 KG/M2 | SYSTOLIC BLOOD PRESSURE: 102 MMHG | WEIGHT: 107 LBS

## 2025-02-03 DIAGNOSIS — F33.2 SEVERE EPISODE OF RECURRENT MAJOR DEPRESSIVE DISORDER, WITHOUT PSYCHOTIC FEATURES (CMS/HCC): Primary | ICD-10-CM

## 2025-02-03 DIAGNOSIS — F41.1 GENERALIZED ANXIETY DISORDER: ICD-10-CM

## 2025-02-03 DIAGNOSIS — F41.0 PANIC DISORDER: ICD-10-CM

## 2025-02-03 DIAGNOSIS — Z09 POSTOP CHECK: Primary | ICD-10-CM

## 2025-02-03 PROCEDURE — 99024 POSTOP FOLLOW-UP VISIT: CPT | Performed by: OBSTETRICS & GYNECOLOGY

## 2025-02-03 PROCEDURE — G0410 GRP PSYCH PARTIAL HOSP 45-50: HCPCS | Performed by: COUNSELOR

## 2025-02-03 NOTE — GROUP NOTE
[facility] DBT IOP Homework Review Group  Date:  2/3/2025  Start Time:  10:00 AM  End Time:  11:00 AM  Number of Attendees: 4    Stephanie Jeffers, YOB: 1950,  was an active group participant.    Group Focus:  Goal of group was review homework from previous session and reinforce/review skills learned and how patients applied the skills to multiple contexts in their lives    Group Focus: Goal of group was review homework from previous session and reinforce/review skills learned and how patients applied the skills to multiple contexts in their lives        Assessment/observation of group participation/presentation: Stephanie demonstrated her homework in group on check the facts and opposite action.  She was receptive to peer and LPC coaching and insight grew as the exercise progressed.      Visit Diagnosis:     ICD-10-CM ICD-9-CM   1. Severe episode of recurrent major depressive disorder, without psychotic features (CMS/HCC)  F33.2 296.33   2. Generalized anxiety disorder  F41.1 300.02   3. Panic disorder  F41.0 300.01       Plan: Continue DBT IOP to adopt skill translation to all relevant contexts.  Pt to F/U with treatment goals as outlined in treatment plan.  Pt to F/U with local ED/call 911 should SI/HI arises.        Ruby Stacy, LPC

## 2025-02-03 NOTE — GROUP NOTE
[facility] DBT IOP Check In Group  Date:  2/3/2025  Start Time:   9:00 AM  End Time:  10:00 AM  Number of Attendees: 4    Stephanie Jeffers, YOB: 1950,  was an active group participant.    Group Focus: Goal of group was to welcome new and returning IOP members, review group code of conduct, set expectations for participation in DBT IOP, and assess for safety.    About the Group: Clinician reviewed [facility] Group Code of Conduct and answered any questions that arose.  Clinician welcomed new members to the group and facilitated brief introductions of group members to one another.  Topic of the curriculum was “ABC PLEASE”. This group is designed to highlight proactive skills to reduce vulnerability toward emotion mind.  Facilitator led group in a mindfulness participation practice called “Spiral Staircase” to practice observation as a mindful practice.   Diary cards were reviewed following the meditation, with a focus on safety related behaviors such as SI, HI and SIB and symptoms of mood dysregulation such as Depression, Anxiety and Anger.      DBT Diary Card:  Day of the week  What day of the week are you completing this for?: Sunday  Emotions (0-5)  Sadness: 3  Anger: 2  Anxiety: 3  Guilt: 0  Shame: 0  Madonna: 0  Other: N/A  Action (Y/N)  Took medication as prescribed: Yes  Suicidal Plans/Intent: No  Self-Harm: No  Substance Use: No  Isolated: Yes  Other: N/A  Mindfulness Skills  Mindfulness Skills: Wise Mind, Observe, Describe  Distress Tolerance Skills: Radical Acceptance, Mindfulness of Thoughts  Emotion Regulation Skills: Mindfulness of Emotions, Allow Emotions  Interpersonal Effectiveness Skills: Prioritize Goals, Modulate Intensity, Self-Validation    Assessment/observation of group participation/presentation: Stephanie was engaged, noting struggled with mindfulness secondary to anxiety.  She shared her diary card.    Visit Diagnosis:     ICD-10-CM ICD-9-CM   1. Severe episode of recurrent major depressive  disorder, without psychotic features (CMS/HCC)  F33.2 296.33   2. Generalized anxiety disorder  F41.1 300.02   3. Panic disorder  F41.0 300.01       Plan: Continue DBT IOP to adopt skill translation to all relevant contexts.  Pt to F/U with treatment goals as outlined in treatment plan.  Pt to F/U with local ED/call 911 should SI/HI arises.        Ruby Stacy, LPC

## 2025-02-03 NOTE — GROUP NOTE
[facility] DBT IOP New Skill Acquisition/Wrap Up Group  Date:  2/3/2025  Start Time:  11:00 AM  End Time:  12:00 PM  Number of Attendees: 5    Stephanie Jeffers, YOB: 1950,  was an active group participant.    Group Focus: Goal of group was introduce skill of the day and confirm homework assignment due for next group.  About the Group:  The topic of the day was introduced, guided by Emotion Regulation Handouts 14, 15, 16, 17, 18, 19 and 20 from [employer]’s  “DBT Skills Training - Handouts and Worksheets” Second Edition.  Educated group on skills used to reduce vulnerability to emotion mind by using ABC PLEASE skills (Handout 14).  Facilitated discussion on Handouts 15 and 17 on Accumulating Positive Emotions and their short term and long-term benefits.  Worked with patients to identify Pleasant Events that can boost short term accumulation of positive emotions and encouraged members to pick items in handout 16 that were new to their repertoire. Highlighted Handout 18 on establishing Values and Priorities for participants to have long term success in accumulating positive emotions.  Facilitated discussion the utility and practice of Building Mastery and Coping Ahead (Handout 19).  Group members identified areas in which they have an opportunity to [place] Ahead and facilitator demonstrated the skill step by step with group member assistance. Identified how taking care of our bodies can regulate our Minds through treating Physical Illness, Balanced Eating, Avoidance of Mood-Altering Substances, Balanced Sleep and Exercise. Group members chose one area of the PLEASE skill and created one goal for their evening that can be practiced.  Group concluded with a review of Homework and Diary Card expectations while enrolled in DBT IOP at [facility].  Group members were oriented to the assigned homework for the following group and opportunities to have questions answered was provided.      Assessment/observation of  group participation/presentation: Stephanie was engaged and engaged in discussions on the ABC PLEASE skills.  She had no questions on the homework.     Visit Diagnosis:     ICD-10-CM ICD-9-CM   1. Severe episode of recurrent major depressive disorder, without psychotic features (CMS/HCC)  F33.2 296.33   2. Generalized anxiety disorder  F41.1 300.02   3. Panic disorder  F41.0 300.01       Plan: Continue DBT IOP to adopt skill translation to all relevant contexts.  Pt to F/U with treatment goals as outlined in treatment plan.  Pt to F/U with local ED/call 911 should SI/HI arises.        Ruby Stacy, LPC

## 2025-02-03 NOTE — PROGRESS NOTES
Patient ID: Stephanie Jeffers   : 1950 74 y.o.  MRN: [id number]   Visit Date: 2025    Subjective   Stephanie Jeffers is presenting today for Post-op Visit (S/P LEEP & ECC)      HPI    Patient is s/p LEEP on 25    Bleeding: Patient is having bleeding after the procedure.Light bleeding   Pain: Patient's pain is well controlled. She is not taking pain medications.   Sexual Activity: has not resumed sexual activity.   Physical Activity: has not resumed physical activity.   Bowl and Bladder: Patient is not having difficult with her bowel or bladder function.     Pathology:  Lab Results   Component Value Date    FINALDX  2025     A.  Cervix, LEEP excision:               Ectocervical squamous mucosa with focal HPV cytopathic effect changes.              No evidence of squamous dysplasia (MICHELLE / MAR) identified.             All margins are negative for lesions.     B.  Endocervical currettings:               Fragments of cervical tissue and mucus with no abnormality.             No evidence of dysplasia (MICHELLE) or malignancy identified.          **ATTENTION PATIENTS**  **The findings in this report have been made available for review potentially before your provider has had a chance to review and discuss the results with you.  Please allow time for your provider to review your results.  If you have any questions or concerns about these results, please contact the healthcare provider who ordered the test.**             Past Medical History:  has a past medical history of Abnormal Pap smear of cervix, Anxiety, COVID (), COVID-19 vaccine series started, Degenerative arthritis, Depression, Dry eyes, Herpes, Hypotension, IPMN (intraductal papillary mucinous neoplasm) (2022), Kidney stones, Lipid disorder, Migraines, Osteopenia, Screening for breast cancer (2024), and Screening for cervical cancer (2023).     Past Surgical History:  has a past surgical history that includes Cataract  extraction (Left, 2018); Cataract extraction (Right, 2019); Cosmetic surgery; and Lumbar epidural injection.    Obstetric History:   OB History    Para Term  AB Living   2 2 2     2   SAB IAB Ectopic Multiple Live Births                  # Outcome Date GA Lbr Hardik/2nd Weight Sex Type Anes PTL Lv   2 Term            1 Term               Obstetric Comments   LMP-post menopausal       Medications:   Current Outpatient Medications:     acetaminophen (TYLENOL EXTRA STRENGTH) 500 mg tablet, Take 2 tablets (1,000 mg total) by mouth every 6 (six) hours as needed for mild pain., Disp: 240 tablet, Rfl: 0    ARIPiprazole (ABILIFY) 2 mg tablet, Take 1 tablet (2 mg total) by mouth daily., Disp: 90 tablet, Rfl: 0    clonazePAM (KlonoPIN) 0.5 mg tablet, Take 1.5 tablets (0.75 mg total) by mouth nightly., Disp: 45 tablet, Rfl: 0    cycloSPORINE (RESTASIS) 0.05 % ophthalmic emulsion, Administer 1 drop into both eyes 2 (two) times a day., Disp: , Rfl:     docusate sodium (COLACE) 100 mg capsule, Take 1 capsule (100 mg total) by mouth 2 (two) times a day., Disp: 60 capsule, Rfl: 0    escitalopram (LEXAPRO) 10 mg tablet, Take 1 tablet (10 mg total) by mouth daily., Disp: 90 tablet, Rfl: 0    estradioL (ESTRACE) 0.01 % (0.1 mg/gram) vaginal cream, Insert 2 g into the vagina 2 (two) times a week (Mon, Thu). Please hold until 2 weeks after surgery Then resume., Disp: 16 g, Rfl: 0    evolocumab (REPATHA SURECLICK) 140 mg/mL pen, Inject 1 mL (140 mg total) under the skin every 14 (fourteen) days., Disp: 6 mL, Rfl: 3    ibuprofen (MOTRIN) 600 mg tablet, Take 1 tablet (600 mg total) by mouth every 6 (six) hours as needed for mild pain (pain)., Disp: 120 tablet, Rfl: 0    linaCLOtide (LINZESS) 290 mcg capsule, Take 290 mcg by mouth daily before breakfast., Disp: , Rfl:     SUMAtriptan (IMITREX) 100 mg tablet, Take 100 mg by mouth daily as needed for migraine. Only 1 dose in 24 hours, Disp: , Rfl:     valACYclovir  "(VALTREX) 500 mg tablet, Take 500 mg by mouth as needed., Disp: , Rfl:     XDEMVY 0.25 % drops, Administer 1 drop into both eyes 2 (two) times a day., Disp: , Rfl:       Allergies: is allergic to sulfa (sulfonamide antibiotics).       Vital Signs for this encounter: Visit Vitals  /60 (BP Location: Left upper arm, Patient Position: Sitting)   Ht 1.613 m (5' 3.5\")   Wt 48.5 kg (107 lb)   BMI 18.66 kg/m²       OBGyn Exam  General Appearance: Alert, cooperative, no acute distress  Head: Normocephalic, without obvious abnormality  Abdomen: Soft, nontender, nondistended,no masses, no organomegaly. Incisions c/d/i. No Si/Sx of infection  Genitalia: VULVA: normal appearing vulva with no masses, tenderness or lesions, VAGINA: normal appearing vagina with normal color and discharge, no lesions, CERVIX: normal appearing cervix without discharge or lesions, LEEP bed almost fully healed. Some light spottin  Extremities: no edema     exam Pelvic exam conducted with staff chaperone present. Staff chaperone name: GAUDENCIO Willett .      Impression/Plan:    1. Physical exam normal, patient may return to all activities as tolerated.   2. Return to office in 1 year for annual exam or as needed for problems.     Ashley Aguilar MD  "

## 2025-02-05 ENCOUNTER — IOP (OUTPATIENT)
Dept: PSYCHIATRY | Facility: HOSPITAL | Age: 75
End: 2025-02-05
Attending: SOCIAL WORKER
Payer: MEDICARE

## 2025-02-05 DIAGNOSIS — F41.1 GENERALIZED ANXIETY DISORDER: ICD-10-CM

## 2025-02-05 DIAGNOSIS — F33.2 SEVERE EPISODE OF RECURRENT MAJOR DEPRESSIVE DISORDER, WITHOUT PSYCHOTIC FEATURES (CMS/HCC): Primary | ICD-10-CM

## 2025-02-05 DIAGNOSIS — F41.0 PANIC DISORDER: ICD-10-CM

## 2025-02-05 PROCEDURE — G0410 GRP PSYCH PARTIAL HOSP 45-50: HCPCS | Performed by: COUNSELOR

## 2025-02-05 NOTE — GROUP NOTE
[facility] DBT IOP Homework Review Group  Date:  2/5/2025  Start Time:  10:00 AM  End Time:  11:00 AM  Number of Attendees: 5    Stephanie Jeffers, YOB: 1950,  was an active group participant.    Group Focus:  Goal of group was review homework from previous session and reinforce/review skills learned and how patients applied the skills to multiple contexts in their lives    Group Focus: Goal of group was review homework from previous session and reinforce/review skills learned and how patients applied the skills to multiple contexts in their lives        Assessment/observation of group participation/presentation: Stephanie actively engaged in group discussing examples of how she engaged in ABC PLEASE skills.    Visit Diagnosis:     ICD-10-CM ICD-9-CM   1. Severe episode of recurrent major depressive disorder, without psychotic features (CMS/HCC)  F33.2 296.33   2. Generalized anxiety disorder  F41.1 300.02   3. Panic disorder  F41.0 300.01       Plan: Continue DBT IOP to adopt skill translation to all relevant contexts.  Pt to F/U with treatment goals as outlined in treatment plan.  Pt to F/U with local ED/call 911 should SI/HI arises.        Ruby Stacy, LPC

## 2025-02-05 NOTE — GROUP NOTE
[facility] DBT IOP New Skill Acquisition/Wrap Up Group  Date:  2/5/2025  Start Time:  11:00 AM  End Time:  12:00 PM  Number of Attendees: 5    Stephanie Jeffers, YOB: 1950,  was an active group participant.    Group Focus: Goal of group was introduce skill of the day and confirm homework assignment due for next group.  About the Group:  The topic of the day was introduced, guided by Interpersonal Effectiveness Handouts 1, 2 and 2A from [employer]’s  “DBT Skills Training - Handouts and Worksheets” Second Edition.  Group was educated on the goals of Interpersonal Effectiveness Skills, such as getting what group members need from their supports, building and ending relationships and walking the Middle Path (Handout 1).  In depth discussion between Facilitator and group members took place on the myriad of factors that get in the way of Interpersonal Effectiveness (Handout 2). Worked with group members to read aloud various myths that get in the way of Interpersonal Effectiveness.  Dialogue was encouraged to identify addition myths both with objective effectiveness and relationship/self-esteem effectiveness.   Experiential art activity was facilitated on relationships to observe/describe their experience.  Group concluded with a review of Homework and Diary Card expectations while enrolled in DBT IOP at [facility].  Group members were oriented to the assigned homework for the following group and opportunities to have questions answered was provided.       Assessment/observation of group participation/presentation: Stephanie was an active participant in challenging myths and asking topical questions on the barriers to Interpersonal Effectiveness. She had no questions on the homework.     Visit Diagnosis:     ICD-10-CM ICD-9-CM   1. Severe episode of recurrent major depressive disorder, without psychotic features (CMS/HCC)  F33.2 296.33   2. Generalized anxiety disorder  F41.1 300.02   3. Panic disorder  F41.0  300.01       Plan: Continue DBT IOP to adopt skill translation to all relevant contexts.  Pt to F/U with treatment goals as outlined in treatment plan.  Pt to F/U with local ED/call 911 should SI/HI arises.        Ruby Stacy, LPC

## 2025-02-05 NOTE — GROUP NOTE
[facility] DBT IOP Check In Group  Date:  2/5/2025  Start Time:   9:00 AM  End Time:  10:00 AM  Number of Attendees: 5    Stephanie Jeffers, YOB: 1950,  was an active group participant.    Group Focus: Goal of group was to welcome new and returning IOP members, review group code of conduct, set expectations for participation in DBT IOP, and assess for safety.    About the Group: Clinician reviewed [facility] Group Code of Conduct and answered any questions that arose.  Clinician welcomed new members to the group and facilitated brief introductions of group members to one another.  Topic of the curriculum was “Introduction to Interpersonal Effectiveness”. This group is designed to begin skill training in problem solving through communication.  Facilitator led group in a mindfulness participation practice called “Rainstorm” to explore movement as a mindful practice.   Diary cards were reviewed following the meditation, with a focus on safety related behaviors such as SI, HI and SIB and symptoms of mood dysregulation such as Depression, Anxiety and Anger.      DBT Diary Card:  Day of the week  What day of the week are you completing this for?: Tuesday  Emotions (0-5)  Sadness: 1  Anger: 1  Anxiety: 1  Guilt: 0  Shame: 0  Madonna: 1  Other: N/A  Action (Y/N)  Took medication as prescribed: Yes  Suicidal Plans/Intent: No  Self-Harm: No  Substance Use: No  Isolated: No  Other: N/A  Mindfulness Skills  Mindfulness Skills: Wise Mind, Observe, Non-Judgemental, Effective  Distress Tolerance Skills: ACCEPTS, Radical Acceptance, Willingness, Mindfulness of Thoughts  Emotion Regulation Skills: Mindfulness of Emotions, Check the Facts, Opposite Action, Allow Emotions  Interpersonal Effectiveness Skills: Prioritize Goals, Modulate Intensity, Self-Validation, Validation of Others    Assessment/observation of group participation/presentation: Stephanie engaged in mindfulness with the group and shared her diary card.    Visit Diagnosis:      ICD-10-CM ICD-9-CM   1. Severe episode of recurrent major depressive disorder, without psychotic features (CMS/HCC)  F33.2 296.33   2. Generalized anxiety disorder  F41.1 300.02   3. Panic disorder  F41.0 300.01       Plan: Continue DBT IOP to adopt skill translation to all relevant contexts.  Pt to F/U with treatment goals as outlined in treatment plan.  Pt to F/U with local ED/call 911 should SI/HI arises.        Ruby Stacy, LPC

## 2025-02-06 RX ORDER — IBUPROFEN 600 MG/1
TABLET ORAL
Qty: 120 TABLET | Refills: 0 | OUTPATIENT
Start: 2025-02-06

## 2025-02-07 ENCOUNTER — IOP (OUTPATIENT)
Dept: PSYCHIATRY | Facility: HOSPITAL | Age: 75
End: 2025-02-07
Attending: SOCIAL WORKER
Payer: MEDICARE

## 2025-02-07 DIAGNOSIS — F41.1 GENERALIZED ANXIETY DISORDER: ICD-10-CM

## 2025-02-07 DIAGNOSIS — F41.0 PANIC DISORDER: ICD-10-CM

## 2025-02-07 DIAGNOSIS — F33.2 SEVERE EPISODE OF RECURRENT MAJOR DEPRESSIVE DISORDER, WITHOUT PSYCHOTIC FEATURES (CMS/HCC): Primary | ICD-10-CM

## 2025-02-07 PROCEDURE — G0410 GRP PSYCH PARTIAL HOSP 45-50: HCPCS | Performed by: COUNSELOR

## 2025-02-07 NOTE — GROUP NOTE
[facility] DBT IOP Homework Review Group  Date:  2/7/2025  Start Time:  10:00 AM  End Time:  11:00 AM  Number of Attendees: 4    Stephanie Jeffers, YOB: 1950,  was an active group participant.    Group Focus:  Goal of group was review homework from previous session and reinforce/review skills learned and how patients applied the skills to multiple contexts in their lives    Group Focus: Goal of group was review homework from previous session and reinforce/review skills learned and how patients applied the skills to multiple contexts in their lives        Assessment/observation of group participation/presentation: Stephanie engaged in addressing myths and asking clarifying questions on the homework.     Visit Diagnosis:     ICD-10-CM ICD-9-CM   1. Severe episode of recurrent major depressive disorder, without psychotic features (CMS/HCC)  F33.2 296.33   2. Generalized anxiety disorder  F41.1 300.02   3. Panic disorder  F41.0 300.01       Plan: Continue DBT IOP to adopt skill translation to all relevant contexts.  Pt to F/U with treatment goals as outlined in treatment plan.  Pt to F/U with local ED/call 911 should SI/HI arises.        Ruby Stacy, LPC

## 2025-02-07 NOTE — GROUP NOTE
[facility] DBT IOP Check In Group  Date:  2/7/2025  Start Time:   9:00 AM  End Time:  10:00 AM  Number of Attendees: 4    Stephanie Jeffers, YOB: 1950,  was an active group participant.    Group Focus: Goal of group was to welcome new and returning IOP members, review group code of conduct, set expectations for participation in DBT IOP, and assess for safety.    About the Group: Clinician reviewed [facility] Group Code of Conduct and answered any questions that arose.  Clinician welcomed new members to the group and facilitated brief introductions of group members to one another.  Topic of the curriculum was “Interpersonal Effectiveness Skill - DEAR MAN”. This group is designed to institute skill training in communication to increase success in getting what you want.  Facilitator led group in a mindfulness participation practice called “Pictures and Judgement” to mindfully describe pictures without judgements.   Diary cards were reviewed following the meditation, with a focus on safety related behaviors such as SI, HI and SIB and symptoms of mood dysregulation such as Depression, Anxiety and Anger.      DBT Diary Card:  Day of the week  What day of the week are you completing this for?: Thursday  Emotions (0-5)  Sadness: 1  Anger: 0  Anxiety: 0  Guilt: 0  Shame: 0  Madonna: 1  Other: N/A  Action (Y/N)  Took medication as prescribed: Yes  Suicidal Plans/Intent: No  Self-Harm: No  Substance Use: No  Isolated: No  Other: N/A  Mindfulness Skills  Mindfulness Skills: Wise Mind, Non-Judgemental, One-Mindful  Distress Tolerance Skills: Self-Soothe, Radical Acceptance, Willingness, Mindfulness of Thoughts  Emotion Regulation Skills: Mindfulness of Emotions, Accumulate Positives, [place] Ahead  Interpersonal Effectiveness Skills: Prioritize Goals, Modulate Intensity    Assessment/observation of group participation/presentation: Stephanie was engaged in mindfulness and shared her completed diary card.    Visit Diagnosis:      ICD-10-CM ICD-9-CM   1. Severe episode of recurrent major depressive disorder, without psychotic features (CMS/HCC)  F33.2 296.33   2. Generalized anxiety disorder  F41.1 300.02   3. Panic disorder  F41.0 300.01       Plan: Continue DBT IOP to adopt skill translation to all relevant contexts.  Pt to F/U with treatment goals as outlined in treatment plan.  Pt to F/U with local ED/call 911 should SI/HI arises.        Ruby Stacy, LPC

## 2025-02-07 NOTE — GROUP NOTE
[facility] DBT IOP New Skill Acquisition/Wrap Up Group  Date:  2/7/2025  Start Time:  11:00 AM  End Time:  12:00 PM  Number of Attendees: 4    Stephanie Jeffesr, YOB: 1950,  was an active group participant.    Group Focus: Goal of group was introduce skill of the day and confirm homework assignment due for next group.  About the Group:  The topic of the day was introduced, guided by Interpersonal Effectiveness Handouts 4 and 5 from [employer]’s  “DBT Skills Training - Handouts and Worksheets” Second Edition.  Facilitator clarified the goals of Interpersonal Effectiveness (Handout 4) before delving into the skills for getting what you want.  The specific skill, DEAR MAN, was covered in depth (Handout 5).  Group was shown a video from DBT RU on DEAR MAN followed by facilitator leading group in applying the skill to a real-world example. Skills covered were Describe, Express, Assert, Reinforce, Mindful, Appear Confident and Negotiate.   Group concluded with a review of Homework and Diary Card expectations while enrolled in DBT IOP at [facility].  Group members were oriented to the assigned homework for the following group and opportunities to have questions answered was provided.      Assessment/observation of group participation/presentation: Stephanie was attentive and engaged as we dicussed and demonstrated various DEAR MAN exercises in group.  She had no questions on the homework.     Visit Diagnosis:     ICD-10-CM ICD-9-CM   1. Severe episode of recurrent major depressive disorder, without psychotic features (CMS/HCC)  F33.2 296.33   2. Generalized anxiety disorder  F41.1 300.02   3. Panic disorder  F41.0 300.01       Plan: Continue DBT IOP to adopt skill translation to all relevant contexts.  Pt to F/U with treatment goals as outlined in treatment plan.  Pt to F/U with local ED/call 911 should SI/HI arises.        Ruby Stacy, LPC

## 2025-02-10 ENCOUNTER — IOP (OUTPATIENT)
Dept: PSYCHIATRY | Facility: HOSPITAL | Age: 75
End: 2025-02-10
Attending: SOCIAL WORKER
Payer: MEDICARE

## 2025-02-10 DIAGNOSIS — F33.2 SEVERE EPISODE OF RECURRENT MAJOR DEPRESSIVE DISORDER, WITHOUT PSYCHOTIC FEATURES (CMS/HCC): Primary | ICD-10-CM

## 2025-02-10 DIAGNOSIS — F41.0 PANIC DISORDER: ICD-10-CM

## 2025-02-10 DIAGNOSIS — F41.1 GENERALIZED ANXIETY DISORDER: ICD-10-CM

## 2025-02-10 PROCEDURE — G0410 GRP PSYCH PARTIAL HOSP 45-50: HCPCS | Performed by: COUNSELOR

## 2025-02-10 NOTE — GROUP NOTE
[facility] DBT IOP Homework Review Group  Date:  2/10/2025  Start Time:  10:00 AM  End Time:  11:00 AM  Number of Attendees: 5    Stephanie Jeffers, YOB: 1950,  was an active group participant.    Group Focus:  Goal of group was review homework from previous session and reinforce/review skills learned and how patients applied the skills to multiple contexts in their lives    Group Focus: Goal of group was review homework from previous session and reinforce/review skills learned and how patients applied the skills to multiple contexts in their lives        Assessment/observation of group participation/presentation: Stephanie was an attentive, engaged participant while reviewing homework of DEAR MAN in group.     Visit Diagnosis:     ICD-10-CM ICD-9-CM   1. Severe episode of recurrent major depressive disorder, without psychotic features (CMS/HCC)  F33.2 296.33   2. Generalized anxiety disorder  F41.1 300.02   3. Panic disorder  F41.0 300.01       Plan: Continue DBT IOP to adopt skill translation to all relevant contexts.  Pt to F/U with treatment goals as outlined in treatment plan.  Pt to F/U with local ED/call 911 should SI/HI arises.        Ruby Stacy, LPC

## 2025-02-10 NOTE — GROUP NOTE
[facility] DBT IOP New Skill Acquisition/Wrap Up Group  Date:  2/10/2025  Start Time:  11:00 AM  End Time:  12:00 PM  Number of Attendees: 5    Stephanie Jeffers, YOB: 1950,  was an active group participant.    Group Focus: Goal of group was introduce skill of the day and confirm homework assignment due for next group.  About the Group:  The topic of the day was introduced, guided by Interpersonal Effectiveness Handouts 4 and 6 and 7 from [employer]’s  “DBT Skills Training - Handouts and Worksheets” Second Edition.  Facilitator clarified the goals of Interpersonal Effectiveness (Handout 4) before delving into the skills for relationship effectiveness.  The specific skill, GIVE, was covered in depth (Handout 6).  Group was shown a video from DBT RU on GIVE followed by facilitator leading group in applying the skill to a real-world example. Facilitator followed the GIVE skill with skills on self respect effectiveness.  The specific skill, FAST, was covered in depth (Handout 7).  Group was shown a video from DBT  on FAST followed by facilitator leading group in applying the skill to a real-world example. Group concluded with a review of Homework and Diary Card expectations while enrolled in DBT IOP at [facility].  Group members were oriented to the assigned homework for the following group and opportunities to have questions answered was provided.      Assessment/observation of group participation/presentation: Stephanie was actively engaged and alert while discussing skills of GIVE and FAST and how they support shifting of priorities in communication.     Visit Diagnosis:     ICD-10-CM ICD-9-CM   1. Severe episode of recurrent major depressive disorder, without psychotic features (CMS/HCC)  F33.2 296.33   2. Generalized anxiety disorder  F41.1 300.02   3. Panic disorder  F41.0 300.01       Plan: Continue DBT IOP to adopt skill translation to all relevant contexts.  Pt to F/U with treatment goals as outlined  in treatment plan.  Pt to F/U with local ED/call 911 should SI/HI arises.        Ruby Stacy, LPC

## 2025-02-10 NOTE — GROUP NOTE
[facility] DBT IOP Check In Group  Date:  2/10/2025  Start Time:   9:00 AM  End Time:  10:00 AM  Number of Attendees: 5    Stephanie Jeffers, YOB: 1950,  was an active group participant.    Group Focus: Goal of group was to welcome new and returning IOP members, review group code of conduct, set expectations for participation in DBT IOP, and assess for safety.    About the Group: Clinician reviewed [facility] Group Code of Conduct and answered any questions that arose.  Clinician welcomed new members to the group and facilitated brief introductions of group members to one another.  Topic of the curriculum was “Interpersonal Effectiveness Skill - GIVE and FAST”. This group is designed to institute skill training in communication to increase success in relationship and self respect effectiveness.  Facilitator led group in a mindfulness participation practice called “Lyrical Analysis” to mindfully describe how the song “Biju of Anything” by Sadia Panfilo illustrates the FAST skill.   Diary cards were reviewed following the meditation, with a focus on safety related behaviors such as SI, HI and SIB and symptoms of mood dysregulation such as Depression, Anxiety and Anger.      DBT Diary Card:  Day of the week  What day of the week are you completing this for?: Sunday  Emotions (0-5)  Sadness: 0  Anger: 0  Anxiety: 1  Guilt: 0  Shame: 0  Madonna: 5  Other: N/A  Action (Y/N)  Took medication as prescribed: Yes  Suicidal Plans/Intent: No  Self-Harm: No  Substance Use: No  Isolated: No  Other: N/A  Mindfulness Skills  Mindfulness Skills: Wise Mind, Non-Judgemental, One-Mindful, Effective  Distress Tolerance Skills: Pros and Cons, ACCEPTS, IMPROVE  Emotion Regulation Skills: Mindfulness of Emotions, Opposite Action, Allow Emotions  Interpersonal Effectiveness Skills: Modulate Intensity, Self-Validation, Validation of Others    Assessment/observation of group participation/presentation: Stephanie was engaged in mindfulness  and shared her diary card and margarita over the [employer] win.     Visit Diagnosis:     ICD-10-CM ICD-9-CM   1. Severe episode of recurrent major depressive disorder, without psychotic features (CMS/HCC)  F33.2 296.33   2. Generalized anxiety disorder  F41.1 300.02   3. Panic disorder  F41.0 300.01       Plan: Continue DBT IOP to adopt skill translation to all relevant contexts.  Pt to F/U with treatment goals as outlined in treatment plan.  Pt to F/U with local ED/call 911 should SI/HI arises.        Ruby Stacy, LPC

## 2025-02-11 ENCOUNTER — TELEMEDICINE (OUTPATIENT)
Dept: PSYCHIATRY | Facility: HOSPITAL | Age: 75
End: 2025-02-11
Attending: COUNSELOR
Payer: MEDICARE

## 2025-02-11 DIAGNOSIS — F41.1 GENERALIZED ANXIETY DISORDER: ICD-10-CM

## 2025-02-11 DIAGNOSIS — F33.2 SEVERE EPISODE OF RECURRENT MAJOR DEPRESSIVE DISORDER, WITHOUT PSYCHOTIC FEATURES (CMS/HCC): Primary | ICD-10-CM

## 2025-02-11 DIAGNOSIS — F41.0 PANIC DISORDER: ICD-10-CM

## 2025-02-11 PROCEDURE — 90832 PSYTX W PT 30 MINUTES: CPT | Mod: 95 | Performed by: COUNSELOR

## 2025-02-11 ASSESSMENT — COGNITIVE AND FUNCTIONAL STATUS - GENERAL
RECENT MEMORY: WNL
AROUSAL LEVEL: ALERT
EST. PREMORBID INTELLIGENCE: AVERAGE
SLEEP_WAKE_CYCLE: NO CHANGE
PERCEPTUAL FUNCTION: NORMAL
THOUGHT_CONTENT: APPROPRIATE
CONCENTRATION: WNL
DELUSIONS: NONE OR AGE APPROPRIATE
SPEECH: REGULAR
MOOD: EUTHYMIC (NORMAL);HOPEFUL;MOTIVATED
PSYCHOMOTOR FUNCTIONING: WNL
APPETITE: NO CHANGE
REMOTE MEMORY: WNL
EYE_CONTACT: WNL
INSIGHT: INTACT
LIBIDO: NON-CONTRIBUTORY
AFFECT: FULL RANGE;TENSE
IMPULSE CONTROL: INTACT
ORIENTATION: FULLY ORIENTED
THOUGHT_PROCESS: WNL;WORRY
ATTENTION: WNL
APPEARANCE: WELL GROOMED

## 2025-02-11 NOTE — PROGRESS NOTES
Stephanieemmanuel Jeffers is a 74 y.o. female who presents for Follow-up.     Request for Consent  Patient provided consent to treat via telehealth technology.Patient understands the telehealth visit will be billed to their insurance or patient directly.  Patient was informed only the patient and the clinician are permitted on the telehealth visit, visits are not recorded by the clinician, and the patient is not permitted to record the visit. Patient was provided information on how to access HIPAA compliant platform. Clinician confirmed identification of patient by name and birthdate, provider name, location of patient in [place], and callback number in case disconnected. Patient informed of the right to choose the form of care delivery, including the right to refuse a telehealth visit.     Patient Response to Request for Consent: Yes  Telehealth Platform utilized: Epic Video Client  Visit Type performed: Audio and Video  The patient is at home: Yes  The patient is in [place]:   yes  Those who participated in the encounter: Patient and Provider  Patient Call back number: [phone]      Presenting Problem:  DBT Skills, Mood Regulation, Aftercare    Mental Status Exam:  Arousal Level: Alert  Appearance: Well Groomed  Speech: Regular  Psychomotor Functioning: WNL  Eye Contact: WNL  Est. Premorbid Intelligence: Average  Orientation: Fully oriented  Attention: WNL  Concentration: WNL  Recent Memory: WNL  Remote Memory: WNL  Thought Content: Appropriate  Thought Process: WNL, Worry  Insight: Intact  Perceptual Function: Normal  Delusions: None or age appropriate  Sleeping: No Change  Appetite: No Change  Libido: Non-Contributory  Affect: Full Range, Tense  Mood: Euthymic (normal), Hopeful, Motivated  GAD7 Total Score: : 4  PHQ-9: Brief Depression Severity Measure Score: 6    [place] Suicide Severity Rating Scale: Done today         Assessment:   Edilma met over EVV to discuss treatment progress and update  her treatment plan.  Re administered PHQ9 and GAD7.  Mood significantly improved and Stephanie reports the combination of skills and a recent med change have been instrumental in her stability.  [facility]ed aftercare and Stephanie will do the OP DBT group with DAKOTA after IOP. She will also have a session with the RN she has been seeing tomorrow who was a recommendation from [employer] for therapy but anticipates that it will be there last as they have not joined well. Florind[facility]ed DAKOTA and MATTHEW reached out to team to see if [facility] is available as a therapy resource given she is an active pt with Dr. Saleem and DBT.  Updated treatment plan and Stephanie was unable to access it to sign while in session.  We agreed to have her sign in person tomorrow when she arrives for DBT IOP.     Suicide Risk Assessment Not indicated for this patient.  Plan:    Patient to F/U with DBT IOP 3 days a week, 3 hours per day, to adopt skill translation to all relevant contexts.    Patient to F/U with treatment goals as outlined in treatment plan.  Patient to F/U with local ED or call 911 should SI/HI arise.        Visit Diagnosis:    ICD-10-CM ICD-9-CM   1. Severe episode of recurrent major depressive disorder, without psychotic features (CMS/HCC)  F33.2 296.33   2. Generalized anxiety disorder  F41.1 300.02   3. Panic disorder  F41.0 300.01       Time  Start Time: 1300  End Time: 1333  Total Time: 33  Ruby Stacy LPC @ 1:39 PM

## 2025-02-11 NOTE — Clinical Note
Bill Valdez, Do we have space to bring Stephanie on for OP individual therapy with us?  She has a very flexible schedule and would do well with Mayra, or Vesna- Or anyone really but an older soul would be optimal.   She is doing very well in DBT IOP and will do the DBT OP with me as well on Tuesdays. Its been a nice surprise!
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
## 2025-02-12 ENCOUNTER — IOP (OUTPATIENT)
Dept: PSYCHIATRY | Facility: HOSPITAL | Age: 75
End: 2025-02-12
Attending: SOCIAL WORKER
Payer: MEDICARE

## 2025-02-12 DIAGNOSIS — F33.2 SEVERE EPISODE OF RECURRENT MAJOR DEPRESSIVE DISORDER, WITHOUT PSYCHOTIC FEATURES (CMS/HCC): Primary | ICD-10-CM

## 2025-02-12 DIAGNOSIS — F41.1 GENERALIZED ANXIETY DISORDER: ICD-10-CM

## 2025-02-12 DIAGNOSIS — F41.0 PANIC DISORDER: ICD-10-CM

## 2025-02-12 PROCEDURE — G0410 GRP PSYCH PARTIAL HOSP 45-50: HCPCS | Performed by: COUNSELOR

## 2025-02-12 NOTE — GROUP NOTE
[facility] DBT IOP Homework Review Group  Date:  2/12/2025  Start Time:  10:00 AM  End Time:  11:00 AM  Number of Attendees: 2    Stephanie Jeffers, YOB: 1950,  was an active group participant.    Group Focus:  Goal of group was review homework from previous session and reinforce/review skills learned and how patients applied the skills to multiple contexts in their lives    Group Focus: Goal of group was review homework from previous session and reinforce/review skills learned and how patients applied the skills to multiple contexts in their lives        Assessment/observation of group participation/presentation: Stephanie shared her RAMIN with [employer] Manager skills on ending a therapy relationship and was receptive to peer coaching.     Visit Diagnosis:     ICD-10-CM ICD-9-CM   1. Severe episode of recurrent major depressive disorder, without psychotic features (CMS/HCC)  F33.2 296.33   2. Generalized anxiety disorder  F41.1 300.02   3. Panic disorder  F41.0 300.01       Plan: Continue DBT IOP to adopt skill translation to all relevant contexts.  Pt to F/U with treatment goals as outlined in treatment plan.  Pt to F/U with local ED/call 911 should SI/HI arises.        Ruby Stacy, LPC

## 2025-02-12 NOTE — GROUP NOTE
[facility] DBT IOP New Skill Acquisition/Wrap Up Group  Date:  2/12/2025  Start Time:  11:00 AM  End Time:  12:00 PM  Number of Attendees: 3    Stephanie Jeffers, YOB: 1950,  was an active group participant.    Group Focus: Goal of group was introduce skill of the day and confirm homework assignment due for next group.  About the Group:  The topic of the day was introduced, guided by Interpersonal Effectiveness Handouts 14, 15, 16, 16A, and 16B from [employer]’s  “DBT Skills Training - Handouts and Worksheets” Second Edition.  Introduced the skills used to Walk The Middle Path (Dialectics, Validation, Recovering From Invalidation and Strategies for Changing behavior), highlighting the groups focus being on Dialectics (Handout 14).  Defined and further discussed what the purpose of Dialectics is (Handout 15).  Facilitated group wide discussion on the myriad of ways to Think and Act Dialectically (Handout 16), having group members identify other ways to see all sides, stay aware of connections, embrace change and see transactions.   Group members read aloud examples of Opposite Sides That Can Both Be True (handout 16A) and Important Opposites To Balance (Handout 16B).  Encouraged group discourse on identifying examples not discussed.  Group concluded with a review of Homework and Diary Card expectations while enrolled in DBT IOP at [facility].  Group members were oriented to the assigned homework for the following group and opportunities to have questions answered was provided.       Assessment/observation of group participation/presentation: Stephanie was engaged in adopting a dialectical stance, asking topical questions and had no questions on the homework.  She will not be present Friday and was given a fresh diary card.      Visit Diagnosis:     ICD-10-CM ICD-9-CM   1. Severe episode of recurrent major depressive disorder, without psychotic features (CMS/HCC)  F33.2 296.33   2. Generalized anxiety disorder   F41.1 300.02   3. Panic disorder  F41.0 300.01       Plan: Continue DBT IOP to adopt skill translation to all relevant contexts.  Pt to F/U with treatment goals as outlined in treatment plan.  Pt to F/U with local ED/call 911 should SI/HI arises.        Ruby Stacy, LPC

## 2025-02-12 NOTE — GROUP NOTE
[facility] DBT IOP Check In Group  Date:  2/12/2025  Start Time:   9:00 AM  End Time:  10:00 AM  Number of Attendees: 2    Stephanie Jeffers, YOB: 1950,  was an active group participant.    Group Focus: Goal of group was to welcome new and returning IOP members, review group code of conduct, set expectations for participation in DBT IOP, and assess for safety.    About the Group: Clinician reviewed [facility] Group Code of Conduct and answered any questions that arose.  Clinician welcomed new members to the group and facilitated brief introductions of group members to one another.  Topic of the curriculum was “Dialectics”. This group is designed educate group members on opposite sides that are simultaneously true.  Facilitator led group in a mindfulness participation practice of focused attention on the body.   Diary cards were reviewed following the meditation, with a focus on safety related behaviors such as SI, HI and SIB and symptoms of mood dysregulation such as Depression, Anxiety and Anger.      DBT Diary Card:  Day of the week  What day of the week are you completing this for?: Tuesday  Emotions (0-5)  Sadness: 1  Anger: 1  Anxiety: 1  Guilt: 0  Shame: 0  Madonna: 1  Other: N/A  Action (Y/N)  Took medication as prescribed: Yes  Suicidal Plans/Intent: No  Self-Harm: No  Substance Use: No  Isolated: No  Other: N/A  Mindfulness Skills  Mindfulness Skills: Wise Mind, Observe, Describe, Non-Judgemental  Distress Tolerance Skills: Self-Soothe, Radical Acceptance, Willingness  Emotion Regulation Skills: Mindfulness of Emotions, Problem Solving, [place] Ahead  Interpersonal Effectiveness Skills: Prioritize Goals, DEAR MAN, Self-Validation    Assessment/observation of group participation/presentation: Stephanie was engaged in mindfulness and shared her diary card in group.    Visit Diagnosis:     ICD-10-CM ICD-9-CM   1. Severe episode of recurrent major depressive disorder, without psychotic features (CMS/HCC)  F33.2  296.33   2. Generalized anxiety disorder  F41.1 300.02   3. Panic disorder  F41.0 300.01       Plan: Continue DBT IOP to adopt skill translation to all relevant contexts.  Pt to F/U with treatment goals as outlined in treatment plan.  Pt to F/U with local ED/call 911 should SI/HI arises.        Ruby Stacy, LPC

## 2025-02-12 NOTE — Clinical Note
Please remove Stephanie from this Friday 2/14 DBT IOP as she will be out secondary to the superbowl parade :)

## 2025-02-13 ENCOUNTER — IOP (OUTPATIENT)
Dept: PSYCHIATRY | Facility: HOSPITAL | Age: 75
End: 2025-02-13
Attending: PSYCHIATRY & NEUROLOGY
Payer: MEDICARE

## 2025-02-13 DIAGNOSIS — F41.0 PANIC DISORDER: ICD-10-CM

## 2025-02-13 DIAGNOSIS — F33.2 SEVERE EPISODE OF RECURRENT MAJOR DEPRESSIVE DISORDER, WITHOUT PSYCHOTIC FEATURES (CMS/HCC): Primary | ICD-10-CM

## 2025-02-13 DIAGNOSIS — F41.1 GENERALIZED ANXIETY DISORDER: ICD-10-CM

## 2025-02-13 PROCEDURE — G0463 HOSPITAL OUTPT CLINIC VISIT: HCPCS | Performed by: PSYCHIATRY & NEUROLOGY

## 2025-02-13 RX ORDER — CLONAZEPAM 0.5 MG/1
0.75 TABLET ORAL NIGHTLY
Qty: 45 TABLET | Refills: 0 | Status: SHIPPED | OUTPATIENT
Start: 2025-02-13 | End: 2025-03-17 | Stop reason: SDUPTHER

## 2025-02-13 RX ORDER — ARIPIPRAZOLE 2 MG/1
2 TABLET ORAL DAILY
Qty: 90 TABLET | Refills: 0 | Status: SHIPPED | OUTPATIENT
Start: 2025-02-13 | End: 2025-03-17 | Stop reason: SDUPTHER
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
## 2025-02-17 ENCOUNTER — IOP (OUTPATIENT)
Dept: PSYCHIATRY | Facility: HOSPITAL | Age: 75
End: 2025-02-17
Attending: SOCIAL WORKER
Payer: MEDICARE

## 2025-02-17 DIAGNOSIS — F41.0 PANIC DISORDER: ICD-10-CM

## 2025-02-17 DIAGNOSIS — F41.1 GENERALIZED ANXIETY DISORDER: ICD-10-CM

## 2025-02-17 DIAGNOSIS — F33.1 MODERATE EPISODE OF RECURRENT MAJOR DEPRESSIVE DISORDER (CMS/HCC): Primary | ICD-10-CM

## 2025-02-17 PROCEDURE — G0410 GRP PSYCH PARTIAL HOSP 45-50: HCPCS | Performed by: COUNSELOR

## 2025-02-17 NOTE — GROUP NOTE
[facility] DBT IOP Homework Review Group  Date:  2/17/2025  Start Time:  10:00 AM  End Time:  11:00 AM  Number of Attendees: 6    Stephanie Jeffers, YOB: 1950,  was an active group participant.    Group Focus:  Goal of group was review homework from previous session and reinforce/review skills learned and how patients applied the skills to multiple contexts in their lives    Group Focus: Goal of group was review homework from previous session and reinforce/review skills learned and how patients applied the skills to multiple contexts in their lives        Assessment/observation of group participation/presentation: Stephanie was attentive to peers sharing their work on dialectical thinking.     Visit Diagnosis:     ICD-10-CM ICD-9-CM   1. Moderate episode of recurrent major depressive disorder (CMS/HCC)  F33.1 296.32   2. Generalized anxiety disorder  F41.1 300.02   3. Panic disorder  F41.0 300.01       Plan: Continue DBT IOP to adopt skill translation to all relevant contexts.  Pt to F/U with treatment goals as outlined in treatment plan.  Pt to F/U with local ED/call 911 should SI/HI arises.        Ruby Stacy, LPC

## 2025-02-17 NOTE — GROUP NOTE
[facility] DBT IOP Check In Group  Date:  2/17/2025  Start Time:   9:00 AM  End Time:  10:00 AM  Number of Attendees: 6    Stephanie Jeffers, YOB: 1950,  was an active group participant.    Group Focus: Goal of group was to welcome new and returning IOP members, review group code of conduct, set expectations for participation in DBT IOP, and assess for safety.    About the Group: Clinician reviewed [facility] Group Code of Conduct and answered any questions that arose.  Clinician welcomed new members to the group and facilitated brief introductions of group members to one another.  Topic of the curriculum was “Validation”. This group is designed educate group members the utility of validation of self and others.  Facilitator led group in a mindfulness describe practice of “I Spy”.   Diary cards were reviewed following the mindful practice, with a focus on safety related behaviors such as SI, HI and SIB and symptoms of mood dysregulation such as Depression, Anxiety and Anger.        DBT Diary Card:  Day of the week  What day of the week are you completing this for?: Sunday  Emotions (0-5)  Sadness: 0  Anger: 0  Anxiety: 0  Guilt: 0  Shame: 0  Madonna: 2  Other: N/A  Action (Y/N)  Took medication as prescribed: Yes  Suicidal Plans/Intent: No  Self-Harm: No  Substance Use: No  Isolated: No  Other: N/A  Mindfulness Skills  Mindfulness Skills: Wise Mind, Non-Judgemental, One-Mindful  Distress Tolerance Skills: Self-Soothe, Radical Acceptance, Willingness, Mindfulness of Thoughts  Emotion Regulation Skills: Mindfulness of Emotions, Check the Facts, Allow Emotions  Interpersonal Effectiveness Skills: DEAR MAN, Self-Validation, Validation of Others    Assessment/observation of group participation/presentation: Stephanie was attentive and engaged in mindfulness and sharing her diary card.     Visit Diagnosis:     ICD-10-CM ICD-9-CM   1. Moderate episode of recurrent major depressive disorder (CMS/HCC)  F33.1 296.32   2.  Generalized anxiety disorder  F41.1 300.02   3. Panic disorder  F41.0 300.01       Plan: Continue DBT IOP to adopt skill translation to all relevant contexts.  Pt to F/U with treatment goals as outlined in treatment plan.  Pt to F/U with local ED/call 911 should SI/HI arises.        Ruby Stacy, LPC

## 2025-02-17 NOTE — GROUP NOTE
[facility] DBT IOP New Skill Acquisition/Wrap Up Group  Date:  2/17/2025  Start Time:  11:00 AM  End Time:  12:00 PM  Number of Attendees: 6    Stephanie Jeffers, YOB: 1950,  was an active group participant.    Group Focus: Goal of group was introduce skill of the day and confirm homework assignment due for next group.  About the Group:  The topic of the day was introduced, guided by Interpersonal Effectiveness Handouts 17, 18 and 19 from [employer]  “DBT Skills Training - Handouts and Worksheets” Second Edition.  Defined Validation and highlighted its utility (Handout 17).  Group members read aloud the 6 ways to Validate and identified areas that the validation could be useful for them (Handout 18). Facilitator led an exercise in group on Mirroring to illustrate Validation as “being present” in communication.  Group members were arranged in pairs and Facilitator had one person tell the other something very basic, such as what they ate for breakfast. The other person should be able to repeat this back to them quite accurately. Then gradually, the speaker was encouraged to share more and more complicated ideas and concepts. After 5 minutes, roles were reversed.  Group members at the end of the exercise shared reflections on the experience and offered validation from the facilitator. Encouraged group discourse on identifying examples not discussed.   Educated group on the process of recovering from Invalidation using Handout 19.  Group members shared aloud the step by step process and offered their own experiences where the process could serve them currently.  Group concluded with a review of Homework and Diary Card expectations while enrolled in DBT IOP at [facility].  Group members were oriented to the assigned homework for the following group and opportunities to have questions answered was provided.      Assessment/observation of group participation/presentation: Stephanie was engaged in adopting skills  of validation and engaged in skills demo in group.  She had no questions on the homework.     Visit Diagnosis:     ICD-10-CM ICD-9-CM   1. Moderate episode of recurrent major depressive disorder (CMS/HCC)  F33.1 296.32   2. Generalized anxiety disorder  F41.1 300.02   3. Panic disorder  F41.0 300.01       Plan: Continue DBT IOP to adopt skill translation to all relevant contexts.  Pt to F/U with treatment goals as outlined in treatment plan.  Pt to F/U with local ED/call 911 should SI/HI arises.        Ruby Stacy, LPC

## 2025-02-19 ENCOUNTER — TELEMEDICINE (OUTPATIENT)
Dept: CARDIOLOGY | Facility: CLINIC | Age: 75
End: 2025-02-19
Payer: MEDICARE

## 2025-02-19 ENCOUNTER — TELEPHONE (OUTPATIENT)
Dept: SCHEDULING | Facility: CLINIC | Age: 75
End: 2025-02-19
Payer: MEDICARE

## 2025-02-19 ENCOUNTER — IOP (OUTPATIENT)
Dept: PSYCHIATRY | Facility: HOSPITAL | Age: 75
End: 2025-02-19
Attending: SOCIAL WORKER
Payer: MEDICARE

## 2025-02-19 VITALS — BODY MASS INDEX: 19.14 KG/M2 | WEIGHT: 108 LBS | HEIGHT: 63 IN

## 2025-02-19 DIAGNOSIS — E78.01 FAMILIAL HYPERCHOLESTEROLEMIA: Primary | ICD-10-CM

## 2025-02-19 DIAGNOSIS — I25.10 ATHEROSCLEROSIS OF NATIVE CORONARY ARTERY OF NATIVE HEART WITHOUT ANGINA PECTORIS: ICD-10-CM

## 2025-02-19 DIAGNOSIS — F41.1 GENERALIZED ANXIETY DISORDER: ICD-10-CM

## 2025-02-19 DIAGNOSIS — F33.1 MODERATE EPISODE OF RECURRENT MAJOR DEPRESSIVE DISORDER (CMS/HCC): Primary | ICD-10-CM

## 2025-02-19 DIAGNOSIS — E78.41 ELEVATED LP(A): ICD-10-CM

## 2025-02-19 DIAGNOSIS — F41.0 PANIC DISORDER: ICD-10-CM

## 2025-02-19 DIAGNOSIS — I95.1 ORTHOSTATIC HYPOTENSION: ICD-10-CM

## 2025-02-19 PROCEDURE — G0410 GRP PSYCH PARTIAL HOSP 45-50: HCPCS | Performed by: COUNSELOR

## 2025-02-19 NOTE — GROUP NOTE
[facility] DBT IOP New Skill Acquisition/Wrap Up Group  Date:  2/19/2025  Start Time:  11:00 AM  End Time:  12:00 PM  Number of Attendees: 7    Stephanie Jeffers, YOB: 1950,  was an active group participant.    Group Focus: Goal of group was introduce skill of the day and confirm homework assignment due for next group.  About the Group:  The topic of the day was introduced, guided by General Handouts 1, 3 and 4 from [employer]  “DBT Skills Training - Handouts and Worksheets” Second Edition.  Facilitator educated the group the Goals of Skills Training, Guidelines of Skills Training as well as Assumptions of Members of Skills Training Groups.  Discussion was facilitated on the “Options for Solving Any Problem “, derived from General Handout 1A from the aforementioned text.  Facilitator followed up by leading a group exercise on identifying each members “Life [facility]” (LWL) by engaging members in a writing prompt exercise where their life is a metaphorical house.  Group members worked with Facilitator to build LWL from several prompts.  Members were provided encouragement, validation and support by facilitator.  Each member was directed to transcribe their LWL onto an index card for quick reference.  Group concluded with a review of Homework and Diary Card expectations while enrolled in DBT IOP at [facility].  Group members were oriented to the assigned homework for the following group and opportunities to have questions answered was provided.       Assessment/observation of group participation/presentation: Stephanie was engaged and attentive in building a life worth living.  She had no questions on the homework to do a pros and cons on being skillful.     Visit Diagnosis:     ICD-10-CM ICD-9-CM   1. Moderate episode of recurrent major depressive disorder (CMS/HCC)  F33.1 296.32   2. Generalized anxiety disorder  F41.1 300.02   3. Panic disorder  F41.0 300.01       Plan: Continue DBT [facility] to adopt skill  translation to all relevant contexts.  Pt to F/U with treatment goals as outlined in treatment plan.  Pt to F/U with local ED/call 911 should SI/HI arises.        Ruby Stacy, LPC

## 2025-02-19 NOTE — GROUP NOTE
[facility] DBT IOP Check In Group  Date:  2/19/2025  Start Time:   9:00 AM  End Time:  10:00 AM  Number of Attendees: 7    Stephanie Jeffers, YOB: 1950,  was an active group participant.    Group Focus: Goal of group was to welcome new and returning IOP members, review group code of conduct, set expectations for participation in DBT IOP, and assess for safety.    About the Group: Clinician reviewed [facility] Group Code of Conduct and answered any questions that arose.  Clinician welcomed new members to the group and facilitated brief introductions of group members to one another.  Topic of the curriculum was “Skill Training Guidelines, Assumptions, Goals and Life [place] Living”. This group is designed to orient participants to the core tenants of Dialectical Behavioral Therapy (DBT) as well as having the patients identify their Life [place] Living.  Facilitator led group in a mindfulness observation practice called “leaves on a stream” which is a visualization-focused meditation to center patients to the start of group.   Diary cards were reviewed following the meditation, with a focus on safety related behaviors such as SI, HI and SIB and symptoms of mood dysregulation such as Depression, Anxiety and Anger.      DBT Diary Card:  Day of the week  What day of the week are you completing this for?: Tuesday  Emotions (0-5)  Sadness: 0  Anger: 1  Anxiety: 1  Guilt: 0  Shame: 0  Madonna: 1  Other: N/A  Action (Y/N)  Took medication as prescribed: Yes  Suicidal Plans/Intent: No  Self-Harm: No  Substance Use: No  Isolated: No  Other: N/A  Mindfulness Skills  Mindfulness Skills: Wise Mind, Participate, One-Mindful  Distress Tolerance Skills: Self-Soothe, Radical Acceptance, Willingness  Emotion Regulation Skills: Mindfulness of Emotions, Check the Facts, Opposite Action, Problem Solving, [place] Ahead, Allow Emotions  Interpersonal Effectiveness Skills: Self-Validation, Validation of Others    Assessment/observation of group  participation/presentation: Stephanie was engaged in mindfulness and shared her diary card with group.     Visit Diagnosis:     ICD-10-CM ICD-9-CM   1. Moderate episode of recurrent major depressive disorder (CMS/HCC)  F33.1 296.32   2. Generalized anxiety disorder  F41.1 300.02   3. Panic disorder  F41.0 300.01       Plan: Continue DBT IOP to adopt skill translation to all relevant contexts.  Pt to F/U with treatment goals as outlined in treatment plan.  Pt to F/U with local ED/call 911 should SI/HI arises.        Ruby Stacy, LPC

## 2025-02-19 NOTE — PROGRESS NOTES
Antonio Price MD  Cardiology    [facility]ART [facility]    [facility]  [address]  [address] [place] [address]    TEL  [phone]  [email or web address]     02/19/25     Verification of Patient Location:  The patient affirms they are currently located in the following state: [place]    Request for Consent:    Audio and Video Encounter   Pari, my name is Antonio Price MD.  Before we proceed, can you please verify your identification by telling me your full name and date of birth?  Can you tell me who is in the room with you?    You and I are about to have a telemedicine check-in or visit because you have requested it.  This is a live video-conference.  I am a real person, speaking to you in real time.  There is no one else with me on the video-conference. I am not recording this conversation and I am asking you not to record it.  This telemedicine visit will be billed to your health insurance or you, if you are self-insured.  You understand you will be responsible for any copayments or coinsurances that apply to your telemedicine visit.  Communication platform used for this encounter:  Neo[employer]ista Video Visit (Epic Video Client)       Before starting our telemedicine visit, I am required to get your consent for this virtual check-in or visit by telemedicine. Do you consent?    Patient Response to Request for Consent:  Yes        Dear Dr. Wang:    It was my pleasure to see Ms. Stephanie Jeffers at the [facility] today for follow-up.    After last visit she started Zetia. The medication was ultimately stopped due to cramping. Follow up lab testing showed LFT abnormality.  Repatha was stopped, but ultimately restarted after a GI evaluation.  It was ultimately felt that her abnormal LFTs were likely related to ezetimibe.  She has been intermittently struggling with depression though overall has improved recently.  She reports that her diet has  been suboptimal due to this.  She is working on lifestyle change.  Her exercise habits are very good overall.  She has no cardiopulmonary symptoms at rest or with activity. Specifically, no chest pain or pressure.  No dyspnea at rest or with activity.  No lower extremity edema, orthopnea, PND.  No palpitations or syncope.  She is compliant with Repatha. No side effects. Blood pressure and heart rate have reportedly been controlled.    Cardiovascular History:  1. Familial hypercholesterolemia, elevated Lp(a)  2. Coronary atherosclerosis   3. Orthostatic hypotension    Past Medical History: Migraines, History of HBV    Past Surgical History:  Past Surgical History   Procedure Laterality Date    Cataract extraction Left 11/19/2018    Cataract extraction Right 01/16/2019    Cervical biopsy  w/ loop electrode excision      Cosmetic surgery      Face    LEEP and ECC N/A 1/24/2025    Performed by Ashley Aguilar MD at [facility] OR [facility]    Lumbar epidural injection         Medications:  Current Outpatient Medications   Medication Sig Dispense Refill    ARIPiprazole (ABILIFY) 2 mg tablet Take 1 tablet (2 mg total) by mouth daily. 90 tablet 0    clonazePAM (KlonoPIN) 0.5 mg tablet Take 1.5 tablets (0.75 mg total) by mouth nightly. 45 tablet 0    cycloSPORINE (RESTASIS) 0.05 % ophthalmic emulsion Administer 1 drop into both eyes 2 (two) times a day.      escitalopram (LEXAPRO) 10 mg tablet Take 1 tablet (10 mg total) by mouth daily. 90 tablet 0    estradioL (ESTRACE) 0.01 % (0.1 mg/gram) vaginal cream Insert 2 g into the vagina 2 (two) times a week (Mon, Thu). Please hold until 2 weeks after surgery Then resume. 16 g 0    evolocumab (REPATHA SURECLICK) 140 mg/mL pen Inject 1 mL (140 mg total) under the skin every 14 (fourteen) days. 6 mL 3    linaCLOtide (LINZESS) 290 mcg capsule Take 290 mcg by mouth daily before breakfast.      SUMAtriptan (IMITREX) 100 mg tablet Take 100 mg by mouth daily as needed for migraine. Only 1 dose in  24 hours      valACYclovir (VALTREX) 500 mg tablet Take 500 mg by mouth as needed.      XDEMVY 0.25 % drops Administer 1 drop into both eyes 2 (two) times a day.      ibuprofen (MOTRIN) 600 mg tablet Take 1 tablet (600 mg total) by mouth every 6 (six) hours as needed for mild pain (pain). (Patient not taking: Reported on 2025) 120 tablet 0     No current facility-administered medications for this visit.       Allergies: Sulfa (sulfonamide antibiotics)    Social History: She is .  She has 2 children.  Retired .  She is a never smoker.  Approximately 3 glasses of wine per week.  No recreational drugs.    Family History: She has a sister who has tetralogy of Fallot. Her brother has no documented coronary artery disease. Her mother  at a young age from complications of lung cancer.  She had hypercholesterolemia.  Her father lived to be 92.  He had a stroke late in life and also had hypercholesterolemia.  No family history of premature coronary artery disease, arrhythmias, cardiomyopathies, or sudden cardiac death.    Review of Systems: A complete 14-point review of systems is negative, except as noted in the HPI.    Exam: N/A given telemedicine visit. Below is her exam from her last in person visit.   Objective   There were no vitals filed for this visit.  Wt Readings from Last 3 Encounters:   25 49 kg (108 lb)   25 48.5 kg (107 lb)   25 49 kg (108 lb)     Body mass index is 19.13 kg/m².  Constitutional: Appears comfortable.   Eyes: No icterus.    ENT: Deferred.   Neck: No jugular venous distention.   Vascular: No carotid bruits.   Cardiac: Normal S1 and S2, regular rhythm. No murmurs, rubs, or gallops appreciated.  Lungs: Clear to auscultation bilaterally.    GI: Soft, normoactive bowel sounds.  Extremities: Warm. No lower extremity edema.   Skin: Dry.  Neurologic: Awake, alert, oriented.    Psychiatric: No agitation.    Labs: Personally reviewed and  discussed with the patient.  Notable for the following.   Lab Results   Component Value Date    LDLCALC 97 12/30/2024    CHOL 234 (H) 12/30/2024    TRIG 139 12/30/2024     12/30/2024    HGBA1C 5.5 12/21/2023     01/07/2025    K 4.3 01/07/2025    BUN 23 01/07/2025    CREATININE 0.8 01/07/2025    WBC 7.63 01/07/2025    HGB 13.7 01/07/2025     01/07/2025     Lab Results   Component Value Date    ALT 18 12/30/2024    ALT 18 12/30/2024    AST 24 12/30/2024    AST 24 12/30/2024    ALKPHOS 51 12/30/2024    ALKPHOS 51 12/30/2024    BILITOT 0.5 12/30/2024    BILITOT 0.5 12/30/2024 1/2022-  , , HDL 75, LDL 87  High-sensitivity CRP 0.9  Sodium 139, potassium 4.3, creatinine 0.9  LFTs within normal limits  TSH within normal limits    6/2019- , TG 82, ,     Cardiovascular Studies:   1. Stress echo, 1/2017: No ischemia. Excellent exercise tolerance. Baseline echo- Normal wall motion and excursion. 1+ MR and TR. RVSP 13 mmHg.   2.  CAC, 7/2021: Composite 120 (left main 1, LAD 51, RCA 68).  75-90th percentile.  Mild-moderate atherosclerotic calcification of the aorta.  Otherwise unremarkable noncardiac findings.  3.  Zio, 4/2023: Sinus (73, ). Isolated PAC and PVC. Symptoms corresponded with sinus rhythm.   4.  Stres echo, 5/2023: No ischemia. Excellent exercise capacity. Baseline echo- Normal LV size, thickness, LVEF 60%. No WMAs. Normal RV size/function. Mild MR.       Assessment/Plan     Familial hypercholesterolemia, elevated Lp(a)  She is tolerating Repatha without side effects.  Ezetimibe was discontinued due to LFT abnormalities.  She had significant myalgias on statin therapy.  Her current LDL is higher than I would like long-term, though she reports that her lifestyle habits have been suboptimal.  Will continue her current regimen for now without changes.  Will repeat a lipid profile in a few months and determine next steps from there.  We would consider  bempedoic acid as a next step and she will confirm with her hepatologist that this would be acceptable.  She has made first-degree family members aware of her Lp(a) elevation.    Coronary atherosclerosis   Her stress echocardiogram showed no evidence of ischemia.  She has no cardiopulmonary symptoms presently.  She knows to contact me for any new cardiopulmonary symptoms.  Aspirin was previously stopped due to easy bruising.  Lipid management as above.    Orthostatic hypotension  She reports that her blood pressure has been stable. She is asymptomatic. Will continue to monitor.    Tachycardia  She reports that her heart rate has normalized. She believes the tachycardia was related to prior medications.       It was my pleasure to visit with Stephanie COMPA Jeffers in clinic today.  She will call to schedule a 4 month visit.  Please do not hesitate to contact me with any questions.      Sincerely,       ________________  Antonio Price MD      Time Spent:  I spent 20 minutes on this date of service performing the following activities: obtaining history, entering orders, documenting, preparing for visit, obtaining / reviewing records, providing counseling and education, communicating results, and coordinating care.

## 2025-02-19 NOTE — GROUP NOTE
[facility] DBT IOP Homework Review Group  Date:  2/19/2025  Start Time:  10:00 AM  End Time:  11:00 AM  Number of Attendees: 7    Stephanie Jeffers, YOB: 1950,  was an active group participant.    Group Focus:  Goal of group was review homework from previous session and reinforce/review skills learned and how patients applied the skills to multiple contexts in their lives    Group Focus: Goal of group was review homework from previous session and reinforce/review skills learned and how patients applied the skills to multiple contexts in their lives        Assessment/observation of group participation/presentation: Stephanie shared her completed homework on validation strategies.  She was attentive toward peer shares as well.     Visit Diagnosis:     ICD-10-CM ICD-9-CM   1. Moderate episode of recurrent major depressive disorder (CMS/HCC)  F33.1 296.32   2. Generalized anxiety disorder  F41.1 300.02   3. Panic disorder  F41.0 300.01       Plan: Continue DBT IOP to adopt skill translation to all relevant contexts.  Pt to F/U with treatment goals as outlined in treatment plan.  Pt to F/U with local ED/call 911 should SI/HI arises.        Ruby Stacy, LPC

## 2025-02-21 ENCOUNTER — IOP (OUTPATIENT)
Dept: PSYCHIATRY | Facility: HOSPITAL | Age: 75
End: 2025-02-21
Attending: SOCIAL WORKER
Payer: MEDICARE

## 2025-02-21 DIAGNOSIS — F41.0 PANIC DISORDER: ICD-10-CM

## 2025-02-21 DIAGNOSIS — F33.1 MODERATE EPISODE OF RECURRENT MAJOR DEPRESSIVE DISORDER (CMS/HCC): Primary | ICD-10-CM

## 2025-02-21 DIAGNOSIS — F41.1 GENERALIZED ANXIETY DISORDER: ICD-10-CM

## 2025-02-21 PROCEDURE — G0410 GRP PSYCH PARTIAL HOSP 45-50: HCPCS | Performed by: COUNSELOR

## 2025-02-21 NOTE — GROUP NOTE
[facility] DBT IOP Check In Group  Date:  2/21/2025  Start Time:   9:00 AM  End Time:  10:00 AM  Number of Attendees: 8    Stephanie Jeffers, YOB: 1950,  was an active group participant.    Group Focus: Goal of group was to welcome new and returning IOP members, review group code of conduct, set expectations for participation in DBT IOP, and assess for safety.     About the Group: Clinician reviewed [facility] Group Code of Conduct and answered any questions that arose.  Clinician welcomed new members to the group and facilitated brief introductions of group members to one another.  Topic of the curriculum was “Introduction to Mindfulness and Wise Mind”. This group is designed introduce the utility and function of Mindfulness Practice and Skills.  Facilitator led group in a mindfulness describe practice using observation of a Vickie.  Participants then described the vickie in detail without judgements and reflected on their experience. Diary cards were reviewed following the mindful practice, with a focus on safety related behaviors such as SI, HI and SIB and symptoms of mood dysregulation such as Depression, Anxiety and Anger.      DBT Diary Card:  Day of the week  What day of the week are you completing this for?: Thursday  Emotions (0-5)  Sadness: 2  Anger: 2  Anxiety: 2  Guilt: 0  Shame: 0  Madonna: 1  Other: N/A  Action (Y/N)  Took medication as prescribed: Yes  Suicidal Plans/Intent: No  Self-Harm: No  Substance Use: No  Isolated: No  Other: N/A  Mindfulness Skills  Mindfulness Skills: Wise Mind, Participate, One-Mindful  Distress Tolerance Skills: Radical Acceptance, Willingness, Mindfulness of Thoughts  Emotion Regulation Skills: Mindfulness of Emotions, Opposite Action, [place] Ahead  Interpersonal Effectiveness Skills: Prioritize Goals, Dialectical Stance, Self-Validation    Assessment/observation of group participation/presentation: Stephanie was engaged in mindfulness and shared her diary card with  group.    Visit Diagnosis:     ICD-10-CM ICD-9-CM   1. Moderate episode of recurrent major depressive disorder (CMS/HCC)  F33.1 296.32   2. Generalized anxiety disorder  F41.1 300.02   3. Panic disorder  F41.0 300.01       Plan: Continue DBT IOP to adopt skill translation to all relevant contexts.  Pt to F/U with treatment goals as outlined in treatment plan.  Pt to F/U with local ED/call 911 should SI/HI arises.        Ruby Stacy, LPC         0 = understands/communicates without difficulty

## 2025-02-21 NOTE — GROUP NOTE
[facility] DBT IOP New Skill Acquisition/Wrap Up Group  Date:  2/21/2025  Start Time:  11:00 AM  End Time:  12:00 PM  Number of Attendees: 8    Stephanie Jeffers, YOB: 1950,  was an active group participant.    Group Focus: Goal of group was introduce skill of the day and confirm homework assignment due for next group.  About the Group:  Topic of the day was introduced, guided by Mindfulness Handouts 1, 1A and 3 from [employer]i [employer]’s  “DBT Skills Training - Handouts and Worksheets” Second Edition.  Discussed the goals of Mindfulness as intended in DBT skills practice (Handout 1).  Group members read aloud the definitions of Mindfulness (Handout 1A). Facilitator provided practices not included in the work sheet such as Mindfulness of ADls.  Discussion of Mindfulness as a state of mind took place, highlighting that practicing Mindfulness can be done a myriad of ways.  Educated group on the Three States of Mind, or Wise Mind. Facilitated group on Reason Mind and provided examples of how one can identify they are being reasonable.  Discussed Emotion Mind on a group level and sought engagement from group to describe times patients have responded emotionally, without considering the reality of the matter.  Highlight that Phan Mind is the Middle Path, the wisdom of both Emotion and Reason that nets us the greatest benefit in decision making.  Access to Wise Mind is done through practicing Mindfulness. Using the “Wise Mind T Chart”, Facilitator used group participant examples of recent events in order to work collectively to find the middle path, the Wise Mind between Emotion and Reason. Facilitator anchored group in the structure of DBT IOP and beginning each group with a different style of Mindful Practice.    Group concluded with a review of Homework and Diary Card expectations while enrolled in DBT IOP at [facility].  Group members were oriented to the assigned homework for the following group and opportunities  to have questions answered was provided      Assessment/observation of group participation/presentation: Stephanie was engaged and attentive in discussing mindfulness and wise mind.  She had no questions on the homework and gave empathic well wishes to a graduating peer.     Visit Diagnosis:     ICD-10-CM ICD-9-CM   1. Moderate episode of recurrent major depressive disorder (CMS/HCC)  F33.1 296.32   2. Generalized anxiety disorder  F41.1 300.02   3. Panic disorder  F41.0 300.01       Plan: Continue DBT IOP to adopt skill translation to all relevant contexts.  Pt to F/U with treatment goals as outlined in treatment plan.  Pt to F/U with local ED/call 911 should SI/HI arises.        Ruby Stacy, LPC

## 2025-02-21 NOTE — GROUP NOTE
[facility] DBT IOP Homework Review Group  Date:  2/21/2025  Start Time:  10:00 AM  End Time:  11:00 AM  Number of Attendees: 8    Stephanie Jeffers, YOB: 1950,  was an active group participant.    Group Focus:  Goal of group was review homework from previous session and reinforce/review skills learned and how patients applied the skills to multiple contexts in their lives    Group Focus: Goal of group was review homework from previous session and reinforce/review skills learned and how patients applied the skills to multiple contexts in their lives        Assessment/observation of group participation/presentation: Stephanie engaged in homework review on the pros and cons of being skillful with good effect.     Visit Diagnosis:     ICD-10-CM ICD-9-CM   1. Moderate episode of recurrent major depressive disorder (CMS/HCC)  F33.1 296.32   2. Generalized anxiety disorder  F41.1 300.02   3. Panic disorder  F41.0 300.01       Plan: Continue DBT IOP to adopt skill translation to all relevant contexts.  Pt to F/U with treatment goals as outlined in treatment plan.  Pt to F/U with local ED/call 911 should SI/HI arises.        Ruby Stacy, LPC

## 2025-02-24 ENCOUNTER — IOP (OUTPATIENT)
Dept: PSYCHIATRY | Facility: HOSPITAL | Age: 75
End: 2025-02-24
Attending: SOCIAL WORKER
Payer: MEDICARE

## 2025-02-24 DIAGNOSIS — F41.0 PANIC DISORDER: ICD-10-CM

## 2025-02-24 DIAGNOSIS — F33.1 MODERATE EPISODE OF RECURRENT MAJOR DEPRESSIVE DISORDER (CMS/HCC): Primary | ICD-10-CM

## 2025-02-24 DIAGNOSIS — F41.1 GENERALIZED ANXIETY DISORDER: ICD-10-CM

## 2025-02-24 PROCEDURE — G0410 GRP PSYCH PARTIAL HOSP 45-50: HCPCS | Performed by: COUNSELOR

## 2025-02-24 NOTE — GROUP NOTE
[facility] DBT IOP New Skill Acquisition/Wrap Up Group  Date:  2/24/2025  Start Time:  11:00 AM  End Time:  12:00 PM  Number of Attendees: 7    Stephanie Jeffers, YOB: 1950,  was an active group participant.    Group Focus: Goal of group was introduce skill of the day and confirm homework assignment due for next group.  About the Group:  The topic of the day was introduced, guided by Mindfulness Handouts 4, 4A,4B,4C,5,5A,5B,5C from [employer]i [employer]’s  “DBT Skills Training - Handouts and Worksheets” Second Edition.  Facilitator outlined the DBT structure of Mindfulness using Handouts 4 and 5 on the “What” and “How” skills.  Educated the group that the “What” skills are to be done one at a time, and the “how” skills are intended to be all at once. Facilitated discussions of patients lived experience using Mindfulness and barriers face.  Provided an overview to participants of each skill before systematically moving through practices of each.  Group participants were given 3-5 minutes to review each skill handout before being asked to read aloud examples of practical applications of each.  (Observe 4A, Describe 4B, Participate 4C) (Nonjudgmentalness 5A, One-Mindful 5B, Effectively 5C).  Anchored group in reflecting on the Mindfulness practice from the start of group, and had them describe non judgmentally their experience.  Patients identified areas for practice to be done between groups for the skills.     Group concluded with a review of Homework and Diary Card expectations while enrolled in DBT IOP at [facility].  Group members were oriented to the assigned homework for the following group and opportunities to have questions answered was provided.      Assessment/observation of group participation/presentation: Stephanie was attentive and alert adopting both WHAT and HOW skills of mindfulness.  She had no questions on the homework.     Visit Diagnosis:     ICD-10-CM ICD-9-CM   1. Moderate episode of recurrent  major depressive disorder (CMS/HCC)  F33.1 296.32   2. Generalized anxiety disorder  F41.1 300.02   3. Panic disorder  F41.0 300.01       Plan: Continue DBT IOP to adopt skill translation to all relevant contexts.  Pt to F/U with treatment goals as outlined in treatment plan.  Pt to F/U with local ED/call 911 should SI/HI arises.        Ruby Stacy, LPC

## 2025-02-24 NOTE — GROUP NOTE
[facility] DBT IOP Homework Review Group  Date:  2/24/2025  Start Time:  10:00 AM  End Time:  11:00 AM  Number of Attendees: 7    Stephanie Jeffers, YOB: 1950,  was an active group participant.    Group Focus:  Goal of group was review homework from previous session and reinforce/review skills learned and how patients applied the skills to multiple contexts in their lives    Group Focus: Goal of group was review homework from previous session and reinforce/review skills learned and how patients applied the skills to multiple contexts in their lives        Assessment/observation of group participation/presentation: Stephanie was attentive to peers sharing their experience practicing wise mind between groups.     Visit Diagnosis:     ICD-10-CM ICD-9-CM   1. Moderate episode of recurrent major depressive disorder (CMS/HCC)  F33.1 296.32   2. Generalized anxiety disorder  F41.1 300.02   3. Panic disorder  F41.0 300.01       Plan: Continue DBT IOP to adopt skill translation to all relevant contexts.  Pt to F/U with treatment goals as outlined in treatment plan.  Pt to F/U with local ED/call 911 should SI/HI arises.        Ruby Stacy, LPC

## 2025-02-24 NOTE — GROUP NOTE
[facility] DBT IOP Check In Group  Date:  2/24/2025  Start Time:   9:00 AM  End Time:  10:00 AM  Number of Attendees: 7    Stephanie Jeffers, YOB: 1950,  was an active group participant.    Group Focus: Goal of group was to welcome new and returning IOP members, review group code of conduct, set expectations for participation in DBT IOP, and assess for safety.    About the Group: Clinician reviewed [facility] Group Code of Conduct and answered any questions that arose.  Clinician welcomed new members to the group and facilitated brief introductions of group members to one another.  Topic of the curriculum was “The What and How Skills”. Facilitator led group in a participate mindfulness practice of movement using a low intensity [employer] dance routine.  Discussed Mindfulness practice capacity to be light hearted, a way to accumulate positive emotions.   Diary cards were reviewed following the mindfulness exercise, with a focus on safety related behaviors such as SI, HI and SIB and symptoms of mood dysregulation such as Sadness, Anxiety and Anger.      DBT Diary Card:  Day of the week  What day of the week are you completing this for?: Sunday  Emotions (0-5)  Sadness: 2  Anger: 2  Anxiety: 2  Guilt: 1  Shame: 1  Madonna: 1  Other: N/A  Action (Y/N)  Took medication as prescribed: Yes  Suicidal Plans/Intent: No  Self-Harm: No  Substance Use: No  Isolated: Yes  Other: N/A  Mindfulness Skills  Mindfulness Skills: Wise Mind, One-Mindful  Distress Tolerance Skills: Self-Soothe, Radical Acceptance  Emotion Regulation Skills: Mindfulness of Emotions, Opposite Action, PLEASE  Interpersonal Effectiveness Skills: GIVE, Dialectical Stance    Assessment/observation of group participation/presentation: Stephanie was attentive and engaged in mindful movement and shared her diary card with group.     Visit Diagnosis:     ICD-10-CM ICD-9-CM   1. Moderate episode of recurrent major depressive disorder (CMS/HCC)  F33.1 296.32    2. Generalized anxiety disorder  F41.1 300.02   3. Panic disorder  F41.0 300.01       Plan: Continue DBT IOP to adopt skill translation to all relevant contexts.  Pt to F/U with treatment goals as outlined in treatment plan.  Pt to F/U with local ED/call 911 should SI/HI arises.        Ruby Stacy, LPC

## 2025-02-25 ENCOUNTER — TELEPHONE (OUTPATIENT)
Dept: PSYCHIATRY | Facility: HOSPITAL | Age: 75
End: 2025-02-25
Payer: MEDICARE

## 2025-02-25 ENCOUNTER — TELEMEDICINE (OUTPATIENT)
Dept: PSYCHIATRY | Facility: HOSPITAL | Age: 75
End: 2025-02-25
Payer: MEDICARE

## 2025-02-25 DIAGNOSIS — F33.1 MODERATE EPISODE OF RECURRENT MAJOR DEPRESSIVE DISORDER (CMS/HCC): Primary | ICD-10-CM

## 2025-02-25 DIAGNOSIS — F41.1 GENERALIZED ANXIETY DISORDER: ICD-10-CM

## 2025-02-25 DIAGNOSIS — F41.0 PANIC DISORDER: ICD-10-CM

## 2025-02-25 ASSESSMENT — COGNITIVE AND FUNCTIONAL STATUS - GENERAL
LIBIDO: NON-CONTRIBUTORY
IMPULSE CONTROL: INTACT
THOUGHT_CONTENT: APPROPRIATE
ATTENTION: WNL
SPEECH: REGULAR
APPEARANCE: WELL GROOMED
CONCENTRATION: WNL
MOOD: EUTHYMIC (NORMAL);HOPEFUL;MOTIVATED;ANXIOUS
REMOTE MEMORY: WNL
RECENT MEMORY: WNL
PSYCHOMOTOR FUNCTIONING: WNL
THOUGHT_PROCESS: WNL
SLEEP_WAKE_CYCLE: NO CHANGE
PERCEPTUAL FUNCTION: NORMAL
EYE_CONTACT: WNL
INSIGHT: INTACT
AROUSAL LEVEL: ALERT
APPETITE: INCREASED
DELUSIONS: NONE OR AGE APPROPRIATE
ORIENTATION: FULLY ORIENTED
EST. PREMORBID INTELLIGENCE: AVERAGE
AFFECT: FULL RANGE
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
## 2025-02-25 NOTE — TELEPHONE ENCOUNTER
LPC called Stephanie who has not checked on on [facility] for our scheduled 1pm appointment to solidify DC and open up her OP DBT chart.     As LPC went to call, pt joined [facility]
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
## 2025-02-25 NOTE — Clinical Note
Will complete DBT IOP Friday.  Start OP 3/4 in DBT OP and with Mayra for IT. Continue with Dr. Saleem.

## 2025-02-25 NOTE — PROGRESS NOTES
Stephanie Jeffers is a 74 y.o. female who presents for Follow-up and Initial Evaluation.       Request for Consent  Patient provided consent to treat via telehealth technology.Patient understands the telehealth visit will be billed to their insurance or patient directly.  Patient was informed only the patient and the clinician are permitted on the telehealth visit, visits are not recorded by the clinician, and the patient is not permitted to record the visit. Patient was provided information on how to access HIPAA compliant platform. Clinician confirmed identification of patient by name and birthdate, provider name, location of patient in [place], and callback number in case disconnected. Patient informed of the right to choose the form of care delivery, including the right to refuse a telehealth visit.     Patient Response to Request for Consent: Yes  Telehealth Platform utilized: Epic Video Client  Visit Type performed: Audio and Video  The patient is at home: Yes  The patient is in [place]:   yes  Those who participated in the encounter: Patient and Provider  Patient Call back number: [phone]      Presenting Problem:  Discharge and aftercare planning, Step Down Evaluation    Mental Status Exam:  Arousal Level: Alert  Appearance: Well Groomed  Speech: Regular  Psychomotor Functioning: WNL  Eye Contact: WNL  Est. Premorbid Intelligence: Average  Orientation: Fully oriented  Attention: WNL  Concentration: WNL  Recent Memory: WNL  Remote Memory: WNL  Thought Content: Appropriate  Thought Process: WNL  Insight: Intact  Perceptual Function: Normal  Delusions: None or age appropriate  Sleeping: No Change  Appetite: Increased  Libido: Non-Contributory  Affect: Full Range  Mood: Euthymic (normal), Hopeful, Motivated, Anxious  GAD7 Total Score: : 4  PHQ-9: Brief Depression Severity Measure Score: 3    [place] Suicide Severity Rating Scale: Done today  C-SSRS Short Version Recent  1. Within the past month,  have you wished you were dead or wished you could go to sleep and not wake up?: No (2/25/2025  1:19 PM)  2. Within the past month, have you actually had any thoughts of killing yourself?: No (2/25/2025  1:19 PM)  6. Have you ever done anything, started to do anything, or prepared to do anything to end your life?: No (2/25/2025  1:19 PM)          Assessment:   Met with Stephanie on EVV to discuss DC and aftercare.  Spent session updating PHQ9 and GAD7, creating an OP plan inclusive of individual therapy, psychiatry and DBT OP groups. .  No safety concerns, measures indicative of significant mood improvement.  Pt motivated to engage in OP care and graduate on Friday. See  for details.       Based on [place] Suicide Screen and current clinical assessment, patient is determined Low Risk.  Plan:    Patient to F/U with DBT IOP 3 days a week, 3 hours per day, to adopt skill translation to all relevant contexts.    Patient to F/U with treatment goals as outlined in treatment plan.  Patient to F/U with local ED or call 911 should SI/HI arise.        Visit Diagnosis:    ICD-10-CM ICD-9-CM   1. Moderate episode of recurrent major depressive disorder (CMS/HCC)  F33.1 296.32   2. Generalized anxiety disorder  F41.1 300.02   3. Panic disorder  F41.0 300.01       Time  Start Time: 1310  End Time: 1345  Total Time: 35  Ruby Stacy LPC @ 1:46 PM

## 2025-02-26 ENCOUNTER — IOP (OUTPATIENT)
Dept: PSYCHIATRY | Facility: HOSPITAL | Age: 75
End: 2025-02-26
Attending: SOCIAL WORKER
Payer: MEDICARE

## 2025-02-26 DIAGNOSIS — F41.0 PANIC DISORDER: ICD-10-CM

## 2025-02-26 DIAGNOSIS — F41.1 GENERALIZED ANXIETY DISORDER: ICD-10-CM

## 2025-02-26 DIAGNOSIS — F33.1 MODERATE EPISODE OF RECURRENT MAJOR DEPRESSIVE DISORDER (CMS/HCC): Primary | ICD-10-CM

## 2025-02-26 PROCEDURE — G0410 GRP PSYCH PARTIAL HOSP 45-50: HCPCS | Performed by: COUNSELOR

## 2025-02-26 NOTE — GROUP NOTE
[facility] DBT IOP New Skill Acquisition/Wrap Up Group  Date:  2/26/2025  Start Time:  11:00 AM  End Time:  11:45 AM  Number of Attendees: 7    Stephanie Jeffers, YOB: 1950,  was an active group participant.    Group Focus: Goal of group was introduce skill of the day and confirm homework assignment due for next group.  About the Group:  The topic of the day was introduced, guided by Distress Tolerance Handouts 1, 2, 3 and 4 from [employer]’s  “DBT Skills Training - Handouts and Worksheets” Second Edition.  Facilitator outlined the goals of Distress Tolerance and their intended temporary use. (Handout 1).  Provided a brief overview to the group on the various ways Distress Tolerance is practiced. (Handout 2).  Educated the group in the ways we practice these skills as well as when they should be used and when they are not the optimal skill choice (Handout 3). Facilitator had patients describe ways they are currently, unknowingly, using Distress Tolerance as a way to solve problems.  Provided validation to patient experience and reframing of their efforts as skillful but in need of more.  Introduced the STOP skill (Stop, Take a Step Back, Observe, Proceed Mindfully) and its utility.  Provided brief explanation of the brains crisis response and our emotions living in the same area.  That the STOP skill helps us get the Frontal Cortex, where reason lives, involved and accesses our Wise Mind.    Group concluded with a review of Homework and Diary Card expectations while enrolled in DBT IOP at [facility].  Group members were oriented to the assigned homework for the following group and opportunities to have questions answered was provided.      Assessment/observation of group participation/presentation: Stephanie was engaged, taking notes and attending to facilitator to learn about distress tolerance and how to use the STOP skill.  She had no questions on the homework.     Visit Diagnosis:     ICD-10-CM ICD-9-CM    1. Moderate episode of recurrent major depressive disorder (CMS/HCC)  F33.1 296.32   2. Generalized anxiety disorder  F41.1 300.02   3. Panic disorder  F41.0 300.01       Plan: Continue DBT IOP to adopt skill translation to all relevant contexts.  Pt to F/U with treatment goals as outlined in treatment plan.  Pt to F/U with local ED/call 911 should SI/HI arises.        Ruby Stacy, LPC

## 2025-02-26 NOTE — GROUP NOTE
[facility] DBT IOP Homework Review Group  Date:  2/26/2025  Start Time:  10:00 AM  End Time:  11:00 AM  Number of Attendees: 7    Stephanie Jeffers, YOB: 1950,  was an active group participant.    Group Focus:  Goal of group was review homework from previous session and reinforce/review skills learned and how patients applied the skills to multiple contexts in their lives    Group Focus: Goal of group was review homework from previous session and reinforce/review skills learned and how patients applied the skills to multiple contexts in their lives        Assessment/observation of group participation/presentation: Stephanie engaged in describing her use of the HOW and WHAT skills to the group.    Visit Diagnosis:     ICD-10-CM ICD-9-CM   1. Moderate episode of recurrent major depressive disorder (CMS/HCC)  F33.1 296.32   2. Generalized anxiety disorder  F41.1 300.02   3. Panic disorder  F41.0 300.01       Plan: Continue DBT IOP to adopt skill translation to all relevant contexts.  Pt to F/U with treatment goals as outlined in treatment plan.  Pt to F/U with local ED/call 911 should SI/HI arises.        Ruby Stacy, LPC

## 2025-02-28 ENCOUNTER — TELEPHONE (OUTPATIENT)
Dept: PSYCHIATRY | Facility: HOSPITAL | Age: 75
End: 2025-02-28

## 2025-02-28 ENCOUNTER — IOP (OUTPATIENT)
Dept: PSYCHIATRY | Facility: HOSPITAL | Age: 75
End: 2025-02-28
Attending: SOCIAL WORKER
Payer: MEDICARE

## 2025-02-28 DIAGNOSIS — F41.1 GENERALIZED ANXIETY DISORDER: ICD-10-CM

## 2025-02-28 DIAGNOSIS — F33.1 MODERATE EPISODE OF RECURRENT MAJOR DEPRESSIVE DISORDER (CMS/HCC): Primary | ICD-10-CM

## 2025-02-28 DIAGNOSIS — F41.0 PANIC DISORDER: ICD-10-CM

## 2025-02-28 PROCEDURE — G0410 GRP PSYCH PARTIAL HOSP 45-50: HCPCS | Performed by: COUNSELOR

## 2025-03-03 ENCOUNTER — TELEPHONE (OUTPATIENT)
Dept: PSYCHIATRY | Facility: HOSPITAL | Age: 75
End: 2025-03-03
Payer: MEDICARE

## 2025-03-04 ENCOUNTER — TELEMEDICINE (OUTPATIENT)
Dept: PSYCHIATRY | Facility: HOSPITAL | Age: 75
End: 2025-03-04
Attending: COUNSELOR
Payer: MEDICARE

## 2025-03-04 DIAGNOSIS — F41.0 PANIC DISORDER: ICD-10-CM

## 2025-03-04 DIAGNOSIS — F41.1 GENERALIZED ANXIETY DISORDER: ICD-10-CM

## 2025-03-04 DIAGNOSIS — F33.1 MODERATE EPISODE OF RECURRENT MAJOR DEPRESSIVE DISORDER (CMS/HCC): Primary | ICD-10-CM

## 2025-03-04 PROCEDURE — 90853 GROUP PSYCHOTHERAPY: CPT | Performed by: COUNSELOR

## 2025-03-04 ASSESSMENT — COGNITIVE AND FUNCTIONAL STATUS - GENERAL
AFFECT: FULL RANGE
EYE_CONTACT: WNL
POSITIVE INVOLVEMENT: SELF-AWARE;EMPATHETIC;OPEN;RELEVANT;EASILTY ENGAGED;INTERESTED
THOUGHT_PROCESS: WNL
JUDGEMENT: GOOD
APPEARANCE: WELL GROOMED
PSYCHOMOTOR FUNCTIONING: WNL
INSIGHT: INTACT
MOOD: EUTHYMIC (NORMAL);HOPEFUL;MOTIVATED

## 2025-03-04 NOTE — GROUP NOTE
[facility] DBT OP Group    Date:  3/4/2025  Start Time:   9:00 AM  End Time:  10:40 AM  4 members attended group today.  Group Focus: Goal of group was introduce skill of the day and confirm homework assignment due for next group    Stephanie Jeffers, YOB: 1950, was an active group participant.    DBT OP Skills Group Session   Session 1: Orientation & Mindfulness- Guidelines, Assumptions and Wise Mind    Topic of the curriculum was Introduction to Mindfulness and Wise Mind. This group is designed introduce the utility and function of Mindfulness Practice and Skills.  Facilitator led group in a mindfulness observation practice using Progressive Muscle Relaxation.  Participants shared what they noticed physiologically once complete. Homework from the previous session was reviewed and discussed as a group on Distress Tolerance Skills worksheets 12/12A.   Following the review of Homework, the topic of the day was introduced, guided by General Handouts 3 and 4 and Mindfulness Handouts 1, 1A and 3 from Arti Mark's DBT Skills Training - Handouts and Worksheets Second Edition.  Facilitator educated the group the Goals of Skills Training, Guidelines of Skills Training as well as Assumptions of Members of Skills Training Groups.  Discussion was facilitated on the Options for Solving Any Problem , derived from General Handout 1A from the aforementioned text.  Discussed the goals of Mindfulness as intended in DBT skills practice (Handout 1).  Group members read aloud the definitions of Mindfulness (Handout 1A). Facilitator provided practices not included in the work sheet such as Mindfulness of ADLs.  Discussion of Mindfulness as a state of mind took place, highlighting that practicing Mindfulness can be done a myriad of ways.  Educated group on the Three States of Mind, or Wise Mind. Facilitated group on Reason Mind and provided examples of how one can identify they are being reasonable.  Discussed  Emotion Mind on a group level and sought engagement from group to describe times patients have responded emotionally, without considering the reality of the matter.  Highlight that Phan Mind is the Middle Path, the wisdom of both Emotion and Reason that nets us the greatest benefit in decision making.  Access to Wise Mind is done through practicing Mindfulness. As a group, participants engaged in a SELF CHECK IN reproduced from Abdirahman Phan's The Neurodivergent Friendly Workbook of DBT Skills.  Facilitator anchored group in the structure of DBT OP and beginning each group with a different style of Mindful Practice.    Group concluded with a review of Homework expectations while enrolled in DBT OP at [facility].  Group members were oriented to the assigned homework for the following group and opportunities to have questions answered was provided. Group wind down exercise was to engage in three rounds of Paced Breath to center patients at the conclusion of group.      Assessment/Observations:  Stephanie joined DBT OP today and introduced herself to peers.  She joined easily with participants and demonstrated validation skills throughout.  She had no questions on the homework to practice Wise Mind over the next week and engaged in all skills demos in group.     Plan: Stephanie to F/U with Dialectical Behavioral Therapy to translate skills to all relevant contexts between groups.  Pt to F/U with treatment goals as outlined in treatment plan.  Pt to F/U with local ED/call 911 should SI/HI arises.    Ruby Stacy, LPC

## 2025-03-11 ENCOUNTER — TELEMEDICINE (OUTPATIENT)
Dept: PSYCHIATRY | Facility: HOSPITAL | Age: 75
End: 2025-03-11
Attending: COUNSELOR
Payer: MEDICARE

## 2025-03-11 DIAGNOSIS — F41.0 PANIC DISORDER: ICD-10-CM

## 2025-03-11 DIAGNOSIS — F41.1 GENERALIZED ANXIETY DISORDER: ICD-10-CM

## 2025-03-11 DIAGNOSIS — F33.1 MODERATE EPISODE OF RECURRENT MAJOR DEPRESSIVE DISORDER (CMS/HCC): Primary | ICD-10-CM

## 2025-03-11 PROCEDURE — 90853 GROUP PSYCHOTHERAPY: CPT | Performed by: COUNSELOR

## 2025-03-11 ASSESSMENT — COGNITIVE AND FUNCTIONAL STATUS - GENERAL
JUDGEMENT: GOOD
APPEARANCE: WELL GROOMED
INSIGHT: INTACT
PSYCHOMOTOR FUNCTIONING: WNL
THOUGHT_PROCESS: WNL
MOOD: EUTHYMIC (NORMAL)
EYE_CONTACT: WNL
AFFECT: FULL RANGE

## 2025-03-11 NOTE — GROUP NOTE
"[facility] DBT OP Group    Date:  3/11/2025  Start Time:   9:00 AM  End Time:  10:30 AM  4 members attended group today.  Group Focus: Goal of group was introduce skill of the day and confirm homework assignment due for next group    Stephanie Jeffers, YOB: 1950, was an active group participant.    DBT OP Skills Group Session   Session 2: Mindfulness - What and How Skills    Topic of the curriculum was \"The What and How Skills\". This group is designed to further explore Mindfulness in DBT by defining WHAT mindfulness is and HOW we practice.  Facilitator led group in a mindfulness describe practice by Describing a Vickie.  Participants then described the vickie in detail without judgements and reflected on their experience. Homework from the previous session was reviewed and discussed as a group on Mindfulness Worksheet 3.   Following the review of Homework, the topic of the day was introduced, guided by Mindfulness Handouts 4, 4A,4B,4C,5,5A,5B,5C from Arti Mark's \"DBT Skills Training - Handouts and Worksheets\" Second Edition. Facilitator outlined the DBT structure of Mindfulness using Handouts 4 and 5 on the \"What\" and \"How\" skills. Educated the group that the \"What\" skills are to be done one at a time, and the \"how\" skills are intended to be all at once. Facilitated discussions of patients lived experience using Mindfulness and barriers face.  Provided an overview to participants of each skill before systematically moving through practices of each.  Group participants were given 3-5 minutes to review each skill handout before being asked to read aloud examples of practical applications of each.  (Observe 4A, Describe 4B, Participate 4C)(Non-judgmentalness 5A, One-Mindful 5B, Effectively 5C).  Anchored group in reflecting on the Mindfulness practice from the start of group and had them describe nonjudgmentally their experience. Patients identified areas for practice to be done between groups for the " skills. Group concluded with a review of Homework expectations while enrolled in DBT OP at [facility].  Group members were oriented to the assigned homework for the following group (Mindfulness Worksheet 2) and opportunities to have questions answered was provided. Group wind down exercise was to describe three blue things seen in the room to illustrate grounding as mindfulness.       Mental Status:  Findings  Mood: Euthymic (normal)  Affect: Full Range  Rapport: Attentive  Eye Contact: WNL  Appearance: Well Groomed  Psychomotor Functioning: WNL  Thought Process: WNL  Positive Involvement: Self-Aware, Empathetic, Interested  Judgment: Good  Insight: Intact    Assessment/Observations:  Stephanie was engaged, having completed her homework and joining easily with peers.  She participated in mindfulness and in adopting HOW and WHAT skills.  She had no questions on the homework and completed the wind down in group with peers.     Plan: Stephanie to F/U with Dialectical Behavioral Therapy to translate skills to all relevant contexts between groups.  Pt to F/U with treatment goals as outlined in treatment plan.  Pt to F/U with local ED/call 911 should SI/HI arises.    Ruby Stacy, LPC

## 2025-03-17 ENCOUNTER — TELEPHONE (OUTPATIENT)
Dept: PSYCHIATRY | Facility: HOSPITAL | Age: 75
End: 2025-03-17
Payer: MEDICARE

## 2025-03-17 ENCOUNTER — IOP (OUTPATIENT)
Dept: PSYCHIATRY | Facility: HOSPITAL | Age: 75
End: 2025-03-17
Attending: PSYCHIATRY & NEUROLOGY
Payer: MEDICARE

## 2025-03-17 DIAGNOSIS — F41.0 PANIC DISORDER: ICD-10-CM

## 2025-03-17 DIAGNOSIS — F41.1 GENERALIZED ANXIETY DISORDER: ICD-10-CM

## 2025-03-17 DIAGNOSIS — F33.1 MODERATE EPISODE OF RECURRENT MAJOR DEPRESSIVE DISORDER (CMS/HCC): Primary | ICD-10-CM

## 2025-03-17 PROCEDURE — 99214 OFFICE O/P EST MOD 30 MIN: CPT | Mod: 95 | Performed by: PSYCHIATRY & NEUROLOGY

## 2025-03-17 PROCEDURE — 90833 PSYTX W PT W E/M 30 MIN: CPT | Mod: 95 | Performed by: PSYCHIATRY & NEUROLOGY

## 2025-03-17 PROCEDURE — G0463 HOSPITAL OUTPT CLINIC VISIT: HCPCS | Performed by: PSYCHIATRY & NEUROLOGY

## 2025-03-17 RX ORDER — ARIPIPRAZOLE 2 MG/1
2 TABLET ORAL DAILY
Qty: 90 TABLET | Refills: 0 | Status: ON HOLD | OUTPATIENT
Start: 2025-03-17 | End: 2025-05-12

## 2025-03-17 RX ORDER — CLONAZEPAM 0.5 MG/1
0.75 TABLET ORAL NIGHTLY
Qty: 45 TABLET | Refills: 0 | Status: SHIPPED | OUTPATIENT
Start: 2025-03-17 | End: 2025-04-23 | Stop reason: SDUPTHER

## 2025-03-17 RX ORDER — CLONAZEPAM 0.5 MG/1
0.75 TABLET ORAL NIGHTLY
Qty: 45 TABLET | Refills: 0 | Status: CANCELLED | OUTPATIENT
Start: 2025-03-17 | End: 2025-04-16

## 2025-03-17 RX ORDER — ESCITALOPRAM OXALATE 10 MG/1
10 TABLET ORAL DAILY
Qty: 90 TABLET | Refills: 0 | Status: SHIPPED | OUTPATIENT
Start: 2025-03-17 | End: 2025-04-21 | Stop reason: SDUPTHER
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
## 2025-03-18 ENCOUNTER — TELEMEDICINE (OUTPATIENT)
Dept: PSYCHIATRY | Facility: HOSPITAL | Age: 75
End: 2025-03-18
Attending: COUNSELOR
Payer: MEDICARE

## 2025-03-18 DIAGNOSIS — F41.1 GENERALIZED ANXIETY DISORDER: ICD-10-CM

## 2025-03-18 DIAGNOSIS — F41.0 PANIC DISORDER: ICD-10-CM

## 2025-03-18 DIAGNOSIS — F33.1 MODERATE EPISODE OF RECURRENT MAJOR DEPRESSIVE DISORDER (CMS/HCC): Primary | ICD-10-CM

## 2025-03-18 ASSESSMENT — COGNITIVE AND FUNCTIONAL STATUS - GENERAL
EYE_CONTACT: WNL
PSYCHOMOTOR FUNCTIONING: WNL
INSIGHT: INTACT
JUDGEMENT: GOOD
AFFECT: FULL RANGE
THOUGHT_PROCESS: WNL
MOOD: EUTHYMIC (NORMAL);MOTIVATED
APPEARANCE: WELL GROOMED

## 2025-03-18 NOTE — GROUP NOTE
[facility] DBT OP Group    Date:  3/18/2025  Start Time:   9:00 AM  End Time:  10:30 AM  4 members attended group today.  Group Focus: Goal of group was introduce skill of the day and confirm homework assignment due for next group    Stephanie Jeffers, YOB: 1950, was an active group participant.    DBT OP Skills Group Session   Session 3: Interpersonal Effectiveness - Introduction    Topic of the curriculum was Introduction to Interpersonal Effectiveness. This group is designed to begin skill training in problem solving through communication.  Facilitator led group in a mindfulness observe practice using Body Scan.  Group members were encouraged to describe their observations during the body scan. Homework from the previous session was reviewed and discussed as a group on Mindfulness Worksheet 2.   Following the review of Homework, the topic of the day was introduced, guided by Interpersonal Effectiveness Handouts 1, 2 and 2A from Arti Mark's  DBT Skills Training - Handouts and Worksheets Second Edition.  Group was educated on the goals of Interpersonal Effectiveness Skills, such as getting what group members need from their supports, building and ending relationships and walking the Middle Path (Handout 1).  In depth discussion between Facilitator and group members took place on the myriad of factors that get in the way of Interpersonal Effectiveness (Handout 2). Worked with group members to read aloud various myths that get in the way of Interpersonal Effectiveness.  Dialogue was encouraged to identify addition myths both with objective effectiveness and relationship/self-esteem effectiveness.   Experiential art activity was facilitated on Listening and Drawing where participants will notice how their interpretations impacted their adherence to the directions of the exercise.  Group concluded with a review of Homework expectations while enrolled in DBT OP at [facility].  Group members were  oriented to the assigned homework for the following group (Interpersonal Effectiveness Worksheet 2) and opportunities to have questions answered was provided. Group wind down exercise was to share one observation participants had from group.    Mental Status:  Findings  Mood: Euthymic (normal), Motivated  Affect: Full Range  Rapport: Attentive, Appropriate  Eye Contact: WNL  Appearance: Well Groomed  Psychomotor Functioning: WNL  Thought Process: WNL  Positive Involvement: Self-Aware, Empathetic, Interested  Judgment: Good  Insight: Intact    Assessment/Observations:  Stephanie was engaged sharing her experience of mindfulness and the impact of her thoughts as well as willingness to be effective.  She completed her homework and asked topical questions on the skills. S he attentively engaged in discussion on the barriers to and myths of interpersonal effectiveness.     Plan: Stephanie to F/U with Dialectical Behavioral Therapy to translate skills to all relevant contexts between groups.  Pt to F/U with treatment goals as outlined in treatment plan.  Pt to F/U with local ED/call 911 should SI/HI arises.    Ruby Stacy, LPC

## 2025-03-21 ENCOUNTER — TELEPHONE (OUTPATIENT)
Dept: PSYCHIATRY | Facility: HOSPITAL | Age: 75
End: 2025-03-21

## 2025-03-21 ENCOUNTER — OFFICE VISIT (OUTPATIENT)
Dept: PSYCHIATRY | Facility: HOSPITAL | Age: 75
End: 2025-03-21
Attending: SOCIAL WORKER
Payer: MEDICARE

## 2025-03-21 DIAGNOSIS — F41.1 GENERALIZED ANXIETY DISORDER: ICD-10-CM

## 2025-03-21 DIAGNOSIS — F33.1 MODERATE EPISODE OF RECURRENT MAJOR DEPRESSIVE DISORDER (CMS/HCC): Primary | ICD-10-CM

## 2025-03-21 PROCEDURE — 90837 PSYTX W PT 60 MINUTES: CPT | Performed by: SOCIAL WORKER

## 2025-03-21 ASSESSMENT — COGNITIVE AND FUNCTIONAL STATUS - GENERAL
AFFECT: FULL RANGE
CONCENTRATION: WNL
THOUGHT_PROCESS: WNL
SPEECH: REGULAR
APPEARANCE: WELL GROOMED
APPETITE: NO CHANGE
EYE_CONTACT: WNL
AROUSAL LEVEL: ALERT
REMOTE MEMORY: WNL
MOOD: EUTHYMIC (NORMAL);MOTIVATED;ANXIOUS
DELUSIONS: NONE OR AGE APPROPRIATE
ORIENTATION: FULLY ORIENTED
THOUGHT_CONTENT: APPROPRIATE
ATTENTION: WNL
PERCEPTUAL FUNCTION: NORMAL
INSIGHT: INTACT
IMPULSE CONTROL: INTACT
EST. PREMORBID INTELLIGENCE: UNABLE TO ASSESS
RECENT MEMORY: WNL
SLEEP_WAKE_CYCLE: NO CHANGE
PSYCHOMOTOR FUNCTIONING: WNL
LIBIDO: NON-CONTRIBUTORY

## 2025-03-21 NOTE — TELEPHONE ENCOUNTER
Stephanie called to change her future scheduled appointments with [facility] to virtual instead of in-person, they have been updated.

## 2025-03-21 NOTE — PROGRESS NOTES
"  Presenting Problem:  Coping with loneliness and depression    Mental Status Exam:  Arousal Level: Alert  Appearance: Well Groomed  Speech: Regular  Psychomotor Functioning: WNL  Eye Contact: WNL  Est. Premorbid Intelligence: Unable to assess  Orientation: Fully oriented  Attention: WNL  Concentration: WNL  Recent Memory: WNL  Remote Memory: WNL  Thought Content: Appropriate  Thought Process: WNL  Insight: Intact  Perceptual Function: Normal  Delusions: None or age appropriate  Sleeping: No Change  Appetite: No Change  Libido: Non-Contributory  Affect: Full Range  Mood: Euthymic (normal), Motivated, Anxious     [place] Suicide Severity Rating Scale: Not indicated          Assessment:   Met with pt in person.  She shared about history of depression, marriages, relationships with her children and fear of not enjoying her daughters upcoming wedding due to conflictual relationship with ex . Reports \"I feel stuck\", wanting more social involvement and feelings of loneliness. She shared her 25 yo daughter Yuly is engaged and the wedding to [facility] is in the fall, hopes to work in therapy on getting to a better place with it by then. Talked about history with Mariaelena of alienation during Covid and them rebuilding their relationship. She shared about her history of drug addiction in her 30s and childhood with narcissistic father and mentally ill mother. Is finding the DBT skills to be very helpful. We talked about her interests - going to film classes, spending time with girlfriends, reading, and exercising. Therapist encouraged more engagement in social outlets through avenues such as meetup and adult night school. Went over the 4 box need assessment with her to do as homework. Talked briefly at the end about her relationship with herself, fear of being alone, and doing some EMDR in the sessions to process trauma and sense of safety which she states she seeks in romantic relationships.  Suicide Risk Assessment Not " indicated for this patient.  Plan:    Patient to F/U with Weekly psychotherapy for 45 minutes each session.    Patient to F/U with treatment goals as outlined in treatment plan.  Patient to F/U with local ED or call 911 should SI/HI arise.        Visit Diagnosis:    ICD-10-CM ICD-9-CM   1. Moderate episode of recurrent major depressive disorder (CMS/HCC)  F33.1 296.32   2. Generalized anxiety disorder  F41.1 300.02       Time  Start Time: 1000  End Time: 1055  Total Time: 55  Shannon Valencia LCSW @ 12:04 PM

## 2025-03-25 ENCOUNTER — TELEMEDICINE (OUTPATIENT)
Dept: PSYCHIATRY | Facility: HOSPITAL | Age: 75
End: 2025-03-25
Attending: COUNSELOR
Payer: MEDICARE

## 2025-03-25 DIAGNOSIS — F41.1 GENERALIZED ANXIETY DISORDER: ICD-10-CM

## 2025-03-25 DIAGNOSIS — F33.1 MODERATE EPISODE OF RECURRENT MAJOR DEPRESSIVE DISORDER (CMS/HCC): Primary | ICD-10-CM

## 2025-03-25 DIAGNOSIS — F41.0 PANIC DISORDER: ICD-10-CM

## 2025-03-25 PROCEDURE — 90853 GROUP PSYCHOTHERAPY: CPT | Mod: 95

## 2025-03-25 ASSESSMENT — COGNITIVE AND FUNCTIONAL STATUS - GENERAL
PSYCHOMOTOR FUNCTIONING: WNL
SPEECH: REGULAR
MOOD: EUTHYMIC (NORMAL);MOTIVATED;ANXIOUS
LIBIDO: NON-CONTRIBUTORY
APPEARANCE: WELL GROOMED
AROUSAL LEVEL: ALERT
EYE_CONTACT: WNL
THOUGHT_PROCESS: WNL
PERCEPTUAL FUNCTION: NORMAL
DELUSIONS: NONE OR AGE APPROPRIATE
THOUGHT_CONTENT: APPROPRIATE
RECENT MEMORY: WNL
AFFECT: FULL RANGE
REMOTE MEMORY: WNL
IMPULSE CONTROL: INTACT
EST. PREMORBID INTELLIGENCE: UNABLE TO ASSESS
SLEEP_WAKE_CYCLE: NO CHANGE
APPETITE: NO CHANGE
INSIGHT: INTACT
ATTENTION: WNL
ORIENTATION: FULLY ORIENTED
CONCENTRATION: WNL

## 2025-03-25 NOTE — GROUP NOTE
"[facility] DBT OP Group    Date:  3/25/2025  Start Time:   9:00 AM  End Time:  10:30 AM  3 members attended group today.  Group Focus: Goal of group was introduce skill of the day and confirm homework assignment due for next group    Stephanie Jeffers, YOB: 1950, was an active group participant.    DBT OP Skills Group Session   Session 4: Interpersonal Effectiveness - DEAR MAN    Topic of the curriculum was \"Objective Effectiveness in Interpersonal Effectiveness Skills\". This group is designed to continue skill training in problem solving through communication.  Facilitator led group in a mindfulness observe practice using the Chinese Proverb \"Good Or Bad, Who Knows?\" narrated via YouTube. Participants in mindfulness shared their insights and their relationship to the proverb describing how our interpretations are not factual . Homework from the previous session was reviewed and discussed as a group on Interpersonal Effectiveness Worksheet 2.  Following the review of Homework, the topic of the day was introduced, guided by Interpersonal Effectiveness Handouts 4 and 5 from Arti Mark's \"DBT Skills Training - Handouts and Worksheets\" Second Edition.  Group was educated on clarifying the goals of Interpersonal Effectiveness Skills, such as asking questions of \"What specific results or changes do I want/need?\", \"How do I want the person to feel about me after this interaction?\" and/or \"How do I want to feel about myself after this interaction?\" (IE Handout 4Education was provided on the skill DEAR MAN when we seek to prioritize our objectives in an interaction (IE Handout 5). Facilitator detailed each skill of Describe, Express, Assert, Reinforce, Mindful, Appear confidant, and Negotiate using an innocuous example of an everyday need.  Two group members were selected to role play this interaction and receive coaching from their peers.  Group concluded with a review of Homework expectations while " enrolled in DBT OP at [facility]. Group members were oriented to the assigned homework for the following group (Interpersonal Effectiveness Worksheet 4) and opportunities to have questions answered was provided. Group wind down exercise was to name your favorite musical artist or song.    Mental Status:  Findings  Mood: Euthymic (normal), Motivated, Anxious  Affect: Full Range  Eye Contact: WNL  Appearance: Well Groomed  Psychomotor Functioning: WNL  Thought Process: WNL  Insight: Intact    Assessment/Observations: Pt was engaged sharing her thoughts and feelings from mindfulness practice. Pt completed her homework and shared some of her homework. Pt was attentively engaged are provided empathetic feedback to peers.     Plan: Stephanie to F/U with Dialectical Behavioral Therapy to translate skills to all relevant contexts between groups.  Pt to F/U with treatment goals as outlined in treatment plan.  Pt to F/U with local ED/call 911 should SI/HI arises.    Mahnaz Beavers LCSW

## 2025-04-01 ENCOUNTER — TELEMEDICINE (OUTPATIENT)
Dept: PSYCHIATRY | Facility: HOSPITAL | Age: 75
End: 2025-04-01
Attending: COUNSELOR
Payer: MEDICARE

## 2025-04-01 DIAGNOSIS — F33.1 MODERATE EPISODE OF RECURRENT MAJOR DEPRESSIVE DISORDER (CMS/HCC): Primary | ICD-10-CM

## 2025-04-01 DIAGNOSIS — F41.1 GENERALIZED ANXIETY DISORDER: ICD-10-CM

## 2025-04-01 DIAGNOSIS — F41.0 PANIC DISORDER: ICD-10-CM

## 2025-04-01 ASSESSMENT — COGNITIVE AND FUNCTIONAL STATUS - GENERAL
PSYCHOMOTOR FUNCTIONING: WNL
JUDGEMENT: GOOD
EYE_CONTACT: WNL
MOOD: EUTHYMIC (NORMAL);MOTIVATED
THOUGHT_PROCESS: WNL
INSIGHT: INTACT
APPEARANCE: WELL GROOMED
AFFECT: FULL RANGE

## 2025-04-01 NOTE — GROUP NOTE
"[facility] DBT OP Group    Date:  4/1/2025  Start Time:   9:00 AM  End Time:  10:45 AM  Number of Attendees:3   Group Focus: Goal of group was introduce skill of the day and confirm homework assignment due for next group    Stephanie Jeffers, YOB: 1950, was an active group participant.    DBT OP Skills Group Session   Session 5: Interpersonal Effectiveness - GIVE and FAST    Topic of the curriculum was \"Relationship and Self-Respect in Interpersonal Effectiveness Skills\". This group is designed to continue skill training in problem solving through communication.  Facilitator led group in a mindfulness participation practice using a guided video via YouTube on the Emotional Freedom Technique on Tapping. Participants in mindfulness shared their insights and described their observations using the skill of Tapping. Homework from the previous session was reviewed and discussed as a group on Interpersonal Effectiveness Worksheet 4.   Following the review of Homework, the topic of the day was introduced, guided by Interpersonal Effectiveness Handouts 6 and 7 from Arti Mark's \"DBT Skills Training - Handouts and Worksheets\" Second Edition.  Group was educated on clarifying the goals of Interpersonal Effectiveness Skills, such as asking questions of \"What specific results or changes do I want/need?\", \"How do I want the person to feel about me after this interaction?\" and/or \"How do I want to feel about myself after this interaction?\" (IE Handout 4). Education was provided on the skills of GIVE and FAST when we seek to prioritize our relationship and or self respect in an interaction (IE Handouts 6 and 7). Facilitator detailed each skill of Give, Interested, Validate, Easy Manner, Fair, no Apologies, Stick to Values, and Truthful.  Two group members were selected to role play a script mimicking the homework assignment to craft  a request or say no to a request and receive coaching from their peers.  Group " concluded with a review of Homework expectations while enrolled in DBT OP at [facility].  Group members were oriented to the assigned homework for the following group (Interpersonal Effectiveness Worksheet 4) and opportunities to have questions answered was provided. Group wind down exercise was to name their astrological sign and if participants relate to their sign.    Mental Status:  Findings  Mood: Euthymic (normal), Motivated  Affect: Full Range  Rapport: Attentive, Appropriate  Eye Contact: WNL  Appearance: Well Groomed  Psychomotor Functioning: WNL  Thought Process: WNL  Positive Involvement: Self-Aware, Empathetic, Interested, Open  Judgment: Good  Insight: Intact    Assessment/Observations:  Stephanie was engaged, having completed her homework assignments between group and engaged with good participation in mindfulness.  She engaged with group on IE skills and had no questions on using GIVE and FAST skills this week.     Plan: Stephanie to F/U with Dialectical Behavioral Therapy to translate skills to all relevant contexts between groups.  Pt to F/U with treatment goals as outlined in treatment plan.  Pt to F/U with local ED/call 911 should SI/HI arises.    Ruby Stacy, LPC

## 2025-04-04 ENCOUNTER — TELEMEDICINE (OUTPATIENT)
Dept: PSYCHIATRY | Facility: HOSPITAL | Age: 75
End: 2025-04-04
Attending: SOCIAL WORKER
Payer: MEDICARE

## 2025-04-04 DIAGNOSIS — F41.1 GENERALIZED ANXIETY DISORDER: ICD-10-CM

## 2025-04-04 DIAGNOSIS — F33.1 MODERATE EPISODE OF RECURRENT MAJOR DEPRESSIVE DISORDER (CMS/HCC): Primary | ICD-10-CM

## 2025-04-04 PROCEDURE — 90834 PSYTX W PT 45 MINUTES: CPT | Mod: 95 | Performed by: SOCIAL WORKER

## 2025-04-04 ASSESSMENT — COGNITIVE AND FUNCTIONAL STATUS - GENERAL
EST. PREMORBID INTELLIGENCE: UNABLE TO ASSESS
APPEARANCE: WELL GROOMED
THOUGHT_CONTENT: APPROPRIATE
PERCEPTUAL FUNCTION: NORMAL
SPEECH: REGULAR
EYE_CONTACT: WNL
AFFECT: FULL RANGE
INSIGHT: INTACT
SLEEP_WAKE_CYCLE: NO CHANGE
LIBIDO: NON-CONTRIBUTORY
CONCENTRATION: WNL
AROUSAL LEVEL: ALERT
THOUGHT_PROCESS: WNL
REMOTE MEMORY: WNL
IMPULSE CONTROL: INTACT
DELUSIONS: NONE OR AGE APPROPRIATE
MOOD: EUTHYMIC (NORMAL);MOTIVATED
ORIENTATION: FULLY ORIENTED
PSYCHOMOTOR FUNCTIONING: WNL
APPETITE: NO CHANGE
RECENT MEMORY: WNL
ATTENTION: WNL

## 2025-04-04 NOTE — PROGRESS NOTES
"Request for Consent  Patient provided consent to treat via telehealth technology.Patient understands the telehealth visit will be billed to their insurance or patient directly.  Patient was informed only the patient and the clinician are permitted on the telehealth visit, visits are not recorded by the clinician, and the patient is not permitted to record the visit. Patient was provided information on how to access HIPAA compliant platform. Clinician confirmed identification of patient by name and birthdate, provider name, location of patient in [place], and callback number in case disconnected. Patient informed of the right to choose the form of care delivery, including the right to refuse a telehealth visit.     Patient Response to Request for Consent: Yes  Telehealth Platform utilized: Epic Video Client  Visit Type performed: Audio and Video  The patient is at home: Yes  The patient is in [place]:   yes  Those who participated in the encounter: Patient and Provider  Patient Call back number: [phone]   Stephanie Jeffers is a 74 y.o. female who presents for Follow-up.     Presenting Problem:  anxiety re: daughter and relationship    Mental Status Exam:  Arousal Level: Alert  Appearance: Well Groomed  Speech: Regular  Psychomotor Functioning: WNL  Eye Contact: WNL  Est. Premorbid Intelligence: Unable to assess  Orientation: Fully oriented  Attention: WNL  Concentration: WNL  Recent Memory: WNL  Remote Memory: WNL  Thought Content: Appropriate  Thought Process: WNL  Insight: Intact  Perceptual Function: Normal  Delusions: None or age appropriate  Sleeping: No Change  Appetite: No Change  Libido: Non-Contributory  Affect: Full Range  Mood: Euthymic (normal), Motivated     [place] Suicide Severity Rating Scale: Not indicated          Assessment: Met with patient for virtual session. She presented in a fair mood. Reports she is feeling relieved to have spoken to her ex  about their daughter, \"I'm " "glad it won't be a shock to see him at her wedding\". Talked about worrying how dtr will respond to their request that she get a prenup agreement. Talked about having an old boyfriend reappear and want to see her again. Told of her feeling disappointment in herself for allowing them to reconnect. Discussed how she has tendency in past to choose men who were unavailable like her father, narcissitic like him as well. Reports feeling the loneliness and hopeless about finding a partner. We talked about expanding her social life and finding activities to join. Will return in one week.     Suicide Risk Assessment Not indicated for this patient.  Plan:    Patient to F/U with Weekly psychotherapy for 45 minutes each session.  Patient to F/U with Biweekly  DBT OP Group psychotherapy for 90 minutes each session.  Patient to F/U with med checks for medication management as directed by prescriber.    Patient to F/U with treatment goals as outlined in treatment plan.  Patient to F/U with local ED or call 911 should SI/HI arise.        Visit Diagnosis:    ICD-10-CM ICD-9-CM   1. Moderate episode of recurrent major depressive disorder (CMS/HCC)  F33.1 296.32   2. Generalized anxiety disorder  F41.1 300.02       Time  Start Time: 1229  End Time: 1314  Total Time: 45  Shannon Valencia LCSW @ 1:19 PM          "

## 2025-04-08 ENCOUNTER — TELEMEDICINE (OUTPATIENT)
Dept: PSYCHIATRY | Facility: HOSPITAL | Age: 75
End: 2025-04-08
Attending: COUNSELOR
Payer: MEDICARE

## 2025-04-08 ENCOUNTER — APPOINTMENT (OUTPATIENT)
Dept: LAB | Facility: CLINIC | Age: 75
End: 2025-04-08
Attending: OBSTETRICS & GYNECOLOGY
Payer: MEDICARE

## 2025-04-08 DIAGNOSIS — F41.0 PANIC DISORDER: ICD-10-CM

## 2025-04-08 DIAGNOSIS — N39.0 RECURRENT UTI: ICD-10-CM

## 2025-04-08 DIAGNOSIS — F33.1 MODERATE EPISODE OF RECURRENT MAJOR DEPRESSIVE DISORDER (CMS/HCC): Primary | ICD-10-CM

## 2025-04-08 DIAGNOSIS — F41.1 GENERALIZED ANXIETY DISORDER: ICD-10-CM

## 2025-04-08 PROCEDURE — 87086 URINE CULTURE/COLONY COUNT: CPT

## 2025-04-08 ASSESSMENT — COGNITIVE AND FUNCTIONAL STATUS - GENERAL
EYE_CONTACT: WNL
JUDGEMENT: GOOD
PSYCHOMOTOR FUNCTIONING: WNL
MOOD: EUTHYMIC (NORMAL);MOTIVATED
APPEARANCE: WELL GROOMED
THOUGHT_PROCESS: WNL
AFFECT: FULL RANGE
INSIGHT: INTACT

## 2025-04-08 NOTE — GROUP NOTE
"[facility] DBT OP Group    Date:  4/8/2025  Start Time:   9:00 AM  End Time:  10:30 AM  Number of Attendees:4   Group Focus: Goal of group was introduce skill of the day and confirm homework assignment due for next group    Stephanie Jeffers, YOB: 1950, was an active group participant.    DBT OP Skills Group Session   Session 6: Interpersonal Effectiveness - Options for Saying No    Topic of the curriculum was continuing skills of Interpersonal Effectiveness by \"Evaluating Options for Whether or How Intensely to Ask for Something or Say No\". This group is designed to continue skill training in problem solving through communication.  Facilitator led group in a mindfulness observation practice using a guided video via YouTube on Elmer Kindness, or Metta Meditation. Participants in mindfulness shared their insights and described their observations practicing loving kindness. Homework from the previous session was reviewed and discussed as a group on Interpersonal Effectiveness Worksheet 4.   Following the review of Homework, the topic of the day was introduced, guided by Interpersonal Effectiveness Handouts 8 from Arti Mark's \"DBT Skills Training - Handouts and Worksheets\" Second Edition.  Group was systemically shown the variables in play impacting the intensity we may make requests or say no.  Factors that would influence our decision making were covered such as our capacity, the relationship and any power differentials at play (IE Handout 8). Each participant was give a set of 10 \"dimes\" or representative coins in order to practice \"The Dime Game\".  Using a nominal monetary value and scripted prompts, participants were to assign value to each variable to help establish how intensely they would respond to the request or say no to it. Participants were encouraged to offer their own prompts for coaching.   Group concluded with a review of Homework expectations while enrolled in DBT OP at [facility].  Group " members were oriented to the assigned homework for the following group (Interpersonal Effectiveness Worksheet 6) and opportunities to have questions answered was provided. Group wind down exercise was to use validation skills with their neighbor by offering a validating statement.    Mental Status:  Findings  Mood: Euthymic (normal), Motivated  Affect: Full Range  Rapport: Attentive, Appropriate  Eye Contact: WNL  Appearance: Well Groomed  Psychomotor Functioning: WNL  Thought Process: WNL  Positive Involvement: Self-Aware, Empathetic, Interested, Open  Judgment: Good  Insight: Intact    Assessment/Observations:  Stephanie was engaged in mindfulness, noting appreciation for the calm and the challenge of being kind toward individuals in conflict.  She completed her GIVE and FAST assignment without having need of coaching and engaged wholeheartedly in the [place] [employer] GAME in group. She had no questions on the homework to measure the cost of her interpersonal transactions this week using the Dime Game.  She validated her neighbors in group and was receptive to peer validation.    Plan: Stephanie to F/U with Dialectical Behavioral Therapy to translate skills to all relevant contexts between groups.  Pt to F/U with treatment goals as outlined in treatment plan.  Pt to F/U with local ED/call 911 should SI/HI arises.    Ruby Stacy, LPC

## 2025-04-09 LAB — BACTERIA UR CULT: NORMAL

## 2025-04-11 ENCOUNTER — TELEMEDICINE (OUTPATIENT)
Dept: PSYCHIATRY | Facility: HOSPITAL | Age: 75
End: 2025-04-11
Attending: SOCIAL WORKER
Payer: MEDICARE

## 2025-04-11 DIAGNOSIS — F41.1 GENERALIZED ANXIETY DISORDER: ICD-10-CM

## 2025-04-11 DIAGNOSIS — F41.0 PANIC DISORDER: ICD-10-CM

## 2025-04-11 DIAGNOSIS — F33.1 MODERATE EPISODE OF RECURRENT MAJOR DEPRESSIVE DISORDER (CMS/HCC): Primary | ICD-10-CM

## 2025-04-11 PROCEDURE — 90834 PSYTX W PT 45 MINUTES: CPT | Mod: 95 | Performed by: SOCIAL WORKER

## 2025-04-11 ASSESSMENT — COGNITIVE AND FUNCTIONAL STATUS - GENERAL
REMOTE MEMORY: WNL
AFFECT: FULL RANGE
THOUGHT_PROCESS: WNL
THOUGHT_CONTENT: APPROPRIATE
SPEECH: REGULAR
EYE_CONTACT: WNL
RECENT MEMORY: WNL
INSIGHT: INTACT
APPETITE: NO CHANGE
CONCENTRATION: WNL
DELUSIONS: NONE OR AGE APPROPRIATE
PERCEPTUAL FUNCTION: NORMAL
ATTENTION: WNL
IMPULSE CONTROL: INTACT
SLEEP_WAKE_CYCLE: NO CHANGE
APPEARANCE: WELL GROOMED
EST. PREMORBID INTELLIGENCE: UNABLE TO ASSESS
PSYCHOMOTOR FUNCTIONING: WNL
AROUSAL LEVEL: ALERT
ORIENTATION: FULLY ORIENTED
LIBIDO: NON-CONTRIBUTORY

## 2025-04-11 NOTE — PROGRESS NOTES
Request for Consent  Patient provided consent to treat via telehealth technology.Patient understands the telehealth visit will be billed to their insurance or patient directly.  Patient was informed only the patient and the clinician are permitted on the telehealth visit, visits are not recorded by the clinician, and the patient is not permitted to record the visit. Patient was provided information on how to access HIPAA compliant platform. Clinician confirmed identification of patient by name and birthdate, provider name, location of patient in [place], and callback number in case disconnected. Patient informed of the right to choose the form of care delivery, including the right to refuse a telehealth visit.     Patient Response to Request for Consent: Yes  Telehealth Platform utilized: Epic Video Client  Visit Type performed: Audio and Video  The patient is at home: Yes  The patient is in [place]:   yes  Those who participated in the encounter: Patient and Provider  Patient Call back number: [phone]   Stephanie Jeffers is a 74 y.o. female who presents for Follow-up.     Presenting Problem:  depressed    Mental Status Exam:  Arousal Level: Alert  Appearance: Well Groomed  Speech: Regular  Psychomotor Functioning: WNL  Eye Contact: WNL  Est. Premorbid Intelligence: Unable to assess  Orientation: Fully oriented  Attention: WNL  Concentration: WNL  Recent Memory: WNL  Remote Memory: WNL  Thought Content: Appropriate  Thought Process: WNL  Insight: Intact  Perceptual Function: Normal  Delusions: None or age appropriate  Sleeping: No Change  Appetite: No Change  Libido: Non-Contributory  Affect: Full Range  Mood: Motivated, Depressed, Sad     [place] Suicide Severity Rating Scale: Not indicated          Assessment:   Met with pt for virtual session. She presented in a down mood. She reports she is feeling down lately, after having ended the relationship with past boyfriend. Talked about how after last  "session she called him and was able to have a mutual agreement that it was best they not get into a relationship. Felt proud to be able to tell him a friendship would not work for her. She shared about how she felt \"the crash afterwards\" and was telling herself \"my life is empty and its always gonna be this way\". Talked about also being triggered by health problems and \"its probably because I grew up with a sister who was always dying and a mother who had ulcers and was having nervous breakdowns\". Told of sister having heart problem and surgery from young age. Therapist led her through the exercise of using a Thought Record to challenge her negative thoughts. Identified the negative statement of \"my life is empty and its always going to be this way\" and was able to come up with evidence against that (love my kids, they are important to me and need me, love my friendships, they tell me I enrich their lives, enjoy movies, books, museums and walking). Pt created two positive alternative statements to practice daily: \"despite feeling loneliness, I do fill my life with meaningful activities and I do have loving relationships\" and \"I can reach out and engage in connecting with people\". Will return in one week.  Suicide Risk Assessment Not indicated for this patient.  Plan:    Patient to F/U with Weekly psychotherapy for 45 minutes each session.  Patient to F/U with Biweekly  DBT OP Group psychotherapy for 90 minutes each session.  Patient to F/U with med checks for medication management as directed by prescriber.    Patient to F/U with treatment goals as outlined in treatment plan.  Patient to F/U with local ED or call 911 should SI/HI arise.        Visit Diagnosis:    ICD-10-CM ICD-9-CM   1. Moderate episode of recurrent major depressive disorder (CMS/HCC)  F33.1 296.32   2. Generalized anxiety disorder  F41.1 300.02   3. Panic disorder  F41.0 300.01       Time  Start Time: 1230  End Time: 1317  Total Time: 47  Shannon " MAICOL Valencia, HUDSONW @ 1:27 PM

## 2025-04-17 ENCOUNTER — TELEPHONE (OUTPATIENT)
Dept: PSYCHIATRY | Facility: HOSPITAL | Age: 75
End: 2025-04-17
Payer: MEDICARE

## 2025-04-19 ENCOUNTER — TELEPHONE (OUTPATIENT)
Dept: OBSTETRICS AND GYNECOLOGY | Facility: CLINIC | Age: 75
End: 2025-04-19
Payer: MEDICARE

## 2025-04-19 DIAGNOSIS — R30.0 DYSURIA: Primary | ICD-10-CM

## 2025-04-19 NOTE — TELEPHONE ENCOUNTER
Pt was confirmed with name and      Stephanie Jeffers is a 74 y.o.  who called due to a possibly UTI    Had a UTI previously- was on macrobid, helped with symptoms  Then symptoms returned  Culture was negative- symptoms have started again   Home dip was positive for leukocytes     Took One dose of Macrobid 30 minutes ago, reviewed can try azo as well     Instructed to do a repeat urine culture- labs sent over

## 2025-04-21 RX ORDER — ESCITALOPRAM OXALATE 5 MG/1
5 TABLET ORAL DAILY
Qty: 90 TABLET | Refills: 0 | Status: SHIPPED | OUTPATIENT
Start: 2025-04-21 | End: 2025-04-29 | Stop reason: SDUPTHER

## 2025-04-21 NOTE — TELEPHONE ENCOUNTER
Requesting refill for decreased dose   Medicine Refill Request    Last Telemedicine Visit: 1/28/2025 Paresh Saleem DO     Next Appointment: 5/7/2025  Paresh Saleem DO   Total Time: 4

## 2025-04-22 ENCOUNTER — TELEMEDICINE (OUTPATIENT)
Dept: PSYCHIATRY | Facility: HOSPITAL | Age: 75
End: 2025-04-22
Attending: COUNSELOR
Payer: MEDICARE

## 2025-04-22 ENCOUNTER — TELEPHONE (OUTPATIENT)
Dept: PSYCHIATRY | Facility: HOSPITAL | Age: 75
End: 2025-04-22

## 2025-04-22 DIAGNOSIS — F41.1 GENERALIZED ANXIETY DISORDER: ICD-10-CM

## 2025-04-22 DIAGNOSIS — F41.0 PANIC DISORDER: ICD-10-CM

## 2025-04-22 DIAGNOSIS — F33.1 MODERATE EPISODE OF RECURRENT MAJOR DEPRESSIVE DISORDER (CMS/HCC): Primary | ICD-10-CM

## 2025-04-22 ASSESSMENT — COGNITIVE AND FUNCTIONAL STATUS - GENERAL
PSYCHOMOTOR FUNCTIONING: WNL
EYE_CONTACT: WNL
AFFECT: RESTRICTED
POSITIVE INVOLVEMENT: RELEVANT
MOOD: EUTHYMIC (NORMAL);APATHETIC
APPEARANCE: WELL GROOMED
INSIGHT: IMPAIRED, MINIMALLY
JUDGEMENT: FAIR
THOUGHT_PROCESS: NEGATIVITY

## 2025-04-22 NOTE — GROUP NOTE
"[facility] DBT OP Group    Date:  4/22/2025  Start Time:   9:00 AM  End Time:  10:30 AM  Number of Attendees:6   Group Focus: Goal of group was introduce skill of the day and confirm homework assignment due for next group    Stephanie Jeffers, YOB: 1950, was a passive group participant.    DBT OP Skills Group Session   Session 8: Orientation & Mindfulness - Guidelines, Assumptions and Wise Mind    Topic of the curriculum was \"Introduction to Mindfulness and Wise Mind\". This group is designed introduce the utility and function of Mindfulness Practice and Skills.  Facilitator led group in a mindfulness participation practice using the making of a Paper Airplane facilitated by Prince PEDROZA. Participants tested their planes and shared their reflections of the experience. Homework from the previous session was reviewed and discussed as a group on Interpersonal Effectiveness Work Sheets 12 and 13.   Following the review of Homework, the topic of the day was introduced, guided by General Handouts 3 and 4 and Mindfulness Handouts 1, 1A and 3 from Arti Mark's \"DBT Skills Training - Handouts and Worksheets\" Second Edition.  Facilitator educated the group the Goals of Skills Training, Guidelines of Skills Training as well as Assumptions of Members of Skills Training Groups. Discussion was facilitated on the \"Options for Solving Any Problem \", derived from General Handout 1A from the aforementioned text.  Discussed the goals of Mindfulness as intended in DBT skills practice (Handout 1). Group members read aloud the definitions of Mindfulness (Handout 1A). Facilitator provided practices not included in the work sheet such as Mindfulness of ADLs.  Discussion of Mindfulness as a state of mind took place, highlighting that practicing Mindfulness can be done a myriad of ways. Educated group on the Three States of Mind, or Wise Mind. Facilitated group on Reason Mind and provided examples of how one can " "identify they are being reasonable.  Discussed Emotion Mind on a group level and sought engagement from group to describe times patients have responded emotionally, without considering the reality of the matter.  Highlight that Phan Mind is the Middle Path, the wisdom of both Emotion and Reason that nets us the greatest benefit in decision making.  Access to Wise Mind is done through practicing Mindfulness. As a group, participants engaged in decision making using a provided \"Wise Mind T-Chart\".   Group concluded with a review of Homework expectations while enrolled in DBT OP at [facility]. Group members were oriented to the assigned homework for the following group and opportunities to have questions answered was provided. Group wind down exercise was to engage in the practice pf gratitude by sharing one thing each participant was grateful for.    Mental Status:  Findings  Mood: Euthymic (normal), Apathetic  Affect: Restricted  Rapport: Inattentive  Eye Contact: WNL  Appearance: Well Groomed  Psychomotor Functioning: WNL  Thought Process: Negativity  Positive Involvement: Relevant  Negative Involvement: Bored, Guarded  Judgment: Fair  Insight: Impaired, minimally    Assessment/Observations:  Stephanie was passive throughout the group.  She noted having \"given up\" on mindfulness and struggling to be effective.  She shared her disagreement with LPC assessment on the impact on giving up and was able to acknowledge where giving up comes up interpersonally. As we covered PHAN MIND, she had her binder closed and did not follow along.  When I addressed homework, she noted she takes her previous packets out of her binder so it is less heavy and did not have the material to show LPC. She had no questions on the homework.     Plan: Stephanie to F/U with Dialectical Behavioral Therapy to translate skills to all relevant contexts between groups.  Pt to F/U with treatment goals as outlined in treatment plan.  Pt to F/U with local ED/call " 911 should SI/HI arises.    Ruby Stacy, LPC

## 2025-04-23 ENCOUNTER — TELEPHONE (OUTPATIENT)
Dept: PSYCHIATRY | Facility: HOSPITAL | Age: 75
End: 2025-04-23
Payer: MEDICARE

## 2025-04-23 NOTE — TELEPHONE ENCOUNTER
Called Stephanie to provide coaching on health anxiety.  Dicussed  target of addressing health anxiety and noticing in the past two weeks an uptick in psychiatry outreach, and being withdrawn. Validated Stephanie about worries on tremors and the variables that could be influencing her experience.      Provided psych ed on med side effects as well as skills tolerate distress when experience them in lieu of making rapid changes.      Set parameters that Dr. Saleem will not respond via the portal and support stephanie monthly in person- next session 5/5.  Radha, the RN will respond to portal messages in [facility] or by phone to address between appointment concerns and LPC will support Stephanie with coaching on skills to manage her anxiety. Dicussed how anticipating side effects can cause anxiety and cause her to shake.  That getting accustomed to the dose, reducing anxiety, may be what ends her experience of shaking.  Also dicussed how the med adjustment actually made her feel worse as she felt lower after the decrease, so looking at the pros and cons of tolerating side effects over refusing to.  Reinforced that we want to hear from stephanie about any side effects and that it will be Radha checking in moving forward.      Shared that the aim is not to unintentionally reinforce Stepahnie's health anxiety with responding with excessive reassurance.  That we want Stephanie to use the skills she has learned and translate them to her health anxiety and aging. Stephanie agreed and appreciated the coaching and is enrolled in working on skills translation.    Dicussed that if more frequent psychiatry intervention in needed, we will want ot consider increasing her LOC where psychiatry is more accessible.     Encouraged Stephanie to be in touch between groups and we will see her in person on Tuesday for OP DBT.

## 2025-04-29 ENCOUNTER — TELEMEDICINE (OUTPATIENT)
Dept: PSYCHIATRY | Facility: HOSPITAL | Age: 75
End: 2025-04-29
Attending: COUNSELOR
Payer: MEDICARE

## 2025-04-29 DIAGNOSIS — F41.0 PANIC DISORDER: ICD-10-CM

## 2025-04-29 DIAGNOSIS — F33.1 MODERATE EPISODE OF RECURRENT MAJOR DEPRESSIVE DISORDER (CMS/HCC): Primary | ICD-10-CM

## 2025-04-29 DIAGNOSIS — F41.1 GENERALIZED ANXIETY DISORDER: ICD-10-CM

## 2025-04-29 RX ORDER — ESCITALOPRAM OXALATE 10 MG/1
10 TABLET ORAL DAILY
Qty: 30 TABLET | Refills: 0 | Status: SHIPPED | OUTPATIENT
Start: 2025-04-29 | End: 2025-04-29 | Stop reason: SDUPTHER

## 2025-04-29 RX ORDER — ESCITALOPRAM OXALATE 10 MG/1
10 TABLET ORAL DAILY
Qty: 30 TABLET | Refills: 0 | Status: ON HOLD | OUTPATIENT
Start: 2025-04-29 | End: 2025-05-08 | Stop reason: SDUPTHER

## 2025-04-29 ASSESSMENT — COGNITIVE AND FUNCTIONAL STATUS - GENERAL
JUDGEMENT: GOOD
AFFECT: FULL RANGE
EYE_CONTACT: WNL
POSITIVE INVOLVEMENT: SELF-AWARE;EMPATHETIC;OPEN;OBJECTIVE;RELEVANT;EASILTY ENGAGED;INTERESTED
PSYCHOMOTOR FUNCTIONING: WNL
THOUGHT_PROCESS: WNL;WORRY
MOOD: EUTHYMIC (NORMAL);HOPEFUL;MOTIVATED
APPEARANCE: WELL GROOMED
INSIGHT: INTACT

## 2025-04-29 NOTE — GROUP NOTE
"[facility] DBT OP Group    Date:  4/29/2025  Start Time:   9:00 AM  End Time:  10:40 AM  Number of Attendees:6   Group Focus: Goal of group was introduce skill of the day and confirm homework assignment due for next group    Stephanie Jeffers, YOB: 1950, was an active group participant.    DBT OP Skills Group Session   Session 9: Mindfulness - What and How Skills    Topic of the curriculum was \"The What and How Skills\". This group is designed to further explore Mindfulness in DBT by defining WHAT mindfulness is and HOW we practice.  Facilitator led group in a mindfulness participation practice by engaging in Qi Gong to Shake the Body, facilitated by Elvis Sanford via YouTube. Group was encouraged to describe their observations in participating in Qi Gong as a group. Homework from the previous session was reviewed and discussed as a group on Mindfulness Worksheet 3.   Following the review of Homework, the topic of the day was introduced, guided by Mindfulness Handouts 4, 4A,4B,4C,5,5A,5B,5C from Arti Mark's \"DBT Skills Training - Handouts and Worksheets\" Second Edition. Facilitator outlined the DBT structure of Mindfulness using Handouts 4 and 5 on the \"What\" and \"How\" skills. Educated the group that the \"What\" skills are to be done one at a time, and the \"how\" skills are intended to be all at once. Facilitated discussions of patients lived experience using Mindfulness and barriers face.  Provided an overview to participants of each skill before systematically moving through practices of each.  Group participants were given 3-5 minutes to review each skill handout before being asked to read aloud examples of practical applications of each.  (Observe 4A, Describe 4B, Participate 4C) (Non-judgmentalness 5A, One-Mindful 5B, Effectively 5C).  Anchored group in reflecting on the Mindfulness practice from the start of group and had them reflect on their experience by isolating the 6 mindful skills and how " they were present in the exercise.  Patients identified areas for practice to be done between groups for the skills. Group concluded with a review of Homework expectations to denote daily practice using the weekly calendar of HOW and WHAT practices and reviewed expectations for completion while enrolled in DBT OP at [facility]. Group members were oriented to the assigned homework for the following group (Mindfulness Worksheets 4B and 5B) and opportunities to have questions answered was provided. Group wind down exercise was to describe and emotion you want to experience more of or have a healthier experience of.    Mental Status:  Findings  Mood: Euthymic (normal), Hopeful, Motivated  Affect: Full Range  Rapport: Attentive, Appropriate  Eye Contact: WNL  Appearance: Well Groomed  Psychomotor Functioning: WNL  Thought Process: WNL, Worry  Positive Involvement: Self-Aware, Empathetic, Open, Objective, Relevant, Easilty Engaged, Interested  Judgment: Good  Insight: Intact    Assessment/Observations:  Stephanie arrived on time, and completed mindfulness practice in group.  She completed her homework on Phan mind and was attentive to peers seeking coaching on the skills.  She had no question on the HOW and WHAT skills or on the assigned homework. She shared about wanting a healthier experience of her sadness and to have more margarita.  She met with the nurse to review a prescription.     Plan: Stephanie to F/U with Dialectical Behavioral Therapy to translate skills to all relevant contexts between groups.  Pt to F/U with treatment goals as outlined in treatment plan.  Pt to F/U with local ED/call 911 should SI/HI arises.    Ruby Stacy, LPC   normal

## 2025-04-29 NOTE — TELEPHONE ENCOUNTER
Stephanie is requesting a prescription for 10 mg tablet of escitalopram (LEXAPRO) in order to cut and quarter.  She states she cannot cut the 5 mg tablet in half and believes that she can quarter a 10 mg tablet with ease.  Total Time: 7

## 2025-05-05 ENCOUNTER — TELEMEDICINE (OUTPATIENT)
Dept: PSYCHIATRY | Facility: HOSPITAL | Age: 75
End: 2025-05-05
Attending: SOCIAL WORKER
Payer: MEDICARE

## 2025-05-05 ENCOUNTER — TELEPHONE (OUTPATIENT)
Dept: PSYCHIATRY | Facility: HOSPITAL | Age: 75
End: 2025-05-05
Payer: MEDICARE

## 2025-05-05 DIAGNOSIS — F41.1 GENERALIZED ANXIETY DISORDER: ICD-10-CM

## 2025-05-05 DIAGNOSIS — F33.1 MODERATE EPISODE OF RECURRENT MAJOR DEPRESSIVE DISORDER (CMS/HCC): Primary | ICD-10-CM

## 2025-05-05 PROCEDURE — 90832 PSYTX W PT 30 MINUTES: CPT | Mod: 95 | Performed by: SOCIAL WORKER

## 2025-05-06 ENCOUNTER — TELEPHONE (OUTPATIENT)
Dept: PSYCHIATRY | Facility: HOSPITAL | Age: 75
End: 2025-05-06
Payer: MEDICARE

## 2025-05-06 ENCOUNTER — HOSPITAL ENCOUNTER (INPATIENT)
Facility: HOSPITAL | Age: 75
LOS: 6 days | Discharge: HOME | DRG: 883 | End: 2025-05-12
Attending: PSYCHIATRY & NEUROLOGY | Admitting: PSYCHIATRY & NEUROLOGY
Payer: MEDICARE

## 2025-05-06 ENCOUNTER — HOSPITAL ENCOUNTER (EMERGENCY)
Facility: HOSPITAL | Age: 75
Discharge: OTHER INSTITUTION | End: 2025-05-06
Attending: EMERGENCY MEDICINE | Admitting: EMERGENCY MEDICINE
Payer: MEDICARE

## 2025-05-06 VITALS
TEMPERATURE: 97.5 F | DIASTOLIC BLOOD PRESSURE: 56 MMHG | OXYGEN SATURATION: 97 % | SYSTOLIC BLOOD PRESSURE: 114 MMHG | RESPIRATION RATE: 18 BRPM | HEART RATE: 77 BPM

## 2025-05-06 DIAGNOSIS — F32.A DEPRESSION, UNSPECIFIED DEPRESSION TYPE: Primary | ICD-10-CM

## 2025-05-06 DIAGNOSIS — F33.2 SEVERE EPISODE OF RECURRENT MAJOR DEPRESSIVE DISORDER, WITHOUT PSYCHOTIC FEATURES (CMS/HCC): ICD-10-CM

## 2025-05-06 DIAGNOSIS — F41.9 ANXIETY: ICD-10-CM

## 2025-05-06 LAB
ALBUMIN SERPL-MCNC: 4.6 G/DL (ref 3.5–5.7)
ALP SERPL-CCNC: 48 IU/L (ref 34–125)
ALT SERPL-CCNC: 14 IU/L (ref 7–52)
AMPHET UR QL SCN: NOT DETECTED
ANION GAP SERPL CALC-SCNC: 8 MEQ/L (ref 3–15)
APAP SERPL-MCNC: 0.1 UG/ML (ref 10–30)
AST SERPL-CCNC: 21 IU/L (ref 13–39)
BACTERIA URNS QL MICRO: ABNORMAL /HPF
BARBITURATES UR QL SCN: NOT DETECTED
BASOPHILS # BLD: 0 K/UL (ref 0.01–0.1)
BASOPHILS NFR BLD: 0 %
BENZODIAZ UR QL SCN: NOT DETECTED
BILIRUB SERPL-MCNC: 0.5 MG/DL (ref 0.3–1.2)
BILIRUB UR QL STRIP.AUTO: NEGATIVE MG/DL
BUN SERPL-MCNC: 23 MG/DL (ref 7–25)
CALCIUM SERPL-MCNC: 10.1 MG/DL (ref 8.6–10.3)
CANNABINOIDS UR QL SCN: NOT DETECTED
CHLORIDE SERPL-SCNC: 102 MEQ/L (ref 98–107)
CHOLEST SERPL-MCNC: 253 MG/DL
CLARITY UR REFRACT.AUTO: CLEAR
CO2 SERPL-SCNC: 29 MEQ/L (ref 21–31)
COCAINE UR QL SCN: NOT DETECTED
COLOR UR AUTO: YELLOW
CREAT SERPL-MCNC: 0.8 MG/DL (ref 0.6–1.2)
DIFFERENTIAL METHOD BLD: ABNORMAL
EGFRCR SERPLBLD CKD-EPI 2021: >60 ML/MIN/1.73M*2
EOSINOPHIL # BLD: 0.02 K/UL (ref 0.04–0.36)
EOSINOPHIL NFR BLD: 0.3 %
ERYTHROCYTE [DISTWIDTH] IN BLOOD BY AUTOMATED COUNT: 14.5 % (ref 11.7–14.4)
EST. AVERAGE GLUCOSE BLD GHB EST-MCNC: 114 MG/DL
ETHANOL SERPL-MCNC: <10 MG/DL
FENTANYL CTO UR SCN-MCNC: NOT DETECTED NG/ML
GLUCOSE SERPL-MCNC: 102 MG/DL (ref 70–99)
GLUCOSE UR STRIP.AUTO-MCNC: NEGATIVE MG/DL
HBA1C MFR BLD: 5.6 %
HCT VFR BLD AUTO: 44 % (ref 35–45)
HDLC SERPL-MCNC: 120 MG/DL
HDLC SERPL: 2.1 {RATIO}
HGB BLD-MCNC: 13.8 G/DL (ref 11.8–15.7)
HGB UR QL STRIP.AUTO: ABNORMAL
HYALINE CASTS #/AREA URNS LPF: ABNORMAL /LPF
IMM GRANULOCYTES # BLD AUTO: 0.03 K/UL (ref 0–0.08)
IMM GRANULOCYTES NFR BLD AUTO: 0.4 %
KETONES UR STRIP.AUTO-MCNC: NEGATIVE MG/DL
LDLC SERPL CALC-MCNC: 114 MG/DL
LEUKOCYTE ESTERASE UR QL STRIP.AUTO: NEGATIVE
LYMPHOCYTES # BLD: 2.03 K/UL (ref 1.2–3.5)
LYMPHOCYTES NFR BLD: 28.2 %
MCH RBC QN AUTO: 27.3 PG (ref 28–33.2)
MCHC RBC AUTO-ENTMCNC: 31.4 G/DL (ref 32.2–35.5)
MCV RBC AUTO: 87 FL (ref 83–98)
MONOCYTES # BLD: 0.55 K/UL (ref 0.28–0.8)
MONOCYTES NFR BLD: 7.6 %
MUCOUS THREADS URNS QL MICRO: ABNORMAL /LPF
NEUTROPHILS # BLD: 4.57 K/UL (ref 1.7–7)
NEUTS SEG NFR BLD: 63.5 %
NITRITE UR QL STRIP.AUTO: NEGATIVE
NONHDLC SERPL-MCNC: 133 MG/DL
NRBC BLD-RTO: 0 %
OPIATES UR QL SCN: NOT DETECTED
PCP UR QL SCN: NOT DETECTED
PH UR STRIP.AUTO: 6.5 [PH]
PLATELET # BLD AUTO: 240 K/UL (ref 150–369)
PMV BLD AUTO: 10 FL (ref 9.4–12.3)
POTASSIUM SERPL-SCNC: 3.9 MEQ/L (ref 3.5–5.1)
PROT SERPL-MCNC: 8.1 G/DL (ref 6–8.2)
PROT UR QL STRIP.AUTO: NEGATIVE
RBC # BLD AUTO: 5.06 M/UL (ref 3.93–5.22)
RBC #/AREA URNS HPF: ABNORMAL /HPF
SALICYLATES SERPL-MCNC: <1.5 MG/DL
SARS-COV-2 AG RESP QL IA.RAPID: NORMAL
SODIUM SERPL-SCNC: 139 MEQ/L (ref 136–145)
SP GR UR REFRACT.AUTO: 1.01
SQUAMOUS URNS QL MICRO: ABNORMAL /HPF
TRIGL SERPL-MCNC: 97 MG/DL
TSH SERPL DL<=0.05 MIU/L-ACNC: 1.44 MIU/L (ref 0.34–5.6)
UROBILINOGEN UR STRIP-ACNC: 0.2 EU/DL
WBC # BLD AUTO: 7.2 K/UL (ref 3.8–10.5)
WBC #/AREA URNS HPF: ABNORMAL /HPF

## 2025-05-06 PROCEDURE — 87426 SARSCOV CORONAVIRUS AG IA: CPT

## 2025-05-06 PROCEDURE — 85025 COMPLETE CBC W/AUTO DIFF WBC: CPT | Performed by: EMERGENCY MEDICINE

## 2025-05-06 PROCEDURE — 81001 URINALYSIS AUTO W/SCOPE: CPT | Mod: 59

## 2025-05-06 PROCEDURE — 99285 EMERGENCY DEPT VISIT HI MDM: CPT

## 2025-05-06 PROCEDURE — 83036 HEMOGLOBIN GLYCOSYLATED A1C: CPT | Performed by: PSYCHIATRY & NEUROLOGY

## 2025-05-06 PROCEDURE — G0480 DRUG TEST DEF 1-7 CLASSES: HCPCS | Performed by: EMERGENCY MEDICINE

## 2025-05-06 PROCEDURE — 36415 COLL VENOUS BLD VENIPUNCTURE: CPT | Performed by: EMERGENCY MEDICINE

## 2025-05-06 PROCEDURE — 93005 ELECTROCARDIOGRAM TRACING: CPT | Performed by: PSYCHIATRY & NEUROLOGY

## 2025-05-06 PROCEDURE — 12400000 HC ROOM AND CARE SEMIPRIVATE PSYCH

## 2025-05-06 PROCEDURE — 80061 LIPID PANEL: CPT | Performed by: PSYCHIATRY & NEUROLOGY

## 2025-05-06 PROCEDURE — 84443 ASSAY THYROID STIM HORMONE: CPT | Performed by: PSYCHIATRY & NEUROLOGY

## 2025-05-06 PROCEDURE — 63700000 HC SELF-ADMINISTRABLE DRUG

## 2025-05-06 PROCEDURE — 63700000 HC SELF-ADMINISTRABLE DRUG: Performed by: PSYCHIATRY & NEUROLOGY

## 2025-05-06 PROCEDURE — 80307 DRUG TEST PRSMV CHEM ANLYZR: CPT | Performed by: EMERGENCY MEDICINE

## 2025-05-06 PROCEDURE — 80053 COMPREHEN METABOLIC PANEL: CPT | Performed by: EMERGENCY MEDICINE

## 2025-05-06 RX ORDER — HALOPERIDOL LACTATE 5 MG/ML
5 INJECTION, SOLUTION INTRAMUSCULAR EVERY 6 HOURS PRN
Status: CANCELLED | OUTPATIENT
Start: 2025-05-06

## 2025-05-06 RX ORDER — HYDROXYZINE HYDROCHLORIDE 25 MG/1
50 TABLET, FILM COATED ORAL EVERY 6 HOURS PRN
Status: DISCONTINUED | OUTPATIENT
Start: 2025-05-06 | End: 2025-05-09

## 2025-05-06 RX ORDER — IBUPROFEN 400 MG/1
400 TABLET ORAL EVERY 6 HOURS PRN
Status: DISCONTINUED | OUTPATIENT
Start: 2025-05-06 | End: 2025-05-12 | Stop reason: HOSPADM

## 2025-05-06 RX ORDER — CLONAZEPAM 0.5 MG/1
0.75 TABLET ORAL NIGHTLY
Status: DISCONTINUED | OUTPATIENT
Start: 2025-05-06 | End: 2025-05-08

## 2025-05-06 RX ORDER — DOCUSATE SODIUM 100 MG/1
100 CAPSULE, LIQUID FILLED ORAL 2 TIMES DAILY PRN
Status: DISCONTINUED | OUTPATIENT
Start: 2025-05-06 | End: 2025-05-12 | Stop reason: HOSPADM

## 2025-05-06 RX ORDER — ONDANSETRON 4 MG/1
4 TABLET, ORALLY DISINTEGRATING ORAL EVERY 8 HOURS PRN
Status: DISCONTINUED | OUTPATIENT
Start: 2025-05-06 | End: 2025-05-12 | Stop reason: HOSPADM

## 2025-05-06 RX ORDER — HYDROXYZINE HYDROCHLORIDE 25 MG/1
50 TABLET, FILM COATED ORAL EVERY 6 HOURS PRN
Status: CANCELLED | OUTPATIENT
Start: 2025-05-06

## 2025-05-06 RX ORDER — TRAZODONE HYDROCHLORIDE 50 MG/1
50 TABLET ORAL NIGHTLY PRN
Status: DISCONTINUED | OUTPATIENT
Start: 2025-05-06 | End: 2025-05-08

## 2025-05-06 RX ORDER — LORAZEPAM 1 MG/1
1 TABLET ORAL EVERY 4 HOURS PRN
Status: CANCELLED | OUTPATIENT
Start: 2025-05-06

## 2025-05-06 RX ORDER — DOCUSATE SODIUM 100 MG/1
100 CAPSULE, LIQUID FILLED ORAL 2 TIMES DAILY PRN
Status: CANCELLED | OUTPATIENT
Start: 2025-05-06

## 2025-05-06 RX ORDER — HALOPERIDOL LACTATE 5 MG/ML
5 INJECTION, SOLUTION INTRAMUSCULAR EVERY 6 HOURS PRN
Status: DISCONTINUED | OUTPATIENT
Start: 2025-05-06 | End: 2025-05-12 | Stop reason: HOSPADM

## 2025-05-06 RX ORDER — LORAZEPAM 1 MG/1
2 TABLET ORAL EVERY 4 HOURS PRN
Status: CANCELLED | OUTPATIENT
Start: 2025-05-06

## 2025-05-06 RX ORDER — DIPHENHYDRAMINE HCL 50 MG/ML
50 VIAL (ML) INJECTION EVERY 6 HOURS PRN
Status: CANCELLED | OUTPATIENT
Start: 2025-05-06

## 2025-05-06 RX ORDER — ACETAMINOPHEN 325 MG/1
650 TABLET ORAL EVERY 4 HOURS PRN
Status: DISCONTINUED | OUTPATIENT
Start: 2025-05-06 | End: 2025-05-12 | Stop reason: HOSPADM

## 2025-05-06 RX ORDER — TRAZODONE HYDROCHLORIDE 50 MG/1
50 TABLET ORAL NIGHTLY PRN
Status: CANCELLED | OUTPATIENT
Start: 2025-05-06

## 2025-05-06 RX ORDER — ACETAMINOPHEN 325 MG/1
650 TABLET ORAL EVERY 4 HOURS PRN
Status: CANCELLED | OUTPATIENT
Start: 2025-05-06

## 2025-05-06 RX ORDER — SUMATRIPTAN SUCCINATE 100 MG/1
100 TABLET ORAL DAILY PRN
Status: DISCONTINUED | OUTPATIENT
Start: 2025-05-06 | End: 2025-05-12 | Stop reason: HOSPADM

## 2025-05-06 RX ORDER — DIPHENHYDRAMINE HCL 25 MG
25 CAPSULE ORAL EVERY 6 HOURS PRN
Status: CANCELLED | OUTPATIENT
Start: 2025-05-06

## 2025-05-06 RX ORDER — LORAZEPAM 2 MG/1
2 TABLET ORAL EVERY 4 HOURS PRN
Status: DISCONTINUED | OUTPATIENT
Start: 2025-05-06 | End: 2025-05-09

## 2025-05-06 RX ORDER — DIPHENHYDRAMINE HCL 50 MG/ML
50 VIAL (ML) INJECTION EVERY 6 HOURS PRN
Status: DISCONTINUED | OUTPATIENT
Start: 2025-05-06 | End: 2025-05-09

## 2025-05-06 RX ORDER — HALOPERIDOL 5 MG/1
5 TABLET ORAL EVERY 6 HOURS PRN
Status: CANCELLED | OUTPATIENT
Start: 2025-05-06

## 2025-05-06 RX ORDER — HALOPERIDOL 5 MG/1
5 TABLET ORAL EVERY 6 HOURS PRN
Status: DISCONTINUED | OUTPATIENT
Start: 2025-05-06 | End: 2025-05-12 | Stop reason: HOSPADM

## 2025-05-06 RX ORDER — LORAZEPAM 1 MG/1
1 TABLET ORAL EVERY 4 HOURS PRN
Status: DISCONTINUED | OUTPATIENT
Start: 2025-05-06 | End: 2025-05-09

## 2025-05-06 RX ORDER — ONDANSETRON 4 MG/1
4 TABLET, ORALLY DISINTEGRATING ORAL EVERY 8 HOURS PRN
Status: CANCELLED | OUTPATIENT
Start: 2025-05-06

## 2025-05-06 RX ORDER — DIPHENHYDRAMINE HCL 25 MG
25 CAPSULE ORAL EVERY 6 HOURS PRN
Status: DISCONTINUED | OUTPATIENT
Start: 2025-05-06 | End: 2025-05-12 | Stop reason: HOSPADM

## 2025-05-06 RX ORDER — LORAZEPAM 1 MG/1
1 TABLET ORAL ONCE
Status: COMPLETED | OUTPATIENT
Start: 2025-05-06 | End: 2025-05-06

## 2025-05-06 RX ADMIN — CLONAZEPAM 0.75 MG: 0.5 TABLET ORAL at 22:48

## 2025-05-06 RX ADMIN — LORAZEPAM 1 MG: 1 TABLET ORAL at 14:51

## 2025-05-07 LAB
ATRIAL RATE: 59
P AXIS: 76
PR INTERVAL: 172
QRS DURATION: 86
QT INTERVAL: 420
QTC CALCULATION(BAZETT): 415
R AXIS: 70
T WAVE AXIS: 67
VENTRICULAR RATE: 59

## 2025-05-07 PROCEDURE — 99999 PR OFFICE/OUTPT VISIT,PROCEDURE ONLY: CPT | Performed by: STUDENT IN AN ORGANIZED HEALTH CARE EDUCATION/TRAINING PROGRAM

## 2025-05-07 PROCEDURE — 63700000 HC SELF-ADMINISTRABLE DRUG: Performed by: PSYCHIATRY & NEUROLOGY

## 2025-05-07 PROCEDURE — 200200 PR NO CHARGE

## 2025-05-07 PROCEDURE — 93010 ELECTROCARDIOGRAM REPORT: CPT | Performed by: INTERNAL MEDICINE

## 2025-05-07 PROCEDURE — 99223 1ST HOSP IP/OBS HIGH 75: CPT | Performed by: STUDENT IN AN ORGANIZED HEALTH CARE EDUCATION/TRAINING PROGRAM

## 2025-05-07 PROCEDURE — 12400000 HC ROOM AND CARE SEMIPRIVATE PSYCH

## 2025-05-07 PROCEDURE — 90792 PSYCH DIAG EVAL W/MED SRVCS: CPT

## 2025-05-07 PROCEDURE — 63700000 HC SELF-ADMINISTRABLE DRUG

## 2025-05-07 RX ORDER — DULOXETIN HYDROCHLORIDE 20 MG/1
20 CAPSULE, DELAYED RELEASE ORAL DAILY
Status: DISCONTINUED | OUTPATIENT
Start: 2025-05-07 | End: 2025-05-12 | Stop reason: HOSPADM

## 2025-05-07 RX ORDER — ESCITALOPRAM OXALATE 5 MG/1
5 TABLET ORAL DAILY
Status: DISCONTINUED | OUTPATIENT
Start: 2025-05-07 | End: 2025-05-07

## 2025-05-07 RX ORDER — ARIPIPRAZOLE 2 MG/1
2 TABLET ORAL DAILY
Status: DISCONTINUED | OUTPATIENT
Start: 2025-05-07 | End: 2025-05-12 | Stop reason: HOSPADM

## 2025-05-07 RX ADMIN — CLONAZEPAM 0.75 MG: 0.5 TABLET ORAL at 21:08

## 2025-05-07 RX ADMIN — ARIPIPRAZOLE 2 MG: 2 TABLET ORAL at 14:03

## 2025-05-07 RX ADMIN — DULOXETINE HYDROCHLORIDE 20 MG: 20 CAPSULE, DELAYED RELEASE PELLETS ORAL at 15:36

## 2025-05-08 PROBLEM — F33.2 SEVERE EPISODE OF RECURRENT MAJOR DEPRESSIVE DISORDER, WITHOUT PSYCHOTIC FEATURES (CMS/HCC): Status: RESOLVED | Noted: 2019-01-09 | Resolved: 2025-05-08

## 2025-05-08 PROBLEM — F43.21 ADJUSTMENT DISORDER WITH DEPRESSED MOOD: Status: ACTIVE | Noted: 2025-05-08

## 2025-05-08 PROCEDURE — 12400000 HC ROOM AND CARE SEMIPRIVATE PSYCH

## 2025-05-08 PROCEDURE — 63700000 HC SELF-ADMINISTRABLE DRUG: Performed by: PSYCHIATRY & NEUROLOGY

## 2025-05-08 PROCEDURE — 99232 SBSQ HOSP IP/OBS MODERATE 35: CPT | Performed by: PSYCHIATRY & NEUROLOGY

## 2025-05-08 PROCEDURE — 63700000 HC SELF-ADMINISTRABLE DRUG

## 2025-05-08 RX ORDER — ESCITALOPRAM OXALATE 10 MG/1
7.5 TABLET ORAL DAILY
COMMUNITY
End: 2025-05-12 | Stop reason: HOSPADM

## 2025-05-08 RX ORDER — CLONAZEPAM 0.5 MG/1
0.5 TABLET ORAL NIGHTLY
Status: DISCONTINUED | OUTPATIENT
Start: 2025-05-08 | End: 2025-05-12 | Stop reason: HOSPADM

## 2025-05-08 RX ORDER — SENNOSIDES 8.6 MG/1
1 TABLET ORAL DAILY
Status: DISCONTINUED | OUTPATIENT
Start: 2025-05-09 | End: 2025-05-10

## 2025-05-08 RX ORDER — TRAZODONE HYDROCHLORIDE 50 MG/1
50 TABLET ORAL NIGHTLY
Status: DISCONTINUED | OUTPATIENT
Start: 2025-05-08 | End: 2025-05-09

## 2025-05-08 RX ADMIN — TRAZODONE HYDROCHLORIDE 50 MG: 50 TABLET ORAL at 21:43

## 2025-05-08 RX ADMIN — ARIPIPRAZOLE 2 MG: 2 TABLET ORAL at 09:25

## 2025-05-08 RX ADMIN — DULOXETINE HYDROCHLORIDE 20 MG: 20 CAPSULE, DELAYED RELEASE PELLETS ORAL at 09:25

## 2025-05-08 RX ADMIN — CLONAZEPAM 0.5 MG: 0.5 TABLET ORAL at 21:43

## 2025-05-09 PROCEDURE — 63700000 HC SELF-ADMINISTRABLE DRUG: Performed by: PSYCHIATRY & NEUROLOGY

## 2025-05-09 PROCEDURE — 99232 SBSQ HOSP IP/OBS MODERATE 35: CPT | Performed by: PSYCHIATRY & NEUROLOGY

## 2025-05-09 PROCEDURE — 63700000 HC SELF-ADMINISTRABLE DRUG

## 2025-05-09 PROCEDURE — 12400000 HC ROOM AND CARE SEMIPRIVATE PSYCH

## 2025-05-09 RX ORDER — HYDROXYZINE HYDROCHLORIDE 25 MG/1
25 TABLET, FILM COATED ORAL EVERY 6 HOURS PRN
Status: DISCONTINUED | OUTPATIENT
Start: 2025-05-09 | End: 2025-05-09

## 2025-05-09 RX ORDER — HYDROXYZINE HYDROCHLORIDE 25 MG/1
25 TABLET, FILM COATED ORAL EVERY 6 HOURS PRN
Status: DISCONTINUED | OUTPATIENT
Start: 2025-05-09 | End: 2025-05-12 | Stop reason: HOSPADM

## 2025-05-09 RX ORDER — TRAZODONE HYDROCHLORIDE 100 MG/1
100 TABLET ORAL NIGHTLY
Status: DISCONTINUED | OUTPATIENT
Start: 2025-05-09 | End: 2025-05-12 | Stop reason: HOSPADM

## 2025-05-09 RX ADMIN — DULOXETINE HYDROCHLORIDE 20 MG: 20 CAPSULE, DELAYED RELEASE PELLETS ORAL at 08:00

## 2025-05-09 RX ADMIN — SENNOSIDES 1 TABLET: 8.6 TABLET, FILM COATED ORAL at 08:00

## 2025-05-09 RX ADMIN — DOCUSATE SODIUM 100 MG: 100 CAPSULE, LIQUID FILLED ORAL at 17:20

## 2025-05-09 RX ADMIN — CLONAZEPAM 0.5 MG: 0.5 TABLET ORAL at 21:56

## 2025-05-09 RX ADMIN — TRAZODONE HYDROCHLORIDE 100 MG: 100 TABLET ORAL at 21:56

## 2025-05-09 RX ADMIN — ARIPIPRAZOLE 2 MG: 2 TABLET ORAL at 08:00

## 2025-05-10 PROCEDURE — 63700000 HC SELF-ADMINISTRABLE DRUG: Performed by: PSYCHIATRY & NEUROLOGY

## 2025-05-10 PROCEDURE — 63700000 HC SELF-ADMINISTRABLE DRUG

## 2025-05-10 PROCEDURE — 99231 SBSQ HOSP IP/OBS SF/LOW 25: CPT | Performed by: PSYCHIATRY & NEUROLOGY

## 2025-05-10 PROCEDURE — 12400000 HC ROOM AND CARE SEMIPRIVATE PSYCH

## 2025-05-10 RX ORDER — SENNOSIDES 8.6 MG/1
1 TABLET ORAL EVERY 12 HOURS
Status: DISCONTINUED | OUTPATIENT
Start: 2025-05-10 | End: 2025-05-12 | Stop reason: HOSPADM

## 2025-05-10 RX ORDER — POLYETHYLENE GLYCOL 3350 17 G/17G
17 POWDER, FOR SOLUTION ORAL DAILY PRN
Status: DISCONTINUED | OUTPATIENT
Start: 2025-05-10 | End: 2025-05-12 | Stop reason: HOSPADM

## 2025-05-10 RX ADMIN — DOCUSATE SODIUM 100 MG: 100 CAPSULE, LIQUID FILLED ORAL at 09:01

## 2025-05-10 RX ADMIN — DULOXETINE HYDROCHLORIDE 20 MG: 20 CAPSULE, DELAYED RELEASE PELLETS ORAL at 09:01

## 2025-05-10 RX ADMIN — SENNOSIDES 1 TABLET: 8.6 TABLET, FILM COATED ORAL at 09:01

## 2025-05-10 RX ADMIN — TRAZODONE HYDROCHLORIDE 100 MG: 100 TABLET ORAL at 22:09

## 2025-05-10 RX ADMIN — DOCUSATE SODIUM 100 MG: 100 CAPSULE, LIQUID FILLED ORAL at 22:09

## 2025-05-10 RX ADMIN — CLONAZEPAM 0.5 MG: 0.5 TABLET ORAL at 22:09

## 2025-05-10 RX ADMIN — SENNOSIDES 1 TABLET: 8.6 TABLET, FILM COATED ORAL at 22:09

## 2025-05-10 RX ADMIN — ARIPIPRAZOLE 2 MG: 2 TABLET ORAL at 09:01

## 2025-05-11 PROCEDURE — 63700000 HC SELF-ADMINISTRABLE DRUG: Performed by: PSYCHIATRY & NEUROLOGY

## 2025-05-11 PROCEDURE — 99231 SBSQ HOSP IP/OBS SF/LOW 25: CPT | Performed by: PSYCHIATRY & NEUROLOGY

## 2025-05-11 PROCEDURE — 63700000 HC SELF-ADMINISTRABLE DRUG

## 2025-05-11 PROCEDURE — 12400000 HC ROOM AND CARE SEMIPRIVATE PSYCH

## 2025-05-11 RX ADMIN — SENNOSIDES 1 TABLET: 8.6 TABLET, FILM COATED ORAL at 22:06

## 2025-05-11 RX ADMIN — TRAZODONE HYDROCHLORIDE 100 MG: 100 TABLET ORAL at 22:07

## 2025-05-11 RX ADMIN — DOCUSATE SODIUM 100 MG: 100 CAPSULE, LIQUID FILLED ORAL at 22:09

## 2025-05-11 RX ADMIN — DOCUSATE SODIUM 100 MG: 100 CAPSULE, LIQUID FILLED ORAL at 08:43

## 2025-05-11 RX ADMIN — CLONAZEPAM 0.5 MG: 0.5 TABLET ORAL at 22:06

## 2025-05-11 RX ADMIN — POLYETHYLENE GLYCOL 3350 17 G: 17 POWDER, FOR SOLUTION ORAL at 08:43

## 2025-05-11 RX ADMIN — SENNOSIDES 1 TABLET: 8.6 TABLET, FILM COATED ORAL at 08:43

## 2025-05-11 RX ADMIN — ARIPIPRAZOLE 2 MG: 2 TABLET ORAL at 08:43

## 2025-05-11 RX ADMIN — DULOXETINE HYDROCHLORIDE 20 MG: 20 CAPSULE, DELAYED RELEASE PELLETS ORAL at 08:43

## 2025-05-12 ENCOUNTER — TELEPHONE (OUTPATIENT)
Dept: PSYCHIATRY | Facility: HOSPITAL | Age: 75
End: 2025-05-12
Payer: MEDICARE

## 2025-05-12 VITALS
RESPIRATION RATE: 16 BRPM | DIASTOLIC BLOOD PRESSURE: 49 MMHG | BODY MASS INDEX: 18.02 KG/M2 | OXYGEN SATURATION: 95 % | SYSTOLIC BLOOD PRESSURE: 106 MMHG | WEIGHT: 105.56 LBS | HEIGHT: 64 IN | TEMPERATURE: 97.5 F | HEART RATE: 67 BPM

## 2025-05-12 PROCEDURE — 63700000 HC SELF-ADMINISTRABLE DRUG: Performed by: PSYCHIATRY & NEUROLOGY

## 2025-05-12 PROCEDURE — 63700000 HC SELF-ADMINISTRABLE DRUG

## 2025-05-12 PROCEDURE — 99239 HOSP IP/OBS DSCHRG MGMT >30: CPT | Performed by: PSYCHIATRY & NEUROLOGY

## 2025-05-12 RX ORDER — TRAZODONE HYDROCHLORIDE 100 MG/1
100 TABLET ORAL NIGHTLY
Qty: 30 TABLET | Refills: 0 | Status: SHIPPED
Start: 2025-05-12 | End: 2025-06-11

## 2025-05-12 RX ORDER — ARIPIPRAZOLE 2 MG/1
2 TABLET ORAL DAILY
Qty: 30 TABLET | Refills: 0 | Status: SHIPPED
Start: 2025-05-12 | End: 2025-06-11

## 2025-05-12 RX ORDER — DULOXETIN HYDROCHLORIDE 20 MG/1
20 CAPSULE, DELAYED RELEASE ORAL DAILY
Qty: 30 CAPSULE | Refills: 0 | Status: SHIPPED
Start: 2025-05-13 | End: 2025-06-12

## 2025-05-12 RX ORDER — SUMATRIPTAN SUCCINATE 100 MG/1
100 TABLET ORAL DAILY PRN
Qty: 12 TABLET | Refills: 0 | Status: SHIPPED
Start: 2025-05-12 | End: 2025-06-11

## 2025-05-12 RX ADMIN — DULOXETINE HYDROCHLORIDE 20 MG: 20 CAPSULE, DELAYED RELEASE PELLETS ORAL at 09:00

## 2025-05-12 RX ADMIN — SENNOSIDES 1 TABLET: 8.6 TABLET, FILM COATED ORAL at 09:00

## 2025-05-12 RX ADMIN — ARIPIPRAZOLE 2 MG: 2 TABLET ORAL at 09:00

## 2025-05-12 RX ADMIN — DOCUSATE SODIUM 100 MG: 100 CAPSULE, LIQUID FILLED ORAL at 09:00

## 2025-05-13 ENCOUNTER — TELEMEDICINE (OUTPATIENT)
Dept: PSYCHIATRY | Facility: HOSPITAL | Age: 75
End: 2025-05-13
Attending: COUNSELOR
Payer: MEDICARE

## 2025-05-13 ENCOUNTER — APPOINTMENT (OUTPATIENT)
Dept: PSYCHIATRY | Facility: HOSPITAL | Age: 75
End: 2025-05-13
Attending: PSYCHIATRY & NEUROLOGY
Payer: MEDICARE

## 2025-05-13 ENCOUNTER — DOCUMENTATION (OUTPATIENT)
Dept: PSYCHIATRY | Facility: HOSPITAL | Age: 75
End: 2025-05-13
Payer: MEDICARE

## 2025-05-13 VITALS
OXYGEN SATURATION: 97 % | SYSTOLIC BLOOD PRESSURE: 91 MMHG | DIASTOLIC BLOOD PRESSURE: 57 MMHG | HEART RATE: 97 BPM | RESPIRATION RATE: 18 BRPM

## 2025-05-13 DIAGNOSIS — F33.2 SEVERE EPISODE OF RECURRENT MAJOR DEPRESSIVE DISORDER, WITHOUT PSYCHOTIC FEATURES (CMS/HCC): Primary | ICD-10-CM

## 2025-05-13 DIAGNOSIS — F41.1 GENERALIZED ANXIETY DISORDER: ICD-10-CM

## 2025-05-13 DIAGNOSIS — F41.0 PANIC DISORDER: ICD-10-CM

## 2025-05-15 ENCOUNTER — OFFICE VISIT (OUTPATIENT)
Dept: PSYCHIATRY | Facility: HOSPITAL | Age: 75
End: 2025-05-15
Attending: PSYCHIATRY & NEUROLOGY
Payer: MEDICARE

## 2025-05-15 DIAGNOSIS — F33.2 SEVERE EPISODE OF RECURRENT MAJOR DEPRESSIVE DISORDER, WITHOUT PSYCHOTIC FEATURES (CMS/HCC): Primary | ICD-10-CM

## 2025-05-15 DIAGNOSIS — F41.1 GENERALIZED ANXIETY DISORDER: ICD-10-CM

## 2025-05-15 DIAGNOSIS — F41.0 PANIC DISORDER: ICD-10-CM

## 2025-05-15 PROCEDURE — G0463 HOSPITAL OUTPT CLINIC VISIT: HCPCS | Mod: 95 | Performed by: PSYCHIATRY & NEUROLOGY

## 2025-05-19 ENCOUNTER — TELEMEDICINE (OUTPATIENT)
Dept: PSYCHIATRY | Facility: HOSPITAL | Age: 75
End: 2025-05-19
Attending: SOCIAL WORKER
Payer: MEDICARE

## 2025-05-19 DIAGNOSIS — F41.0 PANIC DISORDER: ICD-10-CM

## 2025-05-19 DIAGNOSIS — F41.1 GENERALIZED ANXIETY DISORDER: ICD-10-CM

## 2025-05-19 DIAGNOSIS — F33.2 SEVERE EPISODE OF RECURRENT MAJOR DEPRESSIVE DISORDER, WITHOUT PSYCHOTIC FEATURES (CMS/HCC): Primary | ICD-10-CM

## 2025-05-19 PROCEDURE — 90834 PSYTX W PT 45 MINUTES: CPT | Mod: 95 | Performed by: SOCIAL WORKER

## 2025-05-20 ENCOUNTER — GROUP VISIT (OUTPATIENT)
Dept: PSYCHIATRY | Facility: HOSPITAL | Age: 75
End: 2025-05-20
Attending: COUNSELOR
Payer: MEDICARE

## 2025-05-20 DIAGNOSIS — F33.2 SEVERE EPISODE OF RECURRENT MAJOR DEPRESSIVE DISORDER, WITHOUT PSYCHOTIC FEATURES (CMS/HCC): Primary | ICD-10-CM

## 2025-05-20 DIAGNOSIS — F41.1 GENERALIZED ANXIETY DISORDER: ICD-10-CM

## 2025-05-20 DIAGNOSIS — F41.0 PANIC DISORDER: ICD-10-CM

## 2025-05-20 PROCEDURE — 90853 GROUP PSYCHOTHERAPY: CPT

## 2025-05-27 ENCOUNTER — GROUP VISIT (OUTPATIENT)
Dept: PSYCHIATRY | Facility: HOSPITAL | Age: 75
End: 2025-05-27
Attending: COUNSELOR
Payer: MEDICARE

## 2025-05-27 DIAGNOSIS — F41.0 PANIC DISORDER: ICD-10-CM

## 2025-05-27 DIAGNOSIS — F33.2 SEVERE EPISODE OF RECURRENT MAJOR DEPRESSIVE DISORDER, WITHOUT PSYCHOTIC FEATURES (CMS/HCC): Primary | ICD-10-CM

## 2025-05-27 DIAGNOSIS — F41.1 GENERALIZED ANXIETY DISORDER: ICD-10-CM

## 2025-05-27 PROCEDURE — 90853 GROUP PSYCHOTHERAPY: CPT | Performed by: COUNSELOR

## 2025-05-30 ENCOUNTER — OFFICE VISIT (OUTPATIENT)
Dept: PSYCHIATRY | Facility: HOSPITAL | Age: 75
End: 2025-05-30
Attending: SOCIAL WORKER
Payer: MEDICARE

## 2025-05-30 DIAGNOSIS — F41.1 GENERALIZED ANXIETY DISORDER: ICD-10-CM

## 2025-05-30 DIAGNOSIS — F33.2 SEVERE EPISODE OF RECURRENT MAJOR DEPRESSIVE DISORDER, WITHOUT PSYCHOTIC FEATURES (CMS/HCC): Primary | ICD-10-CM

## 2025-05-30 DIAGNOSIS — F41.0 PANIC DISORDER: ICD-10-CM

## 2025-05-30 PROCEDURE — 90834 PSYTX W PT 45 MINUTES: CPT | Mod: 95 | Performed by: SOCIAL WORKER

## 2025-05-30 PROCEDURE — 90834 PSYTX W PT 45 MINUTES: CPT | Performed by: SOCIAL WORKER

## 2025-06-03 ENCOUNTER — GROUP VISIT (OUTPATIENT)
Dept: PSYCHIATRY | Facility: HOSPITAL | Age: 75
End: 2025-06-03
Attending: COUNSELOR
Payer: MEDICARE

## 2025-06-03 DIAGNOSIS — F41.1 GENERALIZED ANXIETY DISORDER: ICD-10-CM

## 2025-06-03 DIAGNOSIS — F33.2 SEVERE EPISODE OF RECURRENT MAJOR DEPRESSIVE DISORDER, WITHOUT PSYCHOTIC FEATURES (CMS/HCC): Primary | ICD-10-CM

## 2025-06-03 DIAGNOSIS — F41.0 PANIC DISORDER: ICD-10-CM

## 2025-06-03 PROCEDURE — 90853 GROUP PSYCHOTHERAPY: CPT | Performed by: COUNSELOR

## 2025-06-03 ASSESSMENT — COGNITIVE AND FUNCTIONAL STATUS - GENERAL
AFFECT: RESTRICTED
INSIGHT: IMPAIRED, MINIMALLY
JUDGEMENT: FAIR
MOOD: EUTHYMIC (NORMAL)
APPEARANCE: WELL GROOMED
PSYCHOMOTOR FUNCTIONING: WNL
EYE_CONTACT: WNL
POSITIVE INVOLVEMENT: INTERESTED
THOUGHT_PROCESS: WNL
NEGATIVE INVOLVEMENT: GUARDED

## 2025-06-03 NOTE — GROUP NOTE
"[facility] DBT OP Group    Date:  6/3/2025  Start Time:   9:00 AM  End Time:  10:40 AM  Number of Attendees:5   Group Focus: Goal of group was introduce skill of the day and confirm homework assignment due for next group    Stephanie Jeffers, YOB: 1950, was quiet but attentive.    DBT OP Skills Group Session   Session 13: Emotion Regulation - Problem Solving and Review    During homework review, patients disclosed struggling with pulling together the skill of problem solving.  New skill (Session 14) was postponed to the following week in order to revisit Naming and Describing Emotions, Checking the Facts, Opposite Action and Problem Solving.  Pt's were provided a worksheet on each skill and homework between groups is to revisit their prompting event and name and describe their emotion, complete checking the facts, and then enacting OA or PS based on results.  LPC will teach Session 14 and 15 next week on Accumulating Pleasant Emotions and Building Mastery.     Topic of the curriculum was to Decide What to Do in response to an intense emotion.  Facilitator led group in a mindfulness observe practice by engaging in a game of Name That Tune.  Group was encouraged to describe their observations in participating in this game. Homework from the previous session was reviewed and discussed as a group on Emotion Regulation Worksheets 7 and 8.   Following the review of Homework, the topic of the day was introduced, guided by Emotion Regulation Handouts 9. 12, and 13 from Arti Mark's \"DBT Skills Training - Handouts and Worksheets\" Second Edition.  Facilitator re- introduced the decision tree mapped in Emotion Regulation Handout 9 on Opposite Action and Problem Solving. Mapped the step-by-step details on deciding a course of action based on naming and describing an emotion then checking the facts in order to make a wise mind decision.  Reviewed Handout ER Handout 12 on doing Problem Solving when emotions fit " the facts.  Step by step, group used examples to apply Problem Solving.  Using Emotion Regulation Handout 13, Facilitator reviewed how to decide between opposite action or problem solving in response to emotions.  Group members were oriented to the assigned homework for the following group (Emotion Regulation Worksheets 7 and 8) to use opposite action and problem solving. Opportunity for participants to ask clarifying questions and receive answers were provided. Group wind down exercise was to answer If you could have front row seats to any concert ever, what would you choose?    Mental Status:  Findings  Mood: Euthymic (normal)  Affect: Restricted  Rapport: Appropriate  Eye Contact: WNL  Appearance: Well Groomed  Psychomotor Functioning: WNL  Thought Process: WNL  Positive Involvement: Interested  Negative Involvement: Guarded  Judgment: Fair  Insight: Impaired, minimally    Assessment/Observations:  Stephanie completed mindfulness, showed completed homework and did not ask follow up on the skills today but listened attentively as the emotion regulation skills were revisited and reconceptualized.  She had no questions on the homework.     Plan: Stephanie to F/U with Dialectical Behavioral Therapy to translate skills to all relevant contexts between groups.  Pt to F/U with treatment goals as outlined in treatment plan.  Pt to F/U with local ED/call 911 should SI/HI arises.    Ruby Stacy, LPC

## 2025-06-06 ENCOUNTER — TELEPHONE (OUTPATIENT)
Dept: PSYCHIATRY | Facility: HOSPITAL | Age: 75
End: 2025-06-06
Payer: MEDICARE

## 2025-06-06 ENCOUNTER — TELEMEDICINE (OUTPATIENT)
Dept: PSYCHIATRY | Facility: HOSPITAL | Age: 75
End: 2025-06-06
Attending: SOCIAL WORKER
Payer: MEDICARE

## 2025-06-06 DIAGNOSIS — F41.1 GENERALIZED ANXIETY DISORDER: ICD-10-CM

## 2025-06-06 DIAGNOSIS — F41.0 PANIC DISORDER: ICD-10-CM

## 2025-06-06 DIAGNOSIS — F33.2 SEVERE EPISODE OF RECURRENT MAJOR DEPRESSIVE DISORDER, WITHOUT PSYCHOTIC FEATURES (CMS/HCC): Primary | ICD-10-CM

## 2025-06-06 ASSESSMENT — COGNITIVE AND FUNCTIONAL STATUS - GENERAL
SPEECH: REGULAR
EST. PREMORBID INTELLIGENCE: UNABLE TO ASSESS
REMOTE MEMORY: UNABLE TO ASSESS
EYE_CONTACT: WNL
IMPULSE CONTROL: INTACT
DELUSIONS: NONE OR AGE APPROPRIATE
THOUGHT_CONTENT: APPROPRIATE
PSYCHOMOTOR FUNCTIONING: WNL
INSIGHT: IMPAIRED, MINIMALLY
APPETITE: NO CHANGE
RECENT MEMORY: WNL
APPEARANCE: WELL GROOMED
LIBIDO: NON-CONTRIBUTORY
AFFECT: TENSE
ATTENTION: WNL
AROUSAL LEVEL: ALERT
PERCEPTUAL FUNCTION: NORMAL
ORIENTATION: FULLY ORIENTED
THOUGHT_PROCESS: WNL
CONCENTRATION: WNL
SLEEP_WAKE_CYCLE: NO CHANGE

## 2025-06-06 NOTE — PROGRESS NOTES
"Request for Consent  Patient provided consent to treat via telehealth technology.Patient understands the telehealth visit will be billed to their insurance or patient directly.  Patient was informed only the patient and the clinician are permitted on the telehealth visit, visits are not recorded by the clinician, and the patient is not permitted to record the visit. Patient was provided information on how to access HIPAA compliant platform. Clinician confirmed identification of patient by name and birthdate, provider name, location of patient in [place], and callback number in case disconnected. Patient informed of the right to choose the form of care delivery, including the right to refuse a telehealth visit.     Patient Response to Request for Consent: Yes  Telehealth Platform utilized: Epic Video Client  Visit Type performed: Audio and Video  The patient is at home: Yes  The patient is in [place]:   yes  Those who participated in the encounter: Patient and Provider  Patient Call back number: [phone]   Stephanie Jeffers is a 74 y.o. female who presents for Follow-up.     Presenting Problem:  Health Anxiety, catastrophic thinking    Mental Status Exam:  Arousal Level: Alert  Appearance: Well Groomed  Speech: Regular  Psychomotor Functioning: WNL  Eye Contact: WNL  Est. Premorbid Intelligence: Unable to assess  Orientation: Fully oriented  Attention: WNL  Concentration: WNL  Recent Memory: WNL  Remote Memory: Unable to Assess  Thought Content: Appropriate  Thought Process: WNL  Insight: Impaired, minimally  Perceptual Function: Normal  Delusions: None or age appropriate  Sleeping: No Change  Appetite: No Change  Libido: Non-Contributory  Affect: Tense  Mood: Hopeless, Frustrated, Anxious     Caroline Suicide Severity Rating Scale: Not indicated          Assessment: Met with pt for virtual session. She presented in an distressed mood. She shared \"Im not good today, the tremors are driving me crazy\". " "She reports \"I go to exercise class, I go to DBT, and I go to the store, and that's it. I want to spend the rest of the time in bed. My health feels terrible, everything shakes, I'm afraid of what's going on\". She shared that she is waiting for a Neurology appt but its not until the end of July, and is working with her Psychiatrist to adjust medications, but feels hopeless about it. Reviewed with patient her catastrophic thoughts and their impact on the tremors, and the neuroscience of challenging the thoughts to create coping statements, to change mood. Used Thought Record to challenge her feelings of \"scared, fearful, hopelss, loss of control of my body, fear this will be permanently disabling\". She shared she tells herself \"oh my god, this is never going to go away, this is so uncomfortable, what if I have Parkinson's, I don't want to live this way\". She told of her Father having Parkinsons and wanting to get neurological eval. Was able to recognize that she has not been told any definitive diagnosis or death threat, that it is possible to get diagnosed and treatment could work, it could be simply temporary. Created three statements that are alternative, positive counterstatements similar to \"Despite the tremors, I can learn to cope with this calmly by not giving up and being kind to myself because Im a survivor\". Pt edited statements, will write them on cards and was encouraged to repeat them out loud, in mirror, and several times per day throughout the day especially when triggered. Talked about breathing and imagining healing calm energy into hands. Will return in one week.    Suicide Risk Assessment Not indicated for this patient.  Plan:    Patient to F/U with Weekly psychotherapy for 45 minutes each session.  Patient to F/U with outside provider for Psychiatry.  Patient to F/U with DBT IOP 3 days a week, 3 hours per day, for approximately 6 weeks, to adopt skill translation to all relevant " contexts.    Patient to F/U with treatment goals as outlined in treatment plan.  Patient to F/U with local ED or call 911 should SI/HI arise.        Visit Diagnosis:    ICD-10-CM ICD-9-CM   1. Severe episode of recurrent major depressive disorder, without psychotic features (CMS/HCC)  F33.2 296.33   2. Generalized anxiety disorder  F41.1 300.02   3. Panic disorder  F41.0 300.01       Time  Start Time: 1444  End Time: 1528  Total Time: 44  Shannon Valencia LCSW @ 3:42 PM

## 2025-06-10 ENCOUNTER — TELEPHONE (OUTPATIENT)
Dept: RHEUMATOLOGY | Facility: CLINIC | Age: 75
End: 2025-06-10
Payer: MEDICARE

## 2025-06-12 ENCOUNTER — TELEPHONE (OUTPATIENT)
Dept: PSYCHIATRY | Facility: HOSPITAL | Age: 75
End: 2025-06-12
Payer: MEDICARE

## 2025-06-12 ENCOUNTER — DOCUMENTATION (OUTPATIENT)
Dept: PSYCHIATRY | Facility: HOSPITAL | Age: 75
End: 2025-06-12
Payer: MEDICARE

## 2025-06-12 DIAGNOSIS — F33.2 SEVERE EPISODE OF RECURRENT MAJOR DEPRESSIVE DISORDER, WITHOUT PSYCHOTIC FEATURES (CMS/HCC): Primary | ICD-10-CM

## 2025-06-12 DIAGNOSIS — F41.0 PANIC DISORDER: ICD-10-CM

## 2025-06-12 DIAGNOSIS — F41.1 GENERALIZED ANXIETY DISORDER: ICD-10-CM
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
## 2025-06-12 NOTE — TELEPHONE ENCOUNTER
"Called pt who sent [employer] message of her intention to drop from DBT OP groups secondary to ongoing medical appointment conflicts.     \"Im not good, I am bad, I am losing weight, I am going away for a month, I am trembling.  I am down to 102lbs. \" - Attending a treatment center in [facility] at the recommendation of new psychiatrist.      [facility] in [place], [place].      Offered validation and encouragement.  Stephanie understands she will be Dc'ed and was invited to call us if support is needed with referrals.  She was appreciative for the support from [facility] and DBT groups.    Updated team at [facility].  "

## 2025-07-10 ENCOUNTER — TELEPHONE (OUTPATIENT)
Dept: PSYCHIATRY | Facility: HOSPITAL | Age: 75
End: 2025-07-10
Payer: MEDICARE

## 2025-07-10 ENCOUNTER — DOCUMENTATION (OUTPATIENT)
Dept: PSYCHIATRY | Facility: HOSPITAL | Age: 75
End: 2025-07-10
Payer: MEDICARE

## 2025-07-11 ENCOUNTER — TELEPHONE (OUTPATIENT)
Dept: PSYCHIATRY | Facility: HOSPITAL | Age: 75
End: 2025-07-11
Payer: MEDICARE

## 2025-07-11 NOTE — TELEPHONE ENCOUNTER
LSW called RTF Adriana Agrawal [phone].  Pt prefering to step down to OP care: OP IT, Psych and DBT OP group.  RTF typically refers to HLOC after RTF care but her IT and pt agreeing to OP care.  Pt will complete 30 days on Monday & fly to [place].  LSW advise [facility] would recommend pt complete HLOC to attend care at [facility] and ask for records to help figure out next steps.  Adriana back in office Sun/Mon and will send [facility] Tx plan and last progress note.  LSW will follow up with pt after team reviews.  RTF will assist pt getting scheduled with OP IT & Psych before d/c.

## 2025-07-14 ENCOUNTER — TELEPHONE (OUTPATIENT)
Dept: PSYCHIATRY | Facility: HOSPITAL | Age: 75
End: 2025-07-14
Payer: MEDICARE

## 2025-07-14 RX ORDER — ESCITALOPRAM OXALATE 10 MG/1
TABLET ORAL
Qty: 30 TABLET | Refills: 0 | OUTPATIENT
Start: 2025-07-14

## 2025-07-14 NOTE — TELEPHONE ENCOUNTER
Pt returned my call.  LSW explained OP DBT group is closed and would have to wait for her current group to graduate to start.  LSW reported 6 month commitment, pt reports attending for 3 months and possible 3 remaining months.  LSW advise on need to eval to make sure appropriate LOC to start.  LSW offered referrals to outside DBT groups via email, pt agreed.  LSW email:    Anibal Brown,    Here are DBT skill groups in area:    Nereida Cardoso Prefers email: [email or web address], [phone] & website [email or web address]   Located in [place], [place]; Virtual assessments and groups only, is accepting clients, group on Monday nights and typically quick admit. Accepts insurance including Medicare.    DBT Program at [facility] [email or web address]/ & [phone], email: [email or web address] Located in [place], Adult group: Thursdays 1-2:30 PM offered in person or virtual, accepts insurance for individual sessions, $65 per group. limited Medicare slots-year wait, Medicaid no co-pay. Accepting referrals has 6 weeks to 3 months wait to start. Requires referral form completed by current provider.      [facility] website: [email or web address]-dbt Phone (074) [phone] & email: info@[employer].com  Offers comprehensive DBT program for adults. Location: [facility]    DBT Skills Building host Deonna Blackwell at [facility] Phone [phone], website: DBT Skills Group  [facility] email: [email or web address]  Located in [place], in person or virtual & Group held on Fridays at 11am. Insurances: BCBS plans, Aetna, [employer], [employer], [employer] Admin, [facility]/[employer].    [facility]: DBT Skills Group Host: Mike Palmer website: DBT Skills Group [place] -- [facility] Phone: [phone] The group will review the four core DBT skills modules including  mindfulness, interpersonal effectiveness, emotion regulation and distress tolerance. We will be following the 6 month schedule for skills training laid out by DBT's , Arti Mark. The group will start with a review of mindfulness skills, followed by training on the core skills of distress tolerance, interpersonal effectiveness and emotion regulation. Mindfulness skills are an important part of DBT and will be reviewed between modules.  This group will be a closed group while we are reviewing a module but will reopen during mindfulness training depending on space and availability. Please check with your insurance about group coverage since each insurance plan is unique in their coverage. Out of pocket rate is $65/session. Saturdays at 9 AM to 10:30 AM Insurances: Aetna, Cigna, Highmark, Imagine, [employer], & [employer] / Optum    Group DBT Skills Sessions [facility], website https://[email or web address]  Phone [phone]  There are 4 modules of DBT Skills Groups; mindfulness, distress tolerance, emotion regulation and interpersonal effectiveness. Groups run weekly for a 6 month duration. Group is priced at a reduced fee of $30 per session for ongoing individual clients of [facility]. Group fees are $60 per session for clients with providers outside of [facility].    Adult DBT Group host: Fide Latham Phone [phone] & Fide Latham MA, [facility], [facility], Therapy ([email or web address])   Virtual DBT Adult therapy group, meets Tuesdays at 6 PM. Cost is $40 Group & has openings.  Insurances: Aetna, Blue Cross/Blue Shield, Cigna, Highmark, [employer], [facility], [facility], [facility], , [place]/Value Options, Medicare

## 2025-07-25 ENCOUNTER — TELEPHONE (OUTPATIENT)
Dept: SCHEDULING | Facility: CLINIC | Age: 75
End: 2025-07-25
Payer: MEDICARE

## 2025-08-06 ENCOUNTER — APPOINTMENT (OUTPATIENT)
Dept: LAB | Facility: CLINIC | Age: 75
End: 2025-08-06
Attending: INTERNAL MEDICINE
Payer: MEDICARE

## 2025-08-06 DIAGNOSIS — I25.10 ATHEROSCLEROSIS OF NATIVE CORONARY ARTERY OF NATIVE HEART WITHOUT ANGINA PECTORIS: ICD-10-CM

## 2025-08-06 DIAGNOSIS — E78.01 FAMILIAL HYPERCHOLESTEROLEMIA: ICD-10-CM

## 2025-08-06 DIAGNOSIS — E78.41 ELEVATED LP(A): ICD-10-CM

## 2025-08-06 LAB
ALBUMIN SERPL-MCNC: 4.3 G/DL (ref 3.5–5.7)
ALP SERPL-CCNC: 43 IU/L (ref 34–125)
ALT SERPL-CCNC: 17 IU/L (ref 7–52)
AST SERPL-CCNC: 24 IU/L (ref 13–39)
BILIRUB DIRECT SERPL-MCNC: 0.1 MG/DL
BILIRUB SERPL-MCNC: 0.5 MG/DL (ref 0.3–1.2)
CHOLEST SERPL-MCNC: 234 MG/DL
HDLC SERPL-MCNC: 105 MG/DL
HDLC SERPL: 2.2 {RATIO}
LDLC SERPL CALC-MCNC: 112 MG/DL
NONHDLC SERPL-MCNC: 129 MG/DL
PROT SERPL-MCNC: 6.8 G/DL (ref 6–8.2)
TRIGL SERPL-MCNC: 85 MG/DL

## 2025-08-06 PROCEDURE — 36415 COLL VENOUS BLD VENIPUNCTURE: CPT

## 2025-08-06 PROCEDURE — 80061 LIPID PANEL: CPT

## 2025-08-06 PROCEDURE — 80076 HEPATIC FUNCTION PANEL: CPT

## 2025-08-07 ENCOUNTER — OFFICE VISIT (OUTPATIENT)
Dept: CARDIOLOGY | Facility: CLINIC | Age: 75
End: 2025-08-07
Payer: MEDICARE

## 2025-08-07 VITALS
OXYGEN SATURATION: 97 % | WEIGHT: 107 LBS | HEIGHT: 64 IN | SYSTOLIC BLOOD PRESSURE: 110 MMHG | BODY MASS INDEX: 18.27 KG/M2 | DIASTOLIC BLOOD PRESSURE: 70 MMHG | HEART RATE: 73 BPM

## 2025-08-07 DIAGNOSIS — E78.01 FAMILIAL HYPERCHOLESTEROLEMIA: Primary | ICD-10-CM

## 2025-08-07 DIAGNOSIS — R93.1 ELEVATED CORONARY ARTERY CALCIUM SCORE: ICD-10-CM

## 2025-08-07 DIAGNOSIS — Z78.9 STATIN INTOLERANCE: ICD-10-CM

## 2025-08-07 DIAGNOSIS — E78.41 ELEVATED LIPOPROTEIN(A): ICD-10-CM

## 2025-08-07 DIAGNOSIS — I95.1 ORTHOSTATIC HYPOTENSION: ICD-10-CM

## 2025-08-07 LAB
ATRIAL RATE: 71
P AXIS: 69
PR INTERVAL: 164
QRS DURATION: 80
QT INTERVAL: 380
QTC CALCULATION(BAZETT): 412
R AXIS: 81
T WAVE AXIS: 78
VENTRICULAR RATE: 71

## 2025-08-07 PROCEDURE — 99214 OFFICE O/P EST MOD 30 MIN: CPT | Performed by: INTERNAL MEDICINE

## 2025-08-07 PROCEDURE — 93000 ELECTROCARDIOGRAM COMPLETE: CPT | Performed by: INTERNAL MEDICINE

## 2025-08-07 RX ORDER — LAMOTRIGINE 50 MG/1
50 TABLET, EXTENDED RELEASE ORAL
COMMUNITY

## 2025-08-07 RX ORDER — ESCITALOPRAM OXALATE 5 MG/1
5 TABLET ORAL DAILY
COMMUNITY

## 2025-08-07 NOTE — PROGRESS NOTES
Antonio Price MD  Cardiology    [facility] HEART GROUP    [facility]  [facility] [address]  [address] [place] [address]    TEL  [phone]  [facility]     08/07/25     Dear Dr. Wang:    It was my pleasure to see Ms. Stephanie Jeffers at the [facility] today for follow-up.    She was recently admitted to an inpatient psychiatric facility.  She states that her diet was poor during that time.  Lots of fried/high saturated fat foods.  She is now home and feels much better overall.  She is back to her usual lifestyle habits.  She has no cardiopulmonary symptoms at rest or with activity. Specifically, no chest pain or pressure.  No dyspnea at rest or with activity.  No lower extremity edema, orthopnea, PND.  No palpitations or syncope.  She is compliant with Repatha. No side effects.     Cardiovascular History:  1. Familial hypercholesterolemia, elevated Lp(a)  2. Coronary atherosclerosis   3. Orthostatic hypotension    Past Medical History: Migraines, History of HBV    Past Surgical History:  Past Surgical History   Procedure Laterality Date    Cataract extraction Left 11/19/2018    Cataract extraction Right 01/16/2019    Cervical biopsy  w/ loop electrode excision      Cosmetic surgery      Face    LEEP and ECC N/A 1/24/2025    Performed by Ashley Aguilar MD at [facility] OR [facility]    Lumbar epidural injection       Medications:  Current Outpatient Medications   Medication Sig Dispense Refill    cycloSPORINE (RESTASIS) 0.05 % ophthalmic emulsion Administer 1 drop into both eyes 2 (two) times a day.      escitalopram (LEXAPRO) 5 mg tablet Take 5 mg by mouth daily.      evolocumab (REPATHA SURECLICK) 140 mg/mL pen Inject 1 mL (140 mg total) under the skin every 14 (fourteen) days. 6 mL 3    lamoTRIgine (LAMICTAL XR) 50 mg tablet extended release 24hr Take 50 mg by mouth.      linaCLOtide (LINZESS) 290 mcg capsule Take 290 mcg by mouth daily before  breakfast.      SUMAtriptan (IMITREX) 100 mg tablet Take 1 tablet by mouth daily as needed for migraine. Only 1 dose in 24 hours      ARIPiprazole (ABILIFY) 2 mg tablet Take 1 tablet (2 mg total) by mouth daily. 30 tablet 0    clonazePAM (KlonoPIN) 0.5 mg tablet Take 1.5 tablets (0.75 mg total) by mouth nightly. 45 tablet 0    SUMAtriptan (IMITREX) 100 mg tablet Take 1 tablet (100 mg total) by mouth daily as needed for migraine. May repeat dose once in 2hr if no relief. Don't exceed 2 doses in 24hr. 12 tablet 0    traZODone (DESYREL) 100 mg tablet Take 1 tablet (100 mg total) by mouth nightly. 30 tablet 0     No current facility-administered medications for this visit.       Allergies: Sulfa (sulfonamide antibiotics)    Social History: She is .  She has 2 children.  Retired .  She is a never smoker.  Social alcohol use.     Family History: She has a sister who has tetralogy of Fallot. Her brother has no documented coronary artery disease. Her mother  at a young age from complications of lung cancer.  She had hypercholesterolemia.  Her father lived to be 92.  He had a stroke late in life and also had hypercholesterolemia.  No family history of premature coronary artery disease, arrhythmias, cardiomyopathies, or sudden cardiac death.    Review of Systems: A complete 14-point review of systems is negative, except as noted in the HPI.    Exam:   Objective   Vitals:    25 0913   BP: 110/70   Pulse: 73   SpO2: 97%     Wt Readings from Last 3 Encounters:   25 48.5 kg (107 lb)   25 49 kg (108 lb)   25 48.5 kg (107 lb)     Body mass index is 18.37 kg/m².  Constitutional: Appears comfortable.   Eyes: No icterus.    ENT: Deferred.   Neck: No jugular venous distention.   Vascular: No carotid bruits.   Cardiac: Normal S1 and S2, regular rhythm. No murmurs, rubs, or gallops appreciated.  Lungs: Clear to auscultation bilaterally.    GI: Nondistended.  Extremities:  Warm. No lower extremity edema.   Skin: Dry.  Neurologic: Awake, alert, oriented.    Psychiatric: No agitation.    Labs: Personally reviewed and discussed with the patient.  Notable for the following.   Lab Results   Component Value Date    LDLCALC 112 (H) 08/06/2025    CHOL 234 (H) 08/06/2025    TRIG 85 08/06/2025     08/06/2025    HGBA1C 5.6 05/06/2025     05/06/2025    K 3.9 05/06/2025    BUN 23 05/06/2025    CREATININE 0.8 05/06/2025    WBC 7.20 05/06/2025    HGB 13.8 05/06/2025     05/06/2025     Lab Results   Component Value Date    ALT 17 08/06/2025    AST 24 08/06/2025    ALKPHOS 43 08/06/2025    BILITOT 0.5 08/06/2025 1/2022-  , , HDL 75, LDL 87  High-sensitivity CRP 0.9  Sodium 139, potassium 4.3, creatinine 0.9  LFTs within normal limits  TSH within normal limits    6/2019- , TG 82, ,     ECG: Tracing from today personally reviewed and discussed with the patient.  It shows sinus rhythm.    Cardiovascular Studies:   1. Stress echo, 1/2017: No ischemia. Excellent exercise tolerance. Baseline echo- Normal wall motion and excursion. 1+ MR and TR. RVSP 13 mmHg.   2.  CAC, 7/2021: Composite 120 (left main 1, LAD 51, RCA 68).  75-90th percentile.  Mild-moderate atherosclerotic calcification of the aorta.  Otherwise unremarkable noncardiac findings.  3.  Zio, 4/2023: Sinus (73, ). Isolated PAC and PVC. Symptoms corresponded with sinus rhythm.   4.  Stres echo, 5/2023: No ischemia. Excellent exercise capacity. Baseline echo- Normal LV size, thickness, LVEF 60%. No WMAs. Normal RV size/function. Mild MR.       Assessment/Plan     Familial hypercholesterolemia, elevated Lp(a), statin intolerance  She is tolerating Repatha without side effects.  Ezetimibe was discontinued due to LFT abnormalities.  She had significant myalgias on statin therapy.  Her current LDL is higher than I would like long-term, though she reports that her lifestyle habits have been  suboptimal recently.  We discussed starting bempedoic acid.  She prefers to work on lifestyle modification with a repeat lipid profile in a few months.  If her LDL remains above target at that time we will add bempedoic acid.  She has no history of gout.  Potential side effects of the medication were reviewed. She has made first-degree family members aware of her Lp(a) elevation.    Coronary atherosclerosis   Her stress echocardiogram showed no evidence of ischemia.  She has no cardiopulmonary symptoms presently.  She knows to contact me for any new cardiopulmonary symptoms.  Aspirin was previously stopped due to easy bruising.  Lipid management as above.    Orthostatic hypotension  She reports that her blood pressure has been stable. She is asymptomatic. Will continue to monitor.    Tachycardia  She reports that her heart rate has normalized. She believes the tachycardia was related to prior medications.  Heart rate normal today.  She is in sinus rhythm.      It was my pleasure to visit with Stephanie Jeffers in clinic today.  She will follow-up in 6 months.  Please do not hesitate to contact me with any questions.      Sincerely,       ________________  Antonio Price MD

## (undated) DEVICE — DRESSING TELFA 3X4

## (undated) DEVICE — SPONGE RAYTEC 8 X 4 16-PLY

## (undated) DEVICE — SYRINGE DISP LUER-LOK 10 CC

## (undated) DEVICE — SKIN MARKER SURGICAL

## (undated) DEVICE — SUTURE VICRYL 3-0  J497G

## (undated) DEVICE — GLOVE SZ 6.5 PROTEXIS CLASSIC LATEX

## (undated) DEVICE — HEMOSTAT SURGICEL FIBRILLAR 2 X 4

## (undated) DEVICE — TRAY WET SKIN PREP PREMIUM

## (undated) DEVICE — COVER LIGHTHANDLE STERILE BLUE

## (undated) DEVICE — TUBING SMOKE EVAC PENCIL COATED

## (undated) DEVICE — TUBING SMOKE EVAC 3/8"

## (undated) DEVICE — Device

## (undated) DEVICE — PAD PREPPING CUFFED 24X48 NS 100/CS

## (undated) DEVICE — GOWN SIRUS FABRNF RAGLAN XL ST 28/CS

## (undated) DEVICE — SUCTION TUBING CONNECTION 18 INCH

## (undated) DEVICE — PAD GROUND ELECTROSURGICAL W/CORD

## (undated) DEVICE — LOOP ELECTRODEYELLOW

## (undated) DEVICE — NEEDLE SPINAL 22GX3.5

## (undated) DEVICE — LOOP ELECTRODE 15MM X 12MM GREEN

## (undated) DEVICE — ELECTRODE BALL 5MM